# Patient Record
Sex: FEMALE | Race: WHITE | NOT HISPANIC OR LATINO | Employment: PART TIME | URBAN - METROPOLITAN AREA
[De-identification: names, ages, dates, MRNs, and addresses within clinical notes are randomized per-mention and may not be internally consistent; named-entity substitution may affect disease eponyms.]

---

## 2016-03-11 LAB — PHQ9 SCORE: 4

## 2016-05-25 LAB — PHQ9 SCORE: 4

## 2016-06-10 LAB — PHQ9 SCORE: 0

## 2016-07-14 LAB — PHQ9 SCORE: 0

## 2017-03-05 ENCOUNTER — OFFICE VISIT (OUTPATIENT)
Dept: URGENT CARE | Facility: RETAIL CLINIC | Age: 32
End: 2017-03-05
Payer: MEDICARE

## 2017-03-05 VITALS
TEMPERATURE: 98.9 F | SYSTOLIC BLOOD PRESSURE: 129 MMHG | HEART RATE: 98 BPM | DIASTOLIC BLOOD PRESSURE: 86 MMHG | OXYGEN SATURATION: 99 %

## 2017-03-05 DIAGNOSIS — J01.90 ACUTE SINUSITIS WITH SYMPTOMS > 10 DAYS: Primary | ICD-10-CM

## 2017-03-05 PROCEDURE — 99213 OFFICE O/P EST LOW 20 MIN: CPT | Performed by: PHYSICIAN ASSISTANT

## 2017-03-05 NOTE — PATIENT INSTRUCTIONS
Take antibiotic as directed  Apply warm facial compresses/packs for 5-10 minutes three times daily.  Drink plenty of fluids- 6 to 10 glasses of liquids each day. Rest.  Saline drops or nasal sprays as needed.   Steam treatments or humidifier.  Mucinex (guaifenesin) to thin out secretions.  Tylenol or ibuprofen as needed for pain or fever  Follow up at your primary care clinic for increasing pain, high fever, vision changes, worsening symptoms, or no relief from symptoms after 7-10 days.  Please follow up with primary care provider if not improving, worsening or new symptoms or for any adverse reactions to medications.

## 2017-03-05 NOTE — PROGRESS NOTES
Chief Complaint   Patient presents with     Sinus Problem     began as a cold 2 weeks ago; pressure in face, head     Headache     sinus pressure     Cough     productive; 2-3 days        SUBJECTIVE:  Florina Marie is a 31 year old female here with concerns about sinus infection.  She states onset of symptoms were 2 week(s) ago.  Cold sxs prior. She has had maxillary pressure. Course of illness is worsening. Severity moderate  Current and Associated symptoms: nasal congestion, rhinorrhea, cough , facial pain/pressure, headache and post-nasal drainage  Predisposing factors include  recent illness. Recent treatment has included: OTC meds and fliuds    Past Medical History   Diagnosis Date     Attention deficit disorder with hyperactivity(314.01)      Other kyphoscoliosis and scoliosis      Other specified delay in development      Microcephaly     Viral warts, unspecified      plantar warts     Current Outpatient Prescriptions   Medication Sig Dispense Refill     amoxicillin-clavulanate (AUGMENTIN) 875-125 MG per tablet Take 1 tablet by mouth 2 times daily for 10 days 20 tablet 0     desogestrel-ethinyl estradiol (APRI) 0.15-30 MG-MCG per tablet Take 1 tablet by mouth daily Use continuously for 3 months, then use placebo tablets 84 tablet 4     ARIPiprazole (ABILIFY) 10 MG tablet Take 10 mg by mouth daily       clonazePAM (KLONOPIN) 0.5 MG tablet Take 0.5 tablets (0.25 mg) by mouth 2 times daily as needed for anxiety 60 tablet 5          Allergies   Allergen Reactions     No Known Drug Allergies       History   Smoking Status     Never Smoker   Smokeless Tobacco     Never Used     Comment: no smokers in the household       ROS:  Review of systems negative except as stated above.    OBJECTIVE:  /86 (BP Location: Left arm)  Pulse 98  Temp 98.9  F (37.2  C) (Oral)  SpO2 99%  Exam:GENERAL APPEARANCE: healthy, alert and no distress  EYES:  conjunctiva clear  HENT: TM's normal bilaterally, nasal turbinates  erythematous, swollen, thick rhinorrhea yellow, oral mucous membranes moist, no erythema noted, maxillary sinus tenderness. Post nasal drainage noted in the pharynx.  NECK: supple, nontender, no lymphadenopathy  RESP: lungs clear to auscultation - no rales, rhonchi or wheezes  CV: regular rates and rhythm, normal S1 S2, no murmur noted  SKIN: no suspicious lesions or rashes    ASSESSMENT:  Acute sinusitis with symptoms > 10 days [J01.90]    PLAN:  amoxicillin-clavulanate (AUGMENTIN) 875-125 MG per tablet  Take antibiotic as directed  Apply warm facial compresses/packs for 5-10 minutes three times daily.  Drink plenty of fluids- 6 to 10 glasses of liquids each day. Rest.  Saline drops or nasal sprays as needed.   Steam treatments or humidifier.  Mucinex (guaifenesin) to thin out secretions.  Tylenol or ibuprofen as needed for pain or fever  Follow up at your primary care clinic for increasing pain, high fever, vision changes, worsening symptoms, or no relief from symptoms after 7-10 days.  Please follow up with primary care provider if not improving, worsening or new symptoms or for any adverse reactions to medications.     Ekaterina Tristan PA-C  [facility]

## 2017-03-05 NOTE — MR AVS SNAPSHOT
After Visit Summary   3/5/2017    Florina Marie    MRN: [id number]           Patient Information     Date Of Birth          1985        Visit Information        Provider Department      3/5/2017 10:20 AM Ekaterina Tristan PA-C [facility]        Today's Diagnoses     Acute sinusitis with symptoms > 10 days    -  1      Care Instructions    Take antibiotic as directed  Apply warm facial compresses/packs for 5-10 minutes three times daily.  Drink plenty of fluids- 6 to 10 glasses of liquids each day. Rest.  Saline drops or nasal sprays as needed.   Steam treatments or humidifier.  Mucinex (guaifenesin) to thin out secretions.  Tylenol or ibuprofen as needed for pain or fever  Follow up at your primary care clinic for increasing pain, high fever, vision changes, worsening symptoms, or no relief from symptoms after 7-10 days.  Please follow up with primary care provider if not improving, worsening or new symptoms or for any adverse reactions to medications.             Follow-ups after your visit        Who to contact     You can reach your care team any time of the day by calling [phone].  Notification of test results:  If you have an abnormal lab result, we will notify you by phone as soon as possible.         Additional Information About Your Visit        MyChart Information     [employer] gives you secure access to your electronic health record. If you see a primary care provider, you can also send messages to your care team and make appointments. If you have questions, please call your primary care clinic.  If you do not have a primary care provider, please call [phone] and they will assist you.        Care EveryWhere ID     This is your Care EveryWhere ID. This could be used by other organizations to access your [place] medical records  [id number]        Your Vitals Were     Pulse Temperature Pulse Oximetry             98 98.9  F (37.2  C) (Oral) 99%           Blood Pressure from Last 3 Encounters:   03/05/17 129/86   09/09/16 114/73   09/01/16 122/80    Weight from Last 3 Encounters:   09/09/16 237 lb 12 oz (107.8 kg)   09/01/16 237 lb 12 oz (107.8 kg)   08/16/16 239 lb (108.4 kg)              Today, you had the following     No orders found for display         Today's Medication Changes          These changes are accurate as of: 3/5/17 10:50 AM.  If you have any questions, ask your nurse or doctor.               Start taking these medicines.        Dose/Directions    amoxicillin-clavulanate 875-125 MG per tablet   Commonly known as:  AUGMENTIN   Used for:  Acute sinusitis with symptoms > 10 days        Dose:  1 tablet   Take 1 tablet by mouth 2 times daily for 10 days   Quantity:  20 tablet   Refills:  0            Where to get your medicines      These medications were sent to [facility] [place], [facility], [place] - [address]  [address] [address]     Phone:  [phone]     amoxicillin-clavulanate 875-125 MG per tablet                Primary Care Provider Office Phone # Fax #    Alanna Curiel [phone]       [facility] [address]  [place] [address]        Thank you!     Thank you for choosing [facility]  for your care. Our goal is always to provide you with excellent care. Hearing back from our patients is one way we can continue to improve our services. Please take a few minutes to complete the written survey that you may receive in the mail after your visit with us. Thank you!             Your Updated Medication List - Protect others around you: Learn how to safely use, store and throw away your medicines at www.disposemymeds.org.          This list is accurate as of: 3/5/17 10:50 AM.  Always use your most recent med list.                   Brand Name Dispense Instructions for use    ABILIFY 10 MG tablet   Generic drug:  ARIPiprazole      Take 10 mg by mouth  daily       amoxicillin-clavulanate 875-125 MG per tablet    AUGMENTIN    20 tablet    Take 1 tablet by mouth 2 times daily for 10 days       clonazePAM 0.5 MG tablet    klonoPIN    60 tablet    Take 0.5 tablets (0.25 mg) by mouth 2 times daily as needed for anxiety       desogestrel-ethinyl estradiol 0.15-30 MG-MCG per tablet    APRI    84 tablet    Take 1 tablet by mouth daily Use continuously for 3 months, then use placebo tablets

## 2017-03-05 NOTE — NURSING NOTE
"Chief Complaint   Patient presents with     Sinus Problem     began as a cold 2 weeks ago; pressure in face, head     Headache     sinus pressure     Cough     productive; 2-3 days       Initial /86 (BP Location: Left arm)  Pulse 98  Temp 98.9  F (37.2  C) (Oral)  SpO2 99% Estimated body mass index is 39.47 kg/(m^2) as calculated from the following:    Height as of 9/9/16: 5' 5.08\" (1.653 m).    Weight as of 9/9/16: 237 lb 12 oz (107.8 kg).  Medication Reconciliation: complete  "

## 2017-03-07 ENCOUNTER — TELEPHONE (OUTPATIENT)
Dept: FAMILY MEDICINE | Facility: OTHER | Age: 32
End: 2017-03-07

## 2017-03-07 NOTE — TELEPHONE ENCOUNTER
Form filled out to the best of my ability and placed in TC's basket for review/signature.  Karen Rodgers, CMA

## 2017-03-07 NOTE — TELEPHONE ENCOUNTER
Reason for call:  Form  Reason for Call:  Form, our goal is to have forms completed with 72 hours, however, some forms may require a visit or additional information.    Type of letter, form or note:  medical    Who is the form from?: Patient    Where did the form come from: Patient or family brought in       What clinic location was the form placed at?: [facility] - [phone]    Where the form was placed: [facility]    What number is listed as a contact on the form?: NA       Additional comments: pt dropped off form to be completed by MD. Please call pt when done.     Call taken on 3/7/2017 at 10:51 AM by Rhoda Benites

## 2017-03-14 ENCOUNTER — OFFICE VISIT (OUTPATIENT)
Dept: OBGYN | Facility: OTHER | Age: 32
End: 2017-03-14
Payer: MEDICARE

## 2017-03-14 VITALS — DIASTOLIC BLOOD PRESSURE: 76 MMHG | HEART RATE: 72 BPM | SYSTOLIC BLOOD PRESSURE: 122 MMHG

## 2017-03-14 DIAGNOSIS — B37.31 CANDIDIASIS OF VAGINA: Primary | ICD-10-CM

## 2017-03-14 PROCEDURE — 99213 OFFICE O/P EST LOW 20 MIN: CPT | Performed by: ADVANCED PRACTICE MIDWIFE

## 2017-03-14 RX ORDER — FLUCONAZOLE 150 MG/1
150 TABLET ORAL
Qty: 3 TABLET | Refills: 0 | Status: SHIPPED | OUTPATIENT
Start: 2017-03-14 | End: 2017-04-18

## 2017-03-14 NOTE — NURSING NOTE
"Chief Complaint   Patient presents with     Vaginal Problem     check for yeast infection       Initial /76 (BP Location: Left arm, Patient Position: Chair, Cuff Size: Adult Large)  Pulse 72  LMP 03/07/2017 (Approximate) Estimated body mass index is 39.47 kg/(m^2) as calculated from the following:    Height as of 9/9/16: 5' 5.08\" (1.653 m).    Weight as of 9/9/16: 237 lb 12 oz (107.8 kg).  BP completed using cuff size: large    No obstetric history on file.    The following HM Due: NONE      The following patient reported/Care Every where data was sent to:  P ABSTRACT QUALITY INITIATIVES [[id number]]       N/a  Malina Little CMA               "

## 2017-03-14 NOTE — MR AVS SNAPSHOT
After Visit Summary   3/14/2017    Folrina Marie    MRN: [id number]           Patient Information     Date Of Birth          1985        Visit Information        Provider Department      3/14/2017 1:45 PM Lovely Horton APRN [facility]        Today's Diagnoses     Candidiasis of vagina    -  1      Care Instructions    Here is a list of suggestions that may help eliminate or prevent vaginal infections or reduce their recurrence.  Many of these suggestions:   1. boosting your immune system  2. changing the vaginal environment to a more acidic state   3. increasing the good healthy bacteria    Read through them and try the ones that seem to fit you and your life style.    Soak in a warm bath tub, no soap, no bubble bath and no oils.  Add one cup vinegar or lemon juice to bath water once in a while,     Keep a water bottle with a squirt top in your bathroom, fill with warm water and use as a spray after wiping, then pat dry.  You may add 1-3 TBS of white vinegar to help maintain an acidic environment.    Wear cotton underwear; loose pants or skirts, avoid pantyhose and thong underwear.    Try to avoid wearing underware to bed so that your vaginal area can breathe and stay drier.    Keep vaginal area as dry as possible so don't sit for long periods of time in workout clothing or wet bathing suits.     Consider using either male or female condoms or asking your partner not to ejaculate in your vagina.    To boost your immune system consider the followin. Sleep:7-8 hours a night  2. Fluids: get a minimum of 8-10 glasses of water a day  3. Foods: try reducing processed foods in your diet and add whole grains, raw vegetables, fruit, and yogurt that contains active culture or kefir  4. Consider taking a vitamin B complex tablet, sometimes called stress tabs, Vitamin D 1000mg a day, or cranberry tablets.    5.  Consider the stress in your life and try to reduce it through yoga  or meditation.    Decrease daily intake of refined sugars, honey, red meat and alcohol    At each meal drink 1tsp apple cider vinegar and 1 tsp honey in   cup warm water    Consider probiotic tablets to restore normal bacteria back into your system    Boric acid capsules, insert 2 capsules  00  daily into vagina every 3 days if you have chronic problems with yeast or bacterial vaginosis.    If your symptoms do not resolve or if you have questions please call the clinic at [phone] for [place] or [phone] for [place] or send a message through My Chart.           Follow-ups after your visit        Who to contact     If you have questions or need follow up information about today's clinic visit or your schedule please contact [facility] directly at [phone].  Normal or non-critical lab and imaging results will be communicated to you by MyChart, letter or phone within 4 business days after the clinic has received the results. If you do not hear from us within 7 days, please contact the clinic through [employer] or phone. If you have a critical or abnormal lab result, we will notify you by phone as soon as possible.  Submit refill requests through [employer] or call your pharmacy and they will forward the refill request to us. Please allow 3 business days for your refill to be completed.          Additional Information About Your Visit        [email or web address] Information     [employer] gives you secure access to your electronic health record. If you see a primary care provider, you can also send messages to your care team and make appointments. If you have questions, please call your primary care clinic.  If you do not have a primary care provider, please call [phone] and they will assist you.        Care EveryWhere ID     This is your Care EveryWhere ID. This could be used by other organizations to access your [place] medical records  [id number]        Your Vitals Were     Pulse Last Period                 72 03/07/2017 (Approximate)           Blood Pressure from Last 3 Encounters:   03/14/17 122/76   03/05/17 129/86   09/09/16 114/73    Weight from Last 3 Encounters:   09/09/16 237 lb 12 oz (107.8 kg)   09/01/16 237 lb 12 oz (107.8 kg)   08/16/16 239 lb (108.4 kg)              Today, you had the following     No orders found for display         Today's Medication Changes          These changes are accurate as of: 3/14/17  1:57 PM.  If you have any questions, ask your nurse or doctor.               Start taking these medicines.        Dose/Directions    fluconazole 150 MG tablet   Commonly known as:  DIFLUCAN   Used for:  Candidiasis of vagina   Started by:  Lovely Horton APRN CNALYCE        Dose:  150 mg   Take 1 tablet (150 mg) by mouth every 3 days   Quantity:  3 tablet   Refills:  0            Where to get your medicines      These medications were sent to [address]  [address] [facility] [address]     Phone:  [phone]     fluconazole 150 MG tablet                Primary Care Provider Office Phone # Fax #    Alanna CJ MD Veena [phone]       [facility] [address]  [facility] [address]        Thank you!     Thank you for choosing [facility]  for your care. Our goal is always to provide you with excellent care. Hearing back from our patients is one way we can continue to improve our services. Please take a few minutes to complete the written survey that you may receive in the mail after your visit with us. Thank you!             Your Updated Medication List - Protect others around you: Learn how to safely use, store and throw away your medicines at www.disposemymeds.org.          This list is accurate as of: 3/14/17  1:57 PM.  Always use your most recent med list.                   Brand Name Dispense Instructions for use    ABILIFY 10 MG tablet   Generic drug:  ARIPiprazole      Take  10 mg by mouth daily       amoxicillin-clavulanate 875-125 MG per tablet    AUGMENTIN    20 tablet    Take 1 tablet by mouth 2 times daily for 10 days       clonazePAM 0.5 MG tablet    klonoPIN    60 tablet    Take 0.5 tablets (0.25 mg) by mouth 2 times daily as needed for anxiety       desogestrel-ethinyl estradiol 0.15-30 MG-MCG per tablet    APRI    84 tablet    Take 1 tablet by mouth daily Use continuously for 3 months, then use placebo tablets       fluconazole 150 MG tablet    DIFLUCAN    3 tablet    Take 1 tablet (150 mg) by mouth every 3 days

## 2017-03-14 NOTE — PATIENT INSTRUCTIONS
Here is a list of suggestions that may help eliminate or prevent vaginal infections or reduce their recurrence.  Many of these suggestions:   1. boosting your immune system  2. changing the vaginal environment to a more acidic state   3. increasing the good healthy bacteria    Read through them and try the ones that seem to fit you and your life style.    Soak in a warm bath tub, no soap, no bubble bath and no oils.  Add one cup vinegar or lemon juice to bath water once in a while,     Keep a water bottle with a squirt top in your bathroom, fill with warm water and use as a spray after wiping, then pat dry.  You may add 1-3 TBS of white vinegar to help maintain an acidic environment.    Wear cotton underwear; loose pants or skirts, avoid pantyhose and thong underwear.    Try to avoid wearing underware to bed so that your vaginal area can breathe and stay drier.    Keep vaginal area as dry as possible so don't sit for long periods of time in workout clothing or wet bathing suits.     Consider using either male or female condoms or asking your partner not to ejaculate in your vagina.    To boost your immune system consider the followin. Sleep:7-8 hours a night  2. Fluids: get a minimum of 8-10 glasses of water a day  3. Foods: try reducing processed foods in your diet and add whole grains, raw vegetables, fruit, and yogurt that contains active culture or kefir  4. Consider taking a vitamin B complex tablet, sometimes called stress tabs, Vitamin D 1000mg a day, or cranberry tablets.    5.  Consider the stress in your life and try to reduce it through yoga or meditation.    Decrease daily intake of refined sugars, honey, red meat and alcohol    At each meal drink 1tsp apple cider vinegar and 1 tsp honey in   cup warm water    Consider probiotic tablets to restore normal bacteria back into your system    Boric acid capsules, insert 2 capsules  00  daily into vagina every 3 days if you have chronic problems with  yeast or bacterial vaginosis.    If your symptoms do not resolve or if you have questions please call the clinic at [phone] for [place] or [phone] for [place] or send a message through My Chart.

## 2017-03-29 ENCOUNTER — TRANSFERRED RECORDS (OUTPATIENT)
Dept: HEALTH INFORMATION MANAGEMENT | Facility: CLINIC | Age: 32
End: 2017-03-29

## 2017-04-18 ENCOUNTER — OFFICE VISIT (OUTPATIENT)
Dept: OBGYN | Facility: OTHER | Age: 32
End: 2017-04-18
Payer: MEDICARE

## 2017-04-18 VITALS
BODY MASS INDEX: 41.09 KG/M2 | SYSTOLIC BLOOD PRESSURE: 122 MMHG | HEART RATE: 64 BPM | WEIGHT: 247.5 LBS | DIASTOLIC BLOOD PRESSURE: 74 MMHG

## 2017-04-18 DIAGNOSIS — B37.31 CANDIDAL VULVOVAGINITIS: ICD-10-CM

## 2017-04-18 DIAGNOSIS — R30.0 DYSURIA: Primary | ICD-10-CM

## 2017-04-18 LAB
ALBUMIN UR-MCNC: NEGATIVE MG/DL
APPEARANCE UR: CLEAR
BACTERIA #/AREA URNS HPF: ABNORMAL /HPF
BILIRUB UR QL STRIP: NEGATIVE
COLOR UR AUTO: YELLOW
GLUCOSE UR STRIP-MCNC: NEGATIVE MG/DL
HGB UR QL STRIP: ABNORMAL
KETONES UR STRIP-MCNC: NEGATIVE MG/DL
LEUKOCYTE ESTERASE UR QL STRIP: ABNORMAL
NITRATE UR QL: NEGATIVE
NON-SQ EPI CELLS #/AREA URNS LPF: ABNORMAL /LPF
PH UR STRIP: 5.5 PH (ref 5–7)
RBC #/AREA URNS AUTO: ABNORMAL /HPF (ref 0–2)
SP GR UR STRIP: 1.01 (ref 1–1.03)
URN SPEC COLLECT METH UR: ABNORMAL
UROBILINOGEN UR STRIP-ACNC: 0.2 EU/DL (ref 0.2–1)
WBC #/AREA URNS AUTO: ABNORMAL /HPF (ref 0–2)

## 2017-04-18 PROCEDURE — 87088 URINE BACTERIA CULTURE: CPT | Performed by: ADVANCED PRACTICE MIDWIFE

## 2017-04-18 PROCEDURE — 99213 OFFICE O/P EST LOW 20 MIN: CPT | Performed by: ADVANCED PRACTICE MIDWIFE

## 2017-04-18 PROCEDURE — 87086 URINE CULTURE/COLONY COUNT: CPT | Performed by: ADVANCED PRACTICE MIDWIFE

## 2017-04-18 PROCEDURE — 81001 URINALYSIS AUTO W/SCOPE: CPT | Performed by: ADVANCED PRACTICE MIDWIFE

## 2017-04-18 PROCEDURE — 87186 SC STD MICRODIL/AGAR DIL: CPT | Performed by: ADVANCED PRACTICE MIDWIFE

## 2017-04-18 RX ORDER — SULFAMETHOXAZOLE AND TRIMETHOPRIM 400; 80 MG/1; MG/1
1 TABLET ORAL 2 TIMES DAILY
Qty: 6 TABLET | Refills: 0 | Status: SHIPPED | OUTPATIENT
Start: 2017-04-18 | End: 2017-07-26

## 2017-04-18 RX ORDER — PHENAZOPYRIDINE HYDROCHLORIDE 200 MG/1
200 TABLET, FILM COATED ORAL 3 TIMES DAILY PRN
Qty: 6 TABLET | Refills: 0 | Status: SHIPPED | OUTPATIENT
Start: 2017-04-18 | End: 2017-07-26

## 2017-04-18 RX ORDER — FLUCONAZOLE 150 MG/1
150 TABLET ORAL DAILY
Qty: 1 TABLET | Refills: 0 | Status: SHIPPED | OUTPATIENT
Start: 2017-04-18 | End: 2017-07-26

## 2017-04-18 NOTE — MR AVS SNAPSHOT
After Visit Summary   4/18/2017    Florina Marie    MRN: [id number]           Patient Information     Date Of Birth          1985        Visit Information        Provider Department      4/18/2017 1:00 PM Lovely Horton APRN CNM [facility]        Today's Diagnoses     Dysuria    -  1    Candidal vulvovaginitis          Care Instructions    To help reduce the symptoms of your bladder infection please:     Drink 8-10 glasses of water every day  Take all of the medication as it has been prescribed, don't stop before the last dose is gone.  You may use Uristat or Pyridium to soothe your bladder and reduce the burning but be aware that it may stain your clothing.   If you experience fever, chills, back pain please call the clinic ([place] [phone] or [place] [phone])    To prevent future urinary tract infections    AVOID CHEMICAL IRRITTANTS: bath gels, perfumed products,  deoderant pads or tampons, douching    CLOTHING: that increases moisture and bacterial growth:  nylon, lycra, pantihose, pantiliners    AVOID: tight clothing and thongs    ACIDIFY URINE: cranberry tablets  to acidify urine and decrease bacterial growth.     URINATE: before and after intercourse.  If bladder infections are a common problem for you, consider washing your bottom before intercourse as well.     FLUIDS:  Drink 6-8 glasses of water every day.                   Follow-ups after your visit        Follow-up notes from your care team     Return if symptoms worsen or fail to improve.      Who to contact     If you have questions or need follow up information about today's clinic visit or your schedule please contact [facility] directly at [phone].  Normal or non-critical lab and imaging results will be communicated to you by MyChart, letter or phone within 4 business days after the clinic has received the results. If you do not hear from us within 7 days, please contact the  clinic through [employer] or phone. If you have a critical or abnormal lab result, we will notify you by phone as soon as possible.  Submit refill requests through [employer] or call your pharmacy and they will forward the refill request to us. Please allow 3 business days for your refill to be completed.          Additional Information About Your Visit        [employer] Information     [employer] gives you secure access to your electronic health record. If you see a primary care provider, you can also send messages to your care team and make appointments. If you have questions, please call your primary care clinic.  If you do not have a primary care provider, please call [phone] and they will assist you.        Care EveryWhere ID     This is your Care EveryWhere ID. This could be used by other organizations to access your [facility] medical records  [id number]        Your Vitals Were     Pulse Last Period BMI (Body Mass Index)             64 (LMP Unknown) 41.09 kg/m2          Blood Pressure from Last 3 Encounters:   04/18/17 122/74   03/14/17 122/76   03/05/17 129/86    Weight from Last 3 Encounters:   04/18/17 247 lb 8 oz (112.3 kg)   09/09/16 237 lb 12 oz (107.8 kg)   09/01/16 237 lb 12 oz (107.8 kg)              We Performed the Following     *UA reflex to Microscopic and Culture ([facility] (except [place] and [place])     Urine Culture Aerobic Bacterial     Urine Microscopic          Today's Medication Changes          These changes are accurate as of: 4/18/17  3:38 PM.  If you have any questions, ask your nurse or doctor.               Start taking these medicines.        Dose/Directions    fluconazole 150 MG tablet   Commonly known as:  DIFLUCAN   Used for:  Candidal vulvovaginitis   Started by:  Lovely Horton APRN CNM        Dose:  150 mg   Take 1 tablet (150 mg) by mouth daily   Quantity:  1 tablet   Refills:  0       phenazopyridine 200 MG tablet   Commonly known as:  PYRIDIUM   Used  for:  Dysuria   Started by:  Lovely Horton APRN CNM        Dose:  200 mg   Take 1 tablet (200 mg) by mouth 3 times daily as needed for irritation   Quantity:  6 tablet   Refills:  0       sulfamethoxazole-trimethoprim 400-80 MG per tablet   Commonly known as:  BACTRIM/SEPTRA   Used for:  Dysuria   Started by:  Lovely Horton APRN CNM        Dose:  1 tablet   Take 1 tablet by mouth 2 times daily   Quantity:  6 tablet   Refills:  0            Where to get your medicines      These medications were sent to [facility], [facility], [place] - [address]  [address] [address]     Phone:  [phone]     fluconazole 150 MG tablet    phenazopyridine 200 MG tablet    sulfamethoxazole-trimethoprim 400-80 MG per tablet                Primary Care Provider Office Phone # Fax #    Alanna Curiel [phone]       [facility] [address]  [facility] [address]        Thank you!     Thank you for choosing [facility]  for your care. Our goal is always to provide you with excellent care. Hearing back from our patients is one way we can continue to improve our services. Please take a few minutes to complete the written survey that you may receive in the mail after your visit with us. Thank you!             Your Updated Medication List - Protect others around you: Learn how to safely use, store and throw away your medicines at www.disposemymeds.org.          This list is accurate as of: 4/18/17  3:38 PM.  Always use your most recent med list.                   Brand Name Dispense Instructions for use    ABILIFY 10 MG tablet   Generic drug:  ARIPiprazole      Take 10 mg by mouth daily       clonazePAM 0.5 MG tablet    klonoPIN    60 tablet    Take 0.5 tablets (0.25 mg) by mouth 2 times daily as needed for anxiety       desogestrel-ethinyl estradiol 0.15-30 MG-MCG per tablet    APRI    84 tablet    Take 1 tablet by mouth daily Use  continuously for 3 months, then use placebo tablets       fluconazole 150 MG tablet    DIFLUCAN    1 tablet    Take 1 tablet (150 mg) by mouth daily       phenazopyridine 200 MG tablet    PYRIDIUM    6 tablet    Take 1 tablet (200 mg) by mouth 3 times daily as needed for irritation       sulfamethoxazole-trimethoprim 400-80 MG per tablet    BACTRIM/SEPTRA    6 tablet    Take 1 tablet by mouth 2 times daily

## 2017-04-18 NOTE — PROGRESS NOTES
SUBJECTIVE:    Florina Marie is a 31 year old female who presents today with 1 day(s) of dysuria and frequency.   UTI HX: rare.  ADDITIONAL SX: none at this time    Pt denies fever, chills, CVT, abd pain, vaginal discharge/odor. Pt reports that when on abx she gets vaginal yeast infection.    Patient Active Problem List   Diagnosis     Problems with learning     Obesity     Generalized anxiety disorder     Hyperlipidemia with target LDL less than 130     Attention deficit disorder without hyperactivity     Past Medical History:   Diagnosis Date     Attention deficit disorder with hyperactivity(314.01)      Other kyphoscoliosis and scoliosis      Other specified delay in development     Microcephaly     Viral warts, unspecified     plantar warts     Past Surgical History:   Procedure Laterality Date     EXAM UNDER ANESTHESIA PELVIC N/A 9/9/2016    Procedure: EXAM UNDER ANESTHESIA PELVIC;  Surgeon: Rhoda Alcazar MD;  Location:  OR      TOOTH EXTRACTION W/FORCEP      sara     Current Outpatient Prescriptions   Medication Sig Dispense Refill     sulfamethoxazole-trimethoprim (BACTRIM/SEPTRA) 400-80 MG per tablet Take 1 tablet by mouth 2 times daily 6 tablet 0     phenazopyridine (PYRIDIUM) 200 MG tablet Take 1 tablet (200 mg) by mouth 3 times daily as needed for irritation 6 tablet 0     fluconazole (DIFLUCAN) 150 MG tablet Take 1 tablet (150 mg) by mouth daily 1 tablet 0     desogestrel-ethinyl estradiol (APRI) 0.15-30 MG-MCG per tablet Take 1 tablet by mouth daily Use continuously for 3 months, then use placebo tablets 84 tablet 4     ARIPiprazole (ABILIFY) 10 MG tablet Take 10 mg by mouth daily       clonazePAM (KLONOPIN) 0.5 MG tablet Take 0.5 tablets (0.25 mg) by mouth 2 times daily as needed for anxiety 60 tablet 5     Allergies   Allergen Reactions     No Known Drug Allergies          ROS:  CONSTITUTIONAL: Healthy, alert, in no apparent distress.  GI: NEGATIVE  : See HPI  MUSCULOSKELETAL:  NEGATIVE    EXAM  /74 (BP Location: Left arm, Patient Position: Chair, Cuff Size: Adult Large)  Pulse 64  Wt 247 lb 8 oz (112.3 kg)  LMP  (LMP Unknown)  BMI 41.09 kg/m2  Patient appears well, afebrile.   ABD: Soft without supra pubic pain or tenderness  BACK: Neg CVAT.   Urine dip shows   Color Urine (no units)   Date Value   04/18/2017 Yellow     Appearance Urine (no units)   Date Value   04/18/2017 Clear     Glucose Urine (mg/dL)   Date Value   04/18/2017 Negative     Bilirubin Urine (no units)   Date Value   04/18/2017 Negative     Ketones Urine (mg/dL)   Date Value   04/18/2017 Negative     Specific Gravity Urine (no units)   Date Value   04/18/2017 1.015     pH Urine (pH)   Date Value   04/18/2017 5.5     Protein Albumin Urine (mg/dL)   Date Value   04/18/2017 Negative     Urobilinogen Urine (EU/dL)   Date Value   04/18/2017 0.2     Nitrite Urine (no units)   Date Value   04/18/2017 Negative     Leukocyte Esterase Urine (no units)   Date Value   04/18/2017 Small (A)       ASSESSMENT: uncomplicated UTI    PLAN:   (R30.0) Dysuria  (primary encounter diagnosis)  Plan: *UA reflex to Microscopic and Culture ([place]         and [facility] (except [place] and         [place]), Urine Microscopic, Urine Culture         Aerobic Bacterial,         sulfamethoxazole-trimethoprim (BACTRIM/SEPTRA)         400-80 MG per tablet, phenazopyridine         (PYRIDIUM) 200 MG tablet      (B37.3) Candidal vulvovaginitis  Plan: fluconazole (DIFLUCAN) 150 MG tablet        Urine was sent for culture.   Push fluids    Medications per orders in [facility].  May use Uristat or other OTC med for dysuria  Reinforced the importance of completing this course of antibiotics   RTC with continued symptoms or concerns.  Reviewed methods to decrease incidence of UTI    Scribe Disclosure:   IMonae, am serving as a scribe; to document services personally performed by ANGIE Silvestre, BRITTANI  - -based on data collection  and the provider's statements to me.     Provider Disclosure:  I agree with above History, Review of Systems, Physical exam and Plan.  I have reviewed the content of the documentation and have edited it as needed. I have personally performed the services documented here and the documentation accurately represents those services and the decisions I have made.      Electronically signed by:  ANGIE Silvestre CNM

## 2017-04-18 NOTE — PATIENT INSTRUCTIONS
To help reduce the symptoms of your bladder infection please:     Drink 8-10 glasses of water every day  Take all of the medication as it has been prescribed, don't stop before the last dose is gone.  You may use Uristat or Pyridium to soothe your bladder and reduce the burning but be aware that it may stain your clothing.   If you experience fever, chills, back pain please call the clinic ([place] [phone] or [place] [phone])    To prevent future urinary tract infections    AVOID CHEMICAL IRRITTANTS: bath gels, perfumed products,  deoderant pads or tampons, douching    CLOTHING: that increases moisture and bacterial growth:  nylon, lycra, pantihose, pantiliners    AVOID: tight clothing and thongs    ACIDIFY URINE: cranberry tablets  to acidify urine and decrease bacterial growth.     URINATE: before and after intercourse.  If bladder infections are a common problem for you, consider washing your bottom before intercourse as well.     FLUIDS:  Drink 6-8 glasses of water every day.

## 2017-04-18 NOTE — NURSING NOTE
"Chief Complaint   Patient presents with     Dysuria     check for UTI       Initial /74 (BP Location: Left arm, Patient Position: Chair, Cuff Size: Adult Large)  Pulse 64  Wt 247 lb 8 oz (112.3 kg)  LMP  (LMP Unknown)  BMI 41.09 kg/m2 Estimated body mass index is 41.09 kg/(m^2) as calculated from the following:    Height as of 9/9/16: 5' 5.08\" (1.653 m).    Weight as of this encounter: 247 lb 8 oz (112.3 kg).  BP completed using cuff size: large    No obstetric history on file.    The following HM Due: NONE      The following patient reported/Care Every where data was sent to:  P ABSTRACT QUALITY INITIATIVES [[id number]]       N/a  Malina Little CMA               "

## 2017-04-20 LAB
BACTERIA SPEC CULT: ABNORMAL
MICRO REPORT STATUS: ABNORMAL
MICROORGANISM SPEC CULT: ABNORMAL
SPECIMEN SOURCE: ABNORMAL

## 2017-07-26 ENCOUNTER — OFFICE VISIT (OUTPATIENT)
Dept: URGENT CARE | Facility: RETAIL CLINIC | Age: 32
End: 2017-07-26
Payer: MEDICARE

## 2017-07-26 VITALS — SYSTOLIC BLOOD PRESSURE: 134 MMHG | TEMPERATURE: 97.1 F | DIASTOLIC BLOOD PRESSURE: 88 MMHG | HEART RATE: 87 BPM

## 2017-07-26 DIAGNOSIS — H61.22 IMPACTED CERUMEN OF LEFT EAR: Primary | ICD-10-CM

## 2017-07-26 PROCEDURE — 99213 OFFICE O/P EST LOW 20 MIN: CPT | Performed by: PHYSICIAN ASSISTANT

## 2017-07-26 NOTE — PATIENT INSTRUCTIONS
Your left ear were irrigated today.  Discouraged use of Q-tips/chris pins. This may only aggravate the problem.   Do not use ear candles or home irrigation as these techniques may cause injury and have not been proven to be effective.  In the future if wax becomes an issue again, may use 1:1 peroxide/water solution, mineral oil or Debrox.  -Place 5-10 drops in ear  -Let this set for 5 minutes  -Tilt ear to the side to allow solution to drain  -May flush with warm water using a shower head.  May dip a cotton ball in mineral oil and place in ear for 10-15 min to soften wax as well.  Do this for 1-3 days before coming in to have ear flushed as this softens ear wax.  Using these techniques monthly can prevent wax from building up in ear canal.      FOr itchy ears, consider using oils drops (mineral oil, baby oil, olive, canola oil, coconut oil, vegetable oil, etc.   Few drops on cotton ball or eyedropper and place few drops in each ear once a day for few days

## 2017-07-26 NOTE — PROGRESS NOTES
Chief Complaint   Patient presents with     Ear Problem     left ear plugged since last night, no ear pain, no fevers      SUBJECTIVE:  Florina Marie is a 31 year old female who presents with left ear plugged since last night, harder to hear now  No drainage or ear pain  Severity: mild   Timing:still present  Additional symptoms include none  Has had cerumen impaction before    Past Medical History:   Diagnosis Date     Attention deficit disorder with hyperactivity(314.01)      Other kyphoscoliosis and scoliosis      Other specified delay in development     Microcephaly     Viral warts, unspecified     plantar warts     Current Outpatient Prescriptions   Medication Sig Dispense Refill     desogestrel-ethinyl estradiol (APRI) 0.15-30 MG-MCG per tablet Take 1 tablet by mouth daily Use continuously for 3 months, then use placebo tablets 84 tablet 4     ARIPiprazole (ABILIFY) 10 MG tablet Take 10 mg by mouth daily       clonazePAM (KLONOPIN) 0.5 MG tablet Take 0.5 tablets (0.25 mg) by mouth 2 times daily as needed for anxiety 60 tablet 5        Allergies   Allergen Reactions     No Known Drug Allergies         History   Smoking Status     Never Smoker   Smokeless Tobacco     Never Used     Comment: no smokers in the household       ROS:   Review of systems negative except as stated above.    OBJECTIVE:  /88 (BP Location: Left arm)  Pulse 87  Temp 97.1  F (36.2  C) (Temporal)  The right auditory canal very small amt of cerumen, non-obstructing. ear canal otherwise normal.   Right TM is gray, no effusion, and normal landmarks     The left auditory canal shows cerumen impaction, CMA lavaged ear with warm water/hydrogen peroxide, lavaged ear clear of cerumen, remainder of canal clear of erythema, edema, and drainage   After ear lavage, The left TM is intact, no effusions, no erythema, and normal landmarks       ASSESSMENT:  (H61.22) Impacted cerumen of left ear    PLAN:  Your left ear were irrigated  today.  Discouraged use of Q-tips/chris pins. This may only aggravate the problem.   Do not use ear candles or home irrigation as these techniques may cause injury and have not been proven to be effective.  In the future if wax becomes an issue again, may use 1:1 peroxide/water solution, mineral oil or Debrox.  -Place 5-10 drops in ear  -Let this set for 5 minutes  -Tilt ear to the side to allow solution to drain  -May flush with warm water using a shower head.  May dip a cotton ball in mineral oil and place in ear for 10-15 min to soften wax as well.  Do this for 1-3 days before coming in to have ear flushed as this softens ear wax.  Using these techniques monthly can prevent wax from building up in ear canal.  Please follow up with primary care provider if not improving, worsening or new symptoms    Ekaterina Tristan PA-C  [facility] [place] [facility]

## 2017-07-26 NOTE — NURSING NOTE
"Chief Complaint   Patient presents with     Ear Problem     left ear plugged since last night, no ear pain, no fevers       Initial /88 (BP Location: Left arm)  Pulse 87  Temp 97.1  F (36.2  C) (Temporal) Estimated body mass index is 41.09 kg/(m^2) as calculated from the following:    Height as of 9/9/16: 5' 5.08\" (1.653 m).    Weight as of 4/18/17: 247 lb 8 oz (112.3 kg).  Medication Reconciliation: complete    "

## 2017-08-03 LAB — PHQ9 SCORE: 4

## 2017-08-09 ENCOUNTER — TRANSFERRED RECORDS (OUTPATIENT)
Dept: HEALTH INFORMATION MANAGEMENT | Facility: CLINIC | Age: 32
End: 2017-08-09

## 2017-08-09 LAB — PHQ9 SCORE: 2

## 2017-08-24 DIAGNOSIS — F41.1 GENERALIZED ANXIETY DISORDER: ICD-10-CM

## 2017-08-24 DIAGNOSIS — Z13.1 SCREENING FOR DIABETES MELLITUS: ICD-10-CM

## 2017-08-24 DIAGNOSIS — E55.9 VITAMIN D DEFICIENCY: ICD-10-CM

## 2017-08-24 DIAGNOSIS — Z13.220 SCREENING FOR LIPOID DISORDERS: ICD-10-CM

## 2017-08-24 DIAGNOSIS — F33.1 DEPRESSION, MAJOR, RECURRENT, MODERATE (H): Primary | ICD-10-CM

## 2017-08-24 DIAGNOSIS — Z51.81 ENCOUNTER FOR THERAPEUTIC DRUG MONITORING: ICD-10-CM

## 2017-08-24 DIAGNOSIS — Z13.29 SCREENING FOR ENDOCRINE DISORDER: ICD-10-CM

## 2017-08-24 LAB
ALBUMIN SERPL-MCNC: 3.1 G/DL (ref 3.4–5)
ALP SERPL-CCNC: 70 U/L (ref 40–150)
ALT SERPL W P-5'-P-CCNC: 21 U/L (ref 0–50)
ANION GAP SERPL CALCULATED.3IONS-SCNC: 10 MMOL/L (ref 3–14)
AST SERPL W P-5'-P-CCNC: 13 U/L (ref 0–45)
BASOPHILS # BLD AUTO: 0 10E9/L (ref 0–0.2)
BASOPHILS NFR BLD AUTO: 0.2 %
BILIRUB SERPL-MCNC: 0.4 MG/DL (ref 0.2–1.3)
BUN SERPL-MCNC: 11 MG/DL (ref 7–30)
CALCIUM SERPL-MCNC: 8.7 MG/DL (ref 8.5–10.1)
CHLORIDE SERPL-SCNC: 107 MMOL/L (ref 94–109)
CHOLEST SERPL-MCNC: 182 MG/DL
CO2 SERPL-SCNC: 23 MMOL/L (ref 20–32)
CREAT SERPL-MCNC: 1.07 MG/DL (ref 0.52–1.04)
DEPRECATED CALCIDIOL+CALCIFEROL SERPL-MC: 35 UG/L (ref 20–75)
DIFFERENTIAL METHOD BLD: NORMAL
EOSINOPHIL # BLD AUTO: 0.3 10E9/L (ref 0–0.7)
EOSINOPHIL NFR BLD AUTO: 3.1 %
ERYTHROCYTE [DISTWIDTH] IN BLOOD BY AUTOMATED COUNT: 14.1 % (ref 10–15)
GFR SERPL CREATININE-BSD FRML MDRD: 59 ML/MIN/1.7M2
GLUCOSE SERPL-MCNC: 86 MG/DL (ref 70–99)
HCT VFR BLD AUTO: 40 % (ref 35–47)
HDLC SERPL-MCNC: 43 MG/DL
HGB BLD-MCNC: 13 G/DL (ref 11.7–15.7)
LDLC SERPL CALC-MCNC: 107 MG/DL
LYMPHOCYTES # BLD AUTO: 2.9 10E9/L (ref 0.8–5.3)
LYMPHOCYTES NFR BLD AUTO: 27.7 %
MCH RBC QN AUTO: 27.4 PG (ref 26.5–33)
MCHC RBC AUTO-ENTMCNC: 32.5 G/DL (ref 31.5–36.5)
MCV RBC AUTO: 84 FL (ref 78–100)
MONOCYTES # BLD AUTO: 0.6 10E9/L (ref 0–1.3)
MONOCYTES NFR BLD AUTO: 5.7 %
NEUTROPHILS # BLD AUTO: 6.7 10E9/L (ref 1.6–8.3)
NEUTROPHILS NFR BLD AUTO: 63.3 %
NONHDLC SERPL-MCNC: 139 MG/DL
PLATELET # BLD AUTO: 278 10E9/L (ref 150–450)
POTASSIUM SERPL-SCNC: 4.2 MMOL/L (ref 3.4–5.3)
PROT SERPL-MCNC: 7.2 G/DL (ref 6.8–8.8)
RBC # BLD AUTO: 4.74 10E12/L (ref 3.8–5.2)
SODIUM SERPL-SCNC: 140 MMOL/L (ref 133–144)
T4 FREE SERPL-MCNC: 0.84 NG/DL (ref 0.76–1.46)
TRIGL SERPL-MCNC: 160 MG/DL
TSH SERPL DL<=0.005 MIU/L-ACNC: 1.73 MU/L (ref 0.4–4)
WBC # BLD AUTO: 10.6 10E9/L (ref 4–11)

## 2017-08-24 PROCEDURE — 36415 COLL VENOUS BLD VENIPUNCTURE: CPT | Performed by: NURSE PRACTITIONER

## 2017-08-24 PROCEDURE — 84443 ASSAY THYROID STIM HORMONE: CPT | Performed by: NURSE PRACTITIONER

## 2017-08-24 PROCEDURE — 80050 GENERAL HEALTH PANEL: CPT | Performed by: NURSE PRACTITIONER

## 2017-08-24 PROCEDURE — 84439 ASSAY OF FREE THYROXINE: CPT | Performed by: NURSE PRACTITIONER

## 2017-08-24 PROCEDURE — 80053 COMPREHEN METABOLIC PANEL: CPT | Performed by: NURSE PRACTITIONER

## 2017-08-24 PROCEDURE — 80061 LIPID PANEL: CPT | Performed by: NURSE PRACTITIONER

## 2017-08-24 PROCEDURE — 82306 VITAMIN D 25 HYDROXY: CPT | Performed by: NURSE PRACTITIONER

## 2017-08-24 NOTE — PROGRESS NOTES
SUBJECTIVE:   CC: Florina Marie is an 32 year old woman who presents for preventive health visit.     Physical   Annual:     Getting at least 3 servings of Calcium per day::  Yes    Bi-annual eye exam::  Yes    Dental care twice a year::  Yes    Sleep apnea or symptoms of sleep apnea::  None    Diet::  Regular (no restrictions)    Frequency of exercise::  2-3 days/week    Duration of exercise::  30-45 minutes    Taking medications regularly::  Yes    Medication side effects::  None    Additional concerns today::  No      Today's PHQ-2 Score:   PHQ-2 ( 1999 [employer]) 8/29/2017   Q1: Little interest or pleasure in doing things 0   Q2: Feeling down, depressed or hopeless 0   PHQ-2 Score 0   Q1: Little interest or pleasure in doing things Not at all   Q2: Feeling down, depressed or hopeless Not at all   PHQ-2 Score 0       Abuse: Current or Past(Physical, Sexual or Emotional)- No  Do you feel safe in your environment - Yes    Social History   Substance Use Topics     Smoking status: Never Smoker     Smokeless tobacco: Never Used      Comment: no smokers in the household     Alcohol use No     The patient does not drink >3 drinks per day nor >7 drinks per week.    Reviewed orders with patient.  Reviewed health maintenance and updated orders accordingly - Yes    Pertinent mammograms are reviewed under the imaging tab.  History of abnormal Pap smear: NO - age 30-65 PAP every 5 years with negative HPV co-testing recommended    Reviewed and updated as needed this visit by clinical staff  Tobacco  Allergies  Meds  Problems  Med Hx  Surg Hx  Fam Hx  Soc Hx          Reviewed and updated as needed this visit by Provider  Allergies  Meds  Problems            ROS:  C: NEGATIVE for fever, chills, change in weight  I: NEGATIVE for worrisome rashes, moles or lesions  E: NEGATIVE for vision changes or irritation  ENT: NEGATIVE for ear, mouth and throat problems  R: NEGATIVE for significant cough or SOB  B: NEGATIVE for  "masses, tenderness or discharge  CV: NEGATIVE for chest pain, palpitations or peripheral edema  GI: NEGATIVE for nausea, abdominal pain, heartburn, or change in bowel habits  : NEGATIVE for unusual urinary or vaginal symptoms. Periods are regular, every 3 months secondary to her use of continuous oral contraceptive pills.  M: NEGATIVE for significant arthralgias or myalgia  N: NEGATIVE for weakness, dizziness or paresthesias  P: NEGATIVE for changes in mood or affect     OBJECTIVE:   /82 (BP Location: Left arm, Patient Position: Chair, Cuff Size: Adult Large)  Pulse 97  Temp 97.9  F (36.6  C) (Temporal)  Resp 18  Ht 5' 5.75\" (1.67 m)  Wt 257 lb (116.6 kg)  SpO2 96%  BMI 41.8 kg/m2  EXAM:  GENERAL: healthy, alert and no distress  EYES: Eyes grossly normal to inspection, PERRL and conjunctivae and sclerae normal  HENT: ear canals and TM's normal, nose and mouth without ulcers or lesions  NECK: no adenopathy, no asymmetry, masses, or scars and thyroid normal to palpation  RESP: lungs clear to auscultation - no rales, rhonchi or wheezes  CV: regular rate and rhythm, normal S1 S2, no S3 or S4, no murmur, click or rub, no peripheral edema and peripheral pulses strong  ABDOMEN: soft, nontender, no hepatosplenomegaly, no masses and bowel sounds normal  MS: no gross musculoskeletal defects noted, no edema  SKIN: no suspicious lesions or rashes  NEURO: Normal strength and tone, mentation intact and speech normal  PSYCH: mentation appears normal, affect normal/bright    ASSESSMENT/PLAN:   1. Encounter for routine adult health examination without abnormal findings  Follows with Psychiatric NP.    2. Morbid obesity, unspecified obesity type (H)  Discussed increasing exercise, decreasing amount of food.    3. Hyperlipidemia, unspecified  Stable from last year.    4. Irregular menstrual cycle  Refills given.    - desogestrel-ethinyl estradiol (APRI) 0.15-30 MG-MCG per tablet; Take 1 tablet by mouth daily Use " "continuously for 3 months, then use placebo tablets  Dispense: 84 tablet; Refill: 4    COUNSELING:  Reviewed preventive health counseling, as reflected in patient instructions    BP Screening:   Last 3 BP Readings:    BP Readings from Last 3 Encounters:   08/29/17 112/82   07/26/17 134/88   04/18/17 122/74       The following was recommended to the patient:  Re-screen BP within a year and recommended lifestyle modifications     reports that she has never smoked. She has never used smokeless tobacco.    Estimated body mass index is 41.8 kg/(m^2) as calculated from the following:    Height as of this encounter: 5' 5.75\" (1.67 m).    Weight as of this encounter: 257 lb (116.6 kg).   Weight management plan: Discussed healthy diet and exercise guidelines and patient will follow up in 12 months in clinic to re-evaluate.    Counseling Resources:  ATP IV Guidelines  Pooled Cohorts Equation Calculator  Breast Cancer Risk Calculator  FRAX Risk Assessment  ICSI Preventive Guidelines  Dietary Guidelines for Americans, 2010  USDA's MyPlate  ASA Prophylaxis  Lung CA Screening    Alanna Curiel MD  [facility]  Answers for HPI/ROS submitted by the patient on 8/29/2017   PHQ-2 Score: 0  ZEENAT 7 TOTAL SCORE: 0    "

## 2017-08-29 ENCOUNTER — OFFICE VISIT (OUTPATIENT)
Dept: FAMILY MEDICINE | Facility: OTHER | Age: 32
End: 2017-08-29
Payer: MEDICARE

## 2017-08-29 VITALS
DIASTOLIC BLOOD PRESSURE: 82 MMHG | SYSTOLIC BLOOD PRESSURE: 112 MMHG | HEART RATE: 97 BPM | OXYGEN SATURATION: 96 % | WEIGHT: 257 LBS | RESPIRATION RATE: 18 BRPM | HEIGHT: 66 IN | TEMPERATURE: 97.9 F | BODY MASS INDEX: 41.3 KG/M2

## 2017-08-29 DIAGNOSIS — Z00.00 ENCOUNTER FOR ROUTINE ADULT HEALTH EXAMINATION WITHOUT ABNORMAL FINDINGS: Primary | ICD-10-CM

## 2017-08-29 DIAGNOSIS — E66.01 MORBID OBESITY, UNSPECIFIED OBESITY TYPE (H): ICD-10-CM

## 2017-08-29 DIAGNOSIS — E78.5 HYPERLIPIDEMIA, UNSPECIFIED: ICD-10-CM

## 2017-08-29 DIAGNOSIS — N92.6 IRREGULAR MENSTRUAL CYCLE: ICD-10-CM

## 2017-08-29 PROCEDURE — 99395 PREV VISIT EST AGE 18-39: CPT | Performed by: FAMILY MEDICINE

## 2017-08-29 RX ORDER — ARIPIPRAZOLE 15 MG/1
15 TABLET ORAL DAILY
COMMUNITY
Start: 2017-08-07 | End: 2018-03-02

## 2017-08-29 RX ORDER — DESOGESTREL AND ETHINYL ESTRADIOL 0.15-0.03
1 KIT ORAL DAILY
Qty: 84 TABLET | Refills: 4 | Status: SHIPPED | OUTPATIENT
Start: 2017-08-29 | End: 2019-01-08

## 2017-08-29 RX ORDER — ALPRAZOLAM 0.5 MG
TABLET ORAL
Refills: 0 | COMMUNITY
Start: 2017-08-07 | End: 2022-09-06

## 2017-08-29 RX ORDER — ESCITALOPRAM OXALATE 10 MG/1
TABLET ORAL
Refills: 1 | COMMUNITY
Start: 2017-08-24 | End: 2018-08-31

## 2017-08-29 ASSESSMENT — ANXIETY QUESTIONNAIRES
7. FEELING AFRAID AS IF SOMETHING AWFUL MIGHT HAPPEN: NOT AT ALL
GAD7 TOTAL SCORE: 0
5. BEING SO RESTLESS THAT IT IS HARD TO SIT STILL: NOT AT ALL
4. TROUBLE RELAXING: NOT AT ALL
7. FEELING AFRAID AS IF SOMETHING AWFUL MIGHT HAPPEN: NOT AT ALL
6. BECOMING EASILY ANNOYED OR IRRITABLE: NOT AT ALL
3. WORRYING TOO MUCH ABOUT DIFFERENT THINGS: NOT AT ALL
1. FEELING NERVOUS, ANXIOUS, OR ON EDGE: NOT AT ALL
GAD7 TOTAL SCORE: 0
2. NOT BEING ABLE TO STOP OR CONTROL WORRYING: NOT AT ALL
GAD7 TOTAL SCORE: 0

## 2017-08-29 ASSESSMENT — PAIN SCALES - GENERAL: PAINLEVEL: NO PAIN (0)

## 2017-08-29 NOTE — MR AVS SNAPSHOT
After Visit Summary   8/29/2017    Florina Marie    MRN: [id number]           Patient Information     Date Of Birth          1985        Visit Information        Provider Department      8/29/2017 9:00 AM Alanna Curiel MD [facility]        Today's Diagnoses     Encounter for routine adult health examination without abnormal findings    -  1    Morbid obesity, unspecified obesity type (H)        Hyperlipidemia, unspecified        Irregular menstrual cycle          Care Instructions      Preventive Health Recommendations  Female Ages 26 - 39  Yearly exam:   See your health care provider every year in order to    Review health changes.     Discuss preventive care.      Review your medicines if you your doctor has prescribed any.    Until age 30: Get a Pap test every three years (more often if you have had an abnormal result).    After age 30: Talk to your doctor about whether you should have a Pap test every 3 years or have a Pap test with HPV screening every 5 years.   You do not need a Pap test if your uterus was removed (hysterectomy) and you have not had cancer.  You should be tested each year for STDs (sexually transmitted diseases), if you're at risk.   Talk to your provider about how often to have your cholesterol checked.  If you are at risk for diabetes, you should have a diabetes test (fasting glucose).  Shots: Get a flu shot each year. Get a tetanus shot every 10 years.   Nutrition:     Eat at least 5 servings of fruits and vegetables each day.    Eat whole-grain bread, whole-wheat pasta and brown rice instead of white grains and rice.    Talk to your provider about Calcium and Vitamin D.     Lifestyle    Exercise at least 150 minutes a week (30 minutes a day, 5 days of the week). This will help you control your weight and prevent disease.    Limit alcohol to one drink per day.    No smoking.     Wear sunscreen to prevent skin cancer.    See your dentist every  "six months for an exam and cleaning.            Follow-ups after your visit        Who to contact     If you have questions or need follow up information about today's clinic visit or your schedule please contact [facility] directly at [phone].  Normal or non-critical lab and imaging results will be communicated to you by [employer], letter or phone within 4 business days after the clinic has received the results. If you do not hear from us within 7 days, please contact the clinic through [employer] or phone. If you have a critical or abnormal lab result, we will notify you by phone as soon as possible.  Submit refill requests through [employer] or call your pharmacy and they will forward the refill request to us. Please allow 3 business days for your refill to be completed.          Additional Information About Your Visit        [employer] Information     [employer] gives you secure access to your electronic health record. If you see a primary care provider, you can also send messages to your care team and make appointments. If you have questions, please call your primary care clinic.  If you do not have a primary care provider, please call [phone] and they will assist you.        Care EveryWhere ID     This is your Care EveryWhere ID. This could be used by other organizations to access your [facility] medical records  [id number]        Your Vitals Were     Pulse Temperature Respirations Height Pulse Oximetry BMI (Body Mass Index)    97 97.9  F (36.6  C) (Temporal) 18 5' 5.75\" (1.67 m) 96% 41.8 kg/m2       Blood Pressure from Last 3 Encounters:   08/29/17 112/82   07/26/17 134/88   04/18/17 122/74    Weight from Last 3 Encounters:   08/29/17 257 lb (116.6 kg)   04/18/17 247 lb 8 oz (112.3 kg)   09/09/16 237 lb 12 oz (107.8 kg)              Today, you had the following     No orders found for display         Where to get your medicines      These medications were sent to [facility] [place], [place] " - [place], [place] - [address]  [address], [facility] [address]     Phone:  [phone]     desogestrel-ethinyl estradiol 0.15-30 MG-MCG per tablet          Primary Care Provider Office Phone # Fax #    Alanna CJ Curiel [phone]       [address] [facility] [address]  [facility] [address]        Equal Access to Services     ROX SANTIAGO : Hadii luis ku hadasho Soomaali, waaxda luqadaha, qaybta kaalmada adeegyada, waxay ángelin hayjerrican chloe patinokristabryan gonzalez . So [facility] [phone].    ATENCIÓN: Si evette prado, tiene a berkowitz disposición servicios gratuitos de asistencia lingüística. [facility] [phone].    We comply with applicable federal civil rights laws and [place] laws. We do not discriminate on the basis of race, color, national origin, age, disability sex, sexual orientation or gender identity.            Thank you!     Thank you for choosing [facility]  for your care. Our goal is always to provide you with excellent care. Hearing back from our patients is one way we can continue to improve our services. Please take a few minutes to complete the written survey that you may receive in the mail after your visit with us. Thank you!             Your Updated Medication List - Protect others around you: Learn how to safely use, store and throw away your medicines at www.disposemymeds.org.          This list is accurate as of: 8/29/17  9:32 AM.  Always use your most recent med list.                   Brand Name Dispense Instructions for use Diagnosis    ALPRAZolam 0.5 MG tablet    XANAX     TAKE ONE TABLET AS NEEDED FOR SEVERE ANXIETY MAX OF 2 TABLETS PER DAY        ARIPiprazole 15 MG tablet    ABILIFY     Take 15 mg by mouth daily        desogestrel-ethinyl estradiol 0.15-30 MG-MCG per tablet    APRI    84 tablet    Take 1 tablet by mouth daily Use continuously for 3 months, then use placebo tablets    Irregular menstrual cycle       escitalopram 10 MG tablet    LEXAPRO     TAKE 1/2  tablet for 4 days then start 1 TABLET DAILY.

## 2017-08-29 NOTE — NURSING NOTE
"Chief Complaint   Patient presents with     Physical     Panel Management     ht, lipid screening, BOWEN, GAD7       Initial /82 (BP Location: Left arm, Patient Position: Chair, Cuff Size: Adult Large)  Pulse 97  Temp 97.9  F (36.6  C) (Temporal)  Resp 18  Ht 5' 5.75\" (1.67 m)  Wt 257 lb (116.6 kg)  SpO2 96%  BMI 41.8 kg/m2 Estimated body mass index is 41.8 kg/(m^2) as calculated from the following:    Height as of this encounter: 5' 5.75\" (1.67 m).    Weight as of this encounter: 257 lb (116.6 kg).  Medication Reconciliation: complete   Meliza Bruce CMA      "

## 2017-08-30 ASSESSMENT — ANXIETY QUESTIONNAIRES: GAD7 TOTAL SCORE: 0

## 2017-09-29 ENCOUNTER — ALLIED HEALTH/NURSE VISIT (OUTPATIENT)
Dept: FAMILY MEDICINE | Facility: OTHER | Age: 32
End: 2017-09-29
Payer: MEDICARE

## 2017-09-29 DIAGNOSIS — Z11.1 SCREENING EXAMINATION FOR PULMONARY TUBERCULOSIS: Primary | ICD-10-CM

## 2017-09-29 PROCEDURE — 86580 TB INTRADERMAL TEST: CPT

## 2017-09-29 NOTE — MR AVS SNAPSHOT
After Visit Summary   9/29/2017    Florina Marie    MRN: [id number]           Patient Information     Date Of Birth          1985        Visit Information        Provider Department      9/29/2017 10:30 AM NL RN TEAM A, APARNA [facility]        Today's Diagnoses     Screening examination for pulmonary tuberculosis    -  1       Follow-ups after your visit        Your next 10 appointments already scheduled     Oct 02, 2017 10:30 AM CDT   Nurse Only with NL RN TEAM A, APARNA   [facility]    [address]  [facility] [id number] [phone]              Who to contact     If you have questions or need follow up information about today's clinic visit or your schedule please contact [facility] directly at [phone].  Normal or non-critical lab and imaging results will be communicated to you by [employer], letter or phone within 4 business days after the clinic has received the results. If you do not hear from us within 7 days, please contact the clinic through [employer] or phone. If you have a critical or abnormal lab result, we will notify you by phone as soon as possible.  Submit refill requests through [employer] or call your pharmacy and they will forward the refill request to us. Please allow 3 business days for your refill to be completed.          Additional Information About Your Visit        MyChart Information     [employer] gives you secure access to your electronic health record. If you see a primary care provider, you can also send messages to your care team and make appointments. If you have questions, please call your primary care clinic.  If you do not have a primary care provider, please call [phone] and they will assist you.        Care EveryWhere ID     This is your Care EveryWhere ID. This could be used by other organizations to access your [facility] medical records  [id number]         Blood Pressure  from Last 3 Encounters:   08/29/17 112/82   07/26/17 134/88   04/18/17 122/74    Weight from Last 3 Encounters:   08/29/17 257 lb (116.6 kg)   04/18/17 247 lb 8 oz (112.3 kg)   09/09/16 237 lb 12 oz (107.8 kg)              We Performed the Following     INJECTION INTRAMUSCULAR OR SUB-Q     TB INTRADERMAL TEST        Primary Care Provider Office Phone # Fax #    Alanna CORONA MD [facility] [phone]       [address]  [facility] [address]        Equal Access to Services     [facility]: Hadii luis bernardo hadciro Soshira, waaxda luqadaha, qaybta kaalmada blayne, rosalee gonzalez . So [facility] [phone].    ATENCIÓN: Si habla español, tiene a berkowitz disposición servicios gratuitos de asistencia lingüística. [facility] [phone].    We comply with applicable federal civil rights laws and [place] laws. We do not discriminate on the basis of race, color, national origin, age, disability sex, sexual orientation or gender identity.            Thank you!     Thank you for choosing [facility]  for your care. Our goal is always to provide you with excellent care. Hearing back from our patients is one way we can continue to improve our services. Please take a few minutes to complete the written survey that you may receive in the mail after your visit with us. Thank you!             Your Updated Medication List - Protect others around you: Learn how to safely use, store and throw away your medicines at www.disposemymeds.org.          This list is accurate as of: 9/29/17 11:27 AM.  Always use your most recent med list.                   Brand Name Dispense Instructions for use Diagnosis    ALPRAZolam 0.5 MG tablet    XANAX     TAKE ONE TABLET AS NEEDED FOR SEVERE ANXIETY MAX OF 2 TABLETS PER DAY        ARIPiprazole 15 MG tablet    ABILIFY     Take 15 mg by mouth daily        desogestrel-ethinyl estradiol 0.15-30 MG-MCG per tablet    APRI    84 tablet    Take 1 tablet by  mouth daily Use continuously for 3 months, then use placebo tablets    Irregular menstrual cycle       escitalopram 10 MG tablet    LEXAPRO     TAKE 1/2 tablet for 4 days then start 1 TABLET DAILY.

## 2017-09-29 NOTE — NURSING NOTE
Florina is present for a Mantoux administration for Work    The patient is asked the following questions today and these are her answers:    -Have you had a mantoux administered in the past 30 days?    No  -Have you had a previous positive Mantoux.  Yes  -Have you received BCG in the past.  No  -Have you had a live vaccine  (MMR, Varicella, OPV, Yellow Fever) in the last 6 weeks.  No  -Have you had and active  viral or bacterial infection in the past 6 weeks.  No  -Have you received corticosteroids or immunosuppressive agents in the past 6 weeks.  No  -Have you been diagnosed with HIV?  No  -Do you have a maglinancy?  No       Lot number: [id number]  EXP: 04/01/2019  Location of  Mantoux:Left forearm  Time: 10:46 AM    Advised Florina that she needs to return in the next 48-72 hours to get the mantoux test checked on the float nurse schedule, Has an appointment been scheduled for the patient? Yes     Mary Sorenson RN

## 2017-10-02 ENCOUNTER — ALLIED HEALTH/NURSE VISIT (OUTPATIENT)
Dept: FAMILY MEDICINE | Facility: OTHER | Age: 32
End: 2017-10-02
Payer: MEDICARE

## 2017-10-02 DIAGNOSIS — Z11.1 SCREENING EXAMINATION FOR PULMONARY TUBERCULOSIS: Primary | ICD-10-CM

## 2017-10-02 LAB
PPDINDURATION: 0 MM (ref 0–5)
PPDREDNESS: 0 MM

## 2017-10-02 PROCEDURE — 99207 ZZC NO CHARGE NURSE ONLY: CPT

## 2017-10-02 NOTE — PROGRESS NOTES
Patient returns to clinic asking for a letter with mantoux results.   Printed and given to patient.     Jazlyn Torres, RN, BSN

## 2017-10-02 NOTE — LETTER
[address]  [facility] [address]  [phone]          10/2/2017          To Whom it May Concern:     Florina Marie, female, 1985 has had a mantoux on 9/29/17.      Mantoux result is NEGATIVE:  Lab Results   Component Value Date    PPDREDNESS 0 10/02/2017    PPDINDURATIO 0 10/02/2017         Please contact me for questions or concerns.        Sincerely,        Jazlyn Torres, RN, BSN  Alanna Curiel MD

## 2017-10-02 NOTE — PROGRESS NOTES
Mantoux result:  Lab Results   Component Value Date    PPDREDNESS 0 10/02/2017    PPDINDURATIO 0 10/02/2017     Mantoux results: No induration.No swelling.No redness.    Patient declines any form or letter with proof of mantoux results.    Jazlyn Torres, RN, BSN

## 2018-01-06 ENCOUNTER — NURSE TRIAGE (OUTPATIENT)
Dept: NURSING | Facility: CLINIC | Age: 33
End: 2018-01-06

## 2018-01-06 NOTE — TELEPHONE ENCOUNTER
She has questions about getting a letter for a service dog, she already has on service dog and she would like to get another one, not sure the process. Noted that this is a non urgent issue that needs to be discussed with her provider at her [facility] next week when clinic is open. Offered to route message to her provider. She declined and stated she will call on Monday. She was just hoping someone else could give her and answer today. No triage required.    Vida Bartlett, RN, BSN  [facility]

## 2018-01-06 NOTE — TELEPHONE ENCOUNTER
Patient states PCP wrote a letter several years ago for her to get a service dog.  She is looking to get another service dog and requesting if can obtain another letter.  [facility] offered to route a message to PCP but patient will contact [facility] and ask if that is something they can do today.

## 2018-01-06 NOTE — TELEPHONE ENCOUNTER
----- Message from Asher Cruz sent at 1/6/2018  9:25 AM CST -----  Reason for Call:  Other call back    Detailed comments: Patient is calling about a letter that was written for her about having a service dog. She is looking to get another service dog and wondering how many letters she can have or if she can have more than one service dog if there is another letter written to her. Please advise. Thank you.    Phone Number Patient can be reached at: Home number on file [phone] (home)    Best Time: When you can.    Can we leave a detailed message on this number? YES    Call taken on 1/6/2018 at 9:23 AM by Asher Cruz

## 2018-01-08 ENCOUNTER — TELEPHONE (OUTPATIENT)
Dept: FAMILY MEDICINE | Facility: OTHER | Age: 33
End: 2018-01-08

## 2018-01-08 NOTE — TELEPHONE ENCOUNTER
Florina said she is waiting for call back today but has not heard. Please let her know asap about service dog.

## 2018-01-08 NOTE — TELEPHONE ENCOUNTER
Spoke with pt about this need for another animal. Her current dog is not a certified service dog, it is a emotional support animal. She would like to get a second one as her anxiety is increasing. She would need a letter from her provider to give to her housing/ in order to try to get this second dog.     Pt does not want to come in for an appt to address this. She would like to wait and have TC review this. She understands TC will not be in until Friday.

## 2018-01-16 NOTE — TELEPHONE ENCOUNTER
Spoke with patient - informed that she will need to discuss getting another service dog with her psychiatrist. No further questions or concerns at this time.    Aide Hastings, CMA

## 2018-02-23 NOTE — PROGRESS NOTES
SUBJECTIVE:   Florina Marie is a 32 year old female who presents to clinic today for the following health issues:      HPI     Patient would like to discuss options for weight loss. Traditional diet and exercise have been ineffective for her.  She has had a long history of obesity however this intensified after being on Abilify.  Her weight then skyrocketed up.  She has tried diet and exercise to the best of her ability.  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  Her Abilify was weaned off a couple months ago and she has since been weaned on the Topamax and is currently at 200 mg twice daily.  Her mother is with her who would like to discuss other options for her.    Problem list and histories reviewed & adjusted, as indicated.  Additional history: as documented    Patient Active Problem List   Diagnosis     Problems with learning     Morbid obesity (H)     Generalized anxiety disorder     Hyperlipidemia, unspecified     Attention deficit disorder without hyperactivity     Past Surgical History:   Procedure Laterality Date     EXAM UNDER ANESTHESIA PELVIC N/A 9/9/2016    Procedure: EXAM UNDER ANESTHESIA PELVIC;  Surgeon: Rhoda Alcazar MD;  [facility]  [facility]       Social History   Substance Use Topics     Smoking status: Never Smoker     Smokeless tobacco: Never Used      Comment: no smokers in the household     Alcohol use No     Family History   Problem Relation Age of Onset     Lipids Father      diet     Thyroid Disease Mother      unsure if hypo or hyper     DIABETES Paternal Grandfather      adult     HEART DISEASE Paternal Grandfather      bypass     Lipids Maternal Aunt      medication     Lipids Maternal Aunt      diet     Alzheimer Disease Maternal Grandfather      Hypertension No family hx of      Coronary Artery Disease No family hx of      Hyperlipidemia No family hx of      Cancer -  "colorectal No family hx of      Ovarian Cancer No family hx of      Prostate Cancer No family hx of      Depression/Anxiety No family hx of      CEREBROVASCULAR DISEASE No family hx of      Anesthesia Reaction No family hx of      Asthma No family hx of      OSTEOPOROSIS No family hx of      Chemical Addiction No family hx of      Obesity No family hx of          BP Readings from Last 3 Encounters:   03/02/18 132/88   08/29/17 112/82   07/26/17 134/88    Wt Readings from Last 3 Encounters:   03/02/18 286 lb (129.7 kg)   08/29/17 257 lb (116.6 kg)   04/18/17 247 lb 8 oz (112.3 kg)           ROS:  CONSTITUTIONAL: NEGATIVE for fever, chills  ENT/MOUTH: NEGATIVE for ear, mouth and throat problems  RESP: NEGATIVE for significant cough or SOB  CV: NEGATIVE for chest pain, palpitations or peripheral edema    OBJECTIVE:     /88 (BP Location: Right arm, Patient Position: Chair, Cuff Size: Adult Large)  Pulse 78  Temp 98.4  F (36.9  C) (Temporal)  Resp 16  Ht 5' 5.35\" (1.66 m)  Wt 286 lb (129.7 kg)  SpO2 98%  BMI 47.08 kg/m2  Body mass index is 47.08 kg/(m^2).  Gen: no apparent distress  NECK: no adenopathy, no asymmetry, no masses  Chest: clear to auscultation without wheeze, rale or rhonchi  Cor: regular rate and rhythm without murmur  ABDOMEN: obese, soft, nontender, no masses and bowel sounds normal  Ext: warm and dry without edema  Psych: Alert and oriented times 3; coherent speech, normal   rate and volume.  Her affect is neutral     ASSESSMENT/PLAN:     BMI:   Estimated body mass index is 47.08 kg/(m^2) as calculated from the following:    Height as of this encounter: 5' 5.35\" (1.66 m).    Weight as of this encounter: 286 lb (129.7 kg).   Weight management plan: Patient referred to endocrine and/or weight management specialty      1. Morbid obesity (H)  Discussed that Topamax can be beneficial and weight loss.  I do believe that Abilify exacerbated her obesity issue.  At this time the patient and her " mother are very engaged and would like to consult with bariatric surgical specialty.  Will also draw labs to look for contributing metabolic issue.    - TSH with free T4 reflex  - Glucose  - BARIATRIC ADULT REFERRAL    2. Hyperlipidemia, unspecified hyperlipidemia type    - TSH with free T4 reflex  - Glucose    3. Generalized anxiety disorder  Follows closely with psychiatry.  - TSH with free T4 reflex    Alanna Curiel MD  [facility]

## 2018-03-02 ENCOUNTER — OFFICE VISIT (OUTPATIENT)
Dept: FAMILY MEDICINE | Facility: OTHER | Age: 33
End: 2018-03-02
Payer: MEDICARE

## 2018-03-02 VITALS
HEART RATE: 78 BPM | DIASTOLIC BLOOD PRESSURE: 88 MMHG | TEMPERATURE: 98.4 F | HEIGHT: 65 IN | WEIGHT: 286 LBS | SYSTOLIC BLOOD PRESSURE: 132 MMHG | BODY MASS INDEX: 47.65 KG/M2 | RESPIRATION RATE: 16 BRPM | OXYGEN SATURATION: 98 %

## 2018-03-02 DIAGNOSIS — F41.1 GENERALIZED ANXIETY DISORDER: ICD-10-CM

## 2018-03-02 DIAGNOSIS — E66.01 MORBID OBESITY (H): Primary | ICD-10-CM

## 2018-03-02 DIAGNOSIS — E78.5 HYPERLIPIDEMIA, UNSPECIFIED HYPERLIPIDEMIA TYPE: ICD-10-CM

## 2018-03-02 DIAGNOSIS — F33.1 MAJOR DEPRESSIVE DISORDER, RECURRENT EPISODE, MODERATE (H): Primary | ICD-10-CM

## 2018-03-02 DIAGNOSIS — Z79.899 HIGH RISK MEDICATION USE: ICD-10-CM

## 2018-03-02 LAB
GLUCOSE SERPL-MCNC: 92 MG/DL (ref 70–99)
TSH SERPL DL<=0.005 MIU/L-ACNC: 1.37 MU/L (ref 0.4–4)

## 2018-03-02 PROCEDURE — 82947 ASSAY GLUCOSE BLOOD QUANT: CPT | Performed by: FAMILY MEDICINE

## 2018-03-02 PROCEDURE — 99213 OFFICE O/P EST LOW 20 MIN: CPT | Performed by: FAMILY MEDICINE

## 2018-03-02 PROCEDURE — 84443 ASSAY THYROID STIM HORMONE: CPT | Performed by: FAMILY MEDICINE

## 2018-03-02 PROCEDURE — 36415 COLL VENOUS BLD VENIPUNCTURE: CPT | Performed by: FAMILY MEDICINE

## 2018-03-02 RX ORDER — TOPIRAMATE 200 MG/1
TABLET, FILM COATED ORAL
Refills: 5 | COMMUNITY
Start: 2018-01-25 | End: 2018-07-09

## 2018-03-02 NOTE — NURSING NOTE
"Chief Complaint   Patient presents with     Weight Loss     Panel Management     height, flu, jhoana, medicare wellness       Initial /88 (BP Location: Right arm, Patient Position: Chair, Cuff Size: Adult Large)  Pulse 78  Temp 98.4  F (36.9  C) (Temporal)  Resp 16  Ht 5' 5.35\" (1.66 m)  Wt 286 lb (129.7 kg)  SpO2 98%  BMI 47.08 kg/m2 Estimated body mass index is 47.08 kg/(m^2) as calculated from the following:    Height as of this encounter: 5' 5.35\" (1.66 m).    Weight as of this encounter: 286 lb (129.7 kg).  Medication Reconciliation: complete   Meliza Bruce CMA      "

## 2018-03-02 NOTE — MR AVS SNAPSHOT
After Visit Summary   3/2/2018    Florina Marie    MRN: [id number]           Patient Information     Date Of Birth          1985        Visit Information        Provider Department      3/2/2018 2:20 PM Alanna Curiel MD [facility]        Today's Diagnoses     Morbid obesity (H)    -  1    Hyperlipidemia, unspecified hyperlipidemia type        Generalized anxiety disorder           Follow-ups after your visit        Additional Services     BARIATRIC ADULT REFERRAL       Your provider has referred you to: [facility]: [facility]  [place] [phone]  [email or web address]    Please be aware that coverage of these services is subject to the terms and limitations of your health insurance plan.  Call member services at your health plan with any benefit or coverage questions.      Please bring the following with you to your appointment:      (1) List of current medications   (2) This referral request   (3) Any documents/labs given to you for this referral                  Who to contact     If you have questions or need follow up information about today's clinic visit or your schedule please contact [facility] directly at [phone].  Normal or non-critical lab and imaging results will be communicated to you by MyChart, letter or phone within 4 business days after the clinic has received the results. If you do not hear from us within 7 days, please contact the clinic through [employer] or phone. If you have a critical or abnormal lab result, we will notify you by phone as soon as possible.  Submit refill requests through [employer] or call your pharmacy and they will forward the refill request to us. Please allow 3 business days for your refill to be completed.          Additional Information About Your Visit        MyChart Information     [employer] gives  "you secure access to your electronic health record. If you see a primary care provider, you can also send messages to your care team and make appointments. If you have questions, please call your primary care clinic.  If you do not have a primary care provider, please call [phone] and they will assist you.        Care EveryWhere ID     This is your Care EveryWhere ID. This could be used by other organizations to access your [place] medical records  [id number]        Your Vitals Were     Pulse Temperature Respirations Height Pulse Oximetry BMI (Body Mass Index)    78 98.4  F (36.9  C) (Temporal) 16 5' 5.35\" (1.66 m) 98% 47.08 kg/m2       Blood Pressure from Last 3 Encounters:   03/02/18 132/88   08/29/17 112/82   07/26/17 134/88    Weight from Last 3 Encounters:   03/02/18 286 lb (129.7 kg)   08/29/17 257 lb (116.6 kg)   04/18/17 247 lb 8 oz (112.3 kg)              We Performed the Following     BARIATRIC ADULT REFERRAL     Glucose     TSH with free T4 reflex        Primary Care Provider Office Phone # Fax #    Alanna CORONA MD [facility] [phone]       [address]  [facility] [address]        Equal Access to Services     ROX SANTIAGO : Hadii aad ku hadasho Soomaali, waaxda luqadaha, qaybta kaalmada adeegyada, waxay rah gonzalez . So [facility] [phone].    ATENCIÓN: Si habla español, tiene a berkowitz disposición servicios gratuitos de asistencia lingüística. Llame al [phone].    We comply with applicable federal civil rights laws and [place] laws. We do not discriminate on the basis of race, color, national origin, age, disability, sex, sexual orientation, or gender identity.            Thank you!     Thank you for choosing [facility]  for your care. Our goal is always to provide you with excellent care. Hearing back from our patients is one way we can continue to improve our services. Please take a few minutes to complete the written survey that " you may receive in the mail after your visit with us. Thank you!             Your Updated Medication List - Protect others around you: Learn how to safely use, store and throw away your medicines at www.disposemymeds.org.          This list is accurate as of 3/2/18  2:38 PM.  Always use your most recent med list.                   Brand Name Dispense Instructions for use Diagnosis    ALPRAZolam 0.5 MG tablet    XANAX     TAKE ONE TABLET AS NEEDED FOR SEVERE ANXIETY MAX OF 2 TABLETS PER DAY        desogestrel-ethinyl estradiol 0.15-30 MG-MCG per tablet    APRI    84 tablet    Take 1 tablet by mouth daily Use continuously for 3 months, then use placebo tablets    Irregular menstrual cycle       escitalopram 10 MG tablet    LEXAPRO     TAKE 1/2 tablet for 4 days then start 1 TABLET DAILY.        topiramate 200 MG tablet    TOPAMAX     TAKE 1 TABLET TWICE DAILY.

## 2018-03-04 LAB — TOPIRAMATE SERPL-MCNC: 5.9 UG/ML (ref 5–20)

## 2018-03-19 ENCOUNTER — TELEPHONE (OUTPATIENT)
Dept: FAMILY MEDICINE | Facility: OTHER | Age: 33
End: 2018-03-19

## 2018-03-19 NOTE — TELEPHONE ENCOUNTER
You placed a referral for patient to bariatrics  on 3/2/18.  Patient has not scheduled as of yet.      Please review and forward to team if follow up with the patient is needed.     Thank you!  [place]/Clinic Referrals Dyad II

## 2018-04-27 ENCOUNTER — TELEPHONE (OUTPATIENT)
Dept: FAMILY MEDICINE | Facility: OTHER | Age: 33
End: 2018-04-27

## 2018-04-27 NOTE — TELEPHONE ENCOUNTER
"Florina Marie is a 32 year old female who calls with possible allergic reaction.    NURSING ASSESSMENT:  Description:  I spoke with the pt who states she usually sees a psychiatrist at [facility] and she noticed her right eye usually gets red under it, she is taking Lexapro. The area that is red is the bag area under the eye. Usually in the morning when she wakes up but it goes away during the day. No new face creams, soaps or lotions. Sometimes sleeps on that side. No new laundry soap  Onset/duration:  \"It has been going on for a while\"  Precip. factors:  NKDA  Associated symptoms:  No swelling, no SOB, no difficulties breathing  Improves/worsens symptoms:  She has not tried anything.  Pain scale (0-10)   0/10    Allergies:   Allergies   Allergen Reactions     No Known Drug Allergies      RECOMMENDED DISPOSITION:  Home care advice  Will comply with recommendation: Yes  If further questions/concerns or if symptoms do not improve, worsen or new symptoms develop, call your PCP or [facility] as soon as possible.      Guideline used:  Telephone Triage Protocols for Nurses, Fifth Edition, Delma Degroot RN    "

## 2018-05-01 ENCOUNTER — TELEPHONE (OUTPATIENT)
Dept: FAMILY MEDICINE | Facility: OTHER | Age: 33
End: 2018-05-01

## 2018-05-01 NOTE — TELEPHONE ENCOUNTER
Reason for call:  Form  Reason for Call:  Form, our goal is to have forms completed with 72 hours, however, some forms may require a visit or additional information.    Type of letter, form or note:  medical    Who is the form from?: Patient (if other please explain)    Where did the form come from: Patient or family brought in       What clinic location was the form placed at?: [facility] - [phone]    Where the form was placed: [facility]    What number is listed as a contact on the form?: [phone]       Additional comments: pt had came in today and dropped off forms to be completed asap. Pt states that this is for options (  ) and she would like this done so she can start the process. Pt had her last PE 8/2017 and she had her last appt on 3/2018. It looks like Alanna SINGER is out of the clinic this weekend so if another provider can fill out and sign. Please advise.     Call taken on 5/1/2018 at 3:16 PM by Rhoda Benites

## 2018-05-01 NOTE — TELEPHONE ENCOUNTER
Form filled out to the best of my ability and placed in out of office providers basket for review/signature.  Karen Rodgers, CMA

## 2018-05-02 ENCOUNTER — TELEPHONE (OUTPATIENT)
Dept: FAMILY MEDICINE | Facility: OTHER | Age: 33
End: 2018-05-02

## 2018-05-02 ENCOUNTER — OFFICE VISIT (OUTPATIENT)
Dept: FAMILY MEDICINE | Facility: OTHER | Age: 33
End: 2018-05-02
Payer: MEDICARE

## 2018-05-02 VITALS
SYSTOLIC BLOOD PRESSURE: 122 MMHG | TEMPERATURE: 97 F | WEIGHT: 269 LBS | BODY MASS INDEX: 44.28 KG/M2 | DIASTOLIC BLOOD PRESSURE: 74 MMHG | RESPIRATION RATE: 18 BRPM | HEART RATE: 68 BPM

## 2018-05-02 DIAGNOSIS — B37.31 CANDIDIASIS OF VAGINA: ICD-10-CM

## 2018-05-02 DIAGNOSIS — N89.8 VAGINAL ODOR: ICD-10-CM

## 2018-05-02 DIAGNOSIS — N30.00 ACUTE CYSTITIS WITHOUT HEMATURIA: Primary | ICD-10-CM

## 2018-05-02 DIAGNOSIS — E86.0 DEHYDRATION: ICD-10-CM

## 2018-05-02 DIAGNOSIS — N89.8 VAGINAL ITCHING: ICD-10-CM

## 2018-05-02 DIAGNOSIS — F81.9 PROBLEMS WITH LEARNING: ICD-10-CM

## 2018-05-02 LAB
ALBUMIN UR-MCNC: ABNORMAL MG/DL
APPEARANCE UR: CLEAR
BACTERIA #/AREA URNS HPF: ABNORMAL /HPF
BILIRUB UR QL STRIP: NEGATIVE
COLOR UR AUTO: YELLOW
GLUCOSE UR STRIP-MCNC: NEGATIVE MG/DL
HGB UR QL STRIP: ABNORMAL
KETONES UR STRIP-MCNC: ABNORMAL MG/DL
LEUKOCYTE ESTERASE UR QL STRIP: NEGATIVE
NITRATE UR QL: NEGATIVE
NON-SQ EPI CELLS #/AREA URNS LPF: ABNORMAL /LPF
PH UR STRIP: 6 PH (ref 5–7)
RBC #/AREA URNS AUTO: ABNORMAL /HPF
SOURCE: ABNORMAL
SP GR UR STRIP: 1.02 (ref 1–1.03)
SPECIMEN SOURCE: NORMAL
UROBILINOGEN UR STRIP-ACNC: 0.2 EU/DL (ref 0.2–1)
WBC #/AREA URNS AUTO: ABNORMAL /HPF
WET PREP SPEC: NORMAL

## 2018-05-02 PROCEDURE — 87086 URINE CULTURE/COLONY COUNT: CPT | Performed by: NURSE PRACTITIONER

## 2018-05-02 PROCEDURE — 99214 OFFICE O/P EST MOD 30 MIN: CPT | Performed by: NURSE PRACTITIONER

## 2018-05-02 PROCEDURE — 87210 SMEAR WET MOUNT SALINE/INK: CPT | Performed by: NURSE PRACTITIONER

## 2018-05-02 PROCEDURE — 81001 URINALYSIS AUTO W/SCOPE: CPT | Performed by: NURSE PRACTITIONER

## 2018-05-02 RX ORDER — FLUCONAZOLE 150 MG/1
150 TABLET ORAL ONCE
Qty: 1 TABLET | Refills: 0 | Status: SHIPPED | OUTPATIENT
Start: 2018-05-02 | End: 2019-02-05

## 2018-05-02 RX ORDER — CIPROFLOXACIN 250 MG/1
250 TABLET, FILM COATED ORAL 2 TIMES DAILY
Qty: 6 TABLET | Refills: 0 | Status: SHIPPED | OUTPATIENT
Start: 2018-05-02 | End: 2018-07-09

## 2018-05-02 ASSESSMENT — ANXIETY QUESTIONNAIRES
7. FEELING AFRAID AS IF SOMETHING AWFUL MIGHT HAPPEN: NOT AT ALL
3. WORRYING TOO MUCH ABOUT DIFFERENT THINGS: NEARLY EVERY DAY
5. BEING SO RESTLESS THAT IT IS HARD TO SIT STILL: NOT AT ALL
2. NOT BEING ABLE TO STOP OR CONTROL WORRYING: NEARLY EVERY DAY
4. TROUBLE RELAXING: NEARLY EVERY DAY
1. FEELING NERVOUS, ANXIOUS, OR ON EDGE: NEARLY EVERY DAY
7. FEELING AFRAID AS IF SOMETHING AWFUL MIGHT HAPPEN: NOT AT ALL
6. BECOMING EASILY ANNOYED OR IRRITABLE: NEARLY EVERY DAY
GAD7 TOTAL SCORE: 15

## 2018-05-02 ASSESSMENT — PAIN SCALES - GENERAL: PAINLEVEL: NO PAIN (0)

## 2018-05-02 ASSESSMENT — PATIENT HEALTH QUESTIONNAIRE - PHQ9
SUM OF ALL RESPONSES TO PHQ QUESTIONS 1-9: 6
10. IF YOU CHECKED OFF ANY PROBLEMS, HOW DIFFICULT HAVE THESE PROBLEMS MADE IT FOR YOU TO DO YOUR WORK, TAKE CARE OF THINGS AT HOME, OR GET ALONG WITH OTHER PEOPLE: NOT DIFFICULT AT ALL
SUM OF ALL RESPONSES TO PHQ QUESTIONS 1-9: 6

## 2018-05-02 NOTE — TELEPHONE ENCOUNTER
Florina Marie is a 32 year old female who presents with concerns of UTI.    NURSING ASSESSMENT:  Description:  Very stressed from work, on edge, nerved out. Urine is darker yellow. Vaginal itching. Started a new job in February which is causing anxiety, stress and frustration. Having rages, blow ups - getting in peoples faces, has laid hands on her boss. Stopped in the clinic on her way to therapy to see if stress can cause a UTI as she is concerned since her urine is darker in color. Denies pain with urination, cloudy urine, odor, vaginal discharge, fever.   Onset/duration:  A few days   Precip. factors:  Stress, anxiety, vaginal itching and darker than her normal urine   Associated symptoms:  Vaginal itching, dark urine   Improves/worsens symptoms:  Same   Pain scale (0-10)   Itching   LMP/preg/breast feeding:  No LMP recorded.  Last exam/Treatment:  03/02/2018  Allergies:   Allergies   Allergen Reactions     No Known Drug Allergies      NURSING PLAN: Nursing advice to patient to see a provider for vaginal itching concerns and discuss urine concerns    RECOMMENDED DISPOSITION:  See in 24 hours - scheduled   Will comply with recommendation: Yes  If further questions/concerns or if symptoms do not improve, worsen or new symptoms develop, call your PCP or [facility] as soon as possible.    NOTES:  Disposition was determined by the first positive assessment question, therefore all previous assessment questions were negative    Guideline used:  Telephone Triage Protocols for Nurses, Fifth Edition, Delma Potts  Nursing Judgment    Faustina Terry RN, BSN

## 2018-05-02 NOTE — PATIENT INSTRUCTIONS
- Start antibiotic treatment today twice daily for 3 days  - Recommend 1 dose of Diflucan for vaginal itching  - Make sure to increase your water intake to help with hydration.   - Follow up if symptoms worsen or do not improve     ANGIE Kaba CNP

## 2018-05-02 NOTE — PROGRESS NOTES
SUBJECTIVE:   Florina Marie is a 32 year old female who presents to clinic today for the following health issues:      HPI  URINARY TRACT SYMPTOMS  Onset: 05/01    Description:   Painful urination (Dysuria): no   Blood in urine (Hematuria): No but, change in color  Delay in urine (Hesitency): no     Intensity: mild    Progression of Symptoms:  same    Accompanying Signs & Symptoms:  Fever/chills: no   Flank pain no   Nausea and vomiting: no   Any vaginal symptoms: vaginal itching- Patient declined doing a self collect wet prep.  Abdominal/Pelvic Pain: no     History:   History of frequent UTI's: YES  History of kidney stones: no   Sexually Active: no   Possibility of pregnancy: No    Precipitating factors:   Patient states she has been stressed and her anxiety has increased.      Therapies Tried and outcome: none  Problem list and histories reviewed & adjusted, as indicated.  Additional history: as documented        Current Outpatient Prescriptions   Medication Sig Dispense Refill     ALPRAZolam (XANAX) 0.5 MG tablet TAKE ONE TABLET AS NEEDED FOR SEVERE ANXIETY MAX OF 2 TABLETS PER DAY  0     desogestrel-ethinyl estradiol (APRI) 0.15-30 MG-MCG per tablet Take 1 tablet by mouth daily Use continuously for 3 months, then use placebo tablets 84 tablet 4     escitalopram (LEXAPRO) 10 MG tablet TAKE 1/2 tablet for 4 days then start 1 TABLET DAILY.  1     topiramate (TOPAMAX) 200 MG tablet TAKE 1 TABLET TWICE DAILY.  5     BP Readings from Last 3 Encounters:   05/02/18 122/74   03/02/18 132/88   08/29/17 112/82    Wt Readings from Last 3 Encounters:   05/02/18 269 lb (122 kg)   03/02/18 286 lb (129.7 kg)   08/29/17 257 lb (116.6 kg)                    ROS:  CONSTITUTIONAL: NEGATIVE for fever, chills, change in weight  ENT/MOUTH: NEGATIVE for ear, mouth and throat problems  RESP: NEGATIVE for significant cough or SOB  CV: NEGATIVE for chest pain, palpitations or peripheral edema  GI: NEGATIVE for nausea, abdominal pain,  heartburn, or change in bowel habits  :  dysuria and frequency, Negative for chance of pregnancy     OBJECTIVE:     /74 (Cuff Size: Adult Large)  Pulse 68  Temp 97  F (36.1  C) (Temporal)  Resp 18  Wt 269 lb (122 kg)  BMI 44.28 kg/m2  Body mass index is 44.28 kg/(m^2).  GENERAL: healthy, alert and no distress  RESP: lungs clear to auscultation - no rales, rhonchi or wheezes  ABDOMEN: soft, nontender, no hepatosplenomegaly, no masses and bowel sounds normal   (female): normal urethral meatus and foul vaginal odor.   PSYCH: mentation appears normal, affect normal/bright    Diagnostic Test Results:  Results for orders placed or performed in visit on 05/02/18 (from the past 24 hour(s))   *UA reflex to Microscopic and Culture ([facility] (except [place] and [place])   Result Value Ref [facility]    Color Urine Yellow     Appearance Urine Clear     Glucose Urine Negative NEG^Negative mg/dL    Bilirubin Urine Negative NEG^Negative    Ketones Urine Trace (A) NEG^Negative mg/dL    Specific Gravity Urine 1.025 1.003 - 1.035    Blood Urine Small (A) NEG^Negative    pH Urine 6.0 5.0 - 7.0 pH    Protein Albumin Urine Trace (A) NEG^Negative mg/dL    Urobilinogen Urine 0.2 0.2 - 1.0 EU/dL    Nitrite Urine Negative NEG^Negative    Leukocyte Esterase Urine Negative NEG^Negative    Source Unspecified Urine    Urine Microscopic   Result Value Ref [facility]    WBC Urine 0 - 5 OTO5^0 - 5 /HPF    RBC Urine O - 2 OTO2^O - 2 /HPF    Squamous Epithelial /LPF Urine Few FEW^Few /LPF    Bacteria Urine Few (A) NEG^Negative /HPF       ASSESSMENT/PLAN:         ICD-10-CM    1. Acute cystitis without hematuria N30.00 *UA reflex to Microscopic and Culture ([facility] (except [place] and [place])     Wet prep     Urine Microscopic     Urine Culture Aerobic Bacterial     ciprofloxacin (CIPRO) 250 MG tablet   2. Vaginal itching L29.8 *UA reflex to Microscopic and Culture ([facility] (except  [place] and [place])     Wet prep   3. Vaginal odor N89.8 fluconazole (DIFLUCAN) 150 MG tablet   4. Dehydration E86.0    5. Problems with learning F81.9    6. Candidiasis of vagina B37.3 fluconazole (DIFLUCAN) 150 MG tablet     - Start antibiotic treatment today twice daily for 3 days  - Recommend 1 dose of Diflucan for vaginal itching  - Make sure to increase your water intake to help with hydration.   - Follow up if symptoms worsen or do not improve       The patient indicates understanding of these issues and agrees with the plan.    Patient Instructions   - Start antibiotic treatment today twice daily for 3 days  - Recommend 1 dose of Diflucan for vaginal itching  - Make sure to increase your water intake to help with hydration.   - Follow up if symptoms worsen or do not improve     ANGIE Kaba CNP, APRN CNP

## 2018-05-02 NOTE — MR AVS SNAPSHOT
After Visit Summary   5/2/2018    Florina Marie    MRN: [id number]           Patient Information     Date Of Birth          1985        Visit Information        Provider Department      5/2/2018 11:40 AM Billie Victoria APRN CNP [facility]        Today's Diagnoses     Acute cystitis without hematuria    -  1    Vaginal itching        Vaginal odor        Dehydration        Problems with learning        Candidiasis of vagina          Care Instructions    - Start antibiotic treatment today twice daily for 3 days  - Recommend 1 dose of Diflucan for vaginal itching  - Make sure to increase your water intake to help with hydration.   - Follow up if symptoms worsen or do not improve     ANGIE Kaba CNP            Follow-ups after your visit        Follow-up notes from your care team     Return if symptoms worsen or fail to improve.      Who to contact     If you have questions or need follow up information about today's clinic visit or your schedule please contact [facility] directly at [phone].  Normal or non-critical lab and imaging results will be communicated to you by [employer], letter or phone within 4 business days after the clinic has received the results. If you do not hear from us within 7 days, please contact the clinic through [employer] or phone. If you have a critical or abnormal lab result, we will notify you by phone as soon as possible.  Submit refill requests through [employer] or call your pharmacy and they will forward the refill request to us. Please allow 3 business days for your refill to be completed.          Additional Information About Your Visit        MyChart Information     [employer] gives you secure access to your electronic health record. If you see a primary care provider, you can also send messages to your care team and make appointments. If you have questions, please call your primary care clinic.  If you do not have a primary  care provider, please call [phone] and they will assist you.        Care EveryWhere ID     This is your Care EveryWhere ID. This could be used by other organizations to access your [place] medical records  [id number]        Your Vitals Were     Pulse Temperature Respirations BMI (Body Mass Index)          68 97  F (36.1  C) (Temporal) 18 44.28 kg/m2         Blood Pressure from Last 3 Encounters:   05/02/18 122/74   03/02/18 132/88   08/29/17 112/82    Weight from Last 3 Encounters:   05/02/18 269 lb (122 kg)   03/02/18 286 lb (129.7 kg)   08/29/17 257 lb (116.6 kg)              We Performed the Following     *UA reflex to Microscopic and Culture ([facility] (except [place] and [place])     Urine Culture Aerobic Bacterial     Urine Microscopic     Wet prep          Today's Medication Changes          These changes are accurate as of 5/2/18 12:28 PM.  If you have any questions, ask your nurse or doctor.               Start taking these medicines.        Dose/Directions    ciprofloxacin 250 MG tablet   Commonly known as:  CIPRO   Used for:  Acute cystitis without hematuria   Started by:  Billie Victoria APRN CNP        Dose:  250 mg   Take 1 tablet (250 mg) by mouth 2 times daily   Quantity:  6 tablet   Refills:  0       fluconazole 150 MG tablet   Commonly known as:  DIFLUCAN   Used for:  Vaginal odor, Candidiasis of vagina   Started by:  Billie Victoria APRN CNP        Dose:  150 mg   Take 1 tablet (150 mg) by mouth once for 1 dose   Quantity:  1 tablet   Refills:  0            Where to get your medicines      These medications were sent to [place], [place] - [place], [place] - [address]  [address] [address]     Phone:  [phone]     ciprofloxacin 250 MG tablet    fluconazole 150 MG tablet                Primary Care Provider Office Phone # Fax #    Alanna Curiel [phone]       [address]  [facility]  [address]        Equal Access to Services     [facility] Children's HospitalLETICIA : Hadii luis bernardo ritikamoe Al, waaxda luqadaha, qaybta kaalmada blayne, rosalee mathis. So [facility] [phone].    ATENCIÓN: Si habla español, tiene a berkowitz disposición servicios gratuitos de asistencia lingüística. Brain al [phone].    We comply with applicable federal civil rights laws and [place] laws. We do not discriminate on the basis of race, color, national origin, age, disability, sex, sexual orientation, or gender identity.            Thank you!     Thank you for choosing [facility]  for your care. Our goal is always to provide you with excellent care. Hearing back from our patients is one way we can continue to improve our services. Please take a few minutes to complete the written survey that you may receive in the mail after your visit with us. Thank you!             Your Updated Medication List - Protect others around you: Learn how to safely use, store and throw away your medicines at www.disposemymeds.org.          This list is accurate as of 5/2/18 12:28 PM.  Always use your most recent med list.                   Brand Name Dispense Instructions for use Diagnosis    ALPRAZolam 0.5 MG tablet    XANAX     TAKE ONE TABLET AS NEEDED FOR SEVERE ANXIETY MAX OF 2 TABLETS PER DAY        ciprofloxacin 250 MG tablet    CIPRO    6 tablet    Take 1 tablet (250 mg) by mouth 2 times daily    Acute cystitis without hematuria       desogestrel-ethinyl estradiol 0.15-30 MG-MCG per tablet    APRI    84 tablet    Take 1 tablet by mouth daily Use continuously for 3 months, then use placebo tablets    Irregular menstrual cycle       escitalopram 10 MG tablet    LEXAPRO     TAKE 1/2 tablet for 4 days then start 1 TABLET DAILY.        fluconazole 150 MG tablet    DIFLUCAN    1 tablet    Take 1 tablet (150 mg) by mouth once for 1 dose    Vaginal odor, Candidiasis of vagina       topiramate 200 MG tablet    TOPAMAX      TAKE 1 TABLET TWICE DAILY.

## 2018-05-02 NOTE — NURSING NOTE
"Chief Complaint   Patient presents with     UTI       Initial /74 (Cuff Size: Adult Large)  Pulse 68  Temp 97  F (36.1  C) (Temporal)  Resp 18  Wt 269 lb (122 kg)  BMI 44.28 kg/m2 Estimated body mass index is 44.28 kg/(m^2) as calculated from the following:    Height as of 3/2/18: 5' 5.35\" (1.66 m).    Weight as of this encounter: 269 lb (122 kg).  Medication Reconciliation: complete  "

## 2018-05-02 NOTE — TELEPHONE ENCOUNTER
Reason for call:  Patient reporting a symptom    Symptom or request: symptoms    Duration (how long have symptoms been present): 2 days     Have you been treated for this before? No    Additional comments: patient is calling asking to speak to a nurse she thinks she might have a UTI and she wants to know if you can get a UTI from stress?    Phone Number patient can be reached at:  Home number on file [phone] (home) or Cell number on file:    Telephone Information:   Mobile [phone]       Best Time:  anytime    Can we leave a detailed message on this number:  YES    Call taken on 5/2/2018 at 8:12 AM by Jennifer Randolph

## 2018-05-03 LAB
BACTERIA SPEC CULT: NORMAL
Lab: NORMAL
SPECIMEN SOURCE: NORMAL

## 2018-05-03 ASSESSMENT — PATIENT HEALTH QUESTIONNAIRE - PHQ9: SUM OF ALL RESPONSES TO PHQ QUESTIONS 1-9: 6

## 2018-05-03 ASSESSMENT — ANXIETY QUESTIONNAIRES: GAD7 TOTAL SCORE: 15

## 2018-07-08 ENCOUNTER — NURSE TRIAGE (OUTPATIENT)
Dept: NURSING | Facility: CLINIC | Age: 33
End: 2018-07-08

## 2018-07-09 ENCOUNTER — TELEPHONE (OUTPATIENT)
Dept: FAMILY MEDICINE | Facility: OTHER | Age: 33
End: 2018-07-09

## 2018-07-09 ENCOUNTER — OFFICE VISIT (OUTPATIENT)
Dept: FAMILY MEDICINE | Facility: OTHER | Age: 33
End: 2018-07-09
Payer: MEDICARE

## 2018-07-09 VITALS
WEIGHT: 279 LBS | HEART RATE: 88 BPM | HEIGHT: 65 IN | OXYGEN SATURATION: 98 % | SYSTOLIC BLOOD PRESSURE: 126 MMHG | DIASTOLIC BLOOD PRESSURE: 82 MMHG | TEMPERATURE: 98.3 F | BODY MASS INDEX: 46.48 KG/M2 | RESPIRATION RATE: 16 BRPM

## 2018-07-09 DIAGNOSIS — E78.5 HYPERLIPIDEMIA, UNSPECIFIED HYPERLIPIDEMIA TYPE: Primary | ICD-10-CM

## 2018-07-09 DIAGNOSIS — T14.8XXA BRUISE: Primary | ICD-10-CM

## 2018-07-09 LAB
ALBUMIN SERPL-MCNC: 3.8 G/DL (ref 3.4–5)
ALP SERPL-CCNC: 71 U/L (ref 40–150)
ALT SERPL W P-5'-P-CCNC: 23 U/L (ref 0–50)
ANION GAP SERPL CALCULATED.3IONS-SCNC: 9 MMOL/L (ref 3–14)
APTT PPP: 42 SEC (ref 22–37)
AST SERPL W P-5'-P-CCNC: 13 U/L (ref 0–45)
BILIRUB SERPL-MCNC: 0.5 MG/DL (ref 0.2–1.3)
BUN SERPL-MCNC: 11 MG/DL (ref 7–30)
CALCIUM SERPL-MCNC: 8.8 MG/DL (ref 8.5–10.1)
CHLORIDE SERPL-SCNC: 104 MMOL/L (ref 94–109)
CO2 SERPL-SCNC: 25 MMOL/L (ref 20–32)
CREAT SERPL-MCNC: 1.01 MG/DL (ref 0.52–1.04)
ERYTHROCYTE [DISTWIDTH] IN BLOOD BY AUTOMATED COUNT: 15.5 % (ref 10–15)
GFR SERPL CREATININE-BSD FRML MDRD: 63 ML/MIN/1.7M2
GLUCOSE SERPL-MCNC: 88 MG/DL (ref 70–99)
HCT VFR BLD AUTO: 42.6 % (ref 35–47)
HGB BLD-MCNC: 13.2 G/DL (ref 11.7–15.7)
INR PPP: 1.1 (ref 0.86–1.14)
MCH RBC QN AUTO: 25.7 PG (ref 26.5–33)
MCHC RBC AUTO-ENTMCNC: 31 G/DL (ref 31.5–36.5)
MCV RBC AUTO: 83 FL (ref 78–100)
PLATELET # BLD AUTO: 299 10E9/L (ref 150–450)
POTASSIUM SERPL-SCNC: 3.9 MMOL/L (ref 3.4–5.3)
PROT SERPL-MCNC: 7.9 G/DL (ref 6.8–8.8)
RBC # BLD AUTO: 5.13 10E12/L (ref 3.8–5.2)
SODIUM SERPL-SCNC: 138 MMOL/L (ref 133–144)
WBC # BLD AUTO: 12.9 10E9/L (ref 4–11)

## 2018-07-09 PROCEDURE — 85610 PROTHROMBIN TIME: CPT | Performed by: FAMILY MEDICINE

## 2018-07-09 PROCEDURE — 85027 COMPLETE CBC AUTOMATED: CPT | Performed by: FAMILY MEDICINE

## 2018-07-09 PROCEDURE — 80053 COMPREHEN METABOLIC PANEL: CPT | Performed by: FAMILY MEDICINE

## 2018-07-09 PROCEDURE — 99213 OFFICE O/P EST LOW 20 MIN: CPT | Performed by: FAMILY MEDICINE

## 2018-07-09 PROCEDURE — 36415 COLL VENOUS BLD VENIPUNCTURE: CPT | Performed by: FAMILY MEDICINE

## 2018-07-09 PROCEDURE — 85730 THROMBOPLASTIN TIME PARTIAL: CPT | Performed by: FAMILY MEDICINE

## 2018-07-09 RX ORDER — DULOXETIN HYDROCHLORIDE 30 MG/1
30 CAPSULE, DELAYED RELEASE ORAL DAILY
Refills: 1 | COMMUNITY
Start: 2018-06-29 | End: 2018-08-09

## 2018-07-09 NOTE — TELEPHONE ENCOUNTER
Reason for Call:  Other call back    Detailed comments: Patient has set up her PE for September 4th, she would like to come in the morning to complete.     Phone Number Patient can be reached at: Cell number on file:    Telephone Information:   Mobile [phone]       Best Time: any    Can we leave a detailed message on this number? YES    Call taken on 7/9/2018 at 3:51 PM by Danielle Dsouza

## 2018-07-09 NOTE — PROGRESS NOTES
SUBJECTIVE:   Florina Marie is a 32 year old female who presents to clinic today for the following health issues:      HPI      No known injury.  Pt has four bruises on her body (leg, arms).       Disposition:  See a provider within 3 days, preferably tomorrow due to number of bruises.  Pt stated her understanding and had no further questions.      Reason for Disposition    [1] Not caused by an injury AND [2] < 5 unexplained bruises    Additional Information    Negative: Shock suspected (e.g., cold/pale/clammy skin, too weak to stand, low BP, rapid pulse)    Negative: Sounds like a life-threatening emergency to the triager    Negative: Bruises with fever    Negative: Tiny bruises (spots or dots) of unknown cause    Negative: Bruise(s) of forehead or head    Negative: Bruise(s) of face or jaw    Negative: Followed an injury, and triager doesn't know which injury guideline to use first    Negative: Post-operative bruising    Negative: Dizziness or lightheadedness    Negative: [1] Bruise on head/face, chest, or abdomen AND [2]  taking Coumadin (warfarin) or other strong blood thinner, or known bleeding disorder (e.g., thrombocytopenia)    Negative: Unexplained bleeding from another site (e.g., gums, nose, urine) as well    Negative: Patient sounds very sick or weak to the triager    Negative: [1] Not caused by an injury AND [2] 5 or more bruises now    Negative: [1] Raised bruise AND [2] size > 2 inches (5 cm) AND [3] expanding    Negative: [1] SEVERE pain AND [2] not improved 2 hours after pain medicine/ice packs    Negative: Suspicious history for the injury    Negative: Taking Coumadin (warfarin) or other strong blood thinner, or known bleeding disorder (e.g., thrombocytopenia)    Protocols used: BRUISES-ADULT-    Problem list and histories reviewed & adjusted, as indicated.  Additional history: as documented        Patient Active Problem List   Diagnosis     Problems with learning     Morbid obesity (H)      Generalized anxiety disorder     Hyperlipidemia, unspecified     Attention deficit disorder without hyperactivity     Past Surgical History:   Procedure Laterality Date     EXAM UNDER ANESTHESIA PELVIC N/A 9/9/2016    Procedure: EXAM UNDER ANESTHESIA PELVIC;  Surgeon: Rhoda Alcazar MD;  Location: [facility]     HC TOOTH EXTRACTION W/FORCEP      wisdom       Social History   Substance Use Topics     Smoking status: Never Smoker     Smokeless tobacco: Never Used      Comment: no smokers in the household     Alcohol use No     Family History   Problem Relation Age of Onset     Lipids Father      diet     Thyroid Disease Mother      unsure if hypo or hyper     Diabetes Paternal Grandfather      adult     HEART DISEASE Paternal Grandfather      bypass     Lipids Maternal Aunt      medication     Lipids Maternal Aunt      diet     Alzheimer Disease Maternal Grandfather      Hypertension No family hx of      Coronary Artery Disease No family hx of      Hyperlipidemia No family hx of      Cancer - colorectal No family hx of      Ovarian Cancer No family hx of      Prostate Cancer No family hx of      Depression/Anxiety No family hx of      Cerebrovascular Disease No family hx of      Anesthesia Reaction No family hx of      Asthma No family hx of      Osteoperosis No family hx of      Chemical Addiction No family hx of      Obesity No family hx of          Current Outpatient Prescriptions   Medication Sig Dispense Refill     ALPRAZolam (XANAX) 0.5 MG tablet TAKE ONE TABLET AS NEEDED FOR SEVERE ANXIETY MAX OF 2 TABLETS PER DAY  0     desogestrel-ethinyl estradiol (APRI) 0.15-30 MG-MCG per tablet Take 1 tablet by mouth daily Use continuously for 3 months, then use placebo tablets 84 tablet 4     escitalopram (LEXAPRO) 10 MG tablet TAKE 1/2 tablet for 4 days then start 1 TABLET DAILY.  1     DULoxetine (CYMBALTA) 30 MG EC capsule Take 30 mg by mouth daily  1     phentermine 30 MG capsule Take 1 capsule (30 mg) by mouth every  "morning 30 capsule 0     Allergies   Allergen Reactions     No Known Drug Allergies      BP Readings from Last 3 Encounters:   07/18/18 124/78   07/09/18 126/82   05/02/18 122/74    Wt Readings from Last 3 Encounters:   07/18/18 282 lb 4.8 oz (128.1 kg)   07/09/18 279 lb (126.6 kg)   05/02/18 269 lb (122 kg)                  Labs reviewed in EPIC    ROS:  Constitutional, HEENT, cardiovascular, pulmonary, gi and gu systems are negative, except as otherwise noted.    OBJECTIVE:     /82 (BP Location: Right arm, Patient Position: Chair, Cuff Size: Adult Large)  Pulse 88  Temp 98.3  F (36.8  C) (Temporal)  Resp 16  Ht 5' 5.35\" (1.66 m)  Wt 279 lb (126.6 kg)  SpO2 98%  BMI 45.93 kg/m2  Body mass index is 45.93 kg/(m^2).   Physical Exam   Constitutional: She is oriented to person, place, and time. She appears well-developed and well-nourished.   HENT:   Head: Normocephalic and atraumatic.   Right Ear: External ear normal.   Left Ear: External ear normal.   Eyes: EOM are normal.   Cardiovascular: Normal rate, regular rhythm and normal heart sounds.    Pulmonary/Chest: Effort normal and breath sounds normal.   Musculoskeletal: Normal range of motion.   Neurological: She is alert and oriented to person, place, and time.   Psychiatric: She has a normal mood and affect.         Diagnostic Test Results:  none     ASSESSMENT/PLAN:     Problem List Items Addressed This Visit     None      Visit Diagnoses     Bruise    -  Primary    Relevant Orders    CBC with platelets (Completed)    INR (Completed)    Partial thromboplastin time (Completed)    Comprehensive metabolic panel (BMP + Alb, Alk Phos, ALT, AST, Total. Bili, TP) (Completed)         Increased bruising  Labs as ordered to evaluate bleeding disorders  Follow results and discuss management accordingly    Emperatriz Stover MD  [facility]"

## 2018-07-09 NOTE — TELEPHONE ENCOUNTER
Pt calling back to report that she read online that if she is bruising and taking Cymbalta that she should call her doctor.  Cymbalta started 6/29/18.      8:50PM: Smart Web used to page on-call Dr. Valorei Juarez to [phone].   9:08PM: Smart Web used to send out a second page.    9:18PM: Writer called Dr. Juarez's cell phone.  She stated pt does not need to be seen tonight, should continue the medication and see her provider tomorrow.    9:20PM: Pt aware and had no further questions.     Lachelle Ott RN/FNA

## 2018-07-09 NOTE — MR AVS SNAPSHOT
"              After Visit Summary   7/9/2018    Florina Marie    MRN: [id number]           Patient Information     Date Of Birth          1985        Visit Information        Provider Department      7/9/2018 3:00 PM Emperatriz Stover MD [facility]        Today's Diagnoses     Bruise    -  1       Follow-ups after your visit        Who to contact     If you have questions or need follow up information about today's clinic visit or your schedule please contact [facility] directly at [phone].  Normal or non-critical lab and imaging results will be communicated to you by [employer], letter or phone within 4 business days after the clinic has received the results. If you do not hear from us within 7 days, please contact the clinic through [employer] S[employer] or phone. If you have a critical or abnormal lab result, we will notify you by phone as soon as possible.  Submit refill requests through [employer] or call your pharmacy and they will forward the refill request to us. Please allow 3 business days for your refill to be completed.          Additional Information About Your Visit        MyChart Information     PerfectPost gives you secure access to your electronic health record. If you see a primary care provider, you can also send messages to your care team and make appointments. If you have questions, please call your primary care clinic.  If you do not have a primary care provider, please call [phone] and they will assist you.        Care EveryWhere ID     This is your Care EveryWhere ID. This could be used by other organizations to access your [place] medical records  [id number]        Your Vitals Were     Pulse Temperature Respirations Height Pulse Oximetry BMI (Body Mass Index)    88 98.3  F (36.8  C) (Temporal) 16 5' 5.35\" (1.66 m) 98% 45.93 kg/m2       Blood Pressure from Last 3 Encounters:   07/09/18 126/82   05/02/18 122/74   03/02/18 132/88    Weight from Last 3 Encounters: "   07/09/18 279 lb (126.6 kg)   05/02/18 269 lb (122 kg)   03/02/18 286 lb (129.7 kg)              We Performed the Following     CBC with platelets     INR     Partial thromboplastin time          Today's Medication Changes          These changes are accurate as of 7/9/18  3:36 PM.  If you have any questions, ask your nurse or doctor.               Stop taking these medicines if you haven't already. Please contact your care team if you have questions.     topiramate 200 MG tablet   Commonly known as:  TOPAMAX   Stopped by:  Emperatriz Stover MD                    Primary Care Provider Office Phone # Fax #    Alanna CORONA MD [facility] [phone]       [address]  [facility] [address]        Equal Access to Services     [facility]: Sosa chris Soshira, waaxda luqadaha, qaybta kaalmada adeegyaradha, rosalee gonzalez . So [facility] [phone].    ATENCIÓN: Si habla español, tiene a berkowitz disposición servicios gratuitos de asistencia lingüística. Llame al [phone].    We comply with applicable federal civil rights laws and [place] laws. We do not discriminate on the basis of race, color, national origin, age, disability, sex, sexual orientation, or gender identity.            Thank you!     Thank you for choosing [facility]  for your care. Our goal is always to provide you with excellent care. Hearing back from our patients is one way we can continue to improve our services. Please take a few minutes to complete the written survey that you may receive in the mail after your visit with us. Thank you!             Your Updated Medication List - Protect others around you: Learn how to safely use, store and throw away your medicines at www.disposemymeds.org.          This list is accurate as of 7/9/18  3:36 PM.  Always use your most recent med list.                   Brand Name Dispense Instructions for use Diagnosis    ALPRAZolam 0.5 MG tablet    XANAX      TAKE ONE TABLET AS NEEDED FOR SEVERE ANXIETY MAX OF 2 TABLETS PER DAY        desogestrel-ethinyl estradiol 0.15-30 MG-MCG per tablet    APRI    84 tablet    Take 1 tablet by mouth daily Use continuously for 3 months, then use placebo tablets    Irregular menstrual cycle       DULoxetine 30 MG EC capsule    CYMBALTA     Take 30 mg by mouth daily        escitalopram 10 MG tablet    LEXAPRO     TAKE 1/2 tablet for 4 days then start 1 TABLET DAILY.

## 2018-07-09 NOTE — TELEPHONE ENCOUNTER
No known injury.  Pt has four bruises on her body (leg, arms).      Disposition:  See a provider within 3 days, preferably tomorrow due to number of bruises.  Pt stated her understanding and had no further questions.     Reason for Disposition    [1] Not caused by an injury AND [2] < 5 unexplained bruises    Additional Information    Negative: Shock suspected (e.g., cold/pale/clammy skin, too weak to stand, low BP, rapid pulse)    Negative: Sounds like a life-threatening emergency to the triager    Negative: Bruises with fever    Negative: Tiny bruises (spots or dots) of unknown cause    Negative: Bruise(s) of forehead or head    Negative: Bruise(s) of face or jaw    Negative: Followed an injury, and triager doesn't know which injury guideline to use first    Negative: Post-operative bruising    Negative: Dizziness or lightheadedness    Negative: [1] Bruise on head/face, chest, or abdomen AND [2]  taking Coumadin (warfarin) or other strong blood thinner, or known bleeding disorder (e.g., thrombocytopenia)    Negative: Unexplained bleeding from another site (e.g., gums, nose, urine) as well    Negative: Patient sounds very sick or weak to the triager    Negative: [1] Not caused by an injury AND [2] 5 or more bruises now    Negative: [1] Raised bruise AND [2] size > 2 inches (5 cm) AND [3] expanding    Negative: [1] SEVERE pain AND [2] not improved 2 hours after pain medicine/ice packs    Negative: Suspicious history for the injury    Negative: Taking Coumadin (warfarin) or other strong blood thinner, or known bleeding disorder (e.g., thrombocytopenia)    Protocols used: BRUISES-ADULT-

## 2018-07-10 ENCOUNTER — TELEPHONE (OUTPATIENT)
Dept: LAB | Facility: OTHER | Age: 33
End: 2018-07-10

## 2018-07-10 ENCOUNTER — TELEPHONE (OUTPATIENT)
Dept: FAMILY MEDICINE | Facility: OTHER | Age: 33
End: 2018-07-10

## 2018-07-10 DIAGNOSIS — R23.3 ABNORMAL BRUISING: ICD-10-CM

## 2018-07-10 DIAGNOSIS — R79.1 ELEVATED PARTIAL THROMBOPLASTIN TIME (PTT): Primary | ICD-10-CM

## 2018-07-10 DIAGNOSIS — R79.1 ELEVATED PARTIAL THROMBOPLASTIN TIME (PTT): ICD-10-CM

## 2018-07-10 LAB
ALBUMIN UR-MCNC: NEGATIVE MG/DL
APPEARANCE UR: CLEAR
BASOPHILS # BLD AUTO: 0.1 10E9/L (ref 0–0.2)
BASOPHILS NFR BLD AUTO: 0.5 %
BILIRUB UR QL STRIP: NEGATIVE
COLOR UR AUTO: YELLOW
CRP SERPL-MCNC: 11 MG/L (ref 0–8)
DIFFERENTIAL METHOD BLD: ABNORMAL
EOSINOPHIL # BLD AUTO: 0.2 10E9/L (ref 0–0.7)
EOSINOPHIL NFR BLD AUTO: 1.5 %
ERYTHROCYTE [DISTWIDTH] IN BLOOD BY AUTOMATED COUNT: 15.7 % (ref 10–15)
ERYTHROCYTE [SEDIMENTATION RATE] IN BLOOD BY WESTERGREN METHOD: 13 MM/H (ref 0–20)
GLUCOSE UR STRIP-MCNC: NEGATIVE MG/DL
HCT VFR BLD AUTO: 41.6 % (ref 35–47)
HGB BLD-MCNC: 13.2 G/DL (ref 11.7–15.7)
HGB UR QL STRIP: NEGATIVE
KETONES UR STRIP-MCNC: 15 MG/DL
LEUKOCYTE ESTERASE UR QL STRIP: NEGATIVE
LYMPHOCYTES # BLD AUTO: 2.6 10E9/L (ref 0.8–5.3)
LYMPHOCYTES NFR BLD AUTO: 20.5 %
MCH RBC QN AUTO: 26.5 PG (ref 26.5–33)
MCHC RBC AUTO-ENTMCNC: 31.7 G/DL (ref 31.5–36.5)
MCV RBC AUTO: 83 FL (ref 78–100)
MONOCYTES # BLD AUTO: 0.8 10E9/L (ref 0–1.3)
MONOCYTES NFR BLD AUTO: 5.9 %
NEUTROPHILS # BLD AUTO: 9 10E9/L (ref 1.6–8.3)
NEUTROPHILS NFR BLD AUTO: 71.6 %
NITRATE UR QL: NEGATIVE
PH UR STRIP: 5 PH (ref 5–7)
PLATELET # BLD AUTO: 296 10E9/L (ref 150–450)
RBC # BLD AUTO: 4.99 10E12/L (ref 3.8–5.2)
RETICS # AUTO: 74.6 10E9/L (ref 25–95)
RETICS/RBC NFR AUTO: 1.5 % (ref 0.5–2)
SOURCE: ABNORMAL
SP GR UR STRIP: >1.03 (ref 1–1.03)
TSH SERPL DL<=0.005 MIU/L-ACNC: 1.18 MU/L (ref 0.4–4)
UROBILINOGEN UR STRIP-ACNC: 0.2 EU/DL (ref 0.2–1)
WBC # BLD AUTO: 12.6 10E9/L (ref 4–11)

## 2018-07-10 PROCEDURE — 85025 COMPLETE CBC W/AUTO DIFF WBC: CPT | Performed by: FAMILY MEDICINE

## 2018-07-10 PROCEDURE — 85652 RBC SED RATE AUTOMATED: CPT | Performed by: FAMILY MEDICINE

## 2018-07-10 PROCEDURE — 81003 URINALYSIS AUTO W/O SCOPE: CPT | Performed by: FAMILY MEDICINE

## 2018-07-10 PROCEDURE — 85060 BLOOD SMEAR INTERPRETATION: CPT | Performed by: FAMILY MEDICINE

## 2018-07-10 PROCEDURE — 36415 COLL VENOUS BLD VENIPUNCTURE: CPT | Performed by: FAMILY MEDICINE

## 2018-07-10 PROCEDURE — 86140 C-REACTIVE PROTEIN: CPT | Performed by: FAMILY MEDICINE

## 2018-07-10 PROCEDURE — 86039 ANTINUCLEAR ANTIBODIES (ANA): CPT | Performed by: FAMILY MEDICINE

## 2018-07-10 PROCEDURE — 84443 ASSAY THYROID STIM HORMONE: CPT | Performed by: FAMILY MEDICINE

## 2018-07-10 PROCEDURE — 84165 PROTEIN E-PHORESIS SERUM: CPT | Performed by: FAMILY MEDICINE

## 2018-07-10 PROCEDURE — 85045 AUTOMATED RETICULOCYTE COUNT: CPT | Performed by: FAMILY MEDICINE

## 2018-07-10 PROCEDURE — 00000402 ZZHCL STATISTIC TOTAL PROTEIN: Performed by: FAMILY MEDICINE

## 2018-07-10 PROCEDURE — 86038 ANTINUCLEAR ANTIBODIES: CPT | Performed by: FAMILY MEDICINE

## 2018-07-10 PROCEDURE — 40000847 ZZHCL STATISTIC MORPHOLOGY W/INTERP HISTOLOGY TC 85060: Performed by: FAMILY MEDICINE

## 2018-07-10 NOTE — TELEPHONE ENCOUNTER
Not a problem, we will have her come back a different time fasting.  Thank you for the quick response. Have a great day!    Mara

## 2018-07-10 NOTE — TELEPHONE ENCOUNTER
Florina had her blood drawn today for a lab only appointment, a lipid panel is ordered as future, the patient was not fasting for her labs today so the lipid panel was not released. If you would like the lipid panel done on the blood (non-fasting) drawn today, we are able to add it on.    Thank you,  Mara Lockhart MLT  [facility]

## 2018-07-10 NOTE — TELEPHONE ENCOUNTER
I called pt to discuss elevated WBC and mildly elevated PTT. Concern for acquired VWD based on results. Will get further testing to r/o blood dyscrasias, Myeloproliferative disease, thyroid condition. Will consider further management based on results. Pt will come in this afternoon to get the labs done

## 2018-07-11 ENCOUNTER — TELEPHONE (OUTPATIENT)
Dept: FAMILY MEDICINE | Facility: OTHER | Age: 33
End: 2018-07-11

## 2018-07-11 DIAGNOSIS — T14.8XXA BRUISING: ICD-10-CM

## 2018-07-11 DIAGNOSIS — R79.1 ABNORMAL COAGULATION PROFILE: Primary | ICD-10-CM

## 2018-07-11 LAB
ALBUMIN SERPL ELPH-MCNC: 4 G/DL (ref 3.7–5.1)
ALPHA1 GLOB SERPL ELPH-MCNC: 0.4 G/DL (ref 0.2–0.4)
ALPHA2 GLOB SERPL ELPH-MCNC: 0.8 G/DL (ref 0.5–0.9)
ANA PAT SER IF-IMP: ABNORMAL
ANA SER QL IF: ABNORMAL
ANA TITR SER IF: ABNORMAL {TITER}
B-GLOBULIN SERPL ELPH-MCNC: 1 G/DL (ref 0.6–1)
COPATH REPORT: NORMAL
GAMMA GLOB SERPL ELPH-MCNC: 1.3 G/DL (ref 0.7–1.6)
M PROTEIN SERPL ELPH-MCNC: 0 G/DL
PROT PATTERN SERPL ELPH-IMP: NORMAL

## 2018-07-11 NOTE — TELEPHONE ENCOUNTER
I called and spoke with patient regarding her lab results and re-iterated what Dr. Stover said yesterday. Her WBCs are still slightly elevated, PTT is prolonged slightly and CRP is mildly elevated as well. I discussed RK's concerned for VWD and what this is. I told her RK will be notifying her within the next few days of the rest of her lab results.    Savage Steward PA-C

## 2018-07-11 NOTE — TELEPHONE ENCOUNTER
Reason for Call:  Request for results:    Name of test or procedure: labs     Date of test of procedure: 7/9/18    Location of the test or procedure: [place]    OK to leave the result message on voice mail or with a family member? YES    Phone number Patient can be reached at:  Home number on file [phone] (home)    Additional comments: pt would like someone to call her and go over her results from labs done on 7/9, please call and advise.    Call taken on 7/11/2018 at 9:42 AM by Anisa Khoury

## 2018-07-11 NOTE — TELEPHONE ENCOUNTER
"Spoke with patient - she is requesting that a provider can call her in RK/TC absence to go over the phone call she had with RK yesterday. She states it was \"hard to understand what RK was saying.\" Also requesting that a provider looks at her labs from yesterday, specifically urine because \"urine is really dark\" and she doesn't want to wait for RK or TC to return.    Please review/advise. Patient requesting a call from a provider and no one else.  Kellee Moore, [facility]    RK phone message from 07/10: \"I called pt to discuss elevated WBC and mildly elevated PTT. Concern for acquired VWD based on results. Will get further testing to r/o blood dyscrasias, Myeloproliferative disease, thyroid condition. Will consider further management based on results. Pt will come in this afternoon to get the labs done \"    "

## 2018-07-12 NOTE — TELEPHONE ENCOUNTER
Pt callingt o request a provider other than dr orlando call her to discuss lab results as she has a hard time understanding RK.

## 2018-07-12 NOTE — TELEPHONE ENCOUNTER
Contacted pt and let her know of RK's message.  Pt states she will call back if she wishes to go forward with the referral.  Karen Rodgers, CMA

## 2018-07-12 NOTE — TELEPHONE ENCOUNTER
Please inform pt that all the lab test to look for other causes of her bruising have come back normal. I cannot explain the reason for her bruising based on the current results. She can see a specialist if symptoms persist. I placed the referral . Please schedule appt if she wishes to proceed that route

## 2018-07-18 ENCOUNTER — OFFICE VISIT (OUTPATIENT)
Dept: FAMILY MEDICINE | Facility: CLINIC | Age: 33
End: 2018-07-18
Payer: MEDICARE

## 2018-07-18 VITALS
HEIGHT: 65 IN | DIASTOLIC BLOOD PRESSURE: 78 MMHG | BODY MASS INDEX: 47.03 KG/M2 | OXYGEN SATURATION: 99 % | SYSTOLIC BLOOD PRESSURE: 124 MMHG | TEMPERATURE: 98.5 F | HEART RATE: 69 BPM | WEIGHT: 282.3 LBS

## 2018-07-18 DIAGNOSIS — Z79.899 HIGH RISK MEDICATION USE: ICD-10-CM

## 2018-07-18 DIAGNOSIS — E66.01 MORBID OBESITY (H): Primary | ICD-10-CM

## 2018-07-18 PROCEDURE — 93000 ELECTROCARDIOGRAM COMPLETE: CPT | Performed by: INTERNAL MEDICINE

## 2018-07-18 PROCEDURE — 99214 OFFICE O/P EST MOD 30 MIN: CPT | Performed by: INTERNAL MEDICINE

## 2018-07-18 RX ORDER — PHENTERMINE HYDROCHLORIDE 30 MG/1
30 CAPSULE ORAL EVERY MORNING
Qty: 30 CAPSULE | Refills: 0 | Status: SHIPPED | OUTPATIENT
Start: 2018-07-18 | End: 2018-09-04

## 2018-07-18 ASSESSMENT — LIFESTYLE VARIABLES: SUBSTANCE_ABUSE: 0

## 2018-07-18 ASSESSMENT — ENCOUNTER SYMPTOMS
NERVOUS/ANXIOUS: 0
DEPRESSION: 0
SHORTNESS OF BREATH: 0
PALPITATIONS: 0
NAUSEA: 0
VOMITING: 0
INSOMNIA: 0
TREMORS: 0
DIARRHEA: 0
HEADACHES: 0
CONSTIPATION: 0
ABDOMINAL PAIN: 0

## 2018-07-18 NOTE — MR AVS SNAPSHOT
After Visit Summary   7/18/2018    Florina Marie    MRN: [id number]           Patient Information     Date Of Birth          1985        Visit Information        Provider Department      7/18/2018 10:30 AM Luis Fernando Sewell MD [facility]        Today's Diagnoses     Morbid obesity (H)    -  1    High risk medication use          Care Instructions    Strictly count/monitor your calories with every meal/snack every day.    Maintain the following meal plan:    Entree - 250 calories per serving  Drink (high protein) - 150 calories per serving  Fruit or Vegetable - 100 calories per serving    Breakfast - Fruit or Vegetable  Snack - Drink   Lunch - Drink + Entree   Snack - Fruit or Vegetable  Dinner - Entree + Entree   Snack - Drink     Monitor your weight once a week and set a weight-loss goal of at least 1-2 pounds per week.    Call doctor if you develop any side effects from the medication.    Follow up in 1 month.            Follow-ups after your visit        Your next 10 appointments already scheduled     Sep 04, 2018  2:20 PM CDT   [facility]YS[facility] with Alanna Curiel MD   [facility]    [address] [place] [id number] [phone]              Who to contact     If you have questions or need follow up information about today's clinic visit or your schedule please contact [facility] directly at [phone].  Normal or non-critical lab and imaging results will be communicated to you by MyChart, letter or phone within 4 business days after the clinic has received the results. If you do not hear from us within 7 days, please contact the clinic through MyChart or phone. If you have a critical or abnormal lab result, we will notify you by phone as soon as possible.  Submit refill requests through [employer] or call your pharmacy and they will forward the refill request to us. Please allow 3 business  "days for your refill to be completed.          Additional Information About Your Visit        MyChart Information     [employer] gives you secure access to your electronic health record. If you see a primary care provider, you can also send messages to your care team and make appointments. If you have questions, please call your primary care clinic.  If you do not have a primary care provider, please call [phone] and they will assist you.        Care EveryWhere ID     This is your Care EveryWhere ID. This could be used by other organizations to access your [place] medical records  [id number]        Your Vitals Were     Pulse Temperature Height Pulse Oximetry BMI (Body Mass Index)       69 98.5  F (36.9  C) (Oral) 5' 5.35\" (1.66 m) 99% 46.48 kg/m2        Blood Pressure from Last 3 Encounters:   07/18/18 124/78   07/09/18 126/82   05/02/18 122/74    Weight from Last 3 Encounters:   07/18/18 282 lb 4.8 oz (128.1 kg)   07/09/18 279 lb (126.6 kg)   05/02/18 269 lb (122 kg)              We Performed the Following     EKG 12-lead complete w/read - Clinics          Today's Medication Changes          These changes are accurate as of 7/18/18 10:58 AM.  If you have any questions, ask your nurse or doctor.               Start taking these medicines.        Dose/Directions    phentermine 30 MG capsule   Used for:  Morbid obesity (H)   Started by:  Luis Fernando Sewell MD        Dose:  30 mg   Take 1 capsule (30 mg) by mouth every morning   Quantity:  30 capsule   Refills:  0            Where to get your medicines      Some of these will need a paper prescription and others can be bought over the counter.  Ask your nurse if you have questions.     Bring a paper prescription for each of these medications     phentermine 30 MG capsule                Primary Care Provider Office Phone # Fax #    Alanna Curiel [phone]       [address]  [facility] [address]        Equal Access " to Services     [facility] [facility] : Sosa Melendez, mat ferris, qarosalee andrade. So [facility] [phone].    ATENCIÓN: Si antoinettela johan, tiene a berkowitz disposición servicios gratuitos de asistencia lingüística. Llame al [phone].    We comply with applicable federal civil rights laws and [place] laws. We do not discriminate on the basis of race, color, national origin, age, disability, sex, sexual orientation, or gender identity.            Thank you!     Thank you for choosing [facility]  for your care. Our goal is always to provide you with excellent care. Hearing back from our patients is one way we can continue to improve our services. Please take a few minutes to complete the written survey that you may receive in the mail after your visit with us. Thank you!             Your Updated Medication List - Protect others around you: Learn how to safely use, store and throw away your medicines at www.disposemymeds.org.          This list is accurate as of 7/18/18 10:58 AM.  Always use your most recent med list.                   Brand Name Dispense Instructions for use Diagnosis    ALPRAZolam 0.5 MG tablet    XANAX     TAKE ONE TABLET AS NEEDED FOR SEVERE ANXIETY MAX OF 2 TABLETS PER DAY        desogestrel-ethinyl estradiol 0.15-30 MG-MCG per tablet    APRI    84 tablet    Take 1 tablet by mouth daily Use continuously for 3 months, then use placebo tablets    Irregular menstrual cycle       DULoxetine 30 MG EC capsule    CYMBALTA     Take 30 mg by mouth daily        escitalopram 10 MG tablet    LEXAPRO     TAKE 1/2 tablet for 4 days then start 1 TABLET DAILY.        phentermine 30 MG capsule     30 capsule    Take 1 capsule (30 mg) by mouth every morning    Morbid obesity (H)

## 2018-07-18 NOTE — PROGRESS NOTES
"HPI      SUBJECTIVE:   Florina Marie is a 32 year old female who presents to clinic today for the following health issues:      Weight management      Interested in gastric sleeve, referred to Wilfredo Ferreira, was reportedly told that she is not a good surgical candidate at this time      When asked about patient's prior experiences with any weight loss program, she said that she has tried Weight Watchers for about 3 months and did not lose much weight. Uncertain if she has been consistent with the program. Her parents will be supervising her diet more closely.    **patient denies history of cardiovascular disease (arrhythmias, heart failure, coronary artery disease, stroke, uncontrolled hypertension); hyperthyroidism; glaucoma; severe anxiety or agitated states; history of drug abuse      Past Medical History:   Diagnosis Date     Attention deficit disorder with hyperactivity(314.01)      Other kyphoscoliosis and scoliosis      Other specified delay in development     Microcephaly     Viral warts, unspecified     plantar warts       Review of Systems   Constitutional: Negative for malaise/fatigue.   Respiratory: Negative for shortness of breath.    Cardiovascular: Negative for chest pain and palpitations.   Gastrointestinal: Negative for abdominal pain, constipation, diarrhea, nausea and vomiting.   Neurological: Negative for tremors and headaches.   Psychiatric/Behavioral: Negative for depression and substance abuse. The patient is not nervous/anxious and does not have insomnia.        /78 (BP Location: Right arm, Patient Position: Sitting, Cuff Size: Adult Regular)  Pulse 69  Temp 98.5  F (36.9  C) (Oral)  Ht 5' 5.35\" (1.66 m)  Wt 282 lb 4.8 oz (128.1 kg)  SpO2 99%  BMI 46.48 kg/m2      Physical Exam   Constitutional: She is oriented to person, place, and time. No distress.   Neck: No thyromegaly present.   Cardiovascular: Normal rate, regular rhythm and normal heart sounds.    Pulmonary/Chest: " Effort normal and breath sounds normal. No respiratory distress.   Abdominal: Soft. There is no tenderness.   Neurological: She is alert and oriented to person, place, and time. Coordination normal. GCS score is 15.   No tremors   Psychiatric: Mood and affect normal.   Vitals reviewed.        ICD-10-CM    1. Morbid obesity (H) E66.01 EKG 12-lead complete w/read - Clinics     phentermine 30 MG capsule   2. High risk medication use Z79.899 EKG 12-lead complete w/read - Clinics     *25 minutes was spent with the patient, more than half of which was spent on counseling on weight management      Patient Instructions   Strictly count/monitor your calories with every meal/snack every day.    Maintain the following meal plan:    Entree - 250 calories per serving  Drink (high protein) - 150 calories per serving  Fruit or Vegetable - 100 calories per serving    Breakfast - Fruit or Vegetable  Snack - Drink   Lunch - Drink + Entree   Snack - Fruit or Vegetable  Dinner - Entree + Entree   Snack - Drink     Monitor your weight once a week and set a weight-loss goal of at least 1-2 pounds per week.    Call doctor if you develop any side effects from the medication.    Follow up in 1 month.

## 2018-07-18 NOTE — PATIENT INSTRUCTIONS
Strictly count/monitor your calories with every meal/snack every day.    Maintain the following meal plan:    Entree - 250 calories per serving  Drink (high protein) - 150 calories per serving  Fruit or Vegetable - 100 calories per serving    Breakfast - Fruit or Vegetable  Snack - Drink   Lunch - Drink + Entree   Snack - Fruit or Vegetable  Dinner - Entree + Entree   Snack - Drink     Monitor your weight once a week and set a weight-loss goal of at least 1-2 pounds per week.    Call doctor if you develop any side effects from the medication.    Follow up in 1 month.

## 2018-07-25 ENCOUNTER — TELEPHONE (OUTPATIENT)
Dept: FAMILY MEDICINE | Facility: OTHER | Age: 33
End: 2018-07-25

## 2018-07-25 NOTE — TELEPHONE ENCOUNTER
You placed a referral for patient to [facility]: [facility] [place] [phone] on 7/12/18.  Patient has not scheduled as of yet.    Please review and forward to team if follow up with the patient is needed.     Thank you!  [place]/Clinic Referrals [facility]

## 2018-07-27 ENCOUNTER — MYC MEDICAL ADVICE (OUTPATIENT)
Dept: FAMILY MEDICINE | Facility: OTHER | Age: 33
End: 2018-07-27

## 2018-08-02 ENCOUNTER — TELEPHONE (OUTPATIENT)
Dept: FAMILY MEDICINE | Facility: OTHER | Age: 33
End: 2018-08-02

## 2018-08-02 NOTE — TELEPHONE ENCOUNTER
Reason for Call:  Same Day Appointment, Requested Provider:  Alanna Curiel MD     PCP: Alanna Curiel    Reason for visit: work in for fatigue - TC only     Duration of symptoms: ongoing    Have you been treated for this in the past? No    Additional comments: wants to see you Friday     Can we leave a detailed message on this number? YES    Phone number patient can be reached at: Home number on file [phone] (home)    Best Time: any    Call taken on 8/2/2018 at 10:14 AM by Xochilt Finch

## 2018-08-02 NOTE — PROGRESS NOTES
"  SUBJECTIVE:   Florina Marie is a 33 year old female who presents to clinic today for the following health issues:    HPI  Concern - Fatigue  Onset: 2-3 weeks    Description:   Therapist has noticed that patient has been very tired during sessions and feels that there is \"something medical going on\". Patient states she will go to work and then come home and want to get back in bed right away. She also states she has been more depressed. She recently got a new boss and states that change is very difficult for her, she also feels that it is her kip that her previous boss was fired.     Intensity: moderate    Progression of Symptoms:  same    Accompanying Signs & Symptoms:  depression    Previous history of similar problem:   unknown    Precipitating factors:   Worsened by: unsure    Alleviating factors:  Improved by: unsure    Therapies Tried and outcome: none       Would also like to discuss her recent lab work. She also heard from friend about \"doing a test to see what medications would be good for you\"    She follows with psychiatry.  She states she was on topiramate which helped out with weight loss but made her more irritable and she did not do well at work as she became confrontational.  This improved when her topiramate was stopped.  She is concerned about taking any other additional medication as she does not want to cause problems at work.  She states she follows up with psychiatry next week.    She did not start the phentermine and she is worried about whether it will cause further problems at work.      Answers for HPI/ROS submitted by the patient on 8/3/2018   ZEENAT 7 TOTAL SCORE: 1  If you checked off any problems, how difficult have these problems made it for you to do your work, take care of things at home, or get along with other people?: Somewhat difficult  PHQ9 TOTAL SCORE: 4    Problem list and histories reviewed & adjusted, as indicated.  Additional history: as documented    Patient Active " Problem List   Diagnosis     Problems with learning     Morbid obesity (H)     Generalized anxiety disorder     Hyperlipidemia, unspecified     Attention deficit disorder without hyperactivity     Past Surgical History:   Procedure Laterality Date     EXAM UNDER ANESTHESIA PELVIC N/A 9/9/2016    Procedure: EXAM UNDER ANESTHESIA PELVIC;  Surgeon: Rhoda Alcazar MD;  Location:  OR      TOOTH EXTRACTION W/FORCEP      wisdom       Social History   Substance Use Topics     Smoking status: Never Smoker     Smokeless tobacco: Never Used      Comment: no smokers in the household     Alcohol use No     Family History   Problem Relation Age of Onset     Lipids Father      diet     Thyroid Disease Mother      unsure if hypo or hyper     Diabetes Paternal Grandfather      adult     HEART DISEASE Paternal Grandfather      bypass     Lipids Maternal Aunt      medication     Lipids Maternal Aunt      diet     Alzheimer Disease Maternal Grandfather      Hypertension No family hx of      Coronary Artery Disease No family hx of      Hyperlipidemia No family hx of      Cancer - colorectal No family hx of      Ovarian Cancer No family hx of      Prostate Cancer No family hx of      Depression/Anxiety No family hx of      Cerebrovascular Disease No family hx of      Anesthesia Reaction No family hx of      Asthma No family hx of      Osteoperosis No family hx of      Chemical Addiction No family hx of      Obesity No family hx of            ROS:  CONSTITUTIONAL: NEGATIVE for fever, chills, has had increase in weight  ENT/MOUTH: NEGATIVE for ear, mouth and throat problems  RESP: NEGATIVE for significant cough or shortness of breath  CV: NEGATIVE for chest pain, palpitations or peripheral edema  Skin: New bruise on left lower abdomen    OBJECTIVE:     /84 (BP Location: Left arm, Patient Position: Chair, Cuff Size: Adult Large)  Pulse 76  Temp 99.1  F (37.3  C) (Temporal)  Resp 16  Wt 286 lb (129.7 kg)  SpO2 97%  BMI  47.08 kg/m2  Body mass index is 47.08 kg/(m^2).  Gen: no apparent distress  Chest/CV: S1 and S2 normal, no murmurs, clicks, gallops or rubs. Regular rate and rhythm. Chest is clear; no wheezes or rales. No edema or JVD.  Skin:  2 cm fading ecchymoses on left lower abdomen, no other bruises seen  Psych: Mood is neutral.    ASSESSMENT/PLAN:     1. Bruising  She saw a colleague of mine for bruising and had further testing done.  There was some concern for possible von Willebrand's disease and referral to clotting disorder clinic has been made.  She has an appointment next week.  Patient does have some learning disabilities and developmental delay.  Attempted to explain that she needs further testing about her bruising.  She did have me call her mother and talk to her mother on the phone.  Her mother is aware of these appointments and the reasons for further testing.    2. Morbid obesity (H)  Encouraged her to try the phentermine.  This could help with appetite suppression as well as possibly increasing her energy level.  We discussed that if she does not like how it makes her feel this medication could always be stopped.  She will follow-up with the weight specialist in a couple of weeks.      3. Generalized anxiety disorder  She continues to follow with psychiatry who is managing her other medications.  She tells me she is taking the Lexapro not Cymbalta.  Encouraged her to discuss this further with psychiatry.    Alanna Curiel MD  [facility]

## 2018-08-02 NOTE — TELEPHONE ENCOUNTER
Patient calls with many questions about referral and lab tests. Does not seem to understand explanations given by providers.   Is advised to discuss at appointment tomorrow with Dr Joiner. She is agreeable with this plan.

## 2018-08-03 ENCOUNTER — OFFICE VISIT (OUTPATIENT)
Dept: FAMILY MEDICINE | Facility: OTHER | Age: 33
End: 2018-08-03
Payer: MEDICARE

## 2018-08-03 VITALS
BODY MASS INDEX: 47.08 KG/M2 | WEIGHT: 286 LBS | SYSTOLIC BLOOD PRESSURE: 132 MMHG | TEMPERATURE: 99.1 F | RESPIRATION RATE: 16 BRPM | DIASTOLIC BLOOD PRESSURE: 84 MMHG | OXYGEN SATURATION: 97 % | HEART RATE: 76 BPM

## 2018-08-03 DIAGNOSIS — F41.1 GENERALIZED ANXIETY DISORDER: ICD-10-CM

## 2018-08-03 DIAGNOSIS — T14.8XXA BRUISING: Primary | ICD-10-CM

## 2018-08-03 DIAGNOSIS — E66.01 MORBID OBESITY (H): ICD-10-CM

## 2018-08-03 PROCEDURE — 99213 OFFICE O/P EST LOW 20 MIN: CPT | Performed by: FAMILY MEDICINE

## 2018-08-03 ASSESSMENT — ANXIETY QUESTIONNAIRES
7. FEELING AFRAID AS IF SOMETHING AWFUL MIGHT HAPPEN: NOT AT ALL
7. FEELING AFRAID AS IF SOMETHING AWFUL MIGHT HAPPEN: NOT AT ALL
1. FEELING NERVOUS, ANXIOUS, OR ON EDGE: SEVERAL DAYS
GAD7 TOTAL SCORE: 1
6. BECOMING EASILY ANNOYED OR IRRITABLE: NOT AT ALL
5. BEING SO RESTLESS THAT IT IS HARD TO SIT STILL: NOT AT ALL
GAD7 TOTAL SCORE: 1
2. NOT BEING ABLE TO STOP OR CONTROL WORRYING: NOT AT ALL
4. TROUBLE RELAXING: NOT AT ALL
GAD7 TOTAL SCORE: 1
3. WORRYING TOO MUCH ABOUT DIFFERENT THINGS: NOT AT ALL

## 2018-08-03 ASSESSMENT — PATIENT HEALTH QUESTIONNAIRE - PHQ9
SUM OF ALL RESPONSES TO PHQ QUESTIONS 1-9: 4
10. IF YOU CHECKED OFF ANY PROBLEMS, HOW DIFFICULT HAVE THESE PROBLEMS MADE IT FOR YOU TO DO YOUR WORK, TAKE CARE OF THINGS AT HOME, OR GET ALONG WITH OTHER PEOPLE: SOMEWHAT DIFFICULT
SUM OF ALL RESPONSES TO PHQ QUESTIONS 1-9: 4

## 2018-08-03 NOTE — MR AVS SNAPSHOT
After Visit Summary   8/3/2018    Florina Marie    MRN: [id number]           Patient Information     Date Of Birth          1985        Visit Information        Provider Department      8/3/2018 1:20 PM Alanna Curiel MD [facility]        Today's Diagnoses     Bruising    -  1    Morbid obesity (H)          Care Instructions    I think it is reasonable to try the phentermine to see if it helps keep your appetite controlled and give you more energy.  If you don't like how it makes your feel, you can stop it.    You have some mild abnormalities in your blood that may be making it easier to bruise.  That is why you are seeing the specialist next week.            Follow-ups after your visit        Your next 10 appointments already scheduled     Aug 09, 2018 10:00 AM CDT   NEW BLEEDING DISORDER with Neo Lopez PA-C   [facility])    [facility] [address]  [facility] [address]   [phone]            Aug 23, 2018 10:00 AM CDT   [facility]    [address] [place] [id number] [phone]           Bring a current list of meds and any records pertaining to this visit. For Physicals, please bring immunization records and any forms needing to be filled out. Please arrive 10 minutes early to complete paperwork.            Sep 04, 2018  2:20 PM CDT   PHYSICAL with [facility]iel MD   [facility]    [address] [place] [address] [address]  [facility] [id number] [phone]              Who to contact     If you have questions or need follow up information about today's clinic visit or your schedule please contact [facility] directly at [phone].  Normal or non-critical lab and imaging results will be communicated to you by  MyChart, letter or phone within 4 business days after the clinic has received the results. If you do not hear from us within 7 days, please contact the clinic through [employer] or phone. If you have a critical or abnormal lab result, we will notify you by phone as soon as possible.  Submit refill requests through [employer] or call your pharmacy and they will forward the refill request to us. Please allow 3 business days for your refill to be completed.          Additional Information About Your Visit        [employer] Information     [employer] gives you secure access to your electronic health record. If you see a primary care provider, you can also send messages to your care team and make appointments. If you have questions, please call your primary care clinic.  If you do not have a primary care provider, please call [phone] and they will assist you.        Care EveryWhere ID     This is your Care EveryWhere ID. This could be used by other organizations to access your [facility] medical records  [id number]        Your Vitals Were     Pulse Temperature Respirations Pulse Oximetry BMI (Body Mass Index)       76 99.1  F (37.3  C) (Temporal) 16 97% 47.08 kg/m2        Blood Pressure from Last 3 Encounters:   08/03/18 132/84   07/18/18 124/78   07/09/18 126/82    Weight from Last 3 Encounters:   08/03/18 286 lb (129.7 kg)   07/18/18 282 lb 4.8 oz (128.1 kg)   07/09/18 279 lb (126.6 kg)              Today, you had the following     No orders found for display       Primary Care Provider Office Phone # Fax #    Alanna CORONA MD [facility] [phone]       [address] [facility] [address]  [facility] [address]        Equal Access to Services     IRINA [facility] Care SystemLETICIA : Hadii luis Melendez, waaxda luqadaha, qaybta kaalrosalee patten. So [facility] [phone].    ATENCIÓN: Si habla español, tiene a berkowitz disposición servicios gratuitos de asistencia lingüística. Llame al [phone].    We  comply with applicable federal civil rights laws and [place] laws. We do not discriminate on the basis of race, color, national origin, age, disability, sex, sexual orientation, or gender identity.            Thank you!     Thank you for choosing [facility]  for your care. Our goal is always to provide you with excellent care. Hearing back from our patients is one way we can continue to improve our services. Please take a few minutes to complete the written survey that you may receive in the mail after your visit with us. Thank you!             Your Updated Medication List - Protect others around you: Learn how to safely use, store and throw away your medicines at www.disposemymeds.org.          This list is accurate as of 8/3/18  1:54 PM.  Always use your most recent med list.                   Brand Name Dispense Instructions for use Diagnosis    ALPRAZolam 0.5 MG tablet    XANAX     TAKE ONE TABLET AS NEEDED FOR SEVERE ANXIETY MAX OF 2 TABLETS PER DAY        desogestrel-ethinyl estradiol 0.15-30 MG-MCG per tablet    APRI    84 tablet    Take 1 tablet by mouth daily Use continuously for 3 months, then use placebo tablets    Irregular menstrual cycle       DULoxetine 30 MG EC capsule    CYMBALTA     Take 30 mg by mouth daily        escitalopram 10 MG tablet    LEXAPRO     TAKE 1/2 tablet for 4 days then start 1 TABLET DAILY.        phentermine 30 MG capsule     30 capsule    Take 1 capsule (30 mg) by mouth every morning    Morbid obesity (H)

## 2018-08-03 NOTE — PATIENT INSTRUCTIONS
I think it is reasonable to try the phentermine to see if it helps keep your appetite controlled and give you more energy.  If you don't like how it makes your feel, you can stop it.    You have some mild abnormalities in your blood that may be making it easier to bruise.  That is why you are seeing the specialist next week.

## 2018-08-04 ASSESSMENT — PATIENT HEALTH QUESTIONNAIRE - PHQ9: SUM OF ALL RESPONSES TO PHQ QUESTIONS 1-9: 4

## 2018-08-04 ASSESSMENT — ANXIETY QUESTIONNAIRES: GAD7 TOTAL SCORE: 1

## 2018-08-09 ENCOUNTER — OFFICE VISIT (OUTPATIENT)
Dept: HEMATOLOGY | Facility: CLINIC | Age: 33
End: 2018-08-09
Attending: FAMILY MEDICINE
Payer: MEDICARE

## 2018-08-09 VITALS
HEART RATE: 70 BPM | TEMPERATURE: 98.7 F | SYSTOLIC BLOOD PRESSURE: 130 MMHG | WEIGHT: 288 LBS | OXYGEN SATURATION: 99 % | DIASTOLIC BLOOD PRESSURE: 85 MMHG | HEIGHT: 65 IN | BODY MASS INDEX: 47.98 KG/M2

## 2018-08-09 DIAGNOSIS — R79.1 PROLONGED PTT: ICD-10-CM

## 2018-08-09 LAB — APTT PPP: 31 SEC (ref 22–37)

## 2018-08-09 PROCEDURE — 85613 RUSSELL VIPER VENOM DILUTED: CPT | Performed by: PHYSICIAN ASSISTANT

## 2018-08-09 PROCEDURE — 85250 CLOT FACTOR IX PTC/CHRSTMAS: CPT | Performed by: PHYSICIAN ASSISTANT

## 2018-08-09 PROCEDURE — 85240 CLOT FACTOR VIII AHG 1 STAGE: CPT | Performed by: PHYSICIAN ASSISTANT

## 2018-08-09 PROCEDURE — 00000401 ZZHCL STATISTIC THROMBIN TIME NC: Performed by: PHYSICIAN ASSISTANT

## 2018-08-09 PROCEDURE — 85245 CLOT FACTOR VIII VW RISTOCTN: CPT | Performed by: PHYSICIAN ASSISTANT

## 2018-08-09 PROCEDURE — 85730 THROMBOPLASTIN TIME PARTIAL: CPT | Performed by: PHYSICIAN ASSISTANT

## 2018-08-09 PROCEDURE — 99215 OFFICE O/P EST HI 40 MIN: CPT | Performed by: PHYSICIAN ASSISTANT

## 2018-08-09 PROCEDURE — 00000167 ZZHCL STATISTIC INR NC: Performed by: PHYSICIAN ASSISTANT

## 2018-08-09 PROCEDURE — 40000809 ZZH STATISTIC NO DOCUMENTATION TO SUPPORT CHARGE

## 2018-08-09 PROCEDURE — 85246 CLOT FACTOR VIII VW ANTIGEN: CPT | Performed by: PHYSICIAN ASSISTANT

## 2018-08-09 ASSESSMENT — PAIN SCALES - GENERAL: PAINLEVEL: NO PAIN (0)

## 2018-08-09 NOTE — PROGRESS NOTES
[facility]  [address] [place] [address]  Phone: [phone], Fax: [phone]      Patient: Florina Marie  MRN: [id number]  : 1985  MARYBEL: 2018    Reason:  Prolonged PTT (42sec)    HPI:  This is a 33 year old female being referred by Dr. Alanna Curiel for abnormal coagulation testing and recent bruising.    Florina is here with her mom today.  She has learning disabilities and developmental delay and mother is her guardian.  They report that Florina has never had significant bleeding issues. No easy bruising as a child, no nosebleeds.  She had wisdom teeth extracted as a young adult without bleeding and had breast reduction surgery in  without bleeding or bruising outside of normal.  She has regular menstrual cycles which last 4-5 days with the first two days being somewhat heavy.  She is unable to quantify this but does states that sometimes she uses two pads.  She denies gushing or passing clots.  She has never been anemic per mother and had her hgb checked in early July.    Mom reports that maternal uncle had RA and was treated with Humira and went on to develop hematologic cancer.  Paternal aunt has chronic ITP.  Neither mother or father have bleeding issues.  Mom has 5 sisters who have no reported bleeding issues but multiple sisters have had issues with thyroid and mother had thyroid removed due to a cancerous nodule.     ROS:  Denies any bleeding issues. Denies any current ecchymosis - she mainly noted these while she was on Cymbalta for a 1-2 week peroid. She does have scoliosis which was not fixed and thus has uneven gait and has frequent falls and back pain.    Works with a psychiatrist for anxiety and depression issues.     Past Medical History:  Past Medical History:   Diagnosis Date     Attention deficit disorder with hyperactivity(314.01)      Other kyphoscoliosis and scoliosis      Other specified delay in development      Microcephaly     Viral warts, unspecified     plantar warts       Past Surgical History:  Past Surgical History:   Procedure Laterality Date     EXAM UNDER ANESTHESIA PELVIC N/A 9/9/2016    Procedure: EXAM UNDER ANESTHESIA PELVIC;  Surgeon: Rhoda Alcazar MD;  Location:  OR      TOOTH EXTRACTION W/FORCEP      wisdom       Medications:  Current Outpatient Prescriptions   Medication Sig Dispense Refill     ALPRAZolam (XANAX) 0.5 MG tablet TAKE ONE TABLET AS NEEDED FOR SEVERE ANXIETY MAX OF 2 TABLETS PER DAY  0     desogestrel-ethinyl estradiol (APRI) 0.15-30 MG-MCG per tablet Take 1 tablet by mouth daily Use continuously for 3 months, then use placebo tablets 84 tablet 4     escitalopram (LEXAPRO) 10 MG tablet TAKE 1/2 tablet for 4 days then start 1 TABLET DAILY.  1     phentermine 30 MG capsule Take 1 capsule (30 mg) by mouth every morning 30 capsule 0        Allergies:  Allergies   Allergen Reactions     No Known Drug Allergies        Social History:  Here with mother who is guardian.      Family History:  See HPI.  No known family hx of bleeding disorder.    Objectives:  Pleasant 33 year old female in no acute distress.  Vitals: B/P: 130/85, T: 98.7, P: 70, R: Data Unavailable, Wt: 288 lbs 0 oz  Exam:   No bruises noted on upper or lower extremities.  No petechiae noted.  She does not appear hypermobile on exam.    Labs:  Recent CBC normal (hgb: 13.2, plt count 296)  Had TSH which was also in the normal range at 1.18  INR: 1.10 and PTT: 42 sec (normal: 22-37 sec)  Repeat PTT, lupus antibody, factor IX and VW panel (on estrogen) being done today.    Assessment:  34 yo female who was evaluated for bruising and found to have prolonged PTT.  No personal hx of bleeding issues despite surgical challenges thus unlikely to have moderate/severe congenital bleeding disorder. Anticipate repeat PTT will be normal or prolongation is from a lupus antibody.   Many SSRIs (Lexapro) are known to cause platelet inhibition  which can result in easy bruising without significant bleeding.     Patient concerned about ongoing fatigue as well.  Recent hgb normal and MCV normal, which do not support iron def.  Encouraged her and mom to continue to discuss with primary care provider.  Mom very concerned about family hx of thyroid issues.     Plan:  Repeat lab testing today to evaluate prolonged PTT.  Will call mom with results per Florina's request.  If abnormalities found discussed having them come back to review in person.      Total Time Spent:  40 minutes, all 40 minutes was spent on face-to-face consultation of the patient and coordination of care in regard to prolonged PTT and reported bruising.   Patient seen with Aure Morfin, nurse clinician.    Sabina Mario MPH, PA-C  Physician Assistant  [facility]  [phone]-main line     All results in the normal range.  Called mom with results per request.  No need for follow-up.   Ref. Range 8/9/2018 11:00   PTT Latest Ref Range: 22 - 37 sec 31   Factor 8 Assay Latest Ref Range: 55 - 200 % 151   von Willebrand Antigen Latest Ref Range: 50 - 200 % 135   von Willebrand Factor Activity Latest Ref Range: 50 - 180 % 110   Factor 9 Assay Latest Ref Range: 65 - 150 % 156 (H)   Lupus Result Latest Ref Range: NEG^Negative  Negative

## 2018-08-09 NOTE — MR AVS SNAPSHOT
After Visit Summary   8/9/2018    Florina Marie    MRN: [id number]           Patient Information     Date Of Birth          1985        Visit Information        Provider Department      8/9/2018 10:00 AM Sabina Mario PA [facility]        Today's Diagnoses     Prolonged PTT           Follow-ups after your visit        Your next 10 appointments already scheduled     Aug 23, 2018 10:00 AM CDT   Office Visit with Luis Fernando Sewell MD   [facility] ([facility])    [address] [phone]           Bring a current list of meds and any records pertaining to this visit. For Physicals, please bring immunization records and any forms needing to be filled out. Please arrive 10 minutes early to complete paperwork.            Sep 04, 2018  2:20 PM CDT   PHYSICAL with [facility]iel [facility]   [facility]    [address] [place] [id number] [phone]              Who to contact     If you have questions or need follow up information about today's clinic visit or your schedule please contact [facility] directly at [phone].  Normal or non-critical lab and imaging results will be communicated to you by [employer], letter or phone within 4 business days after the clinic has received the results. If you do not hear from us within 7 days, please contact the clinic through [employer] or phone. If you have a critical or abnormal lab result, we will notify you by phone as soon as possible.  Submit refill requests through [employer] or call your pharmacy and they will forward the refill request to us. Please allow 3 business days for your refill to be completed.          Additional Information About Your Visit        [employer] Information     [employer] gives you secure access to your electronic health record. If you see a  "primary care provider, you can also send messages to your care team and make appointments. If you have questions, please call your primary care clinic.  If you do not have a primary care provider, please call [phone] and they will assist you.        Care EveryWhere ID     This is your Care EveryWhere ID. This could be used by other organizations to access your [facility] medical records  [id number]        Your Vitals Were     Pulse Temperature Height Pulse Oximetry BMI (Body Mass Index)       70 98.7  F (37.1  C) (Oral) 1.651 m (5' 5\") 99% 47.93 kg/m2        Blood Pressure from Last 3 Encounters:   08/09/18 130/85   08/03/18 132/84   07/18/18 124/78    Weight from Last 3 Encounters:   08/09/18 130.6 kg (288 lb)   08/03/18 129.7 kg (286 lb)   07/18/18 128.1 kg (282 lb 4.8 oz)              We Performed the Following     Ristocetin cofactor     VWF Activity with reflex to Ristocetin Cofactor Activity        Primary Care Provider Office Phone # Fax #    Alanna CORONA MD Veena [phone]       [address]  [facility] [address]        Equal Access to Services     ROX SANTIAGO : Hadii luis yosto Soshira, waaxda luqadaha, qaybta kaalmada adeegyada, rosalee gonzalez . So [facility] [phone].    ATENCIÓN: Si habla español, tiene a berkowitz disposición servicios gratuitos de asistencia lingüística. Llame al [phone].    We comply with applicable federal civil rights laws and [place] laws. We do not discriminate on the basis of race, color, national origin, age, disability, sex, sexual orientation, or gender identity.            Thank you!     Thank you for choosing [facility]  for your care. Our goal is always to provide you with excellent care. Hearing back from our patients is one way we can continue to improve our services. Please take a few minutes to complete the written survey that you may receive in the mail after your visit with " us. Thank you!             Your Updated Medication List - Protect others around you: Learn how to safely use, store and throw away your medicines at www.disposemymeds.org.          This list is accurate as of 8/9/18 11:29 AM.  Always use your most recent med list.                   Brand Name Dispense Instructions for use Diagnosis    ALPRAZolam 0.5 MG tablet    XANAX     TAKE ONE TABLET AS NEEDED FOR SEVERE ANXIETY MAX OF 2 TABLETS PER DAY        desogestrel-ethinyl estradiol 0.15-30 MG-MCG per tablet    APRI    84 tablet    Take 1 tablet by mouth daily Use continuously for 3 months, then use placebo tablets    Irregular menstrual cycle       escitalopram 10 MG tablet    LEXAPRO     TAKE 1/2 tablet for 4 days then start 1 TABLET DAILY.        phentermine 30 MG capsule     30 capsule    Take 1 capsule (30 mg) by mouth every morning    Morbid obesity (H)

## 2018-08-09 NOTE — NURSING NOTE
Florina Marie is here bleeding work up visit.  She is  being seen for elevated PTT.     Welcomed and introduced her and her Mom to our center and provided them with a contact card with our contact phone numbers.    I did review which provider they were going to see today and the timing of seeing that provider.    Medications and allergies were reviewed with only minimal updates.    I thanked them for coming in and encouraged them to call with any concerns or questions. We will plan to call them next week with lab results.    Aure Morfin RN - Nurse Clinician - [facility] - [phone]

## 2018-08-10 LAB — FACT IX ACT/NOR PPP: 156 % (ref 65–150)

## 2018-08-13 LAB — LA PPP-IMP: NEGATIVE

## 2018-08-14 LAB
FACT VIII ACT/NOR PPP: 151 % (ref 55–200)
VWF CBA/VWF AG PPP IA-RTO: 135 % (ref 50–200)
VWF:AC ACT/NOR PPP IA: 110 % (ref 50–180)

## 2018-08-15 LAB
VON WILLEBRAND INTERPRETATION: NORMAL
VWF:RCO ACT/NOR PPP PL AGG: NORMAL %

## 2018-08-20 NOTE — PATIENT INSTRUCTIONS
You have lost 5 pounds since august 9th.  Let's try the phentermine again every day and come back in 1 month and let's see how you do on another month.    Preventive Health Recommendations    Female Ages 65 +    Yearly exam:     See your health care provider every year in order to  o Review health changes.   o Discuss preventive care.    o Review your medicines if your doctor has prescribed any.      You no longer need a yearly Pap test unless you've had an abnormal Pap test in the past 10 years. If you have vaginal symptoms, such as bleeding or discharge, be sure to talk with your provider about a Pap test.      Every 1 to 2 years, have a mammogram.  If you are over 69, talk with your health care provider about whether or not you want to continue having screening mammograms.      Every 10 years, have a colonoscopy. Or, have a yearly FIT test (stool test). These exams will check for colon cancer.       Have a cholesterol test every 5 years, or more often if your doctor advises it.       Have a diabetes test (fasting glucose) every three years. If you are at risk for diabetes, you should have this test more often.       At age 65, have a bone density scan (DEXA) to check for osteoporosis (brittle bone disease).    Shots:    Get a flu shot each year.    Get a tetanus shot every 10 years.    Talk to your doctor about your pneumonia vaccines. There are now two you should receive - Pneumovax (PPSV 23) and Prevnar (PCV 13).    Talk to your pharmacist about the shingles vaccine.    Talk to your doctor about the hepatitis B vaccine.    Nutrition:     Eat at least 5 servings of fruits and vegetables each day.      Eat whole-grain bread, whole-wheat pasta and brown rice instead of white grains and rice.      Get adequate Calcium and Vitamin D.     Lifestyle    Exercise at least 150 minutes a week (30 minutes a day, 5 days a week). This will help you control your weight and prevent disease.      Limit alcohol to one drink per  day.      No smoking.       Wear sunscreen to prevent skin cancer.       See your dentist twice a year for an exam and cleaning.      See your eye doctor every 1 to 2 years to screen for conditions such as glaucoma, macular degeneration and cataracts.

## 2018-08-20 NOTE — PROGRESS NOTES
"   SUBJECTIVE:   CC: Florina Marie is an 33 year old woman who presents for preventive health visit.     HPI  {Outside tests to abstract? :703757}    {additional problems to add (Optional):820504}    Today's PHQ-2 Score:   PHQ-2 ( 1999 Pfizer) 8/29/2017   Q1: Little interest or pleasure in doing things 0   Q2: Feeling down, depressed or hopeless 0   PHQ-2 Score 0   Q1: Little interest or pleasure in doing things Not at all   Q2: Feeling down, depressed or hopeless Not at all   PHQ-2 Score 0       Abuse: Current or Past(Physical, Sexual or Emotional)- {YES/NO/NA:840847}  Do you feel safe in your environment - {YES/NO/NA:399511}    Social History   Substance Use Topics     Smoking status: Never Smoker     Smokeless tobacco: Never Used      Comment: no smokers in the household     Alcohol use No     No flowsheet data found.{add AUDIT responses (Optional) (A score of 7 for adult men is an indication of hazardous drinking; a score of 8 or more is an indication of an alcohol use disorder.  A score of 7 or more for adult women is an indication of hazardous drinking or an alchohol use disorder):952419}    Reviewed orders with patient.  Reviewed health maintenance and updated orders accordingly - {Yes/No:314731::\"Yes\"}  {Chronicprobdata (Optional):075402}    {Mammo Decision Support (Optional):712962}    Pertinent mammograms are reviewed under the imaging tab.  History of abnormal Pap smear: {PAP HX:371577}  PAP / HPV Latest Ref Rng & Units 9/9/2016 9/8/2016 7/24/2013   PAP - - NIL NIL   HPV 16 DNA NEG Negative - -   HPV 18 DNA NEG Negative - -   OTHER HR HPV NEG Negative - -     Reviewed and updated as needed this visit by clinical staff         Reviewed and updated as needed this visit by Provider        {HISTORY OPTIONS (Optional):288451}    Review of Systems  {FEMALE ROS (Optional):418700}     OBJECTIVE:   There were no vitals taken for this visit.  Physical Exam  {Exam Choices (Optional):111347}    {Diagnostic Test " "Results (Optional):195334::\"Diagnostic Test Results:\",\"none \"}    ASSESSMENT/PLAN:   {Diag Picklist:206050}    COUNSELING:  {FEMALE COUNSELING MESSAGES:677067::\"Reviewed preventive health counseling, as reflected in patient instructions\"}    BP Readings from Last 1 Encounters:   08/09/18 130/85     Estimated body mass index is 47.93 kg/(m^2) as calculated from the following:    Height as of 8/9/18: 5' 5\" (1.651 m).    Weight as of 8/9/18: 288 lb (130.6 kg).    {BP Counseling- Complete if BP >= 120/80  (Optional):744364}  {Weight Management Plan (ACO) Complete if BMI is abnormal-  Ages 18-64  BMI >24.9.  Age 65+ with BMI <23 or >30 (Optional):123292}     reports that she has never smoked. She has never used smokeless tobacco.  {Tobacco Cessation -- Complete if patient is a smoker (Optional):855815}    Counseling Resources:  ATP IV Guidelines  Pooled Cohorts Equation Calculator  Breast Cancer Risk Calculator  FRAX Risk Assessment  ICSI Preventive Guidelines  Dietary Guidelines for Americans, 2010  [employer]'s MyPlate  ASA Prophylaxis  Lung CA Screening    Alanna Curiel MD  [facility]"

## 2018-08-20 NOTE — PROGRESS NOTES
SUBJECTIVE:   CC: Florina Marie is an 33 year old woman who presents for preventive health visit.     Physical   Annual:     Getting at least 3 servings of Calcium per day:  NO    Bi-annual eye exam:  NO    Dental care twice a year:  Yes    Sleep apnea or symptoms of sleep apnea:  None    Diet:  Regular (no restrictions)    Frequency of exercise:  None    Taking medications regularly:  Yes    Medication side effects:  None    Additional concerns today:  No    Today's PHQ-2 Score:   PHQ-2 ( 1999 Pfizer) 9/4/2018   Q1: Little interest or pleasure in doing things 0   Q2: Feeling down, depressed or hopeless 0   PHQ-2 Score 0   Q1: Little interest or pleasure in doing things Not at all   Q2: Feeling down, depressed or hopeless Not at all   PHQ-2 Score 0   Answers for HPI/ROS submitted by the patient on 9/4/2018   PHQ-2 Score: 0  If you checked off any problems, how difficult have these problems made it for you to do your work, take care of things at home, or get along with other people?: Not difficult at all  PHQ9 TOTAL SCORE: 6  ZEENAT 7 TOTAL SCORE: 6    Abuse: Current or Past(Physical, Sexual or Emotional)- No  Do you feel safe in your environment - Yes    Social History   Substance Use Topics     Smoking status: Never Smoker     Smokeless tobacco: Never Used      Comment: no smokers in the household     Alcohol use Yes     Alcohol Use 9/4/2018   If you drink alcohol do you typically have greater than 3 drinks per day OR greater than 7 drinks per week? Not Applicable   No flowsheet data found.    Reviewed orders with patient.  Reviewed health maintenance and updated orders accordingly - Yes    Mammogram not appropriate for this patient based on age.    Pertinent mammograms are reviewed under the imaging tab.  History of abnormal Pap smear: NO - age 30-65 PAP every 5 years with negative HPV co-testing recommended  PAP / HPV Latest Ref Rng & Units 9/9/2016 9/8/2016 7/24/2013   PAP - - NIL NIL   HPV 16 DNA NEG Negative  "- -   HPV 18 DNA NEG Negative - -   OTHER HR HPV NEG Negative - -     Reviewed and updated as needed this visit by clinical staff  Tobacco  Allergies  Med Hx  Surg Hx  Fam Hx  Soc Hx        Reviewed and updated as needed this visit by Provider            Review of Systems   Constitutional: Negative for chills and fever.   HENT: Negative for congestion, ear pain, hearing loss and sore throat.    Eyes: Negative for pain and visual disturbance.   Respiratory: Positive for cough. Negative for shortness of breath.    Cardiovascular: Negative for chest pain, palpitations and peripheral edema.   Gastrointestinal: Negative for abdominal pain, constipation, diarrhea, heartburn, hematochezia and nausea.   Breasts:  Negative for tenderness, breast mass and discharge.   Genitourinary: Negative for dysuria, frequency, genital sores, hematuria, pelvic pain, urgency, vaginal bleeding and vaginal discharge.   Musculoskeletal: Negative for arthralgias, joint swelling and myalgias.   Skin: Negative for rash.   Neurological: Negative for dizziness, weakness, headaches and paresthesias.   Psychiatric/Behavioral: Negative for mood changes. The patient is nervous/anxious.           OBJECTIVE:   /68 (BP Location: Left arm, Patient Position: Chair, Cuff Size: Adult Large)  Pulse 89  Temp 98.8  F (37.1  C) (Temporal)  Resp 16  Ht 5' 5.5\" (1.664 m)  Wt 283 lb (128.4 kg)  SpO2 100%  BMI 46.38 kg/m2  Physical Exam   Constitutional: She is oriented to person, place, and time. She appears well-developed and well-nourished. No distress.   HENT:   Right Ear: Tympanic membrane and external ear normal.   Left Ear: Tympanic membrane and external ear normal.   Nose: Nose normal.   Mouth/Throat: Oropharynx is clear and moist. No oropharyngeal exudate.   Eyes: Conjunctivae are normal. Pupils are equal, round, and reactive to light. Right eye exhibits no discharge. Left eye exhibits no discharge.   Neck: Neck supple. No tracheal " deviation present. No thyromegaly present.   Cardiovascular: Normal rate, regular rhythm, S1 normal, S2 normal, normal heart sounds and normal pulses.  Exam reveals no S3, no S4 and no friction rub.    No murmur heard.  Pulmonary/Chest: Effort normal and breath sounds normal. No respiratory distress. She has no wheezes. She has no rales.   Abdominal: Soft. Bowel sounds are normal. She exhibits no mass. There is no hepatosplenomegaly. There is no tenderness.   Musculoskeletal: Normal range of motion. She exhibits no edema.   Lymphadenopathy:     She has no cervical adenopathy.   Neurological: She is alert and oriented to person, place, and time. She has normal strength and normal reflexes. She exhibits normal muscle tone.   Skin: Skin is warm and dry. No rash noted.   Psychiatric: She has a normal mood and affect. Judgment and thought content normal. Cognition and memory are normal.         ASSESSMENT/PLAN:   1. Encounter for routine adult health examination without abnormal findings      2. Hyperlipidemia, unspecified hyperlipidemia type  Plans to return tomorrow for fasting labs    - Lipid panel reflex to direct LDL Fasting; Future    3. Morbid obesity (H)  Feel she tolerated the phentermine okay.  She did lose 5 pounds over the last month.  She is not sure how effective the phentermine was.  I suggested that we give it another month and have her return in follow-up in a month.  We also discussed getting a calendar so she can write down all of her activities and see if she can write down her exercise.  She plans to bike for exercise.  She also likes to dance.    - phentermine 30 MG capsule; Take 1 capsule (30 mg) by mouth every morning  Dispense: 30 capsule; Refill: 0    4. Screening for HIV (human immunodeficiency virus)  We will obtained tomorrow with her blood work    - HIV Screening; Future    5. Viral upper respiratory tract infection  She states that she had some head pain and nasal congestion over the  "weekend.  She feels this is improving.  Do not recommend further intervention at this time.      COUNSELING:  Reviewed preventive health counseling, as reflected in patient instructions    BP Readings from Last 1 Encounters:   09/04/18 114/68     Estimated body mass index is 46.38 kg/(m^2) as calculated from the following:    Height as of this encounter: 5' 5.5\" (1.664 m).    Weight as of this encounter: 283 lb (128.4 kg).      Weight management plan: Will try phentermine     reports that she has never smoked. She has never used smokeless tobacco.      Counseling Resources:  ATP IV Guidelines  Pooled Cohorts Equation Calculator  Breast Cancer Risk Calculator  FRAX Risk Assessment  ICSI Preventive Guidelines  Dietary Guidelines for Americans, 2010  USDA's MyPlate  ASA Prophylaxis  Lung CA Screening    Alanna Curiel MD  [facility]  "

## 2018-08-30 ENCOUNTER — TELEPHONE (OUTPATIENT)
Dept: FAMILY MEDICINE | Facility: OTHER | Age: 33
End: 2018-08-30

## 2018-08-30 NOTE — TELEPHONE ENCOUNTER
Reason for Call:  Form, our goal is to have forms completed with 72 hours, however, some forms may require a visit or additional information.    Type of letter, form or note:  medical    Who is the form from?: Patient    Where did the form come from: form was faxed in    What clinic location was the form placed at?: [facility] - [phone]    Where the form was placed: 's Box-forms box    What number is listed as a contact on the form?: [phone] f       Additional comments: form to be completed     Call taken on 8/30/2018 at 10:08 AM by Danielle Dsouza

## 2018-08-31 ENCOUNTER — NURSE TRIAGE (OUTPATIENT)
Dept: NURSING | Facility: CLINIC | Age: 33
End: 2018-08-31

## 2018-08-31 ENCOUNTER — TELEPHONE (OUTPATIENT)
Dept: FAMILY MEDICINE | Facility: OTHER | Age: 33
End: 2018-08-31

## 2018-08-31 ENCOUNTER — OFFICE VISIT (OUTPATIENT)
Dept: URGENT CARE | Facility: RETAIL CLINIC | Age: 33
End: 2018-08-31
Payer: MEDICARE

## 2018-08-31 VITALS — HEART RATE: 80 BPM | TEMPERATURE: 98.8 F | DIASTOLIC BLOOD PRESSURE: 88 MMHG | SYSTOLIC BLOOD PRESSURE: 138 MMHG

## 2018-08-31 DIAGNOSIS — J02.9 ACUTE PHARYNGITIS, UNSPECIFIED ETIOLOGY: Primary | ICD-10-CM

## 2018-08-31 LAB — S PYO AG THROAT QL IA.RAPID: NORMAL

## 2018-08-31 PROCEDURE — 87081 CULTURE SCREEN ONLY: CPT | Performed by: PHYSICIAN ASSISTANT

## 2018-08-31 PROCEDURE — 87880 STREP A ASSAY W/OPTIC: CPT | Mod: QW | Performed by: PHYSICIAN ASSISTANT

## 2018-08-31 PROCEDURE — 99213 OFFICE O/P EST LOW 20 MIN: CPT | Performed by: PHYSICIAN ASSISTANT

## 2018-08-31 RX ORDER — ESCITALOPRAM OXALATE 20 MG/1
40 TABLET ORAL DAILY
Refills: 5 | COMMUNITY
Start: 2018-08-08

## 2018-08-31 NOTE — PROGRESS NOTES
Chief Complaint   Patient presents with     Throat Pain     began today; using cough drops     Nasal Congestion     stuffy     Sweats     having hot flashes off and on since last night     SUBJECTIVE:  Florina Marie is a 33 year old female presenting with a chief complaint of a sore throat.  Onset of symptoms was when she woke up about 9 hours ago.  Course of illness: sudden onset.  Severity: moderate  Current and Associated symptoms: hot flashes starting last night  Treatment measures tried include: Tylenol/Ibuprofen.  Predisposing factors include: None.    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  5     phentermine 30 MG capsule Take 1 capsule (30 mg) by mouth every morning 30 capsule 0     [DISCONTINUED] escitalopram (LEXAPRO) 10 MG tablet TAKE 1/2 tablet for 4 days then start 1 TABLET DAILY.  1     Social History   Substance Use Topics     Smoking status: Never Smoker     Smokeless tobacco: Never Used      Comment: no smokers in the household     Alcohol use No     Allergies   Allergen Reactions     No Known Drug Allergies      REVIEW OF SYSTEMS  General: POSITIVE for hot flashes. NEGATIVE for chills, headaches.  Skin: Negative for rash.  ENT: POSITIVE for sore throat. NEGATIVE for nasal congestion, sinus pain/pressure, ear pain and post nasal drip.  Resp: NEGATIVE for cough, wheezing and  "SOB.    OBJECTIVE:   /88 (BP Location: Left arm)  Pulse 80  Temp 98.8  F (37.1  C) (Oral)  GENERAL APPEARANCE: healthy, alert and in no distress  HEENT: Eyes PEERL, conjunctiva clear. Bilateral ear canals and TMs normal. Nose normal. Pharynx erythematous without tonsillar hypertrophy without exudate noted.  NECK: supple, non-tender to palpation, no adenopathy noted  RESP: lungs clear to auscultation - no rales, rhonchi or wheezes  CV: regular rates and rhythm, normal S1 S2, no murmur noted  SKIN: no suspicious lesions or rashes    Rapid Strep test is negative; await throat culture results.    ASSESSMENT:    ICD-10-CM    1. Acute pharyngitis, unspecified etiology J02.9 RAPID STREP SCREEN     BETA STREP GROUP A R/O CULTURE     PLAN:   Patient Instructions   Rapid strep test today is negative.   Your throat culture is pending. [facility] will call if positive results to start antibiotics at that time; No call if the culture is negative.  Drink plenty of fluids and rest.  May use salt water gargles- about 8 oz warm water with about 1 teaspoon salt  Sucrets and Cepacol spray are over the counter medications that numb the throat.  Over the counter pain relievers such as Tylenol or ibuprofen may be used as needed.   Honey lemon tea helps to soothe the throat. \"Throat Coat\" tea is soothing as well.  Please follow up with primary care provider if not improving, worsening or new symptoms.    Hydrocortisone cream when feet get red, dry and itchy.    Follow up with primary care provider with any problems, questions or concerns or if symptoms worsen or fail to improve. Patient agreed to plan and verbalized understanding.    Wendy Becerril PA-C  [facility]  "

## 2018-08-31 NOTE — MR AVS SNAPSHOT
"              After Visit Summary   8/31/2018    Florina Marie    MRN: [id number]           Patient Information     Date Of Birth          1985        Visit Information        Provider Department      8/31/2018 4:00 PM Shannon Becerril PA-C [facility]        Today's Diagnoses     Acute pharyngitis, unspecified etiology    -  1      Care Instructions    Rapid strep test today is negative.   Your throat culture is pending. [facility] will call if positive results to start antibiotics at that time; No call if the culture is negative.  Drink plenty of fluids and rest.  May use salt water gargles- about 8 oz warm water with about 1 teaspoon salt  Sucrets and Cepacol spray are over the counter medications that numb the throat.  Over the counter pain relievers such as Tylenol or ibuprofen may be used as needed.   Honey lemon tea helps to soothe the throat. \"Throat Coat\" tea is soothing as well.  Please follow up with primary care provider if not improving, worsening or new symptoms.    Hydrocortisone cream when feet get red, dry and itchy.          Follow-ups after your visit        Your next 10 appointments already scheduled     Sep 04, 2018  2:20 PM CDT   PHYSICAL with Alanna Curiel MD   [facility]    [address] [place] [address] [phone]              Who to contact     You can reach your care team any time of the day by calling [phone].  Notification of test results:  If you have an abnormal lab result, we will notify you by phone as soon as possible.         Additional Information About Your Visit        MyChart Information     [employer] gives you secure access to your electronic health record. If you see a primary care provider, you can also send messages to your care team and make appointments. If you have questions, please call your primary care clinic.  If you do not have a primary care provider, please " call [phone] and they will assist you.        Care EveryWhere ID     This is your Care EveryWhere ID. This could be used by other organizations to access your Center medical records  [id number]        Your Vitals Were     Pulse Temperature                80 98.8  F (37.1  C) (Oral)           Blood Pressure from Last 3 Encounters:   08/31/18 138/88   08/09/18 130/85   08/03/18 132/84    Weight from Last 3 Encounters:   08/09/18 288 lb (130.6 kg)   08/03/18 286 lb (129.7 kg)   07/18/18 282 lb 4.8 oz (128.1 kg)              We Performed the Following     BETA STREP GROUP A R/O CULTURE     RAPID STREP SCREEN        Primary Care Provider Office Phone # Fax #    Alanna CORONA MD Veena [phone]       [address] [facility] [address]  [facility] [address]        Equal Access to Services     [facility] : Hadii aad ku hadasho Soomaali, waaxda luqadaha, qaybta kaalmada adeegyada, waxay ángelin hayjerrican chloe gonzalez . So [facility] [phone].    ATENCIÓN: Si habla español, tiene a berkowitz disposición servicios gratuitos de asistencia lingüística. Brain al [phone].    We comply with applicable federal civil rights laws and [place] laws. We do not discriminate on the basis of race, color, national origin, age, disability, sex, sexual orientation, or gender identity.            Thank you!     Thank you for choosing [facility]  for your care. Our goal is always to provide you with excellent care. Hearing back from our patients is one way we can continue to improve our services. Please take a few minutes to complete the written survey that you may receive in the mail after your visit with us. Thank you!             Your Updated Medication List - Protect others around you: Learn how to safely use, store and throw away your medicines at www.disposemymeds.org.          This list is accurate as of 8/31/18  4:27 PM.  Always use your most recent med list.                   Brand Name Dispense  Instructions for use Diagnosis    ALPRAZolam 0.5 MG tablet    XANAX     TAKE ONE TABLET AS NEEDED FOR SEVERE ANXIETY MAX OF 2 TABLETS PER DAY        desogestrel-ethinyl estradiol 0.15-30 MG-MCG per tablet    APRI    84 tablet    Take 1 tablet by mouth daily Use continuously for 3 months, then use placebo tablets    Irregular menstrual cycle       escitalopram 20 MG tablet    LEXAPRO     TAKE 1 TABLET BY MOUTH DAILY.        phentermine 30 MG capsule     30 capsule    Take 1 capsule (30 mg) by mouth every morning    Morbid obesity (H)

## 2018-08-31 NOTE — PATIENT INSTRUCTIONS
"Rapid strep test today is negative.   Your throat culture is pending. [facility] will call if positive results to start antibiotics at that time; No call if the culture is negative.  Drink plenty of fluids and rest.  May use salt water gargles- about 8 oz warm water with about 1 teaspoon salt  Sucrets and Cepacol spray are over the counter medications that numb the throat.  Over the counter pain relievers such as Tylenol or ibuprofen may be used as needed.   Honey lemon tea helps to soothe the throat. \"Throat Coat\" tea is soothing as well.  Please follow up with primary care provider if not improving, worsening or new symptoms.    Hydrocortisone cream when feet get red, dry and itchy.  "

## 2018-09-01 ENCOUNTER — TELEPHONE (OUTPATIENT)
Dept: URGENT CARE | Facility: RETAIL CLINIC | Age: 33
End: 2018-09-01

## 2018-09-01 ENCOUNTER — NURSE TRIAGE (OUTPATIENT)
Dept: NURSING | Facility: CLINIC | Age: 33
End: 2018-09-01

## 2018-09-01 NOTE — TELEPHONE ENCOUNTER
"Patient waiting for culture results. Also c/o \"hot flashes.\" Denies other symptoms.  Has appointment on 8-4-18.   Tamiko Banuelos RN  [facility]      Reason for Disposition    Nursing judgment    Protocols used: NO GUIDELINE OR REFERENCE AVAILABLE-ADULT-AH      "

## 2018-09-01 NOTE — TELEPHONE ENCOUNTER
Florina was seen at [facility] yesterday for a sore throat and at that time did not have any nasal congestion. Today she present to [facility] to be seen because she had a stuffy nose. She was told that because she was just seen yesterday, we could not see her again today. She called central scheduling, upset by this. I talked with her on the phone.     She has nasal congestion today. No fever, no facial pain or pressure, no cough, no chest congestion. She feels anxious when her nose is stuffy because she feels like she can't breathe and she gets worried.    I advised that she use some nasal saline and or Flonase to treat her nasal congestion. Take a deep breath through her mouth when she starts to feel like she can't breathe. I reassured her that common things happen commonly and this is very likely a common cold. If symptoms worsen or she becomes concerned, follow up in urgent care, at her clinic. She is agreeable to this plan.    Wendy Becerril PA-C  [facility]

## 2018-09-01 NOTE — TELEPHONE ENCOUNTER
"    [facility]NA Triage Call  Presenting Problem:From [phone] Pt called.    Constant Facial pain is 10/10 = today   , with cold and sore throat 5/10 with sneeze , coughing and swallowing , and denies fever .  Currently : 1&0 is ok, and resting a lot and up to bathroom today. Guideline Used: facial pain -adult and tehn sinus pain or congestion - adult   Patient Recommendations/Teaching: see provider in 4 hours and Pt agrees to go to [facility] care and push fluids , see care advice .  .Bree Tavares RN [facility].         Reason for Disposition    [1] SEVERE pain AND [2] not improved 2 hours after pain medicine    Additional Information    Negative: Shock suspected (e.g., cold/pale/clammy skin, too weak to stand, low BP, rapid pulse)    Negative: [1] Similar pain previously AND [2] it was from \"heart attack\"    Negative: [1] Similar pain previously AND [2] it was from \"angina\" AND [3] not relieved by nitroglycerin    Negative: Sounds like a life-threatening emergency to the triager    Negative: Chest pain    Sinus pain and congestion    Negative: Severe difficulty breathing (e.g., struggling for each breath, speaks in single words)    Negative: Sounds like a life-threatening emergency to the triager    Negative: [1] Sinus infection AND [2] taking an antibiotic AND [3] symptoms continue    Negative: [1] Difficulty breathing AND [2] not from stuffy nose (e.g., not relieved by cleaning out the nose)    Negative: [1] SEVERE headache AND [2] fever    Negative: [1] Redness or swelling on the cheek, forehead or around the eye AND [2] fever    Negative: Fever > 104 F (40 C)    Negative: Patient sounds very sick or weak to the triager    Protocols used: SINUS PAIN OR CONGESTION-ADULT-AH, FACE PAIN-ADULT-AH      "

## 2018-09-02 LAB
BACTERIA SPEC CULT: NORMAL
SPECIMEN SOURCE: NORMAL

## 2018-09-04 ENCOUNTER — TELEPHONE (OUTPATIENT)
Dept: LAB | Facility: OTHER | Age: 33
End: 2018-09-04

## 2018-09-04 ENCOUNTER — OFFICE VISIT (OUTPATIENT)
Dept: FAMILY MEDICINE | Facility: OTHER | Age: 33
End: 2018-09-04
Payer: MEDICARE

## 2018-09-04 VITALS
WEIGHT: 283 LBS | BODY MASS INDEX: 45.48 KG/M2 | HEART RATE: 89 BPM | OXYGEN SATURATION: 100 % | DIASTOLIC BLOOD PRESSURE: 68 MMHG | SYSTOLIC BLOOD PRESSURE: 114 MMHG | RESPIRATION RATE: 16 BRPM | TEMPERATURE: 98.8 F | HEIGHT: 66 IN

## 2018-09-04 DIAGNOSIS — Z00.00 ENCOUNTER FOR ROUTINE ADULT HEALTH EXAMINATION WITHOUT ABNORMAL FINDINGS: Primary | ICD-10-CM

## 2018-09-04 DIAGNOSIS — E78.5 HYPERLIPIDEMIA, UNSPECIFIED HYPERLIPIDEMIA TYPE: ICD-10-CM

## 2018-09-04 DIAGNOSIS — Z11.4 SCREENING FOR HIV (HUMAN IMMUNODEFICIENCY VIRUS): ICD-10-CM

## 2018-09-04 DIAGNOSIS — E66.01 MORBID OBESITY (H): ICD-10-CM

## 2018-09-04 DIAGNOSIS — J06.9 VIRAL UPPER RESPIRATORY TRACT INFECTION: ICD-10-CM

## 2018-09-04 PROCEDURE — 99395 PREV VISIT EST AGE 18-39: CPT | Performed by: FAMILY MEDICINE

## 2018-09-04 RX ORDER — PHENTERMINE HYDROCHLORIDE 30 MG/1
30 CAPSULE ORAL EVERY MORNING
Qty: 30 CAPSULE | Refills: 0 | Status: SHIPPED | OUTPATIENT
Start: 2018-09-04 | End: 2018-10-23

## 2018-09-04 ASSESSMENT — ENCOUNTER SYMPTOMS
FREQUENCY: 0
DIZZINESS: 0
JOINT SWELLING: 0
FEVER: 0
DYSURIA: 0
DIARRHEA: 0
HEADACHES: 0
EYE PAIN: 0
HEARTBURN: 0
WEAKNESS: 0
NERVOUS/ANXIOUS: 1
ABDOMINAL PAIN: 0
CHILLS: 0
COUGH: 1
HEMATURIA: 0
SORE THROAT: 0
ARTHRALGIAS: 0
SHORTNESS OF BREATH: 0
CONSTIPATION: 0
PARESTHESIAS: 0
HEMATOCHEZIA: 0
PALPITATIONS: 0
NAUSEA: 0
MYALGIAS: 0
BREAST MASS: 0

## 2018-09-04 ASSESSMENT — ANXIETY QUESTIONNAIRES
2. NOT BEING ABLE TO STOP OR CONTROL WORRYING: MORE THAN HALF THE DAYS
6. BECOMING EASILY ANNOYED OR IRRITABLE: MORE THAN HALF THE DAYS
7. FEELING AFRAID AS IF SOMETHING AWFUL MIGHT HAPPEN: NOT AT ALL
5. BEING SO RESTLESS THAT IT IS HARD TO SIT STILL: NOT AT ALL
7. FEELING AFRAID AS IF SOMETHING AWFUL MIGHT HAPPEN: NOT AT ALL
GAD7 TOTAL SCORE: 6
4. TROUBLE RELAXING: NOT AT ALL
GAD7 TOTAL SCORE: 6
1. FEELING NERVOUS, ANXIOUS, OR ON EDGE: MORE THAN HALF THE DAYS
GAD7 TOTAL SCORE: 6
3. WORRYING TOO MUCH ABOUT DIFFERENT THINGS: NOT AT ALL

## 2018-09-04 ASSESSMENT — PATIENT HEALTH QUESTIONNAIRE - PHQ9
SUM OF ALL RESPONSES TO PHQ QUESTIONS 1-9: 6
SUM OF ALL RESPONSES TO PHQ QUESTIONS 1-9: 6
10. IF YOU CHECKED OFF ANY PROBLEMS, HOW DIFFICULT HAVE THESE PROBLEMS MADE IT FOR YOU TO DO YOUR WORK, TAKE CARE OF THINGS AT HOME, OR GET ALONG WITH OTHER PEOPLE: NOT DIFFICULT AT ALL

## 2018-09-04 NOTE — LETTER
My Depression Action Plan  Name: Florina Marie   Date of Birth 1985  Date: 8/20/2018    My doctor: Alanna Curiel   My clinic: [address]  [facility] [address] [phone]          GREEN    ZONE   Good Control    What it looks like:     Things are going generally well. You have normal up s and down s. You may even feel depressed from time to time, but bad moods usually last less than a day.   What you need to do:  1. Continue to care for yourself (see self care plan)  2. Check your depression survival kit and update it as needed  3. Follow your physician s recommendations including any medication.  4. Do not stop taking medication unless you consult with your physician first.           YELLOW         ZONE Getting Worse    What it looks like:     Depression is starting to interfere with your life.     It may be hard to get out of bed; you may be starting to isolate yourself from others.    Symptoms of depression are starting to last most all day and this has happened for several days.     You may have suicidal thoughts but they are not constant.   What you need to do:     1. Call your care team, your response to treatment will improve if you keep your care team informed of your progress. Yellow periods are signs an adjustment may need to be made.     2. Continue your self-care, even if you have to fake it!    3. Talk to someone in your support network    4. Open up your depression survival kit           RED    ZONE Medical Alert - Get Help    What it looks like:     Depression is seriously interfering with your life.     You may experience these or other symptoms: You can t get out of bed most days, can t work or engage in other necessary activities, you have trouble taking care of basic hygiene, or basic responsibilities, thoughts of suicide or death that will not go away, self-injurious behavior.     What you need to do:  1. Call your care team  and request a same-day appointment. If they are not available (weekends or after hours) call your local crisis line, emergency room or 911.            Depression Self Care Plan / Survival Kit    Self-Care for Depression  Here s the deal. Your body and mind are really not as separate as most people think.  What you do and think affects how you feel and how you feel influences what you do and think. This means if you do things that people who feel good do, it will help you feel better.  Sometimes this is all it takes.  There is also a place for medication and therapy depending on how severe your depression is, so be sure to consult with your medical provider and/ or Behavioral Health Consultant if your symptoms are worsening or not improving.     In order to better manage my stress, I will:    Exercise  Get some form of exercise, every day. This will help reduce pain and release endorphins, the  feel good  chemicals in your brain. This is almost as good as taking antidepressants!  This is not the same as joining a gym and then never going! (they count on that by the way ) It can be as simple as just going for a walk or doing some gardening, anything that will get you moving.      Hygiene   Maintain good hygiene (Get out of bed in the morning, Make your bed, Brush your teeth, Take a shower, and Get dressed like you were going to work, even if you are unemployed).  If your clothes don't fit try to get ones that do.    Diet  I will strive to eat foods that are good for me, drink plenty of water, and avoid excessive sugar, caffeine, alcohol, and other mood-altering substances.  Some foods that are helpful in depression are: complex carbohydrates, B vitamins, flaxseed, fish or fish oil, fresh fruits and vegetables.    Psychotherapy  I agree to participate in Individual Therapy (if recommended).    Medication  If prescribed medications, I agree to take them.  Missing doses can result in serious side effects.  I understand  that drinking alcohol, or other illicit drug use, may cause potential side effects.  I will not stop my medication abruptly without first discussing it with my provider.    Staying Connected With Others  I will stay in touch with my friends, family members, and my primary care provider/team.    Use your imagination  Be creative.  We all have a creative side; it doesn t matter if it s oil painting, sand castles, or mud pies! This will also kick up the endorphins.    Witness Beauty  (AKA stop and smell the roses) Take a look outside, even in mid-winter. Notice colors, textures. Watch the squirrels and birds.     Service to others  Be of service to others.  There is always someone else in need.  By helping others we can  get out of ourselves  and remember the really important things.  This also provides opportunities for practicing all the other parts of the program.    Humor  Laugh and be silly!  Adjust your TV habits for less news and crime-drama and more comedy.    Control your stress  Try breathing deep, massage therapy, biofeedback, and meditation. Find time to relax each day.     My support system    Clinic Contact:  Phone number:    Contact 1:  Phone number:    Contact 2:  Phone number:    [employer]/:  Phone number:    Therapist:  Phone number:    Local crisis center:    Phone number:    Other community support:  Phone number:

## 2018-09-04 NOTE — TELEPHONE ENCOUNTER
"Florina has a lab appointment tomorrow 9/5/18 and at this time there are \"pending\" orders, can they be signed so lab has access to them?  "

## 2018-09-04 NOTE — MR AVS SNAPSHOT
After Visit Summary   9/4/2018    Florina Marie    MRN: [id number]           Patient Information     Date Of Birth          1985        Visit Information        Provider Department      9/4/2018 2:20 PM Alanna Curiel MD [facility]        Today's Diagnoses     Encounter for routine adult health examination without abnormal findings    -  1    Hyperlipidemia, unspecified hyperlipidemia type        Morbid obesity (H)        Screening for HIV (human immunodeficiency virus)        Viral upper respiratory tract infection          Care Instructions    You have lost 5 pounds since august 9th.  Let's try the phentermine again every day and come back in 1 month and let's see how you do on another month.    Preventive Health Recommendations    Female Ages 65 +    Yearly exam:     See your health care provider every year in order to  o Review health changes.   o Discuss preventive care.    o Review your medicines if your doctor has prescribed any.      You no longer need a yearly Pap test unless you've had an abnormal Pap test in the past 10 years. If you have vaginal symptoms, such as bleeding or discharge, be sure to talk with your provider about a Pap test.      Every 1 to 2 years, have a mammogram.  If you are over 69, talk with your health care provider about whether or not you want to continue having screening mammograms.      Every 10 years, have a colonoscopy. Or, have a yearly FIT test (stool test). These exams will check for colon cancer.       Have a cholesterol test every 5 years, or more often if your doctor advises it.       Have a diabetes test (fasting glucose) every three years. If you are at risk for diabetes, you should have this test more often.       At age 65, have a bone density scan (DEXA) to check for osteoporosis (brittle bone disease).    Shots:    Get a flu shot each year.    Get a tetanus shot every 10 years.    Talk to your doctor about your pneumonia  vaccines. There are now two you should receive - Pneumovax (PPSV 23) and Prevnar (PCV 13).    Talk to your pharmacist about the shingles vaccine.    Talk to your doctor about the hepatitis B vaccine.    Nutrition:     Eat at least 5 servings of fruits and vegetables each day.      Eat whole-grain bread, whole-wheat pasta and brown rice instead of white grains and rice.      Get adequate Calcium and Vitamin D.     Lifestyle    Exercise at least 150 minutes a week (30 minutes a day, 5 days a week). This will help you control your weight and prevent disease.      Limit alcohol to one drink per day.      No smoking.       Wear sunscreen to prevent skin cancer.       See your dentist twice a year for an exam and cleaning.      See your eye doctor every 1 to 2 years to screen for conditions such as glaucoma, macular degeneration and cataracts.          Follow-ups after your visit        Follow-up notes from your care team     Return in about 4 weeks (around 10/2/2018) for weight.      Your next 10 appointments already scheduled     Sep 05, 2018  8:00 AM CDT   [facility]    [address] [facility] [address]   [phone]           Please do not eat 10-12 hours before your appointment if you are coming in fasting for labs on lipids, cholesterol, or glucose (sugar). This does not apply to pregnant women. Water, hot tea and black coffee (with nothing added) are okay. Do not drink other fluids, diet soda or chew gum.            Oct 02, 2018  2:20 PM CDT   [facility]    [address] [address]   [phone]           Bring a current list of meds and any records pertaining to this visit. For Physicals, please bring immunization records and any forms needing to be filled out. Please arrive 10 minutes early to complete paperwork.             "  Future tests that were ordered for you today     Open Future Orders        Priority Expected Expires Ordered    HIV Screening Routine  9/4/2019 9/4/2018    Lipid panel reflex to direct LDL Fasting Routine  9/4/2019 9/4/2018            Who to contact     If you have questions or need follow up information about today's clinic visit or your schedule please contact [facility] [place] directly at [phone].  Normal or non-critical lab and imaging results will be communicated to you by MedGRChart, letter or phone within 4 business days after the clinic has received the results. If you do not hear from us within 7 days, please contact the clinic through [employer] or phone. If you have a critical or abnormal lab result, we will notify you by phone as soon as possible.  Submit refill requests through [employer] or call your pharmacy and they will forward the refill request to us. Please allow 3 business days for your refill to be completed.          Additional Information About Your Visit        [employer] Information     [employer] gives you secure access to your electronic health record. If you see a primary care provider, you can also send messages to your care team and make appointments. If you have questions, please call your primary care clinic.  If you do not have a primary care provider, please call [phone] and they will assist you.        Care EveryWhere ID     This is your Care EveryWhere ID. This could be used by other organizations to access your [place] medical records  [id number]        Your Vitals Were     Pulse Temperature Respirations Height Pulse Oximetry BMI (Body Mass Index)    89 98.8  F (37.1  C) (Temporal) 16 5' 5.5\" (1.664 m) 100% 46.38 kg/m2       Blood Pressure from Last 3 Encounters:   09/04/18 114/68   08/31/18 138/88   08/09/18 130/85    Weight from Last 3 Encounters:   09/04/18 283 lb (128.4 kg)   08/09/18 288 lb (130.6 kg)   08/03/18 286 lb (129.7 kg)                 Where to get your " medicines      Some of these will need a paper prescription and others can be bought over the counter.  Ask your nurse if you have questions.     Bring a paper prescription for each of these medications     phentermine 30 MG capsule          Primary Care Provider Office Phone # Fax #    Alanna Curiel [phone]       [address] [facility] [address]  [facility] [address]        Equal Access to Services     [facility] PAMELA : Hadii aad ku hadasho Soomaali, waaxda luqadaha, qaybta kaalmada adeegyada, waxay idiin hayaan adeeg kharash lamargarita . So [facility] [phone].    ATENCIÓN: Si habla español, tiene a berkowitz disposición servicios gratuitos de asistencia lingüística. Llame al [phone].    We comply with applicable federal civil rights laws and [place] laws. We do not discriminate on the basis of race, color, national origin, age, disability, sex, sexual orientation, or gender identity.            Thank you!     Thank you for choosing [facility]  for your care. Our goal is always to provide you with excellent care. Hearing back from our patients is one way we can continue to improve our services. Please take a few minutes to complete the written survey that you may receive in the mail after your visit with us. Thank you!             Your Updated Medication List - Protect others around you: Learn how to safely use, store and throw away your medicines at www.disposemymeds.org.          This list is accurate as of 9/4/18  3:01 PM.  Always use your most recent med list.                   Brand Name Dispense Instructions for use Diagnosis    ALPRAZolam 0.5 MG tablet    XANAX     TAKE ONE TABLET AS NEEDED FOR SEVERE ANXIETY MAX OF 2 TABLETS PER DAY        desogestrel-ethinyl estradiol 0.15-30 MG-MCG per tablet    APRI    84 tablet    Take 1 tablet by mouth daily Use continuously for 3 months, then use placebo tablets    Irregular menstrual cycle       escitalopram 20 MG tablet    LEXAPRO     TAKE  1 TABLET BY MOUTH DAILY.        phentermine 30 MG capsule     30 capsule    Take 1 capsule (30 mg) by mouth every morning    Morbid obesity (H)

## 2018-09-05 DIAGNOSIS — E78.5 HYPERLIPIDEMIA, UNSPECIFIED HYPERLIPIDEMIA TYPE: ICD-10-CM

## 2018-09-05 DIAGNOSIS — Z11.4 SCREENING FOR HIV (HUMAN IMMUNODEFICIENCY VIRUS): ICD-10-CM

## 2018-09-05 LAB
CHOLEST SERPL-MCNC: 182 MG/DL
HDLC SERPL-MCNC: 36 MG/DL
LDLC SERPL CALC-MCNC: 105 MG/DL
NONHDLC SERPL-MCNC: 146 MG/DL
TRIGL SERPL-MCNC: 206 MG/DL

## 2018-09-05 PROCEDURE — 36415 COLL VENOUS BLD VENIPUNCTURE: CPT | Performed by: FAMILY MEDICINE

## 2018-09-05 PROCEDURE — 80061 LIPID PANEL: CPT | Performed by: FAMILY MEDICINE

## 2018-09-05 PROCEDURE — 87389 HIV-1 AG W/HIV-1&-2 AB AG IA: CPT | Performed by: FAMILY MEDICINE

## 2018-09-06 ENCOUNTER — TELEPHONE (OUTPATIENT)
Dept: FAMILY MEDICINE | Facility: OTHER | Age: 33
End: 2018-09-06

## 2018-09-06 LAB — HIV 1+2 AB+HIV1 P24 AG SERPL QL IA: NONREACTIVE

## 2018-09-06 ASSESSMENT — ANXIETY QUESTIONNAIRES: GAD7 TOTAL SCORE: 6

## 2018-09-06 ASSESSMENT — PATIENT HEALTH QUESTIONNAIRE - PHQ9: SUM OF ALL RESPONSES TO PHQ QUESTIONS 1-9: 6

## 2018-09-06 NOTE — TELEPHONE ENCOUNTER
Prior Authorization Retail Medication Request    Medication/Dose: phentermine  ICD code (if different than what is on RX):    Previously Tried and Failed:    Rationale:      Insurance Name:    Insurance ID:        Pharmacy Information (if different than what is on RX)  Name:    Phone:

## 2018-09-07 NOTE — TELEPHONE ENCOUNTER
Central Prior Authorization Team   Phone: [phone]      PA Initiation    Medication: phentermine-Initiated  Insurance Company: [employer] - Phone [phone] Fax [phone]  Pharmacy Filling the [place] [facility] [place] [address]  Filling Pharmacy Phone: [phone]  Filling Pharmacy Fax:    Start Date: 9/7/2018    Filled out fax form, manually faxed to [facility] [phone]

## 2018-09-07 NOTE — TELEPHONE ENCOUNTER
PRIOR AUTHORIZATION DENIED    Medication: phentermine-DENIED    Denial Date: 9/7/2018    Denial Rational: Patient's Medicare Part D drug plan cannot cover drugs used for loss of appetite, weight loss, or weight gain.        Appeal Information:

## 2018-10-18 ENCOUNTER — TELEPHONE (OUTPATIENT)
Dept: FAMILY MEDICINE | Facility: OTHER | Age: 33
End: 2018-10-18

## 2018-10-18 NOTE — TELEPHONE ENCOUNTER
Assuming patient will need to wait for her upcoming appointment (10/23/18) for refill. Will route to covering providers to review   Meliza Bruce CMA

## 2018-10-18 NOTE — PROGRESS NOTES
SUBJECTIVE:   Florina Marie is a 33 year old female who presents to clinic today for the following health issues:      HPI  Medication Followup of Phentermine     Taking Medication as prescribed: yes    Side Effects:  None    Medication Helping Symptoms:  yes     Problem list and histories reviewed & adjusted, as indicated.  Additional history: as documented    Feels less hungry, not eating out as much.  Was biking, but now that it is cold she isn't exercising.  She thinks she will start going to the [facility] and using the treadmill.  Denies being irritable or angry.  Feels her mood is good.  Denies chest pain or shortness of breath.     Patient Active Problem List   Diagnosis     Problems with learning     Morbid obesity (H)     Generalized anxiety disorder     Hyperlipidemia, unspecified     Attention deficit disorder without hyperactivity     Prolonged PTT     Past Surgical History:   Procedure Laterality Date     EXAM UNDER ANESTHESIA PELVIC N/A 9/9/2016    Procedure: EXAM UNDER ANESTHESIA PELVIC;  Surgeon: Rhoda Alcazar MD;  Location:  OR      TOOTH EXTRACTION W/FORCEP      wisdom       Social History   Substance Use Topics     Smoking status: Never Smoker     Smokeless tobacco: Never Used      Comment: no smokers in the household     Alcohol use Yes     Family History   Problem Relation Age of Onset     Lipids Father      diet     Thyroid Disease Mother      unsure if hypo or hyper     Diabetes Paternal Grandfather      adult     HEART DISEASE Paternal Grandfather      bypass     Lipids Maternal Aunt      medication     Lipids Maternal Aunt      diet     Alzheimer Disease Maternal Grandfather      Hypertension No family hx of      Coronary Artery Disease No family hx of      Hyperlipidemia No family hx of      Cancer - colorectal No family hx of      Ovarian Cancer No family hx of      Prostate Cancer No family hx of      Depression/Anxiety No family hx of      Cerebrovascular Disease No family hx of       Anesthesia Reaction No family hx of      Asthma No family hx of      Osteoporosis No family hx of      Chemical Addiction No family hx of      Obesity No family hx of            OBJECTIVE:     /84 (BP Location: Left arm, Patient Position: Chair, Cuff Size: Adult Large)  Pulse 65  Temp 98  F (36.7  C) (Temporal)  Resp 16  Wt 270 lb (122.5 kg)  SpO2 98%  BMI 44.25 kg/m2  Body mass index is 44.25 kg/(m^2).  Gen: no apparent distress  Chest: clear to auscultation without wheeze, rale or rhonchi  Cor: regular rate and rhythm without murmur  Ext: warm and dry without edema  Psych: Alert and oriented times 3; coherent speech, normal   rate and volume.  Her affect is neutral     ASSESSMENT/PLAN:     1. Morbid obesity (H)  She is down 13#.  Congratulated her on her success.  Discussed adding exercise to her program.  Follow up in 1 month.    - phentermine 30 MG capsule; Take 1 capsule (30 mg) by mouth every morning  Dispense: 30 capsule; Refill: 0    Alanna Curiel MD  [facility]

## 2018-10-18 NOTE — TELEPHONE ENCOUNTER
Reason for Call:  Medication or medication refill:    Do you use a [facility]?  Name of the pharmacy and phone number for the current request:  [facility] - [phone]    Name of the medication requested: phentermine 30 MG capsule    Other request: patient is out of the medciation    Can we leave a detailed message on this number? YES    Phone number patient can be reached at: Home number on file [phone] (home)    Best Time: any    Call taken on 10/18/2018 at 8:00 AM by Karen Ahuja

## 2018-10-23 ENCOUNTER — OFFICE VISIT (OUTPATIENT)
Dept: FAMILY MEDICINE | Facility: OTHER | Age: 33
End: 2018-10-23
Payer: MEDICARE

## 2018-10-23 VITALS
WEIGHT: 270 LBS | BODY MASS INDEX: 44.25 KG/M2 | TEMPERATURE: 98 F | SYSTOLIC BLOOD PRESSURE: 130 MMHG | HEART RATE: 65 BPM | DIASTOLIC BLOOD PRESSURE: 84 MMHG | OXYGEN SATURATION: 98 % | RESPIRATION RATE: 16 BRPM

## 2018-10-23 DIAGNOSIS — E66.01 MORBID OBESITY (H): Primary | ICD-10-CM

## 2018-10-23 PROCEDURE — 99213 OFFICE O/P EST LOW 20 MIN: CPT | Performed by: FAMILY MEDICINE

## 2018-10-23 RX ORDER — PHENTERMINE HYDROCHLORIDE 30 MG/1
30 CAPSULE ORAL EVERY MORNING
Qty: 30 CAPSULE | Refills: 0 | Status: SHIPPED | OUTPATIENT
Start: 2018-10-23 | End: 2018-11-20

## 2018-10-23 NOTE — MR AVS SNAPSHOT
After Visit Summary   10/23/2018    Florina Marie    MRN: [id number]           Patient Information     Date Of Birth          1985        Visit Information        Provider Department      10/23/2018 7:40 AM Alanna Curiel MD [facility]        Today's Diagnoses     Morbid obesity (H)    -  1    Need for prophylactic vaccination and inoculation against influenza           Follow-ups after your visit        Follow-up notes from your care team     Return in about 4 weeks (around 11/20/2018) for BP Recheck.      Your next 10 appointments already scheduled     Nov 20, 2018  9:00 AM CST   Office Visit with Alanna Curiel MD   [facility]    [address] [place] [id number] [phone]           Bring a current list of meds and any records pertaining to this visit. For Physicals, please bring immunization records and any forms needing to be filled out. Please arrive 10 minutes early to complete paperwork.              Who to contact     If you have questions or need follow up information about today's clinic visit or your schedule please contact [facility] directly at [phone].  Normal or non-critical lab and imaging results will be communicated to you by MyChart, letter or phone within 4 business days after the clinic has received the results. If you do not hear from us within 7 days, please contact the clinic through [email or web address] or phone. If you have a critical or abnormal lab result, we will notify you by phone as soon as possible.  Submit refill requests through [facility] or call your pharmacy and they will forward the refill request to us. Please allow 3 business days for your refill to be completed.          Additional Information About Your Visit        MyChart Information     [facility] gives you secure access to your electronic health record. If you see a primary care provider, you can also  send messages to your care team and make appointments. If you have questions, please call your primary care clinic.  If you do not have a primary care provider, please call [phone] and they will assist you.        Care EveryWhere ID     This is your Care EveryWhere ID. This could be used by other organizations to access your [place] medical records  [id number]        Your Vitals Were     Pulse Temperature Respirations Pulse Oximetry BMI (Body Mass Index)       65 98  F (36.7  C) (Temporal) 16 98% 44.25 kg/m2        Blood Pressure from Last 3 Encounters:   10/23/18 130/84   09/04/18 114/68   08/31/18 138/88    Weight from Last 3 Encounters:   10/23/18 270 lb (122.5 kg)   09/04/18 283 lb (128.4 kg)   08/09/18 288 lb (130.6 kg)              Today, you had the following     No orders found for display         Where to get your medicines      Some of these will need a paper prescription and others can be bought over the counter.  Ask your nurse if you have questions.     Bring a paper prescription for each of these medications     phentermine 30 MG capsule          Primary Care Provider Office Phone # Fax #    Alanna Curiel [phone]       [address] [address]        Equal Access to Services     ROX SANTIAGO : Sosa yosto Soshira, waaxda luqadaha, qaybta kaalmada adeegyada, rosalee mathis. So [facility] [phone].    ATENCIÓN: Si habla español, tiene a berkowitz disposición servicios gratuitos de asistencia lingüística. Llame al [phone].    We comply with applicable federal civil rights laws and [place] laws. We do not discriminate on the basis of race, color, national origin, age, disability, sex, sexual orientation, or gender identity.            Thank you!     Thank you for choosing [facility]  for your care. Our goal is always to provide you with excellent care. Hearing back from our patients is one way we can  continue to improve our services. Please take a few minutes to complete the written survey that you may receive in the mail after your visit with us. Thank you!             Your Updated Medication List - Protect others around you: Learn how to safely use, store and throw away your medicines at www.disposemymeds.org.          This list is accurate as of 10/23/18  8:13 AM.  Always use your most recent med list.                   Brand Name Dispense Instructions for use Diagnosis    ALPRAZolam 0.5 MG tablet    XANAX     TAKE ONE TABLET AS NEEDED FOR SEVERE ANXIETY MAX OF 2 TABLETS PER DAY        desogestrel-ethinyl estradiol 0.15-30 MG-MCG per tablet    APRI    84 tablet    Take 1 tablet by mouth daily Use continuously for 3 months, then use placebo tablets    Irregular menstrual cycle       escitalopram 20 MG tablet    LEXAPRO     TAKE 1 TABLET BY MOUTH DAILY.        phentermine 30 MG capsule     30 capsule    Take 1 capsule (30 mg) by mouth every morning    Morbid obesity (H)

## 2018-11-04 ENCOUNTER — NURSE TRIAGE (OUTPATIENT)
Dept: NURSING | Facility: CLINIC | Age: 33
End: 2018-11-04

## 2018-11-05 NOTE — TELEPHONE ENCOUNTER
"Patient calling about a pain in vaginal area. description is vague. Denies discharge, fever, or illness. Has mild occasional itching. Was treated with antibiotic about 2 weeks ago and took diflucan she had from previous prescription for possible yeast infection a couple of days ago. Reviewed symptoms to be seen for sooner.  Reason for Disposition    All other vaginal symptoms  (Exception: feels like prior yeast infection, minor abrasion, mild rash < 24 hour duration, mild itching)    Additional Information    Negative: Sounds like a life-threatening emergency to the triager    Negative: Followed a genital area injury    Negative: Foreign body in vagina (e.g., tampon)    Negative: Vaginal bleeding is main symptom    Negative: Vaginal discharge is main symptom    Negative: Pain or burning with urination is main symptom    Negative: Menstrual cramps is main symptom    Negative: Abdominal pain is main symptom    Negative: Pubic lice suspected    Negative: Itching or rash of external female genital area (vulva)    Negative: Patient sounds very sick or weak to the triager    Negative: [1] SEVERE pain AND [2] not improved 2 hours after pain medicine    Negative: [1] Genital area looks infected (e.g., draining sore, spreading redness) AND [2] fever    Negative: [1] Something is hanging out of the vagina AND [2] cannot easily be pushed back inside    Negative: MODERATE-SEVERE itching (i.e., interferes with school, work, or sleep)    Negative: Genital area looks infected (e.g., draining sore, spreading redness)    Negative: Rash with painful tiny water blisters    Negative: [1] Rash (e.g., redness, tiny bumps, sore) of genital area AND [2] present > 24 hours    Negative: Tender lump (swelling or \"ball\") at vaginal opening    Negative: [1] Symptoms of a yeast infection (i.e., itchy, white discharge, not bad smelling) AND    [2] not improved > 3 days following CARE ADVICE    Negative: [1] Vaginal itching AND     [2] not " improved > 3 days following CARE ADVICE    Negative: Patient is worried about sexually transmitted disease (STD)    Negative: Feels like something inside is falling out of vagina (e.g., pressure, heaviness, fullness)    Negative: [1] Vaginal dryness or itching AND [2] nearing menopause or after menopause    Negative: Pain with sexual intercourse (dyspareunia)  (Exception: feels like prior yeast infection, minor abrasion, minor rash < 24 hour duration, mild itching)    Negative: Pain in genital area is a chronic symptom (recurrent or ongoing AND present > 4 weeks)    Protocols used: VAGINAL SYMPTOMS-ADULT-

## 2018-11-13 ENCOUNTER — TELEPHONE (OUTPATIENT)
Dept: FAMILY MEDICINE | Facility: OTHER | Age: 33
End: 2018-11-13

## 2018-11-13 NOTE — TELEPHONE ENCOUNTER
May need office visit to discuss. Last physical 9/2018. Will route to TC to review/advise  Meliza Bruce CMA

## 2018-11-13 NOTE — TELEPHONE ENCOUNTER
Reason for Call:  Other call back    Detailed comments: pt calling, she is on BC and whenever she is close to getting her period, she has uncontrolled crying meltdowns. This happens every month. Wondering if TC has some ideas as to what to do, or if she thinks she needs to start a new BC. Please advise.     Phone Number Patient can be reached at: [phone]    Best Time: any     Can we leave a detailed message on this number? YES    Call taken on 11/13/2018 at 11:53 AM by Liv Roblero

## 2018-11-15 NOTE — PROGRESS NOTES
SUBJECTIVE:   Florina Marie is a 33 year old female who presents to clinic today for the following health issues:      Hypertension     Hypertension:     Outpatient blood pressures:  Are not being checked    Dietary sodium intake::  No added salt diet    Medication Followup of Phentermine    Taking Medication as prescribed: yes    Side Effects:  None    Medication Helping Symptoms:  yes     Would also like to discuss her birth control, her mother is with her today and states that Florina's mood and behaviors escalate when she has her menses.  Florina is following with Psychiatry. Florina and her mother wonder about different contraceptive options.    Florina also complains of right wrist pain with turning her wrist at work.  [facility] and given wrist splint, but she doesn't feel it is helping.  Denies swelling, redness, injury or numbness/tingling.      Problem list and histories reviewed & adjusted, as indicated.  Additional history: as documented    Patient Active Problem List   Diagnosis     Problems with learning     Morbid obesity (H)     Generalized anxiety disorder     Hyperlipidemia, unspecified     Attention deficit disorder without hyperactivity     Prolonged PTT     Past Surgical History:   Procedure Laterality Date     EXAM UNDER ANESTHESIA PELVIC N/A 9/9/2016    Procedure: EXAM UNDER ANESTHESIA PELVIC;  Surgeon: Rhoda Alcazar MD;  Location[facility]  [facility]       Social History   Substance Use Topics     Smoking status: Never Smoker     Smokeless tobacco: Never Used      Comment: no smokers in the household     Alcohol use Yes     Family History   Problem Relation Age of Onset     Lipids Father      diet     Thyroid Disease Mother      unsure if hypo or hyper     Diabetes Paternal Grandfather      adult     HEART DISEASE Paternal Grandfather      bypass     Lipids Maternal Aunt      medication     Lipids Maternal Aunt      diet     Alzheimer Disease  "Maternal Grandfather      Hypertension No family hx of      Coronary Artery Disease No family hx of      Hyperlipidemia No family hx of      Cancer - colorectal No family hx of      Ovarian Cancer No family hx of      Prostate Cancer No family hx of      Depression/Anxiety No family hx of      Cerebrovascular Disease No family hx of      Anesthesia Reaction No family hx of      Asthma No family hx of      Osteoporosis No family hx of      Chemical Addiction No family hx of      Obesity No family hx of            ROS:  CONSTITUTIONAL: NEGATIVE for fever, chills  RESP: NEGATIVE for significant cough or shortness of breath   CV: NEGATIVE for chest pain, palpitations or peripheral edema    OBJECTIVE:     /80 (BP Location: Left arm, Patient Position: Chair, Cuff Size: Adult Large)  Pulse 68  Temp 97.7  F (36.5  C) (Temporal)  Resp 14  Ht 5' 5.5\" (1.664 m)  Wt 263 lb (119.3 kg)  SpO2 98%  BMI 43.1 kg/m2  Body mass index is 43.1 kg/(m^2).  Gen: no apparent distress  Chest/CV: S1 and S2 normal, no murmurs, clicks, gallops or rubs. Regular rate and rhythm. Chest is clear; no wheezes or rales. No edema.  Right wrist:  No erythema or swelling, no tenderness on exam, normal strength and range of motion.  Negative Finklestein's.      ASSESSMENT/PLAN:     1. Morbid obesity (H)  Will continue phentermine.  Asked her to start some regular exercise, she plans to go to the [facility].  Talked about healthy snacks.  Will follow up in 1 month.     - phentermine (ADIPEX-P) 30 MG capsule; Take 1 capsule (30 mg) by mouth every morning  Dispense: 30 capsule; Refill: 0    2. Encounter for other contraceptive management  Discussed DepoProver, Nexaplonon and IUD.  Patient is most interested in Nexaplanon, will refer to Gynecology for consult.    - OB/GYN REFERRAL    3. Need for Td vaccine    - TD (ADULT, 7+) PRESERVE FREE    4. Right wrist pain  Normal exam, suspect strain, will refer to Hand Therapy.    - [facility] HAND, AND " CHIROPRACTIC REFERRAL; Future    5. Need for prophylactic vaccination and inoculation against influenza    - FLU VACCINE, SPLIT VIRUS, IM (QUADRIVALENT) [16995]- >3 YRS  - Vaccine Administration, Initial [43830]    Alanna Curiel MD  [facility]    Injectable Influenza Immunization Documentation    1.  Is the person to be vaccinated sick today?   No    2. Does the person to be vaccinated have an allergy to a component   of the vaccine?   No  Egg Allergy Algorithm Link    3. Has the person to be vaccinated ever had a serious reaction   to influenza vaccine in the past?   No    4. Has the person to be vaccinated ever had Guillain-Barré syndrome?   No    Form completed by Meliza Bruce CMA     Screening Questionnaire for Adult Immunization    Are you sick today?   No   Do you have allergies to medications, food, a vaccine component or latex?   No   Have you ever had a serious reaction after receiving a vaccination?   No   Do you have a long-term health problem with heart disease, lung disease, asthma, kidney disease, metabolic disease (e.g. diabetes), anemia, or other blood disorder?   No   Do you have cancer, leukemia, HIV/AIDS, or any other immune system problem?   No   In the past 3 months, have you taken medications that affect  your immune system, such as prednisone, other steroids, or anticancer drugs; drugs for the treatment of rheumatoid arthritis, Crohn s disease, or psoriasis; or have you had radiation treatments?   No   Have you had a seizure, or a brain or other nervous system problem?   No   During the past year, have you received a transfusion of blood or blood     products, or been given immune (gamma) globulin or antiviral drug?   No   For women: Are you pregnant or is there a chance you could become        pregnant during the next month?   No   Have you received any vaccinations in the past 4 weeks?   No     Immunization questionnaire answers were all negative.        Per orders of  Dr. Curiel, injection of TD, flu given by Meliza Bruce. Patient instructed to remain in clinic for 15 minutes afterwards, and to report any adverse reaction to me immediately.       Screening performed by Meliza Bruce on 11/20/2018 at 9:43 AM.

## 2018-11-20 ENCOUNTER — OFFICE VISIT (OUTPATIENT)
Dept: FAMILY MEDICINE | Facility: OTHER | Age: 33
End: 2018-11-20
Payer: MEDICARE

## 2018-11-20 VITALS
RESPIRATION RATE: 14 BRPM | TEMPERATURE: 97.7 F | WEIGHT: 263 LBS | DIASTOLIC BLOOD PRESSURE: 80 MMHG | OXYGEN SATURATION: 98 % | HEIGHT: 66 IN | HEART RATE: 68 BPM | SYSTOLIC BLOOD PRESSURE: 114 MMHG | BODY MASS INDEX: 42.27 KG/M2

## 2018-11-20 DIAGNOSIS — Z30.8 ENCOUNTER FOR OTHER CONTRACEPTIVE MANAGEMENT: ICD-10-CM

## 2018-11-20 DIAGNOSIS — M25.531 RIGHT WRIST PAIN: ICD-10-CM

## 2018-11-20 DIAGNOSIS — E66.01 MORBID OBESITY (H): Primary | ICD-10-CM

## 2018-11-20 DIAGNOSIS — Z23 NEED FOR TD VACCINE: ICD-10-CM

## 2018-11-20 DIAGNOSIS — Z23 NEED FOR PROPHYLACTIC VACCINATION AND INOCULATION AGAINST INFLUENZA: ICD-10-CM

## 2018-11-20 PROCEDURE — 90686 IIV4 VACC NO PRSV 0.5 ML IM: CPT | Performed by: FAMILY MEDICINE

## 2018-11-20 PROCEDURE — G0008 ADMIN INFLUENZA VIRUS VAC: HCPCS | Mod: 59 | Performed by: FAMILY MEDICINE

## 2018-11-20 PROCEDURE — 90714 TD VACC NO PRESV 7 YRS+ IM: CPT | Performed by: FAMILY MEDICINE

## 2018-11-20 PROCEDURE — 90471 IMMUNIZATION ADMIN: CPT | Performed by: FAMILY MEDICINE

## 2018-11-20 PROCEDURE — 99214 OFFICE O/P EST MOD 30 MIN: CPT | Mod: 25 | Performed by: FAMILY MEDICINE

## 2018-11-20 RX ORDER — PHENTERMINE HYDROCHLORIDE 30 MG/1
30 CAPSULE ORAL EVERY MORNING
Qty: 30 CAPSULE | Refills: 0 | Status: SHIPPED | OUTPATIENT
Start: 2018-11-20 | End: 2018-12-14

## 2018-11-20 NOTE — MR AVS SNAPSHOT
After Visit Summary   11/20/2018    Florina Marie    MRN: [id number]           Patient Information     Date Of Birth          1985        Visit Information        Provider Department      11/20/2018 9:00 AM Alanna Curiel MD [facility]        Today's Diagnoses     Morbid obesity (H)    -  1    Encounter for other contraceptive management        Need for Td vaccine        Right wrist pain        Need for prophylactic vaccination and inoculation against influenza          Care Instructions    Consider Nexplanon/Implanon          Follow-ups after your visit        Additional Services     [facility] PT, HAND, AND CHIROPRACTIC REFERRAL       Physical Therapy, Hand Therapy and Chiropractic Care are available through:  *[facility]  *Hand Therapy (Occupational Therapy or Physical Therapy)  *[facility]    Call one number to schedule at any of the above locations: [phone].    Physical therapy, Hand therapy and/or Chiropractic care has been recommended by your physician as an excellent treatment option to reduce pain and help people return to normal activities, including sports.  Therapy and/or chiropractic care services are a great complement or alternative to expensive and invasive surgery, injections, or long-term use of prescription medications. The primary goal is to identify the underlying problem and provide you the tools to manage your condition on your own.     Please be aware that coverage of these services is subject to the terms and limitations of your health insurance plan.  Call member services at your health plan with any benefit or coverage questions.      Please bring the following to your appointment:  *Your personal calendar for scheduling future appointments  *Comfortable clothing            OB/GYN REFERRAL       Your provider has referred you to:  FM[facility]: [facility] [phone]    [email or web address]    Please be aware that coverage of these services is subject to the terms and limitations of your health insurance plan.  Call member services at your health plan with any benefit or coverage questions.      Please bring the following with you to your appointment:    (1) Any X-Rays, CTs or MRIs which have been performed.  Contact the facility where they were done to arrange for  prior to your scheduled appointment.   (2) List of current medications   (3) This referral request   (4) Any documents/labs given to you for this referral                  Follow-up notes from your care team     Return in about 4 weeks (around 12/18/2018) for weight follow up.      Your next 10 appointments already scheduled     Nov 27, 2018  9:00 AM CST   [facility] ANGIE Fu CNM   [facility]    [address] [facility] [address]   [phone]           Bring a current list of meds and any records pertaining to this visit. For Physicals, please bring immunization records and any forms needing to be filled out. Please arrive 10 minutes early to complete paperwork.            Dec 18, 2018  9:20 AM CST   Office Visit with Alanna Curiel MD   [facility]    [address] [address]   [phone]           Bring a current list of meds and any records pertaining to this visit. For Physicals, please bring immunization records and any forms needing to be filled out. Please arrive 10 minutes early to complete paperwork.              Future tests that were ordered for you today     Open Future Orders        Priority Expected Expires Ordered    [facility] PT, HAND, AND CHIROPRACTIC REFERRAL Routine  11/20/2019 11/20/2018            Who to contact     If you have questions or need follow up information about today's clinic visit or your schedule please contact  "[facility] [place] directly at [phone].  Normal or non-critical lab and imaging results will be communicated to you by MyChart, letter or phone within 4 business days after the clinic has received the results. If you do not hear from us within 7 days, please contact the clinic through CloudSway[employer] or phone. If you have a critical or abnormal lab result, we will notify you by phone as soon as possible.  Submit refill requests through [employer] or call your pharmacy and they will forward the refill request to us. Please allow 3 business days for your refill to be completed.          Additional Information About Your Visit        CloudSwayharTweetflow Information     [employer] gives you secure access to your electronic health record. If you see a primary care provider, you can also send messages to your care team and make appointments. If you have questions, please call your primary care clinic.  If you do not have a primary care provider, please call [phone] and they will assist you.        Care EveryWhere ID     This is your Care EveryWhere ID. This could be used by other organizations to access your [place] medical records  [id number]        Your Vitals Were     Pulse Temperature Respirations Height Pulse Oximetry BMI (Body Mass Index)    68 97.7  F (36.5  C) (Temporal) 14 5' 5.5\" (1.664 m) 98% 43.1 kg/m2       Blood Pressure from Last 3 Encounters:   11/20/18 114/80   10/23/18 130/84   09/04/18 114/68    Weight from Last 3 Encounters:   11/20/18 263 lb (119.3 kg)   10/23/18 270 lb (122.5 kg)   09/04/18 283 lb (128.4 kg)              We Performed the Following     FLU VACCINE, SPLIT VIRUS, IM (QUADRIVALENT) [54006]- >3 YRS     OB/GYN REFERRAL     TD (ADULT, 7+) PRESERVE FREE     Vaccine Administration, Initial [88996]          Where to get your medicines      Some of these will need a paper prescription and others can be bought over the counter.  Ask your nurse if you have questions.     Bring a paper prescription for " each of these medications     phentermine 30 MG capsule          Primary Care Provider Office Phone # Fax #    Alanna CORONA MD [facility] [phone]       [address] [place] [address] [address]  [facility] [address]        Equal Access to Services     ROX SANTIAGO : Hadii aad ku hadsarahmoe Soomaali, waaxda luqadaha, qaybta kaalmada adeegyada, rosalee monreal angelicaalonzo patinokristabryan mathis. So [facility] [phone].    ATENCIÓN: Si habla español, tiene a berkowitz disposición servicios gratuitos de asistencia lingüística. Llame al [phone].    We comply with applicable federal civil rights laws and [place] laws. We do not discriminate on the basis of race, color, national origin, age, disability, sex, sexual orientation, or gender identity.            Thank you!     Thank you for choosing [facility]  for your care. Our goal is always to provide you with excellent care. Hearing back from our patients is one way we can continue to improve our services. Please take a few minutes to complete the written survey that you may receive in the mail after your visit with us. Thank you!             Your Updated Medication List - Protect others around you: Learn how to safely use, store and throw away your medicines at www.disposemymeds.org.          This list is accurate as of 11/20/18  9:44 AM.  Always use your most recent med list.                   Brand Name Dispense Instructions for use Diagnosis    ALPRAZolam 0.5 MG tablet    XANAX     TAKE ONE TABLET AS NEEDED FOR SEVERE ANXIETY MAX OF 2 TABLETS PER DAY        desogestrel-ethinyl estradiol 0.15-30 MG-MCG per tablet    APRI    84 tablet    Take 1 tablet by mouth daily Use continuously for 3 months, then use placebo tablets    Irregular menstrual cycle       escitalopram 20 MG tablet    LEXAPRO     TAKE 1 TABLET BY MOUTH DAILY.        phentermine 30 MG capsule    ADIPEX-P    30 capsule    Take 1 capsule (30 mg) by mouth every morning    Morbid obesity (H)

## 2018-11-27 ENCOUNTER — OFFICE VISIT (OUTPATIENT)
Dept: OBGYN | Facility: OTHER | Age: 33
End: 2018-11-27
Payer: MEDICARE

## 2018-11-27 VITALS
DIASTOLIC BLOOD PRESSURE: 68 MMHG | WEIGHT: 266.75 LBS | BODY MASS INDEX: 43.71 KG/M2 | HEART RATE: 68 BPM | SYSTOLIC BLOOD PRESSURE: 122 MMHG

## 2018-11-27 DIAGNOSIS — Z30.09 CONTRACEPTIVE EDUCATION: ICD-10-CM

## 2018-11-27 DIAGNOSIS — Z97.5 NEXPLANON IN PLACE: ICD-10-CM

## 2018-11-27 DIAGNOSIS — Z30.017 NEXPLANON INSERTION: Primary | ICD-10-CM

## 2018-11-27 PROCEDURE — 11981 INSERTION DRUG DLVR IMPLANT: CPT | Performed by: ADVANCED PRACTICE MIDWIFE

## 2018-11-27 PROCEDURE — 99212 OFFICE O/P EST SF 10 MIN: CPT | Mod: 25 | Performed by: ADVANCED PRACTICE MIDWIFE

## 2018-11-27 NOTE — NURSING NOTE
"Chief Complaint   Patient presents with     Contraception     birth control consult       Initial /68 (BP Location: Right arm, Patient Position: Sitting, Cuff Size: Adult Large)  Pulse 68  Wt 266 lb 12 oz (121 kg)  LMP 11/22/2018 (Exact Date)  BMI 43.71 kg/m2 Estimated body mass index is 43.71 kg/(m^2) as calculated from the following:    Height as of 11/20/18: 5' 5.5\" (1.664 m).    Weight as of this encounter: 266 lb 12 oz (121 kg).  Medication Reconciliation: complete    Malina Little CMA    "

## 2018-11-27 NOTE — PATIENT INSTRUCTIONS
Leave the pressure dressing in place for 4-6 hours.  If you feel the dressing is too tight loosen it or remove it completely.  Leave the Band-Aid in place for 24 hours.  Change it if wet.   Leave the steri strip in place until it falls off by itself.  Call the clinic with fever, chills or bleeding from the site.   Use a back up method of birth control such as condoms or abstain from intercourse for 7 days.   Call the clinic for bleeding that is heavier than a normal period for you or if you experience severe pelvic pain.      What Nexplanon Users May Expect    For appropiate patients, Nexplanon is well tolerated and has a low early-removal rate.    Insertion site complications, such as prolonged pain or infection, are rare. Removal is occasionally difficult, and rarely requires a surgical procedure in the operating room.    Menstrual changes are common with Nexplanon. Bleeding may become more or less frequent or heavy, or absent. The bleeding pattern after the first three months is predictive of future bleeding, but the pattern may change at any time. Average bleeding is 18 days over 3 months. Over 50% of women experience rare over absent bleeding over the two year period, while 25% experience frequent or prolonged bleeding.    In clinical studies, users gained 3.7 pounds over two years. It is unknown what portion of this weight gain is related to Nexplanon    Women with a history of depressed mood may have worsening on Nexplanon, and may need to have the device removed.    Return to baseline ovulation patterns is seen 7-14 days after removal of Nexplanon.    Rarely, headaches and acne have also let to device removal.    Nexplanon may be less effective in women weight more than 130% of their ideal body weight.    Nexplanon does not protect against HIV or STDs.    Please call [facility] at [phone] if you have questions or concerns.    For more complete  information:  [email or web address]

## 2018-11-27 NOTE — MR AVS SNAPSHOT
After Visit Summary   11/27/2018    Florina Marie    MRN: [id number]           Patient Information     Date Of Birth          1985        Visit Information        Provider Department      11/27/2018 9:00 AM Lovely Horton APRN [facility]        Today's Diagnoses     Nexplanon insertion    -  1    Contraceptive education        Nexplanon in place          Care Instructions    Leave the pressure dressing in place for 4-6 hours.  If you feel the dressing is too tight loosen it or remove it completely.  Leave the Band-Aid in place for 24 hours.  Change it if wet.   Leave the steri strip in place until it falls off by itself.  Call the clinic with fever, chills or bleeding from the site.   Use a back up method of birth control such as condoms or abstain from intercourse for 7 days.   Call the clinic for bleeding that is heavier than a normal period for you or if you experience severe pelvic pain.      What Nexplanon Users May Expect    For appropiate patients, Nexplanon is well tolerated and has a low early-removal rate.    Insertion site complications, such as prolonged pain or infection, are rare. Removal is occasionally difficult, and rarely requires a surgical procedure in the operating room.    Menstrual changes are common with Nexplanon. Bleeding may become more or less frequent or heavy, or absent. The bleeding pattern after the first three months is predictive of future bleeding, but the pattern may change at any time. Average bleeding is 18 days over 3 months. Over 50% of women experience rare over absent bleeding over the two year period, while 25% experience frequent or prolonged bleeding.    In clinical studies, users gained 3.7 pounds over two years. It is unknown what portion of this weight gain is related to Nexplanon    Women with a history of depressed mood may have worsening on Nexplanon, and may need to have the device removed.    Return to baseline  ovulation patterns is seen 7-14 days after removal of Nexplanon.    Rarely, headaches and acne have also let to device removal.    Nexplanon may be less effective in women weight more than 130% of their ideal body weight.    Nexplanon does not protect against HIV or STDs.    Please call [facility] at [phone] if you have questions or concerns.    For more complete information:  [email or web address]              Follow-ups after your visit        Follow-up notes from your care team     Return if symptoms worsen or fail to improve.      Your next 10 appointments already scheduled     Dec 12, 2018  1:30 PM CST   [facility] with Rama Hayes OT   [facility]    [address]  [facility] [id number] [phone]            Dec 18, 2018  9:20 AM CST   Office Visit with Alanna Curiel MD   [facility]    [address]  [facility] [id number] [phone]           Bring a current list of meds and any records pertaining to this visit. For Physicals, please bring immunization records and any forms needing to be filled out. Please arrive 10 minutes early to complete paperwork.              Who to contact     If you have questions or need follow up information about today's clinic visit or your schedule please contact [facility] directly at [phone].  Normal or non-critical lab and imaging results will be communicated to you by MyChart, letter or phone within 4 business days after the clinic has received the results. If you do not hear from us within 7 days, please contact the clinic through MyChart or phone. If you have a critical or abnormal lab result, we will notify you by phone as soon as possible.  Submit refill requests through [employer] or call your pharmacy and they will forward the refill request to us. Please allow 3  business days for your refill to be completed.          Additional Information About Your Visit        [email or web address] Information     [employer]) gives you secure access to your electronic health record. If you see a primary care provider, you can also send messages to your care team and make appointments. If you have questions, please call your primary care clinic.  If you do not have a primary care provider, please call [phone] and they will assist you.        Care EveryWhere ID     This is your Care EveryWhere ID. This could be used by other organizations to access your [place] medical records  [id number]        Your Vitals Were     Pulse Last Period BMI (Body Mass Index)             68 11/22/2018 (Exact Date) 43.71 kg/m2          Blood Pressure from Last 3 Encounters:   11/27/18 122/68   11/20/18 114/80   10/23/18 130/84    Weight from Last 3 Encounters:   11/27/18 266 lb 12 oz (121 kg)   11/20/18 263 lb (119.3 kg)   10/23/18 270 lb (122.5 kg)              We Performed the Following     ETONOGESTREL IMPLANT SYSTEM     INSERTION NON-BIODEGRADABLE DRUG DELIVERY IMPLANT          Today's Medication Changes          These changes are accurate as of 11/27/18 10:23 AM.  If you have any questions, ask your nurse or doctor.               Start taking these medicines.        Dose/Directions    etonogestrel 68 MG Impl   Commonly known as:  IMPLANON/NEXPLANON   Used for:  Nexplanon insertion, Nexplanon in place   Started by:  Lovely Horton APRN CNM        Dose:  1 each   1 each (68 mg) by Subdermal route continuous   Refills:  0            Where to get your medicines      Some of these will need a paper prescription and others can be bought over the counter.  Ask your nurse if you have questions.     You don't need a prescription for these medications     etonogestrel 68 MG Impl                Primary Care Provider Office Phone # Fax #    Alanna Curiel [phone]       [address] [place] [address] [address]  [place]  [facility] [address]        Equal Access to Services     [facility] HospitalLETICIA : Hadii luis bernardo ritikamoe Soreianali, waaxda luqadaha, qaybta kaalmada blayne, rosalee mathis. So [facility] [phone].    ATENCIÓN: Si habla español, tiene a berkowitz disposición servicios gratuitos de asistencia lingüística. Brain al [phone].    We comply with applicable federal civil rights laws and [place] laws. We do not discriminate on the basis of race, color, national origin, age, disability, sex, sexual orientation, or gender identity.            Thank you!     Thank you for choosing [facility]  for your care. Our goal is always to provide you with excellent care. Hearing back from our patients is one way we can continue to improve our services. Please take a few minutes to complete the written survey that you may receive in the mail after your visit with us. Thank you!             Your Updated Medication List - Protect others around you: Learn how to safely use, store and throw away your medicines at www.disposemymeds.org.          This list is accurate as of 11/27/18 10:23 AM.  Always use your most recent med list.                   Brand Name Dispense Instructions for use Diagnosis    ALPRAZolam 0.5 MG tablet    XANAX     TAKE ONE TABLET AS NEEDED FOR SEVERE ANXIETY MAX OF 2 TABLETS PER DAY        desogestrel-ethinyl estradiol 0.15-30 MG-MCG per tablet    APRI    84 tablet    Take 1 tablet by mouth daily Use continuously for 3 months, then use placebo tablets    Irregular menstrual cycle       escitalopram 20 MG tablet    LEXAPRO     TAKE 1 TABLET BY MOUTH DAILY.        etonogestrel 68 MG Impl    IMPLANON/NEXPLANON     1 each (68 mg) by Subdermal route continuous    Nexplanon insertion, Nexplanon in place       phentermine 30 MG capsule    ADIPEX-P    30 capsule    Take 1 capsule (30 mg) by mouth every morning    Morbid obesity (H)

## 2018-11-27 NOTE — PROGRESS NOTES
Florina Marie is a 33 year old who presents to the clinic for discussion of cycle control methods.   She has used the following methods in the past: VENESSA but is having mood swings and is hoping that nexplanon would eliminate them  Today she is interested in discussing Mirena IUD, Depo Provera, Nexplanon, Nuva Ring and Patch  Histories reviewed and updated  Past Medical History:   Diagnosis Date     Attention deficit disorder with hyperactivity(314.01)      Other kyphoscoliosis and scoliosis      Other specified delay in development     Microcephaly     Viral warts, unspecified     plantar warts     Past Surgical History:   Procedure Laterality Date     EXAM UNDER ANESTHESIA PELVIC N/A 9/9/2016    Procedure: EXAM UNDER ANESTHESIA PELVIC;  Surgeon: Rhoda Alcazar MD;  Location:  OR      TOOTH EXTRACTION W/FORCEP      wisshaq     Social History     Social History     Marital status: Single     Spouse name: N/A     Number of children: 0     Years of education: N/A     Occupational History     student      [employer]     Social History Main Topics     Smoking status: Never Smoker     Smokeless tobacco: Never Used      Comment: no smokers in the household     Alcohol use Yes     Drug use: No     Sexual activity: No     Other Topics Concern     Caffeine Concern Yes     1 can QD     Exercise Yes     4 days a week 1/2 hour     Bike Helmet No     Seat Belt Yes     Parent/Sibling W/ Cabg, Mi Or Angioplasty Before 65f 55m? No     Social History Narrative     Family History   Problem Relation Age of Onset     Lipids Father      diet     Thyroid Disease Mother      unsure if hypo or hyper     Diabetes Paternal Grandfather      adult     HEART DISEASE Paternal Grandfather      bypass     Lipids Maternal Aunt      medication     Lipids Maternal Aunt      diet     Alzheimer Disease Maternal Grandfather      Hypertension No family hx of      Coronary Artery Disease No family hx of      Hyperlipidemia No family hx  of      Cancer - colorectal No family hx of      Ovarian Cancer No family hx of      Prostate Cancer No family hx of      Depression/Anxiety No family hx of      Cerebrovascular Disease No family hx of      Anesthesia Reaction No family hx of      Asthma No family hx of      Osteoporosis No family hx of      Chemical Addiction No family hx of      Obesity No family hx of        Menstrual History    Menses every 28 days.  Length of menses: 5 days  Menstrual description: normal    Denies the following contraindications to OCP use:  Migraine and migraine with aura  Smoking  Liver disease  Personal and family history of blood clot or stroke under the age of 50.  History of heart disease  History of breast cancer  History of lupus  History of hypertension        EXAM:  /68 (BP Location: Right arm, Patient Position: Sitting, Cuff Size: Adult Large)  Pulse 68  Wt 266 lb 12 oz (121 kg)  LMP 11/22/2018 (Exact Date)  BMI 43.71 kg/m2          ASSESSMENT/PLAN:  There are no contraindications to the use of Nexplanon.  We discussed the use of continuous COCs or monophasic pills vs nexplanon or mirena.  Reports living with her boyfriend but is not sexually active.   Also talked with her mom on the phone regarding cycle control and nexplanon and her mom verbally indicated she wants her to proceed with the nexplanon insertion.     (Z30.017) Nexplanon insertion  (primary encounter diagnosis)  Comment:   Plan:     (Z30.09) Contraceptive education  Comment:   Plan:     (Z97.5) Nexplanon in place  Comment:   Plan:     NEXPLANON INSERTION PROCEDURE    Florina Marie is a 33 year old No obstetric history on file. who presents for Nexplanon insertion. Patient's last menstrual period was 11/22/2018 (exact date).  The patient is currently using oral contraceptive  for contraception.     Tests:   None  Discussed risks of bleeding and infection with placement and the insertion procedure. Also discussed the possibility of irregular  bleeding for 3-6 months and then often cessation of menses but possibility of continued abnormal bleeding. Small risk of migration of the Nexplanon or difficulty removing the Nexplanon. We also discussed possible side effects of weight gain, skin or hair changes, alterations in mood and increase in headaches.  Lasts for 3 years at which time she could have this one removed and another replaced. All questions answered and consent form signed.   Preprocedure medications: 1% plain lidocaine, 1-2 ml  Nexplanon Lot # [id number]  [id number]    Exp date:01/2021   NDC 0914-6454-26    All equipment required was ready and available.  Patients allergies were confirmed.  The patient was placed in the supine position with her  (non-dominant) arm flexed at the elbow, externally rotated, and placed with her wrist parallel to her ear.  The insertion site was identified 6-8 cm above the elbow crease at the inner aspect overlying the bicepital groove.  The insertion site was marked with a sterile marker. The direction of insertion was also indicated with a minal 6-8 cm proximal in the bicepital groove.  The insertion area was cleaned with betadine swabs and anesthetized with 3 cc of 1% lidocaine without epinephrine.  The Nexplanon was removed from its blister.  The needle shield was removed.  Counter-traction was applied to the skin at the marked needle insertion site.  The tip of the needle was inserted at the site, beveled side up, at a slight angle.  The applicator was then lowered to a horizontal position.  The needle was inserted to its full length, keeping the needle parallel to the surface of the skin and the skin tented.   The cannula was retracted against the obturator.  The 4 cm elma was palpated under the skin.  The patient also palpated the elma.  A pressure bandage was applied with sterile gauze. The patient was instructed to remove the bangage in several hours and replace with a band-aid.    The user card was filled out and  given to the patient to keep.    PLAN:   The patient was asked to contact the clinic for any fever/chills/severe pelvic or abdominal pain or heavy bleeding.     FOLLOW-UP:  She was asked to follow up for any problems.

## 2018-11-28 ENCOUNTER — NURSE TRIAGE (OUTPATIENT)
Dept: FAMILY MEDICINE | Facility: OTHER | Age: 33
End: 2018-11-28

## 2018-11-28 NOTE — TELEPHONE ENCOUNTER
"\"I had the Nexplanon implanted yesterday and it's really pinching\".  Paged on call provider for [facility] to speak to me at [facility].  Dr. Madera is on call, page sent @ 6:13 pm via smart web.    Dr. Zepeda advised that Florina should be seen tomorrow.   If she develops any sxs of infections, severe pain, numbness/tingling or weakness of arm, be seen in ER.    Notified Florina of instructions, she appears to understand directives and agrees with plan.  Warm transfer to scheduling to make an appointment.     Reason for Disposition    Caller has URGENT question and triager unable to answer question    Additional Information    Negative: [1] Abdominal pain AND [2] pregnant < 20 weeks    Negative: [1] Vaginal bleeding AND [2] pregnant < 20 weeks    Negative: Vaginal discharge is main symptom    Negative: [1] SEVERE abdominal pain (e.g., excruciating) AND [2] present > 1 hour    Negative: SEVERE vaginal bleeding (i.e., soaking 2 pads or tampons per hour and present 2 or more hours)    Negative: Patient sounds very sick or weak to the triager    Negative: MODERATE vaginal bleeding (i.e., soaking 1 pad or tampon per hour and present > 6 hours)    Negative: [1] Constant abdominal pain AND [2] present > 2 hours    Protocols used: CONTRACEPTION - IMPLANT SYMPTOMS AND QUESTIONS-ADULT-    Marly Landis RN  [facility]      "

## 2018-12-12 ENCOUNTER — NURSE TRIAGE (OUTPATIENT)
Dept: NURSING | Facility: CLINIC | Age: 33
End: 2018-12-12

## 2018-12-12 ENCOUNTER — TELEPHONE (OUTPATIENT)
Dept: FAMILY MEDICINE | Facility: OTHER | Age: 33
End: 2018-12-12

## 2018-12-12 ENCOUNTER — THERAPY VISIT (OUTPATIENT)
Dept: OCCUPATIONAL THERAPY | Facility: CLINIC | Age: 33
End: 2018-12-12
Attending: FAMILY MEDICINE
Payer: MEDICARE

## 2018-12-12 DIAGNOSIS — M25.531 RIGHT WRIST PAIN: ICD-10-CM

## 2018-12-12 PROCEDURE — G8984 CARRY CURRENT STATUS: HCPCS | Mod: GO | Performed by: OCCUPATIONAL THERAPIST

## 2018-12-12 PROCEDURE — G8985 CARRY GOAL STATUS: HCPCS | Mod: GO | Performed by: OCCUPATIONAL THERAPIST

## 2018-12-12 PROCEDURE — 97165 OT EVAL LOW COMPLEX 30 MIN: CPT | Mod: GO | Performed by: OCCUPATIONAL THERAPIST

## 2018-12-12 PROCEDURE — 97140 MANUAL THERAPY 1/> REGIONS: CPT | Mod: GO | Performed by: OCCUPATIONAL THERAPIST

## 2018-12-12 PROCEDURE — 97035 APP MDLTY 1+ULTRASOUND EA 15: CPT | Mod: GO | Performed by: OCCUPATIONAL THERAPIST

## 2018-12-12 NOTE — PROGRESS NOTES
Hand Therapy Initial Evaluation    Current Date:  2018  Referring Physician: Dr. Curiel    Subjective:  Florina Marie is a 33 year old right hand dominant female.    Diagnosis:Right wrist pain  DOI:  18 (Date of order)    Patient reports symptoms of pain and numbness of the right wrist which occurred due to an unknown etiology. Since onset symptoms are unchanged  Special tests:  none.  Previous treatment: brace with no benefit.    General health as reported by patient is good.  Pertinent medical history includes:Depression  Medical allergies:none.  Surgical history: none.  Medication history: Anti-depressants.    Occupational Profile Information:  Current occupation is [employer]  Currently working in normal job without restrictions  Job Tasks: Lifting, Carrying, Pushing, Pulling, Repetitive Tasks  Barriers include:none  Prior functional level:  no limitations  Mobility: No difficulty  Transportation: drives  Leisure activities/hobbies: [employer] basketball, [employer]    Upper Extremity Functional Index Score:  SCORE:   Column Totals: /80: 61   (A lower score indicates greater disability.)    Objective:  Pain:    VAS(0-10) 18   At Rest:  5/10   With Use:  8/10   At Worst  8/10     Report of Pain:  Location: right ulnar wrist   Description: Ache,   Frequency:  Intermittent,   Duration: Worse during the day  Exacerbated by: Lifting, gripping, twisting, pulling, activity  Relieved by:  rest,   Progression: Unchanged    ROM:    Wrist 18   AROM(PROM) Right Left   Extension 70 70   Flexion 73 75   RD 20 20   UD 50 55   Supination 70 73   Pronation 90 90     Strength:   Pain free (Measured in pounds)   18   Trials Right Left   1  2  3  Av  55  60  57 53  49  60  54     Lat Pinch 18   Trials Right Left   1  2  3  Av 12     3 Pt.  Pinch 18   Trials Right Left   1  28 POC  3  Avg: 10 10     Pain Report:  - none    + mild    ++  moderate    +++ severe   Wrist 12/12/18    Strength Right    Extension 5/5    Flexion 5/5 +    RD 5/5    UD 5/5    Supination 5/5    Pronation 5/5      Edema:  [x]         None   []         Mild    []         Moderate      []         Severe                    Color/Temperature:     [x]        Normal  []        Abnormal       Palpation:  [x]         Normal        []       Tender       []      Mild         []       Moderate []       Severe        Sensation: []                   WNL throughout all nerve distributions; per patient report    [x]                   Decreased  []        Median    []        Ulnar    []        Radial nerve distribution  Patient reports sporadic numbness in her hands when she sits in her recliner    Assessment:  Patient presents with symptoms consistent with diagnosis of wrist pain,  with conservative intervention.     Patient's limitations or Problem List includes:  Pain, Sensory disturbance and Tightness in musculature of the right wrist which interferes with the patient's ability to perform Work Tasks and Household Chores as compared to previous level of function.    Rehab Potential:  Excellent - Return to full activity, no limitations    Patient will benefit from skilled Occupational Therapy to increase flexibility, overall strength and stability of wrist and decrease pain to return to previous activity level and resume normal daily tasks and to reach their rehab potential.    Barriers to Learning:  No barrier    Communication Issues:  Patient appears to be able to clearly communicate and understand verbal and written communication and follow directions correctly.    Chart Review: Brief history including review of medical and/or therapy records relating to the presenting problem and Simple history review with patient    Identified Performance Deficits: home establishment and management, meal preparation and cleanup and work    Assessment of Occupational Performance:  3-5 Performance  Deficits    Clinical Decision Making (Complexity): Low complexity    Treatment Explanation:  The following has been discussed with the patient:  RX ordered/plan of care  Anticipated outcomes  Possible risks and side effects    Frequency:  1 X week, once daily  Duration:  for 8 weeks  Treatment Plan:  Modalities:  US  Therapeutic Exercise:  AROM, PROM, Isotonics, Isometrics and Stabilization  Neuromuscular re-education:  Nerve Gliding and Kinesiotaping  Manual Techniques:  Joint mobilization and Myofascial release  Discharge Plan:  Achieve all LTG.  Independent in home treatment program.  Reach maximal therapeutic benefit.      Home Exercise Program:  Compliance with wrist support with activities  Trial of K-tape  Massage to flexors and extensors    Next Visit:  US if indicated  MFR to flexors and extensors  Joint mobilization  Strengthening if pain reduced

## 2018-12-12 NOTE — TELEPHONE ENCOUNTER
Florina Marie is a 33 year old female who calls with headache.    NURSING ASSESSMENT:  Description:  Having right eye pain, red around the eye and eye ball is swollen. Has a headache. Has not tried anything for it. Denies fever, stiff neck, visual changes.   Onset/duration:  2-3 days   Precip. factors:  Unknown   Associated symptoms:  Concern of right eye ball swelling, redness around eye, and pain with headache   Improves/worsens symptoms:  NEW   Pain scale (0-10)   8-9/10, constant pain, worse when touching the eye, throbbing sensation   LMP/preg/breast feeding:  Patient's last menstrual period was 11/22/2018 (exact date).  Last exam/Treatment:  11/27/2018  Allergies:   Allergies   Allergen Reactions     No Known Drug Allergies      NURSING PLAN: Huddle with provider, plan includes to call eye provider or go to ED    RECOMMENDED DISPOSITION:  To ED/UC for evaluation, another person to drive  Will comply with recommendation: Yes  If further questions/concerns or if symptoms do not improve, worsen or new symptoms develop, call your PCP or [facility] as soon as possible.    NOTES:  Disposition was determined by the first positive assessment question, therefore all previous assessment questions were negative    Guideline used:  Telephone Triage Protocols for Nurses, Fifth Edition, Delma Potts  Eye problems  Nursing Judgment   Huddled with SEBASTIEN Terry, RN, BSN

## 2018-12-12 NOTE — LETTER
DEPARTMENT OF HEALTH AND HUMAN SERVICES  CENTERS FOR MEDICARE & MEDICAID SERVICES    PLAN/UPDATED PLAN OF PROGRESS FOR OUTPATIENT REHABILITATION    PATIENTS NAME:  Florina Marie     : 1985    PROVIDER NUMBER:    [id number]    [id number]    [facility]OVIDER NAME: [facility]    MEDICAL RECORD NUMBER: [id number]     START OF CARE DATE:  SOC Date: 18   TYPE:  OT    PRIMARY/TREATMENT DIAGNOSIS: (Pertinent Medical Diagnosis)  Right wrist pain    VISITS FROM START OF CARE:  Rxs Used: 1     Hand Therapy Initial Evaluation  Current Date:  2018  Referring Physician: Dr. Curiel    Subjective:  Florina Marie is a 33 year old right hand dominant female.    Diagnosis:Right wrist pain  DOI:  18 (Date of order)    Patient reports symptoms of pain and numbness of the right wrist which occurred due to an unknown etiology. Since onset symptoms are unchanged  Special tests:  none.  Previous treatment: brace with no benefit.    General health as reported by patient is good.  Pertinent medical history includes:Depression  Medical allergies:none.  Surgical history: none.  Medication history: Anti-depressants.    Occupational Profile Information:  Current occupation is [employer]  Currently working in normal job without restrictions  Job Tasks: Lifting, Carrying, Pushing, Pulling, Repetitive Tasks  Barriers include:none  Prior functional level:  no limitations  Mobility: No difficulty  Transportation: drives  Leisure activities/hobbies: [employer] basketball, bowling    Upper Extremity Functional Index Score:  SCORE:   Column Totals: /80: 61   (A lower score indicates greater disability.)    Objective:  Pain:    VAS(0-10) 18   At Rest:  5/10   With Use:  8/10   At Worst  8/10     Report of Pain:  Location: right ulnar wrist   Description: Ache,   Frequency:  Intermittent,   Duration: Worse during the day  Exacerbated by: Lifting, gripping, twisting, pulling,  activity  Relieved by:  rest,   Progression: Unchanged    ROM:    Wrist 18   AROM(PROM) Right Left   Extension 70 70   Flexion 73 75   RD 20 20   UD 50 55   Supination 70 73   Pronation 90 90     Strength:   Pain free (Measured in pounds)   18   Trials Right Left   1  2  3  Av  55  60  57 53  49  60  54     Lat Pinch 18   Trials Right Left   1  2  3  Av 12     3 Pt.  Pinch 18   Trials Right Left   1  28 POC  3  Avg: 10 10     Pain Report:  - none    + mild    ++ moderate    +++ severe   Wrist 18    Strength Right    Extension 5/5    Flexion 5/5 +    RD 5/5    UD 5/5    Supination 5/5    Pronation 5/5      Edema:  [x]         None   []         Mild    []         Moderate      []         Severe                  Color/Temperature:     [x]        Normal  []        Abnormal       Palpation:  [x]         Normal        []       Tender       []      Mild         []       Moderate []       Severe        Sensation: []                   WNL throughout all nerve distributions; per patient report    [x]                   Decreased  []        Median    []        Ulnar    []        Radial nerve distribution  Patient reports sporadic numbness in her hands when she sits in her recliner    Assessment:  Patient presents with symptoms consistent with diagnosis of wrist pain,  with conservative intervention.     Patient's limitations or Problem List includes:  Pain, Sensory disturbance and Tightness in musculature of the right wrist which interferes with the patient's ability to perform Work Tasks and Household Chores as compared to previous level of function.    Rehab Potential:  Excellent - Return to full activity, no limitations    Patient will benefit from skilled Occupational Therapy to increase flexibility, overall strength and stability of wrist and decrease pain to return to previous activity level and resume normal daily tasks and to reach their rehab potential.    Barriers  "to Learning:  No barrier    Communication Issues:  Patient appears to be able to clearly communicate and understand verbal and written communication and follow directions correctly.    Chart Review: Brief history including review of medical and/or therapy records relating to the presenting problem and Simple history review with patient    Identified Performance Deficits: home establishment and management, meal preparation and cleanup and work    Assessment of Occupational Performance:  3-5 Performance Deficits    Clinical Decision Making (Complexity): Low complexity    Treatment Explanation:  The following has been discussed with the patient:  RX ordered/plan of care  Anticipated outcomes  Possible risks and side effects    Frequency:  1 X week, once daily  Duration:  for 8 weeks  Treatment Plan:  Modalities:  US  Therapeutic Exercise:  AROM, PROM, Isotonics, Isometrics and Stabilization  Neuromuscular re-education:  Nerve Gliding and Kinesiotaping  Manual Techniques:  Joint mobilization and Myofascial release  Discharge Plan:  Achieve all LTG.  Independent in home treatment program.  Reach maximal therapeutic benefit.      Home Exercise Program:  Compliance with wrist support with activities  Trial of K-tape  Massage to flexors and extensors    Next Visit:  US if indicated  MFR to flexors and extensors  Joint mobilization  Strengthening if pain reduced    Caregiver Signature/Credentials _____________________________ Date ________         Rama Hayes OTR/L, CHT  I have reviewed and certified the need for these services and plan of treatment while under my care.        PHYSICIAN'S SIGNATURE:   _________________________________________  Date___________   Alanna Curiel MD    Certification period:  Beginning of Cert date period: 12/12/18 to  End of Cert period date: 03/11/19     Functional Level Progress Report: Please see attached \"Goal Flow sheet for Functional level.\"    ____X____ Continue Services or      "  ________ DC Services                Service dates: From  SOC Date: 12/12/18 date to present

## 2018-12-12 NOTE — TELEPHONE ENCOUNTER
Reason for Call:  Same Day Appointment, Requested Provider:  any provider     PCP: Alanna Curiel    Reason for visit:  Headache, red and swollen right eye.  No vision changes-declined triage but wants to be worked in.    Duration of symptoms: 2-3 days    Have you been treated for this in the past? No    Additional comments:  Patient would like to be seen today in [place] after 2:30.  Please advise.      Can we leave a detailed message on this number? YES    Phone number patient can be reached at: Cell number on file:    Telephone Information:   Mobile [phone]       Best Time: any    Call taken on 12/12/2018 at 11:37 AM by Josefa Haji

## 2018-12-13 ENCOUNTER — NURSE TRIAGE (OUTPATIENT)
Dept: NURSING | Facility: CLINIC | Age: 33
End: 2018-12-13

## 2018-12-13 NOTE — TELEPHONE ENCOUNTER
Eye pain.  Saw eye MD today who put her on antibiotics, but now has sudden bad headache.    She is wondering if it has something to do do with her arm implant birth control.  Will go to ER per protocol.  Tamiko Banuelos RN  [facility]      Reason for Disposition    [1] SEVERE headache AND [2] sudden-onset (i.e., reaching maximum intensity within seconds)    Additional Information    Negative: Unable to walk, or can only walk with assistance (e.g., requires support)    Negative: Stiff neck (can't touch chin to chest)    Negative: Severe pain in one eye    Negative: [1] Other family members (or roommates) with headaches AND [2] possibility of carbon monoxide exposure    Protocols used: HEADACHE-ADULT-AH

## 2018-12-13 NOTE — TELEPHONE ENCOUNTER
Additional Information    Negative: Severe difficulty breathing (e.g., struggling for each breath, speaks in single words)    Negative: Sounds like a life-threatening emergency to the triager    Negative: [1] Recent hospitalization for pneumonia AND [2] taking an antibiotic    Negative: [1] Animal bite infection AND [2] taking an antibiotic    Negative: [1] Ear  infection (Otitis Media) AND [2] taking an antibiotic    Negative: [1] Ear  infection (Swimmer's Ear)) AND [2] taking an antibiotic    Negative: [1] Sinus infection AND [2] taking an antibiotic    Negative: [1] Strep throat AND [2] taking an antibiotic    Negative: [1] Urinary tract  infection (e.g., cystitis, pyelonephritis, urethritis, epididymitis) AND [2] male AND [3] taking an antibiotic    Negative: [1] Urinary tract  infection (e.g., cystitis, pyelonephritis, urethritis) AND [2] female AND [3] taking an antibiotic    Negative: [1] Wound infection AND [2] taking an antibiotic    Negative: MODERATE difficulty breathing (e.g., speaks in phrases, SOB even at rest, pulse 100-120)    Negative: Fever > 103 F (39.4 C)    Negative: Patient sounds very sick or weak to the triager    Negative: [1] Taking antibiotics > 24 hours AND [2] symptoms WORSE    Negative: [1] Recent hospitalization AND [2] symptoms WORSE    Negative: [1] Diabetes mellitus or weak immune system (e.g., HIV positive, cancer chemo, splenectomy, organ transplant, chronic steroids) AND [2] new onset of fever > 100.5 F (38.1 C)    Negative: Caller has URGENT question and triager unable to answer question    Negative: [1] Taking antibiotic AND [2] new onset of fever    Negative: [1] Taking antibiotic > 48 hours (2 days) AND [2] fever still present (SAME)    Negative: [1] Taking antibiotic > 72 hours (3 days) AND [2] symptoms (other than fever) not improved    Negative: Caller has NON-URGENT question and triager unable to answer question    Negative: [1] Finished taking antibiotics AND [2]  symptoms are BETTER but [3] not completely gone    Negative: [1] Taking antibiotic AND [2] symptoms are BETTER AND [3] no fever (all triage questions negative)    Negative: [1] Taking antibiotics < 48 hours AND [2] fever still present (SAME) (all triage questions negative)    Negative: [1] Taking antibiotic < 72 hours (3 days) AND [2] symptoms are SAME (not improved) (all triage questions negative)    Caller has question and triager able to answer question    Protocols used: INFECTION ON ANTIBIOTIC FOLLOW-UP CALL-ADULT-    Caller states her eye is watery and thinks it may be viral infection. Caller states she was seen by an eye doctor on 12/12/19 and was prescribed amoxicillin by mouth to take for the eye infection. Caller states she has not picked up her prescription yet. Triager advised caller to start antibiotic asap, give it up to 72 hours for symptoms to improve, if not call back if symptoms worsen. Caller wants to know if she should go into work with the eye infection. Triager advised to give 24 hours. Caller verbalized and understands directives.

## 2018-12-13 NOTE — TELEPHONE ENCOUNTER
Patient states she has right eyelid swelling that is red and painful with or without touch to the area. She did see an eye doctor today who said she may have an infected gland and started her on antibiotics. She is concerned tonight as the pain has gotten bad and she has a bad headache and is now really nauseous too. She did get a nexplanon implant a couple weeks ago and doesn't think that would be a side effect but wondered, read through the [employer] reference for the Nexplanon with her.     Protocol and care advice reviewed.  She is unsure what she will do, as the recommendations for her symptoms in the protocol are to be seen within 4 hrs, but having been seen already today she is unsure if she will go in again to be seen or not, advised if the eye is that much worse she should look at getting it looked at again.   Caller states understanding of the recommended disposition.   Advised to call back if further questions or concerns.    Reason for Disposition    [1] SEVERE eyelid swelling on one side AND [2] red and painful (or tender to touch)    Additional Information    Negative: Unresponsive, passed out or very weak    Negative: Difficulty breathing or wheezing    Negative: [1] Difficulty swallowing or slurred speech AND [2] sudden onset    Negative: Sounds like a life-threatening emergency to the triager    Negative: [1] SEVERE eyelid swelling (i.e., shut or almost) AND [2] fever    Negative: [1] Eyelid (outer) is very red AND [2] fever    Negative: Patient sounds very sick or weak to the triager    Negative: [1] Pregnant > 20 weeks AND [2] sudden weight gain (i.e., more than 3 lbs or 1.4 kg in one week)    Negative: [1] SEVERE eyelid swelling (i.e., shut or almost) AND [2] involves both eyes      (Exception: itchy eyes, which  are probably an allergic reaction)    Protocols used: EYE - SWELLING-ADULT-

## 2018-12-14 ENCOUNTER — MYC MEDICAL ADVICE (OUTPATIENT)
Dept: FAMILY MEDICINE | Facility: OTHER | Age: 33
End: 2018-12-14

## 2018-12-14 ENCOUNTER — TELEPHONE (OUTPATIENT)
Dept: FAMILY MEDICINE | Facility: OTHER | Age: 33
End: 2018-12-14

## 2018-12-14 ENCOUNTER — OFFICE VISIT (OUTPATIENT)
Dept: FAMILY MEDICINE | Facility: OTHER | Age: 33
End: 2018-12-14
Payer: MEDICARE

## 2018-12-14 VITALS
WEIGHT: 262 LBS | TEMPERATURE: 98.9 F | SYSTOLIC BLOOD PRESSURE: 120 MMHG | RESPIRATION RATE: 16 BRPM | OXYGEN SATURATION: 97 % | BODY MASS INDEX: 42.94 KG/M2 | HEART RATE: 88 BPM | DIASTOLIC BLOOD PRESSURE: 80 MMHG

## 2018-12-14 DIAGNOSIS — H01.001 BLEPHARITIS OF RIGHT UPPER EYELID, UNSPECIFIED TYPE: Primary | ICD-10-CM

## 2018-12-14 DIAGNOSIS — E66.01 MORBID OBESITY (H): ICD-10-CM

## 2018-12-14 PROCEDURE — 99214 OFFICE O/P EST MOD 30 MIN: CPT | Performed by: FAMILY MEDICINE

## 2018-12-14 RX ORDER — NEOMYCIN/POLYMYXIN B/HYDROCORT 3.5-10K-1
1-2 SUSPENSION, DROPS(FINAL DOSAGE FORM)(ML) OPHTHALMIC (EYE) 4 TIMES DAILY
Qty: 4 ML | Refills: 0 | Status: SHIPPED | OUTPATIENT
Start: 2018-12-14 | End: 2019-02-05

## 2018-12-14 RX ORDER — PHENTERMINE HYDROCHLORIDE 30 MG/1
30 CAPSULE ORAL EVERY MORNING
Qty: 30 CAPSULE | Refills: 0 | Status: SHIPPED | OUTPATIENT
Start: 2018-12-14 | End: 2019-01-08

## 2018-12-14 RX ORDER — NEOMYCIN POLYMYXIN B SULFATES AND DEXAMETHASONE 3.5; 10000; 1 MG/ML; [USP'U]/ML; MG/ML
1-2 SUSPENSION/ DROPS OPHTHALMIC 4 TIMES DAILY
Qty: 5 ML | Refills: 0 | Status: SHIPPED | OUTPATIENT
Start: 2018-12-14 | End: 2019-02-05

## 2018-12-14 RX ORDER — NEOMYCIN SULFATE, POLYMYXIN B SULFATE, HYDROCORTISONE 3.5; 10000; 1 MG/ML; [USP'U]/ML; MG/ML
3 SOLUTION/ DROPS AURICULAR (OTIC) 4 TIMES DAILY
Qty: 5 ML | Refills: 0 | Status: SHIPPED | OUTPATIENT
Start: 2018-12-14 | End: 2018-12-14

## 2018-12-14 NOTE — TELEPHONE ENCOUNTER
Reason for Call:  Other call back    Detailed comments: Tiffany from [facility] called clinic. Patient is at pharmacy now. Tiffany stated they do not have Cortisporin in stock. They would like to know if they could use a suspension, or switch to a different medication. Please call Tiffany back at [facility]. Patient is waiting there.     Phone Number : [phone]    Best Time: any    Can we leave a detailed message on this number? YES    Call taken on 12/14/2018 at 11:23 AM by Kit Martinez

## 2018-12-14 NOTE — TELEPHONE ENCOUNTER
Spoke to pharmacy, they need Neomycin-Polymyxim-Dexamethasone sent if able as this is what is in stock  Meliza Bruce, CMA

## 2018-12-14 NOTE — PROGRESS NOTES
SUBJECTIVE:   Florina Marie is a 33 year old female who presents to clinic today for the following health issues:      HPI  Eye(s) Problem - there are several triage notes regarding this   Onset: 3 days     Description:   Location: right  Pain: YES  Redness: YES    Accompanying Signs & Symptoms:  Discharge/mattering: YES  Swelling: YES  Visual changes: no  Fever: no  Nasal Congestion: no  Bothered by bright lights: YES    History:   Trauma: no   Foreign body exposure: no     Precipitating factors:   Wearing contacts: no    Alleviating factors:  Improved by: none    Therapies Tried and outcome: was prescribed Amoxicillin and purchased OTC eye drops    Reviewed nurse triage messages.  Patient saw her eye doctor 2 days ago, was given amoxicillin, didn't pick it up until yesterday, so has been on it for a little less than 24 hours.  The headache has resolved.  The upper eyelid swelling and crusting have stayed the same.  She also notes that she has red papules on her right arm      Problem list and histories reviewed & adjusted, as indicated.  Additional history: as documented    Medication Followup of Phentermine    Taking Medication as prescribed: yes    Side Effects:  None    Medication Helping Symptoms:  yes       Patient Active Problem List   Diagnosis     Problems with learning     Morbid obesity (H)     Generalized anxiety disorder     Hyperlipidemia, unspecified     Attention deficit disorder without hyperactivity     Prolonged PTT     Past Surgical History:   Procedure Laterality Date     EXAM UNDER ANESTHESIA PELVIC N/A 9/9/2016    Procedure: EXAM UNDER ANESTHESIA PELVIC;  Surgeon: Rhoda Alcazar MD;  Location:  OR      TOOTH EXTRACTION W/FORCEP      wisdom       Social History     Tobacco Use     Smoking status: Never Smoker     Smokeless tobacco: Never Used     Tobacco comment: no smokers in the household   Substance Use Topics     Alcohol use: Yes     Family History   Problem Relation Age of  Onset     Lipids Father         diet     Thyroid Disease Mother         unsure if hypo or hyper     Diabetes Paternal Grandfather         adult     Heart Disease Paternal Grandfather         bypass     Lipids Maternal Aunt         medication     Lipids Maternal Aunt         diet     Alzheimer Disease Maternal Grandfather      Hypertension No family hx of      Coronary Artery Disease No family hx of      Hyperlipidemia No family hx of      Cancer - colorectal No family hx of      Ovarian Cancer No family hx of      Prostate Cancer No family hx of      Depression/Anxiety No family hx of      Cerebrovascular Disease No family hx of      Anesthesia Reaction No family hx of      Asthma No family hx of      Osteoporosis No family hx of      Chemical Addiction No family hx of      Obesity No family hx of            ROS:  CONSTITUTIONAL: NEGATIVE for fever, chills, change in weight  ENT/MOUTH: NEGATIVE for ear, mouth and throat problems  RESP: NEGATIVE for significant cough or shortness of breath   CV: NEGATIVE for chest pain, palpitations or peripheral edema    OBJECTIVE:     /80 (BP Location: Right arm, Patient Position: Chair, Cuff Size: Adult Large)   Pulse 88   Temp 98.9  F (37.2  C) (Temporal)   Resp 16   Wt 118.8 kg (262 lb)   LMP 11/22/2018 (Exact Date)   SpO2 97%   BMI 42.94 kg/m    Body mass index is 42.94 kg/m .  Gen: no apparent distress  Right eye:  Upper eyelid red and swollen, eyelashes with yellow crusting.  Lower lid normal.  No conjunctival injection.  Chest/Cv: S1 and S2 normal, no murmurs, clicks, gallops or rubs. Regular rate and rhythm. Chest is clear; no wheezes or rales. No edema.  Skin:  Few acne papules on chin.  Has red papules right antecubital, wrist and finger    ASSESSMENT/PLAN:     1. Blepharitis of right upper eyelid, unspecified type  Has been on oral antibiotics for not quite 24 hours.  Encouraged her to continue these--they could also help out with acne and skin lesions.   Will also Rx cortisporin eye drops to help with her tearing and crusting.    - neomycin-polymyxin-hydrocortisone (CORTISPORIN) 3.5-50477-8 otic solution; Place 3 drops in ear(s) 4 times daily for 7 days  Dispense: 5 mL; Refill: 0    2. Morbid obesity (H)  She is down 4# from a couple weeks ago, will continue phentermine and recheck next month.    - phentermine (ADIPEX-P) 30 MG capsule; Take 1 capsule (30 mg) by mouth every morning  Dispense: 30 capsule; Refill: 0    Alanna Curiel MD  [facility]

## 2018-12-14 NOTE — TELEPHONE ENCOUNTER
Patient states that eye drops were supposed to be sent over but ear drops were sent over. Please send in asap as she is waiting and leaving town.

## 2018-12-26 ENCOUNTER — NURSE TRIAGE (OUTPATIENT)
Dept: NURSING | Facility: CLINIC | Age: 33
End: 2018-12-26

## 2018-12-27 ENCOUNTER — THERAPY VISIT (OUTPATIENT)
Dept: OCCUPATIONAL THERAPY | Facility: CLINIC | Age: 33
End: 2018-12-27
Payer: MEDICARE

## 2018-12-27 ENCOUNTER — TELEPHONE (OUTPATIENT)
Dept: FAMILY MEDICINE | Facility: OTHER | Age: 33
End: 2018-12-27

## 2018-12-27 DIAGNOSIS — M25.531 RIGHT WRIST PAIN: Primary | ICD-10-CM

## 2018-12-27 PROCEDURE — 97530 THERAPEUTIC ACTIVITIES: CPT | Mod: GO | Performed by: OCCUPATIONAL THERAPIST

## 2018-12-27 PROCEDURE — 97140 MANUAL THERAPY 1/> REGIONS: CPT | Mod: GO | Performed by: OCCUPATIONAL THERAPIST

## 2018-12-27 NOTE — TELEPHONE ENCOUNTER
Additional Information    Negative: Sounds like a life-threatening emergency to the triager    Negative: Abdominal cramps unrelated to menstrual period    Negative: [1] SEVERE pain AND [2] pain clearly increases with coughing    Negative: Patient sounds very sick or weak to the triager    Negative: [1] SEVERE vaginal bleeding (i.e., soaking 2 pads or tampons per hour and present 2 or more hours) AND [2] worse than ever before    Negative: [1] MODERATE vaginal bleeding (i.e., soaking 1 pad or tampon per hour and present > 6 hours) AND [2] worse than ever before    Negative: Fever > 100.5 F (38.1 C)    Negative: Could be pregnant (e.g., missed last menstrual period)    Negative: [1] SEVERE pain (e.g., excruciating cramps) AND [2] worse than ever before AND [3] not improved with ibuprofen or naproxen    Negative: [1] Pain present > 3 days AND [2] normally menstrual cramps last 1-3 days    Negative: Pain only on one side    Negative: Unusual vaginal discharge (e.g., odorous, yellow, green, or foamy-white)    Negative: [1] MODERATE pain (e.g., cramps interfere with normal activities) AND [2] not relieved by ibuprofen or naproxen used per Care Advice    Negative: [1] Pain present > 3 days AND [2] occurs with every menstrual period    Negative: Pain during sexual intercourse    Negative: Symptoms began at age over 21    Negative: Vomiting or diarrhea are also present    Normal menstrual cramps (all triage questions negative)    Protocols used: ABDOMINAL PAIN - MENSTRUAL CRAMPS-ADULT-AH    Patient called stating that she is menstruating and developed a level '6' type of left sided lower abdominal pain that comes and goes in a crampy way.  She will try a heating pad and otc pain reliever.

## 2018-12-27 NOTE — PROGRESS NOTES
SOAP Note - Hand Therapy - Objective Information    Current Date:  2018  Referring Physician: Dr. Veena MEAD Cynthia is a 33 year old right hand dominant female.    Diagnosis:Right wrist pain  DOI:  18 (Date of order)    S:  Subjective changes as noted by patient: The wrist is all right. I can't  heavy things without pain.  Still wearing my wrist support  Functional changes noted by patient: Pain with twisting.      O:  Pain:    VAS(0-10) 18   At Rest:  5/10 0/10   With Use:  810 5/10   At Worst  810 5/10     Report of Pain:  Location: right ulnar wrist   Description: Ache,   Frequency:  Intermittent  Duration: Worse during the day  Exacerbated by: Lifting, gripping, twisting, pulling, activity  Relieved by:  rest,   Progression: gradually improving    ROM:    Wrist 18   AROM(PROM) Right Left   Extension 70 70   Flexion 73 75   RD 20 20   UD 50 55   Supination 70 73   Pronation 90 90     Strength:   Pain free (Measured in pounds)   18   Trials Right Left   1  2  3  Av  55  60  57 53  49  60  54     Lat Pinch 18   Trials Right Left   1  2  3  Av 12     3 Pt.  Pinch 18   Trials Right Left   1  2  3  Avg: 10 10     Pain Report:  - none    + mild    ++ moderate    +++ severe   Wrist 18    Strength Right    Extension 5/5    Flexion 5/5 +    RD 5/5    UD 5/5    Supination 5/5    Pronation 5/5           Palpation:  [x]         Normal        []       Tender       []      Mild         []       Moderate []       Severe        Sensation: []                   WNL throughout all nerve distributions; per patient report    [x]                   Decreased  []        Median    []        Ulnar    []        Radial nerve distribution  Patient reports sporadic numbness in her hands when she sits in her recliner    Please refer to the daily flowsheet for treatment provided today.     Home Exercise Program:  Compliance with wrist support  with activities  Massage to flexors and extensors    Next Visit:  US if indicated  MFR to flexors and extensors  Joint mobilization  Strengthening if pain reduced

## 2018-12-27 NOTE — TELEPHONE ENCOUNTER
Our goal is to have forms completed with 72 hours, however some forms may require a visit or additional information.    Who is the form from?: [facility] (if other please explain)  Where the form came from: form was faxed in  What clinic location was the form placed at?: [place]  Where the form was placed: 's Box  What number is listed as a contact on the form?: [phone]    Phone call message- patient request for a letter, form or note:    Date needed: as soon as possible  Please fax to [phone]  Has the patient signed a consent form for release of information? NO    Additional comments:     Call taken on 12/27/2018 at 12:18 PM by Mary Rodríguez    Type of letter, form or note: medical

## 2019-01-02 ENCOUNTER — THERAPY VISIT (OUTPATIENT)
Dept: OCCUPATIONAL THERAPY | Facility: CLINIC | Age: 34
End: 2019-01-02
Payer: MEDICARE

## 2019-01-02 DIAGNOSIS — M25.531 RIGHT WRIST PAIN: ICD-10-CM

## 2019-01-02 PROCEDURE — 97110 THERAPEUTIC EXERCISES: CPT | Mod: GO | Performed by: OCCUPATIONAL THERAPIST

## 2019-01-02 PROCEDURE — 97140 MANUAL THERAPY 1/> REGIONS: CPT | Mod: GO | Performed by: OCCUPATIONAL THERAPIST

## 2019-01-02 NOTE — PROGRESS NOTES
SOAP Note - Hand Therapy - Objective Information    Current Date:  2019  Referring Physician: Dr. Veena MEAD Cynthia is a 33 year old right hand dominant female.    Diagnosis:Right wrist pain  DOI:  18 (Date of order)    S:  Subjective changes as noted by patient: No change. 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  Functional changes noted by patient: Pain with twisting.      O:  Pain:    VAS(0-10) 18   At Rest:  10 010 0/10   With Use:  8/10 510 5/10   At Worst  8/10 5/10 510     Report of Pain:  Location: right ulnar wrist   Description: Ache,   Frequency:  Intermittent  Duration: Worse during the day  Exacerbated by: Lifting, gripping, twisting, pulling, activity  Relieved by:  rest,   Progression: gradually improving    ROM:    Wrist 18   AROM(PROM) Right Left   Extension 70 70   Flexion 73 75   RD 20 20   UD 50 55   Supination 70 73   Pronation 90 90     Strength:   Pain free (Measured in pounds)   18   Trials Right Left   1  2  3  Av  55  60  57 53  49  60  54     Lat Pinch 18   Trials Right Left   1  2  3  Av 12     3 Pt.  Pinch 18   Trials Right Left   1  2  3  Avg: 10 10     Pain Report:  - none    + mild    ++ moderate    +++ severe   Wrist 18   Strength Right Right   Extension 5/5    Flexion 5/5 +    RD 5/5    UD 5/5    Supination 5/5 +   Pronation 5/5         Sensation: []                   WNL throughout all nerve distributions; per patient report    [x]                   Decreased  []        Median    []        Ulnar    []        Radial nerve distribution  Patient reports sporadic numbness in her hands when she sits in her recliner    Please refer to the daily flowsheet for treatment provided today.     Home Exercise Program:  Compliance with wrist support with activities  Massage to flexors and extensors    Next Visit:  US if  indicated  MFR to flexors and extensors  Joint mobilization  Strengthening if pain reduced

## 2019-01-03 NOTE — PROGRESS NOTES
"  SUBJECTIVE:   Florina Marie is a 33 year old female who presents to clinic today for the following health issues:    Patient is here today for a weight check and follow up on phentermine    HPI  Florina Marie is a 33 year old female who presents in follow up of phentermine use.  She denies chest pain, shortness of breath, headache, insomnia or double vision.  She feels it is helping curb her appetite.  She is not exercising, but plans to go back to the [facility] and do more regular exercise such as dance class or treadmill.  She feels her diet is improved.  She is happy with her progress.    Past Medical History:   Diagnosis Date     Attention deficit disorder with hyperactivity(314.01)      Other kyphoscoliosis and scoliosis      Other specified delay in development     Microcephaly     Viral warts, unspecified     plantar warts       Current Outpatient Medications   Medication Sig Dispense Refill     ALPRAZolam (XANAX) 0.5 MG tablet TAKE ONE TABLET AS NEEDED FOR SEVERE ANXIETY MAX OF 2 TABLETS PER DAY  0     escitalopram (LEXAPRO) 20 MG tablet TAKE 1 TABLET BY MOUTH DAILY.  5     etonogestrel (IMPLANON/NEXPLANON) 68 MG IMPL 1 each (68 mg) by Subdermal route continuous  0     phentermine (ADIPEX-P) 30 MG capsule Take 1 capsule (30 mg) by mouth every morning 30 capsule 0         OBJECTIVE:  /72   Pulse 74   Temp 98.9  F (37.2  C) (Temporal)   Resp 16   Ht 1.675 m (5' 5.95\")   Wt 118.1 kg (260 lb 6.4 oz)   LMP 12/17/2018 (Approximate)   SpO2 97%   BMI 42.10 kg/m      Body mass index is 42.1 kg/m .     She has lost 2 pounds since her last visit.    Gen: no apparent distress   Chest: clear to auscultation  Cor: regular rate and rhythm without murmur or gallop  Skin: no rash  Abd: soft, nontender, no mass    ASSESSMENT:    Obesity    PLAN:  Continue phentermine for another month.  Her blood pressure is stable.  Encouraged routine exercise and monitoring of her eating habits.  She feels better having " monthly check in visits.    She also complains of mild and intermittent left ankle pain after kneeling.  Exam reveals no erythema, edema or tenderness.  She has full range of motion and normal strength.  Reassured patient that no obvious abnormality found and most likely secondary to the position of her foot while kneeling.    Electronically signed by Alanna Curiel MD on 1/8/2019

## 2019-01-08 ENCOUNTER — OFFICE VISIT (OUTPATIENT)
Dept: FAMILY MEDICINE | Facility: OTHER | Age: 34
End: 2019-01-08
Payer: MEDICARE

## 2019-01-08 VITALS
HEIGHT: 66 IN | HEART RATE: 74 BPM | SYSTOLIC BLOOD PRESSURE: 104 MMHG | WEIGHT: 260.4 LBS | BODY MASS INDEX: 41.85 KG/M2 | RESPIRATION RATE: 16 BRPM | OXYGEN SATURATION: 97 % | DIASTOLIC BLOOD PRESSURE: 72 MMHG | TEMPERATURE: 98.9 F

## 2019-01-08 DIAGNOSIS — M25.572 PAIN IN JOINT INVOLVING ANKLE AND FOOT, LEFT: ICD-10-CM

## 2019-01-08 DIAGNOSIS — E66.01 MORBID OBESITY (H): Primary | ICD-10-CM

## 2019-01-08 PROBLEM — M25.531 RIGHT WRIST PAIN: Status: RESOLVED | Noted: 2019-01-02 | Resolved: 2019-01-08

## 2019-01-08 PROCEDURE — 99213 OFFICE O/P EST LOW 20 MIN: CPT | Performed by: FAMILY MEDICINE

## 2019-01-08 RX ORDER — PHENTERMINE HYDROCHLORIDE 30 MG/1
30 CAPSULE ORAL EVERY MORNING
Qty: 30 CAPSULE | Refills: 0 | Status: SHIPPED | OUTPATIENT
Start: 2019-01-08 | End: 2019-02-05

## 2019-01-08 ASSESSMENT — ANXIETY QUESTIONNAIRES
2. NOT BEING ABLE TO STOP OR CONTROL WORRYING: NOT AT ALL
1. FEELING NERVOUS, ANXIOUS, OR ON EDGE: MORE THAN HALF THE DAYS
6. BECOMING EASILY ANNOYED OR IRRITABLE: NOT AT ALL
IF YOU CHECKED OFF ANY PROBLEMS ON THIS QUESTIONNAIRE, HOW DIFFICULT HAVE THESE PROBLEMS MADE IT FOR YOU TO DO YOUR WORK, TAKE CARE OF THINGS AT HOME, OR GET ALONG WITH OTHER PEOPLE: SOMEWHAT DIFFICULT
7. FEELING AFRAID AS IF SOMETHING AWFUL MIGHT HAPPEN: NOT AT ALL
3. WORRYING TOO MUCH ABOUT DIFFERENT THINGS: SEVERAL DAYS
5. BEING SO RESTLESS THAT IT IS HARD TO SIT STILL: NOT AT ALL
GAD7 TOTAL SCORE: 3

## 2019-01-08 ASSESSMENT — MIFFLIN-ST. JEOR: SCORE: 1902.05

## 2019-01-08 ASSESSMENT — PATIENT HEALTH QUESTIONNAIRE - PHQ9
5. POOR APPETITE OR OVEREATING: NOT AT ALL
SUM OF ALL RESPONSES TO PHQ QUESTIONS 1-9: 0

## 2019-01-09 ASSESSMENT — ANXIETY QUESTIONNAIRES: GAD7 TOTAL SCORE: 3

## 2019-01-10 ENCOUNTER — NURSE TRIAGE (OUTPATIENT)
Dept: NURSING | Facility: CLINIC | Age: 34
End: 2019-01-10

## 2019-01-10 NOTE — TELEPHONE ENCOUNTER
She gets numbness in her hands and fingers that comes and goes and is present now. It happens randomly throughout the day.  I connected the patient with scheduling, for an appointment.  Kate Gale RN-[facility]      Reason for Disposition    [1] Numbness (i.e., loss of sensation) of the face, arm / hand, or leg / foot on one side of the body AND [2] gradual onset (e.g., days to weeks) AND [3] present now    Additional Information    Negative: [1] SEVERE weakness (i.e., unable to walk or barely able to walk, requires support) AND [2] new onset or worsening    Negative: [1] Weakness (i.e., paralysis, loss of muscle strength) of the face, arm / hand, or leg / foot on one side of the body AND [2] sudden onset AND [3] present now    Negative: [1] Numbness (i.e., loss of sensation) of the face, arm / hand, or leg / foot on one side of the body AND [2] sudden onset AND [3] present now    Negative: [1] Loss of speech or garbled speech AND [2] sudden onset AND [3] present now    Negative: Difficult to awaken or acting confused  (e.g., disoriented, slurred speech)    Negative: Sounds like a life-threatening emergency to the triager    Negative: Confusion, disorientation, or hallucinations is main symptom    Negative: Neck pain is main symptom (and having weakness, numbness, or tingling in arm / hand because of neck pain)    Negative: Back pain is main symptom (and having weakness, numbness, or tingling in leg because of back pain)    Negative: Hand pain is main symptom (and having mild weakness, numbness, or tingling in hand related to hand pain)    Negative: Dizziness is main symptom    Negative: Vision loss or change is main symptom    Negative: Followed a head injury within last 3 days    Negative: Followed a neck injury within last 3 days    Negative: [1] Tingling in both hands and/or feet AND [2] breathing faster than normal AND [3] feels similar to prior panic attack or hyperventilation episode     Negative: Weakness in both sides of the body or weakness all over    Negative: Headache  (and neurologic deficit)    Negative: [1] Back pain AND [2] numbness (loss of sensation) in groin or rectal area    Negative: [1] Unable to urinate (or only a few drops) > 4 hours AND [2] bladder feels very full (e.g., palpable bladder or strong urge to urinate)    Negative: [1] Loss of control of bowel or bladder (i.e., incontinence) AND [2] new onset    Negative: [1] Weakness (i.e., paralysis, loss of muscle strength) of the face, arm / hand, or leg / foot on one side of the body AND [2] sudden onset AND [3] brief (now gone)    Negative: [1] Numbness (i.e., loss of sensation) of the face, arm / hand, or leg / foot on one side of the body AND [2] sudden onset AND [3] brief (now gone)    Negative: [1] Loss of speech or garbled speech AND [2] sudden onset AND [3] brief (now gone)    Negative: Bell's palsy suspected (i.e., weakness on only one side of the face, developing over hours to days, no other symptoms)    Negative: Patient sounds very sick or weak to the triager    Negative: Neck pain  (and neurologic deficit)    Negative: Back pain  (and neurologic deficit)    Negative: [1] Weakness of the face, arm / hand, or leg / foot on one side of the body AND [2] gradual onset (e.g., days to weeks) AND [3] present now    Protocols used: NEUROLOGIC DEFICIT-ADULT-

## 2019-01-11 ENCOUNTER — MYC MEDICAL ADVICE (OUTPATIENT)
Dept: FAMILY MEDICINE | Facility: OTHER | Age: 34
End: 2019-01-11

## 2019-01-11 NOTE — TELEPHONE ENCOUNTER
"Patient calling back asking if she really needs to be seen.  When asked if she made an appointment to see her PCP, she said, \"No, I didn't make an appointment because I really didn't want to.\"  Now she is wondering if this is really something she should be concerned about or not.  This nurse recommended she call clinic in the morning to make appointment to see her PCP as she was advised.  Tamiko Banuelos RN  [facility]    "

## 2019-01-14 NOTE — TELEPHONE ENCOUNTER
Last Read in [employer]  1/11/2019  5:10 PM by Florina Marie    No appt scheduled. Sent [facility] message letting pt know there are some openings in TC schedule tomorrow.    Vida Degroot, RN, BSN

## 2019-01-16 ENCOUNTER — THERAPY VISIT (OUTPATIENT)
Dept: OCCUPATIONAL THERAPY | Facility: CLINIC | Age: 34
End: 2019-01-16
Payer: MEDICARE

## 2019-01-16 DIAGNOSIS — M25.531 RIGHT WRIST PAIN: Primary | ICD-10-CM

## 2019-01-16 PROCEDURE — 97140 MANUAL THERAPY 1/> REGIONS: CPT | Mod: GO | Performed by: OCCUPATIONAL THERAPIST

## 2019-01-16 PROCEDURE — 97110 THERAPEUTIC EXERCISES: CPT | Mod: GO | Performed by: OCCUPATIONAL THERAPIST

## 2019-01-16 NOTE — PROGRESS NOTES
Progress Note - Hand Therapy     Current Date:  2019  Reporting Period: 18 to 19  Referring Physician: Dr. Veena Heaton ALYCE Marie is a 33 year old right hand dominant female.    Diagnosis:Right wrist pain  DOI:  18 (Date of order)    S:  Subjective changes as noted by patient: the wrist has been better. I am still wearing the brace. The only pain I have is when I twist the wrist.  (ie. Cleaning toilets)  Functional changes noted by patient: Pain with twisting.    Upper Extremity Functional Index Score:  SCORE:   Column Totals: /80: 78   (A lower score indicates greater disability.)      O:  Pain:    VAS(0-10) 18   At Rest:  5/10 0/10 0/10 0/10   With Use:  8/10 5/10 5/10 0/10   At Worst  8/10 5/10 5/10 0/10     Report of Pain:  Location: right ulnar wrist   Description: Ache,   Frequency:  Intermittent  Duration: Worse during the day  Exacerbated by: Lifting, gripping, twisting, pulling, activity  Relieved by:  rest,   Progression: gradually improving    ROM:    Wrist 18   AROM(PROM) Right Left   Extension 70 70   Flexion 73 75   RD 20 20   UD 50 55   Supination 70 73   Pronation 90 90     Strength:   Pain free (Measured in pounds)   18   Trials Right Left Right   1  2  3  Av  55  60  57 53  49  60  54 50  55  55  53     Lat Pinch 18   Trials Right Left   1  2  3  Av 12     3 Pt.  Pinch 18   Trials Right Left   1  2  3  Avg: 10 10     Pain Report:  - none    + mild    ++ moderate    +++ severe   Wrist 18   Strength Right Right Right   Extension 5/5  -   Flexion 5/5 +  -   RD 5/5  -   UD 5/5  -   Supination 5/5 + -   Pronation 5/5  -        Sensation: []                   WNL throughout all nerve distributions; per patient report    [x]                   Decreased  []        Median    []        Ulnar    []        Radial nerve distribution  Patient reports sporadic numbness in her  hands during the day    Please refer to the daily flowsheet for treatment provided today.     Assessment:  Response to therapy has been improvement to:    Pain:  frequency is less, intensity of pain is decreased, duration of pain is decreased and less tender over affected area    Overall Assessment:  Patient's symptoms are resolving.  Patient is ready to progress to more complex exercises.  Patient is becoming more independent in home exercise program  Patient would benefit from continued therapy to achieve rehab potential  STG/LTG:  STGoals have been reviewed and progress or achievement has occurred;  see goal sheet for details and updates.    I have re-evaluated this patient and find that the nature, scope, duration and intensity of the therapy is appropriate for the medical condition of the patient.  Plan:  Frequency/Duration:  Recommend continuing with the current treatment plan. 1 X week, once daily  for 4 weeks    Recommendations for Continued Therapy  Additions to Treatment Plan -  Therapeutic Exercise:  Isotonics, Isometrics and Stabilization      Home Exercise Program:  Compliance with wrist support with activities  Massage to flexors and extensors  Isometric ECU and Pronator Quadratus and Teres    Next Visit:  US if indicated  MFR to flexors and extensors  Joint mobilization  Strengthening

## 2019-01-23 ENCOUNTER — OFFICE VISIT (OUTPATIENT)
Dept: FAMILY MEDICINE | Facility: OTHER | Age: 34
End: 2019-01-23
Payer: MEDICARE

## 2019-01-23 ENCOUNTER — THERAPY VISIT (OUTPATIENT)
Dept: OCCUPATIONAL THERAPY | Facility: CLINIC | Age: 34
End: 2019-01-23
Payer: MEDICARE

## 2019-01-23 VITALS
OXYGEN SATURATION: 98 % | SYSTOLIC BLOOD PRESSURE: 118 MMHG | HEIGHT: 66 IN | TEMPERATURE: 99.3 F | RESPIRATION RATE: 16 BRPM | DIASTOLIC BLOOD PRESSURE: 78 MMHG | HEART RATE: 86 BPM | BODY MASS INDEX: 40.82 KG/M2 | WEIGHT: 254 LBS

## 2019-01-23 DIAGNOSIS — M25.531 RIGHT WRIST PAIN: Primary | ICD-10-CM

## 2019-01-23 DIAGNOSIS — R30.0 DYSURIA: Primary | ICD-10-CM

## 2019-01-23 DIAGNOSIS — G56.03 BILATERAL CARPAL TUNNEL SYNDROME: ICD-10-CM

## 2019-01-23 LAB
ALBUMIN UR-MCNC: NEGATIVE MG/DL
APPEARANCE UR: CLEAR
BILIRUB UR QL STRIP: NEGATIVE
COLOR UR AUTO: YELLOW
GLUCOSE UR STRIP-MCNC: NEGATIVE MG/DL
HGB UR QL STRIP: ABNORMAL
KETONES UR STRIP-MCNC: NEGATIVE MG/DL
LEUKOCYTE ESTERASE UR QL STRIP: NEGATIVE
NITRATE UR QL: NEGATIVE
PH UR STRIP: 5.5 PH (ref 5–7)
RBC #/AREA URNS AUTO: NORMAL /HPF
SOURCE: ABNORMAL
SP GR UR STRIP: 1.02 (ref 1–1.03)
UROBILINOGEN UR STRIP-ACNC: 0.2 EU/DL (ref 0.2–1)
WBC #/AREA URNS AUTO: NORMAL /HPF

## 2019-01-23 PROCEDURE — 81001 URINALYSIS AUTO W/SCOPE: CPT | Performed by: PHYSICIAN ASSISTANT

## 2019-01-23 PROCEDURE — 97110 THERAPEUTIC EXERCISES: CPT | Mod: GO | Performed by: OCCUPATIONAL THERAPIST

## 2019-01-23 PROCEDURE — 99214 OFFICE O/P EST MOD 30 MIN: CPT | Performed by: PHYSICIAN ASSISTANT

## 2019-01-23 PROCEDURE — 87086 URINE CULTURE/COLONY COUNT: CPT | Performed by: PHYSICIAN ASSISTANT

## 2019-01-23 PROCEDURE — 97140 MANUAL THERAPY 1/> REGIONS: CPT | Mod: GO | Performed by: OCCUPATIONAL THERAPIST

## 2019-01-23 SDOH — HEALTH STABILITY: MENTAL HEALTH: HOW OFTEN DO YOU HAVE A DRINK CONTAINING ALCOHOL?: NEVER

## 2019-01-23 ASSESSMENT — MIFFLIN-ST. JEOR: SCORE: 1865.95

## 2019-01-23 ASSESSMENT — PAIN SCALES - GENERAL: PAINLEVEL: NO PAIN (0)

## 2019-01-23 NOTE — PROGRESS NOTES
Discharge Note - Hand Therapy     Current Date:  2019  Initial Evaluation Date:  18  Reporting period is from 19 to 2019  Number of Visits:  5    Florina Marie is a 33 year old right hand dominant female.    Diagnosis:Right wrist pain  DOI:  18 (Date of order)    S:  Subjective changes as noted by patient: The wrist is doing good. No changes. I don't think therapy is helping much. Wearing the brace less often.  Functional changes noted by patient: Pain with twisting.    Upper Extremity Functional Index Score:  SCORE:   Column Totals: /80: 78   (A lower score indicates greater disability.)      O:  Pain:    VAS(0-10) 18   At Rest:  5/10 0/10 0/10 0/10   With Use:  8/10 5/10 5/10 0/10   At Worst  8/10 5/10 5/10 0/10     Report of Pain:  Location: right ulnar wrist   Description: Ache,   Frequency:  Intermittent  Duration: Worse during the day  Exacerbated by: Lifting, gripping, twisting, pulling, activity  Relieved by:  rest,   Progression: gradually improving    ROM:    Wrist 18   AROM(PROM) Right Left   Extension 70 70   Flexion 73 75   RD 20 20   UD 50 55   Supination 70 73   Pronation 90 90     Strength:   Pain free (Measured in pounds)   18   Trials Right Left Right   1  2  3  Av  55  60  57 53  49  60  54 50  55  55  53     Lat Pinch 18   Trials Right Left   1  2  3  Av 12     3 Pt.  Pinch 18   Trials Right Left   1  2  3  Avg: 10 10     Pain Report:  - none    + mild    ++ moderate    +++ severe   Wrist 18   Strength Right Right Right   Extension 5/5  -   Flexion 5/5 +  -   RD 5/5  -   UD 5/5  -   Supination 5/5 + -   Pronation 5/5  -        Sensation: []                   WNL throughout all nerve distributions; per patient report    [x]                   Decreased  []        Median    []        Ulnar    []        Radial nerve distribution  Patient reports sporadic numbness in  her hands during the day    Please refer to the daily flowsheet for treatment provided today.     Assessment:  Overall Assessment:  Patient's progress has slowed.  Patient is ready to be discharged from therapy and continue their home treatment program.  STG/LTG:  See goal sheet for details and updates.    Plan:  Frequency/Duration:  Hold chart open for one month, if no contact is received from patient, discharge will occur at that time with this note serving as the discharge summary.    Recommendations for Home Program -   Home Exercise Program:  Compliance with wrist support with activities  Massage to flexors and extensors  Isometric ECU and Pronator Quadratus and Teres

## 2019-01-23 NOTE — PROGRESS NOTES
SUBJECTIVE:   Florina Marie is a 33 year old female who presents to clinic today for the following health issues:      HPI  URINARY TRACT SYMPTOMS  Onset: 2 days    Description:   Painful urination (Dysuria): no   Blood in urine (Hematuria): no   Delay in urine (Hesitancy): no     Intensity: mild    Progression of Symptoms:  worsening    Accompanying Signs & Symptoms:  Fever/chills: no   Flank pain no   Nausea and vomiting: no   Any vaginal symptoms: none  Abdominal/Pelvic Pain: no     History:   History of frequent UTI's: YES  History of kidney stones: no   Sexually Active: no   Possibility of pregnancy: No    Patient reports over the last 2 days she has had urinary frequency. Very rarely will have some dysuria. She denies hesitancy, lower abdominal or back pain, fevers. She has had UTI's in the past. She reports taking baths often. She denies any concerns for STD's as not sexually active.     Problem list and histories reviewed & adjusted, as indicated.  Additional history: as documented    Musculoskeletal problem/pain wrist pain/ numbness      Duration: 2 weeks    Description  Location: both wrists    Intensity:      Accompanying signs and symptoms: numbness    History  Previous similar problem: no   Previous evaluation:  none    Precipitating or alleviating factors:  Trauma or overuse: YES  Aggravating factors include: none    Therapies tried and outcome: nothing    Patient also reports numbness/tingling affecting both hands. This has been present for many months but worse over the last 2 weeks. 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 She has been wearing a right wrist splint while at work and has been seeing OT without improvement in symptoms.     Patient Active Problem List   Diagnosis     Problems with learning     Morbid obesity (H)     Generalized anxiety disorder     Hyperlipidemia, unspecified     Attention deficit disorder without hyperactivity     Prolonged PTT     Right wrist pain      Past Surgical History:   Procedure Laterality Date     EXAM UNDER ANESTHESIA PELVIC N/A 9/9/2016    Procedure: EXAM UNDER ANESTHESIA PELVIC;  Surgeon: Rhoda Alcazar MD;  Location: [facility]     HC TOOTH EXTRACTION W/FORCEP      wisdom       Social History     Tobacco Use     Smoking status: Never Smoker     Smokeless tobacco: Never Used     Tobacco comment: no smokers in the household   Substance Use Topics     Alcohol use: No     Frequency: Never     Family History   Problem Relation Age of Onset     Lipids Father         diet     Thyroid Disease Mother         unsure if hypo or hyper     Diabetes Paternal Grandfather         adult     Heart Disease Paternal Grandfather         bypass     Lipids Maternal Aunt         medication     Lipids Maternal Aunt         diet     Alzheimer Disease Maternal Grandfather      Hypertension No family hx of      Coronary Artery Disease No family hx of      Hyperlipidemia No family hx of      Cancer - colorectal No family hx of      Ovarian Cancer No family hx of      Prostate Cancer No family hx of      Depression/Anxiety No family hx of      Cerebrovascular Disease No family hx of      Anesthesia Reaction No family hx of      Asthma No family hx of      Osteoporosis No family hx of      Chemical Addiction No family hx of      Obesity No family hx of          Current Outpatient Medications   Medication Sig Dispense Refill     ALPRAZolam (XANAX) 0.5 MG tablet TAKE ONE TABLET AS NEEDED FOR SEVERE ANXIETY MAX OF 2 TABLETS PER DAY  0     escitalopram (LEXAPRO) 20 MG tablet TAKE 1 TABLET BY MOUTH DAILY.  5     etonogestrel (IMPLANON/NEXPLANON) 68 MG IMPL 1 each (68 mg) by Subdermal route continuous  0     phentermine (ADIPEX-P) 30 MG capsule Take 1 capsule (30 mg) by mouth every morning 30 capsule 0     Allergies   Allergen Reactions     No Known Drug Allergies      BP Readings from Last 3 Encounters:   01/23/19 118/78   01/08/19 104/72   12/14/18 120/80    Wt Readings from Last 3  "Encounters:   01/23/19 115.2 kg (254 lb)   01/08/19 118.1 kg (260 lb 6.4 oz)   12/14/18 118.8 kg (262 lb)         ROS:  Constitutional, HEENT, cardiovascular, pulmonary, gi and gu systems are negative, except as otherwise noted.    OBJECTIVE:     /78   Pulse 86   Temp 99.3  F (37.4  C) (Temporal)   Resp 16   Ht 1.664 m (5' 5.5\")   Wt 115.2 kg (254 lb)   LMP  (LMP Unknown)   SpO2 98%   PF 97 L/min   BMI 41.62 kg/m    Body mass index is 41.62 kg/m .  GENERAL: healthy, alert and no distress  NECK: no adenopathy, no asymmetry, masses, or scars and thyroid normal to palpation  RESP: lungs clear to auscultation - no rales, rhonchi or wheezes  CV: regular rate and rhythm, normal S1 S2, no S3 or S4, no murmur, click or rub, no peripheral edema and peripheral pulses strong  ABDOMEN: soft, nontender, no hepatosplenomegaly, no masses and bowel sounds normal  MS: no gross musculoskeletal defects noted, no edema  SKIN: no suspicious lesions or rashes  NEURO: Normal strength and tone, mentation intact and speech normal  BACK: no CVA tenderness, no paralumbar tenderness  PSYCH: mentation appears normal, affect normal/bright    Diagnostic Test Results:  Results for orders placed or performed in visit on 01/23/19 (from the past 24 hour(s))   *UA reflex to Microscopic and Culture ([place] and [facility] (except [place] and [place])   Result Value Ref Range    Color Urine Yellow     Appearance Urine Clear     Glucose Urine Negative NEG^Negative mg/dL    Bilirubin Urine Negative NEG^Negative    Ketones Urine Negative NEG^Negative mg/dL    Specific Gravity Urine 1.025 1.003 - 1.035    Blood Urine Small (A) NEG^Negative    pH Urine 5.5 5.0 - 7.0 pH    Protein Albumin Urine Negative NEG^Negative mg/dL    Urobilinogen Urine 0.2 0.2 - 1.0 EU/dL    Nitrite Urine Negative NEG^Negative    Leukocyte Esterase Urine Negative NEG^Negative    Source Unspecified Urine    Urine Microscopic   Result Value Ref Range    WBC Urine " 0 - 5 OTO5^0 - 5 /HPF    RBC Urine O - 2 OTO2^O - 2 /HPF       ASSESSMENT/PLAN:     1. Dysuria  Urine appears clear of infection at this time. Will await culture. Encouraged pushing fluids and avoiding baths for a few days as this certainly could contribute to symptoms. Patient will follow-up in clinic if new symptoms develop or current symptoms fail to improve.  - *UA reflex to Microscopic and Culture ([place] and [facility] (except [place] and [place])  - Urine Microscopic  - Urine Culture Aerobic Bacterial    2. Bilateral carpal tunnel syndrome  Patient continues to have worsening bilateral wrist numbness consistent with carpal tunnel syndrome. She has been wearing a brace and participating in OT without improvement. Will refer patient to ortho for further evaluation and management.   - ORTHO  REFERRAL    The patient indicates understanding of these issues and agrees with the plan.    Ekaterina Nichols PA-C  [facility]

## 2019-01-24 ENCOUNTER — ANCILLARY PROCEDURE (OUTPATIENT)
Dept: GENERAL RADIOLOGY | Facility: OTHER | Age: 34
End: 2019-01-24
Payer: MEDICARE

## 2019-01-24 ENCOUNTER — OFFICE VISIT (OUTPATIENT)
Dept: ORTHOPEDICS | Facility: OTHER | Age: 34
End: 2019-01-24
Payer: MEDICARE

## 2019-01-24 DIAGNOSIS — R20.0 BILATERAL HAND NUMBNESS: ICD-10-CM

## 2019-01-24 DIAGNOSIS — M25.531 BILATERAL WRIST PAIN: Primary | ICD-10-CM

## 2019-01-24 DIAGNOSIS — M25.532 BILATERAL WRIST PAIN: ICD-10-CM

## 2019-01-24 DIAGNOSIS — M25.532 BILATERAL WRIST PAIN: Primary | ICD-10-CM

## 2019-01-24 DIAGNOSIS — M25.531 BILATERAL WRIST PAIN: ICD-10-CM

## 2019-01-24 LAB
BACTERIA SPEC CULT: NO GROWTH
Lab: NORMAL
SPECIMEN SOURCE: NORMAL

## 2019-01-24 PROCEDURE — 73110 X-RAY EXAM OF WRIST: CPT | Mod: LT

## 2019-01-24 PROCEDURE — 99203 OFFICE O/P NEW LOW 30 MIN: CPT | Performed by: ORTHOPAEDIC SURGERY

## 2019-01-24 ASSESSMENT — MIFFLIN-ST. JEOR: SCORE: 1865.95

## 2019-01-24 ASSESSMENT — PAIN SCALES - GENERAL: PAINLEVEL: NO PAIN (0)

## 2019-01-24 NOTE — PROGRESS NOTES
ORTHOPEDIC CONSULT      Chief Complaint: Florina Marie is a 33 year old female who is being seen for Chief Complaint   Patient presents with     Musculoskeletal Problem     bilateral wrist pain     Consult     ref: Ekaterina Nichols       History of Present Illness:   Florina Marie is a 33 year old female who is seen in consultation at the request of Ekaterina Nichols for evaluation of bilateral hand numbness.  Complains of at least 2 months worth of her entire hand going numb.  Is coming and going but getting more constant.  She is attempted some occupational therapy as well as bracing at night with no improvement.  It bothers her at work.  She reports all of her digits go numb.  No pain.          Patient's past medical, surgical, social and family histories reviewed.     Past Medical History:   Diagnosis Date     Attention deficit disorder with hyperactivity(314.01)      Other kyphoscoliosis and scoliosis      Other specified delay in development     Microcephaly     Viral warts, unspecified     plantar warts       Past Surgical History:   Procedure Laterality Date     EXAM UNDER ANESTHESIA PELVIC N/A 9/9/2016    Procedure: EXAM UNDER ANESTHESIA PELVIC;  Surgeon: Rhoda Alcazar MD;  Location:  OR      TOOTH EXTRACTION W/FORCEP      wisdom       Medications:    Current Outpatient Medications on File Prior to Visit:  escitalopram (LEXAPRO) 20 MG tablet TAKE 1 TABLET BY MOUTH DAILY.   etonogestrel (IMPLANON/NEXPLANON) 68 MG IMPL 1 each (68 mg) by Subdermal route continuous   phentermine (ADIPEX-P) 30 MG capsule Take 1 capsule (30 mg) by mouth every morning   ALPRAZolam (XANAX) 0.5 MG tablet TAKE ONE TABLET AS NEEDED FOR SEVERE ANXIETY MAX OF 2 TABLETS PER DAY     No current facility-administered medications on file prior to visit.     Allergies   Allergen Reactions     No Known Drug Allergies        Social History     Occupational History     Occupation: student     Comment: [employer]   Tobacco Use      "Smoking status: Never Smoker     Smokeless tobacco: Never Used     Tobacco comment: no smokers in the household   Substance and Sexual Activity     Alcohol use: No     Frequency: Never     Drug use: No     Sexual activity: No     Birth control/protection: Implant       Family History   Problem Relation Age of Onset     Lipids Father         diet     Thyroid Disease Mother         unsure if hypo or hyper     Diabetes Paternal Grandfather         adult     Heart Disease Paternal Grandfather         bypass     Lipids Maternal Aunt         medication     Lipids Maternal Aunt         diet     Alzheimer Disease Maternal Grandfather      Hypertension No family hx of      Coronary Artery Disease No family hx of      Hyperlipidemia No family hx of      Cancer - colorectal No family hx of      Ovarian Cancer No family hx of      Prostate Cancer No family hx of      Depression/Anxiety No family hx of      Cerebrovascular Disease No family hx of      Anesthesia Reaction No family hx of      Asthma No family hx of      Osteoporosis No family hx of      Chemical Addiction No family hx of      Obesity No family hx of        REVIEW OF SYSTEMS  10 point review systems performed otherwise negative as noted as per history of present illness.    Physical Exam:  Vitals: BP (P) 120/80   Ht (P) 1.664 m (5' 5.5\")   Wt (P) 115.2 kg (254 lb)   LMP  (LMP Unknown)   BMI (P) 41.62 kg/m    BMI= Body mass index is 41.62 kg/m  (pended).  Constitutional: healthy, alert and no acute distress   Psychiatric: mentation appears normal and affect normal/bright  NEURO: no focal deficits  RESP: Normal with easy respirations and no use of accessory muscles to breathe, no audible wheezing or retractions  CV: bilateral upper extemity:  no edema         Regular rate and rhythm by palpation  SKIN: No erythema, rashes, excoriation, or breakdown. No evidence of infection.   JOINT/EXTREMITIES:bilateral: Have full active range of motion.  There is no edema or " atrophy.  There is no weakness throughout.  Sensation is intact to light touch to all the digits.  Negative Tinel's.  Negative carpal compression.  Negative Phalen's.  Hand is well perfused with good capillary refill.     GAIT: not tested     Diagnostic Modalities:  bilateral wrist X-ray: No fracture, dislocation and or lesions. Normal alignment.  Independent visualization of the images was performed.      Impression: bilateral hand numbness    Plan:  All of the above pertinent physical exam and imaging modalities findings was reviewed with Florina.    I reviewed her exam findings.  She reports all of her digits are numb.  It is coming and going.  Carpal tunnel provocative findings are negative today.  Given her 2 months worth of progressive neurologic symptoms affecting the entire hand I recommend EMG to rule out carpal tunnel versus ulnar neuropathy versus radiculopathy.        Return to clinic to discuss test results, or sooner as needed for changes.  Re-x-ray on return: No    Akshat Wilburn D.O.

## 2019-01-24 NOTE — LETTER
1/24/2019         RE: Florina Marie  [id number]  [address]  [place] [address]        Dear Colleague,    Thank you for referring your patient, Florina Marie, to the [facility]. Please see a copy of my visit note below.    ORTHOPEDIC CONSULT      Chief Complaint: Florina Marie is a 33 year old female who is being seen for Chief Complaint   Patient presents with     Musculoskeletal Problem     bilateral wrist pain     Consult     ref: Ekaterina Nichols       History of Present Illness:   Florina Marie is a 33 year old female who is seen in consultation at the request of Ekaterina Nichols for evaluation of bilateral hand numbness.  Complains of at least 2 months worth of her entire hand going numb.  Is coming and going but getting more constant.  She is attempted some occupational therapy as well as bracing at night with no improvement.  It bothers her at work.  She reports all of her digits go numb.  No pain.          Patient's past medical, surgical, social and family histories reviewed.     Past Medical History:   Diagnosis Date     Attention deficit disorder with hyperactivity(314.01)      Other kyphoscoliosis and scoliosis      Other specified delay in development     Microcephaly     Viral warts, unspecified     plantar warts       Past Surgical History:   Procedure Laterality Date     EXAM UNDER ANESTHESIA PELVIC N/A 9/9/2016    Procedure: EXAM UNDER ANESTHESIA PELVIC;  Surgeon: Rhoda Alcazar MD;  Location:  OR      TOOTH EXTRACTION W/FORCEP      wisdom       Medications:    Current Outpatient Medications on File Prior to Visit:  escitalopram (LEXAPRO) 20 MG tablet TAKE 1 TABLET BY MOUTH DAILY.   etonogestrel (IMPLANON/NEXPLANON) 68 MG IMPL 1 each (68 mg) by Subdermal route continuous   phentermine (ADIPEX-P) 30 MG capsule Take 1 capsule (30 mg) by mouth every morning   ALPRAZolam (XANAX) 0.5 MG tablet TAKE ONE TABLET AS NEEDED FOR SEVERE ANXIETY MAX OF 2 TABLETS PER DAY  "    No current facility-administered medications on file prior to visit.     Allergies   Allergen Reactions     No Known Drug Allergies        Social History     Occupational History     Occupation: student     Comment: [employer]   Tobacco Use     Smoking status: Never Smoker     Smokeless tobacco: Never Used     Tobacco comment: no smokers in the household   Substance and Sexual Activity     Alcohol use: No     Frequency: Never     Drug use: No     Sexual activity: No     Birth control/protection: Implant       Family History   Problem Relation Age of Onset     Lipids Father         diet     Thyroid Disease Mother         unsure if hypo or hyper     Diabetes Paternal Grandfather         adult     Heart Disease Paternal Grandfather         bypass     Lipids Maternal Aunt         medication     Lipids Maternal Aunt         diet     Alzheimer Disease Maternal Grandfather      Hypertension No family hx of      Coronary Artery Disease No family hx of      Hyperlipidemia No family hx of      Cancer - colorectal No family hx of      Ovarian Cancer No family hx of      Prostate Cancer No family hx of      Depression/Anxiety No family hx of      Cerebrovascular Disease No family hx of      Anesthesia Reaction No family hx of      Asthma No family hx of      Osteoporosis No family hx of      Chemical Addiction No family hx of      Obesity No family hx of        REVIEW OF SYSTEMS  10 point review systems performed otherwise negative as noted as per history of present illness.    Physical Exam:  Vitals: BP (P) 120/80   Ht (P) 1.664 m (5' 5.5\")   Wt (P) 115.2 kg (254 lb)   LMP  (LMP Unknown)   BMI (P) 41.62 kg/m     BMI= Body mass index is 41.62 kg/m  (pended).  Constitutional: healthy, alert and no acute distress   Psychiatric: mentation appears normal and affect normal/bright  NEURO: no focal deficits  RESP: Normal with easy respirations and no use of accessory muscles to breathe, no audible wheezing or " retractions  CV: bilateral upper extemity:  no edema         Regular rate and rhythm by palpation  SKIN: No erythema, rashes, excoriation, or breakdown. No evidence of infection.   JOINT/EXTREMITIES:bilateral: Have full active range of motion.  There is no edema or atrophy.  There is no weakness throughout.  Sensation is intact to light touch to all the digits.  Negative Tinel's.  Negative carpal compression.  Negative Phalen's.  Hand is well perfused with good capillary refill.     GAIT: not tested     Diagnostic Modalities:  bilateral wrist X-ray: No fracture, dislocation and or lesions. Normal alignment.  Independent visualization of the images was performed.      Impression: bilateral hand numbness    Plan:  All of the above pertinent physical exam and imaging modalities findings was reviewed with Florina.    I reviewed her exam findings.  She reports all of her digits are numb.  It is coming and going.  Carpal tunnel provocative findings are negative today.  Given her 2 months worth of progressive neurologic symptoms affecting the entire hand I recommend EMG to rule out carpal tunnel versus ulnar neuropathy versus radiculopathy.        Return to clinic to discuss test results, or sooner as needed for changes.  Re-x-ray on return: No    Akshat Wilburn D.O.    Again, thank you for allowing me to participate in the care of your patient.        Sincerely,        Anjum Wilburn, DO

## 2019-01-24 NOTE — NURSING NOTE
Referral with patient demographics faxed to [facility], [facility] of Neurology...Darby Montes CMA  ([facility])

## 2019-01-28 NOTE — PROGRESS NOTES
SUBJECTIVE:   Florina Marie is a 33 year old female who presents to clinic today for the following health issues:      History of Present Illness     Diet:  Regular (no restrictions)  Frequency of exercise:  None  Taking medications regularly:  Yes  Medication side effects:  None  Additional concerns today:  No    Medication Followup of Phentermine    Taking Medication as prescribed: yes    Side Effects:  None    Medication Helping Symptoms:  yes   Answers for HPI/ROS submitted by the patient on 2/5/2019   Chronic problems general questions HPI Form  If you checked off any problems, how difficult have these problems made it for you to do your work, take care of things at home, or get along with other people?: Not difficult at all  PHQ9 TOTAL SCORE: 0  ZEENAT 7 TOTAL SCORE: 10    Problem list and histories reviewed & adjusted, as indicated.  Additional history: as documented    Patient Active Problem List   Diagnosis     Problems with learning     Morbid obesity (H)     Generalized anxiety disorder     Hyperlipidemia, unspecified     Attention deficit disorder without hyperactivity     Prolonged PTT     Right wrist pain     Past Surgical History:   Procedure Laterality Date     EXAM UNDER ANESTHESIA PELVIC N/A 9/9/2016    Procedure: EXAM UNDER ANESTHESIA PELVIC;  Surgeon: Rhoda Alcazar MD;  Location:  OR      TOOTH EXTRACTION W/FORCEP      wisdom       Social History     Tobacco Use     Smoking status: Never Smoker     Smokeless tobacco: Never Used     Tobacco comment: no smokers in the household   Substance Use Topics     Alcohol use: No     Frequency: Never     Family History   Problem Relation Age of Onset     Lipids Father         diet     Thyroid Disease Mother         unsure if hypo or hyper     Diabetes Paternal Grandfather         adult     Heart Disease Paternal Grandfather         bypass     Lipids Maternal Aunt         medication     Lipids Maternal Aunt         diet     Alzheimer Disease  Maternal Grandfather      Hypertension No family hx of      Coronary Artery Disease No family hx of      Hyperlipidemia No family hx of      Cancer - colorectal No family hx of      Ovarian Cancer No family hx of      Prostate Cancer No family hx of      Depression/Anxiety No family hx of      Cerebrovascular Disease No family hx of      Anesthesia Reaction No family hx of      Asthma No family hx of      Osteoporosis No family hx of      Chemical Addiction No family hx of      Obesity No family hx of            ROS:  CONSTITUTIONAL: NEGATIVE for fever, chills, change in weight  ENT/MOUTH: NEGATIVE for ear, mouth and throat problems  RESP: NEGATIVE for significant cough or shortness of breath   CV: NEGATIVE for chest pain, palpitations or peripheral edema    OBJECTIVE:     /70 (BP Location: Left arm, Patient Position: Chair, Cuff Size: Adult Large)   Pulse 83   Temp 98.4  F (36.9  C) (Temporal)   Resp 16   Wt 116 kg (255 lb 12.8 oz)   LMP  (LMP Unknown)   SpO2 98%   BMI (P) 41.92 kg/m    Body mass index is 41.92 kg/m  (pended).  Gen: no apparent distress  Chest: clear to auscultation without wheeze, rale or rhonchi  Cor: regular rate and rhythm without murmur  Ext: warm and dry without edema      ASSESSMENT/PLAN:     1. Morbid obesity (H)  Has lost 5# since our last visit.  She goes to [employer] once a week and does a dance class, she is going to try to increase going to the gym to 2-3 times a week.  She feels monthly visits are helpful in keeping her on track, will follow up in another month.    - phentermine (ADIPEX-P) 30 MG capsule; Take 1 capsule (30 mg) by mouth every morning  Dispense: 30 capsule; Refill: 0    Alanna Curiel MD  [facility]

## 2019-02-05 ENCOUNTER — OFFICE VISIT (OUTPATIENT)
Dept: FAMILY MEDICINE | Facility: OTHER | Age: 34
End: 2019-02-05
Payer: MEDICARE

## 2019-02-05 VITALS
SYSTOLIC BLOOD PRESSURE: 120 MMHG | DIASTOLIC BLOOD PRESSURE: 70 MMHG | RESPIRATION RATE: 16 BRPM | BODY MASS INDEX: 41.92 KG/M2 | OXYGEN SATURATION: 98 % | TEMPERATURE: 98.4 F | HEART RATE: 83 BPM | WEIGHT: 255.8 LBS

## 2019-02-05 DIAGNOSIS — E66.01 MORBID OBESITY (H): ICD-10-CM

## 2019-02-05 PROCEDURE — 99213 OFFICE O/P EST LOW 20 MIN: CPT | Performed by: FAMILY MEDICINE

## 2019-02-05 RX ORDER — PHENTERMINE HYDROCHLORIDE 30 MG/1
30 CAPSULE ORAL EVERY MORNING
Qty: 30 CAPSULE | Refills: 0 | Status: SHIPPED | OUTPATIENT
Start: 2019-02-05 | End: 2019-03-05

## 2019-02-05 ASSESSMENT — PATIENT HEALTH QUESTIONNAIRE - PHQ9
SUM OF ALL RESPONSES TO PHQ QUESTIONS 1-9: 0
SUM OF ALL RESPONSES TO PHQ QUESTIONS 1-9: 0
10. IF YOU CHECKED OFF ANY PROBLEMS, HOW DIFFICULT HAVE THESE PROBLEMS MADE IT FOR YOU TO DO YOUR WORK, TAKE CARE OF THINGS AT HOME, OR GET ALONG WITH OTHER PEOPLE: NOT DIFFICULT AT ALL

## 2019-02-05 ASSESSMENT — ANXIETY QUESTIONNAIRES
1. FEELING NERVOUS, ANXIOUS, OR ON EDGE: MORE THAN HALF THE DAYS
4. TROUBLE RELAXING: MORE THAN HALF THE DAYS
6. BECOMING EASILY ANNOYED OR IRRITABLE: NOT AT ALL
GAD7 TOTAL SCORE: 10
GAD7 TOTAL SCORE: 10
3. WORRYING TOO MUCH ABOUT DIFFERENT THINGS: MORE THAN HALF THE DAYS
5. BEING SO RESTLESS THAT IT IS HARD TO SIT STILL: NOT AT ALL
7. FEELING AFRAID AS IF SOMETHING AWFUL MIGHT HAPPEN: MORE THAN HALF THE DAYS
GAD7 TOTAL SCORE: 10
2. NOT BEING ABLE TO STOP OR CONTROL WORRYING: MORE THAN HALF THE DAYS
7. FEELING AFRAID AS IF SOMETHING AWFUL MIGHT HAPPEN: MORE THAN HALF THE DAYS

## 2019-02-06 ASSESSMENT — ANXIETY QUESTIONNAIRES: GAD7 TOTAL SCORE: 10

## 2019-02-06 ASSESSMENT — PATIENT HEALTH QUESTIONNAIRE - PHQ9: SUM OF ALL RESPONSES TO PHQ QUESTIONS 1-9: 0

## 2019-02-14 ENCOUNTER — TELEPHONE (OUTPATIENT)
Dept: FAMILY MEDICINE | Facility: OTHER | Age: 34
End: 2019-02-14

## 2019-02-14 NOTE — TELEPHONE ENCOUNTER
Reason for Call:  Other call back    Detailed comments: pt calling, wondering about a referral to a foot specialist? She is having feet issues, they hurt constantly to the point where she doesn't even want to walk on them. She's been a foot dr in [place] before and was given shoe inserts but she doesn't think they are helping. Wondering what she should do, requests a call back.    Phone Number Patient can be reached at: Cell number on file:    Telephone Information:   Mobile [phone]       Best Time: any    Can we leave a detailed message on this number? YES    Call taken on 2/14/2019 at 10:11 AM by Liv Roblero

## 2019-02-25 ENCOUNTER — TELEPHONE (OUTPATIENT)
Dept: FAMILY MEDICINE | Facility: OTHER | Age: 34
End: 2019-02-25

## 2019-02-25 NOTE — TELEPHONE ENCOUNTER
Reason for Call:  Other prescription    Detailed comments: pt states on medication phentermine (ADIPEX-P) 30 MG capsule. Pt wondering if this can cause constipation , pt heard it could. Pt states is having some constipation and wondering what she can do to help it. Please advise     Phone Number Patient can be reached at: Cell number on file:    Telephone Information:   Mobile [phone]       Best Time: aNY    Can we leave a detailed message on this number? YES    Call taken on 2/25/2019 at 7:26 AM by Alla Keating

## 2019-02-27 NOTE — PROGRESS NOTES
SUBJECTIVE:   Florina Marie is a 33 year old female who presents to clinic today for the following health issues:      History of Present Illness     Diet:  Regular (no restrictions)  Frequency of exercise:  None  Taking medications regularly:  Yes  Medication side effects:  None  Additional concerns today:  No    If you checked off any problems, how difficult have these problems made it for you to do your work, take care of things at home, or get along with other people?: Not difficult at all  PHQ9 TOTAL SCORE: 0  ZEENAT 7 TOTAL SCORE: 4  Problem list and histories review    Problem list and histories reviewed & adjusted, as indicated.  Additional history: as documented    Patient Active Problem List   Diagnosis     Problems with learning     Morbid obesity (H)     Generalized anxiety disorder     Hyperlipidemia, unspecified     Attention deficit disorder without hyperactivity     Prolonged PTT     Right wrist pain     Past Surgical History:   Procedure Laterality Date     EXAM UNDER ANESTHESIA PELVIC N/A 9/9/2016    Procedure: EXAM UNDER ANESTHESIA PELVIC;  Surgeon: Rhoda Alcazar MD;  Location:  OR      TOOTH EXTRACTION W/FORCEP      wisdom       Social History     Tobacco Use     Smoking status: Never Smoker     Smokeless tobacco: Never Used     Tobacco comment: no smokers in the household   Substance Use Topics     Alcohol use: No     Frequency: Never     Family History   Problem Relation Age of Onset     Lipids Father         diet     Thyroid Disease Mother         unsure if hypo or hyper     Diabetes Paternal Grandfather         adult     Heart Disease Paternal Grandfather         bypass     Lipids Maternal Aunt         medication     Lipids Maternal Aunt         diet     Alzheimer Disease Maternal Grandfather      Hypertension No family hx of      Coronary Artery Disease No family hx of      Hyperlipidemia No family hx of      Cancer - colorectal No family hx of      Ovarian Cancer No family hx of       Prostate Cancer No family hx of      Depression/Anxiety No family hx of      Cerebrovascular Disease No family hx of      Anesthesia Reaction No family hx of      Asthma No family hx of      Osteoporosis No family hx of      Chemical Addiction No family hx of      Obesity No family hx of            ROS:  CONSTITUTIONAL: NEGATIVE for fever, chills  ENT/MOUTH: NEGATIVE for ear, mouth and throat problems  RESP: NEGATIVE for significant cough or shortness of breath   CV: NEGATIVE for chest pain, palpitations or peripheral edema  MSK:  States she has left foot pain after work despite using orthotics, this makes her not want to go to the gym  Neuro:  States her EMG reveals that she has carpal tunnel syndrome, she has follow up appointment with Orthopedics in a couple weeks    OBJECTIVE:     /76 (BP Location: Left arm, Patient Position: Chair, Cuff Size: Adult Large)   Pulse 74   Temp 97.8  F (36.6  C) (Temporal)   Resp 16   Wt 114 kg (251 lb 6.4 oz)   SpO2 98%   BMI (P) 41.20 kg/m    Body mass index is 41.2 kg/m  (pended).  Gen: no apparent distress  Chest: clear to auscultation without wheeze, rale or rhonchi  Cor: regular rate and rhythm without murmur  Ext: warm and dry without edema.  Left foot without erythema or edema, does have mild tenderness over the metatarsal heads.      ASSESSMENT/PLAN:     1. Morbid obesity (H)  Has lost 4# since our last visit.  Encouraged her to try using the pool at the gym since her feet hurt.  She feels monthly visits are helpful in keeping her on track, will follow up in another month.    - phentermine (ADIPEX-P) 30 MG capsule; Take 1 capsule (30 mg) by mouth every morning  Dispense: 30 capsule; Refill: 0    2. Left foot pain  She is using her orthotics.  Will refer to Podiatry.    - PODIATRY/FOOT & ANKLE SURGERY REFERRAL     Alanna Curiel MD  [facility]

## 2019-02-28 ENCOUNTER — TRANSFERRED RECORDS (OUTPATIENT)
Dept: HEALTH INFORMATION MANAGEMENT | Facility: CLINIC | Age: 34
End: 2019-02-28

## 2019-03-05 ENCOUNTER — OFFICE VISIT (OUTPATIENT)
Dept: FAMILY MEDICINE | Facility: OTHER | Age: 34
End: 2019-03-05
Payer: MEDICARE

## 2019-03-05 VITALS
DIASTOLIC BLOOD PRESSURE: 76 MMHG | RESPIRATION RATE: 16 BRPM | OXYGEN SATURATION: 98 % | HEART RATE: 74 BPM | SYSTOLIC BLOOD PRESSURE: 124 MMHG | TEMPERATURE: 97.8 F | WEIGHT: 251.4 LBS | BODY MASS INDEX: 41.2 KG/M2

## 2019-03-05 DIAGNOSIS — E66.01 MORBID OBESITY (H): ICD-10-CM

## 2019-03-05 DIAGNOSIS — M79.672 LEFT FOOT PAIN: Primary | ICD-10-CM

## 2019-03-05 PROCEDURE — 99213 OFFICE O/P EST LOW 20 MIN: CPT | Performed by: FAMILY MEDICINE

## 2019-03-05 RX ORDER — PHENTERMINE HYDROCHLORIDE 30 MG/1
30 CAPSULE ORAL EVERY MORNING
Qty: 30 CAPSULE | Refills: 0 | Status: SHIPPED | OUTPATIENT
Start: 2019-03-05 | End: 2019-04-09

## 2019-03-05 ASSESSMENT — ANXIETY QUESTIONNAIRES
GAD7 TOTAL SCORE: 4
7. FEELING AFRAID AS IF SOMETHING AWFUL MIGHT HAPPEN: NOT AT ALL
GAD7 TOTAL SCORE: 4
1. FEELING NERVOUS, ANXIOUS, OR ON EDGE: SEVERAL DAYS
6. BECOMING EASILY ANNOYED OR IRRITABLE: NOT AT ALL
7. FEELING AFRAID AS IF SOMETHING AWFUL MIGHT HAPPEN: NOT AT ALL
GAD7 TOTAL SCORE: 4
4. TROUBLE RELAXING: SEVERAL DAYS
5. BEING SO RESTLESS THAT IT IS HARD TO SIT STILL: NOT AT ALL
3. WORRYING TOO MUCH ABOUT DIFFERENT THINGS: SEVERAL DAYS
2. NOT BEING ABLE TO STOP OR CONTROL WORRYING: SEVERAL DAYS

## 2019-03-06 PROBLEM — M25.531 RIGHT WRIST PAIN: Status: RESOLVED | Noted: 2019-01-02 | Resolved: 2019-03-06

## 2019-03-06 ASSESSMENT — ANXIETY QUESTIONNAIRES: GAD7 TOTAL SCORE: 4

## 2019-03-06 ASSESSMENT — PATIENT HEALTH QUESTIONNAIRE - PHQ9: SUM OF ALL RESPONSES TO PHQ QUESTIONS 1-9: 0

## 2019-03-07 ENCOUNTER — NURSE TRIAGE (OUTPATIENT)
Dept: NURSING | Facility: CLINIC | Age: 34
End: 2019-03-07

## 2019-03-07 ENCOUNTER — TELEPHONE (OUTPATIENT)
Dept: ORTHOPEDICS | Facility: OTHER | Age: 34
End: 2019-03-07

## 2019-03-07 NOTE — TELEPHONE ENCOUNTER
Spoke with patient. She states that she has numbness and pain in her bilateral hands/wrists. The right is worse than the left. She states she was given braces to wear but feels like they don't do anything and sometimes make it feel worse so she does not use them. She is using advil as well.  She understands that she may have to wait until her appointment next week to have this addressed by Dr. Wilburn.  She would still like this sent to him in case there is anything else that can be done before her appointment.     Leni TOLLIVER RN. . .  3/7/2019, 2:40 PM

## 2019-03-07 NOTE — TELEPHONE ENCOUNTER
Reason for call:  Patient reporting a symptom    Symptom or request: shooting pain up rt hand    Duration (how long have symptoms been present): ongoing    Have you been treated for this before? Yes    Additional comments: pt has had EMG and cannot wait for appt 3/14 to see what she should do about shooting pain up rt hand and arm.  She is not sure what to take to help this.  Please advise    Phone Number patient can be reached at:  Cell number on file:    Telephone Information:   Mobile [phone]       Best Time:  any    Can we leave a detailed message on this number:  YES    Call taken on 3/7/2019 at 8:32 AM by Mary Duncan

## 2019-03-08 NOTE — TELEPHONE ENCOUNTER
32 y/o female calls about increased RIGHT hand and arm pain.  She reports she had an EMG done last week completed for hand, been having numbness in hand. EMG showed nerve pinching, possible right hand carpule tunnel. She was given a brace for her hand/wrist which she has on now and a follow up appointment with specialist on Thursday.  However, tonight she is having severe pain in right hand and up to shoulder. Denies new injury, no new deficit of numbness or weakness, but despited NSAID's, unable to control pain >2 hours no relief, advised be seen in the next 3-4 hour tonight, se declines ER will be seen tomorrow in UC or clinic, she will call back for appointment on her own.     Jazlyn Fitzpatrick RN - [facility] Nurse Advisor  03/07/2019       11:40PM    Reason for Disposition    [1] SEVERE pain AND [2] not improved 2 hours after pain medicine/ice packs    Additional Information    Negative: Serious injury with multiple fractures    Negative: [1] Major bleeding (e.g., actively dripping or spurting) AND [2] can't be stopped    Negative: Amputation    Negative: Sounds like a life-threatening emergency to the triager    Negative: Bullet wound, stabbed by knife, or other serious penetrating wound    Negative: Looks like a broken bone (e.g., knuckle is gone or depressed)    Negative: Looks like a dislocated joint (e.g., crooked or deformed)    Negative: Cut over knuckle (MCP joint)    Negative: Skin is split open or gaping  (or length > 1/2 inch or 12 mm)    Negative: [1] Bleeding AND [2] won't stop after 10 minutes of direct pressure (using correct technique)    Negative: [1] Dirt in the wound AND [2] not removed with 15 minutes of scrubbing    Negative: [1] Numbness (loss of sensation) of finger(s) AND [2] present now    Negative: High pressure injection injury (e.g., from grease gun or paint gun, usually work-related)    Negative: Sounds like a serious injury to the triager    Protocols used: HAND AND WRIST  INJURY-ADULT-AH

## 2019-03-12 NOTE — TELEPHONE ENCOUNTER
Informed patient she agrees and will keep appt for Thursday,.     Leni TOLLIVER RN. . .  3/12/2019, 3:22 PM

## 2019-03-13 ENCOUNTER — TELEPHONE (OUTPATIENT)
Dept: FAMILY MEDICINE | Facility: OTHER | Age: 34
End: 2019-03-13

## 2019-03-13 NOTE — TELEPHONE ENCOUNTER
Reason for Call:  Other call back    Detailed comments: pt would like her new lab dog to be a support animal for her depression. She stated her landlord will be faxing the paperwork over today 3/13 and Alanna just needs to sign it. Please call and advise.     Phone Number Patient can be reached at: Cell number on file:    Telephone Information:   Mobile [phone]       Best Time: any    Can we leave a detailed message on this number? YES    Call taken on 3/13/2019 at 9:55 AM by Anisa Khoury

## 2019-03-14 ENCOUNTER — OFFICE VISIT (OUTPATIENT)
Dept: ORTHOPEDICS | Facility: OTHER | Age: 34
End: 2019-03-14
Payer: MEDICARE

## 2019-03-14 VITALS
BODY MASS INDEX: 40.34 KG/M2 | DIASTOLIC BLOOD PRESSURE: 60 MMHG | HEIGHT: 66 IN | SYSTOLIC BLOOD PRESSURE: 124 MMHG | WEIGHT: 251 LBS

## 2019-03-14 DIAGNOSIS — G56.01 RIGHT CARPAL TUNNEL SYNDROME: Primary | ICD-10-CM

## 2019-03-14 PROCEDURE — 99213 OFFICE O/P EST LOW 20 MIN: CPT | Performed by: ORTHOPAEDIC SURGERY

## 2019-03-14 ASSESSMENT — MIFFLIN-ST. JEOR: SCORE: 1852.34

## 2019-03-14 NOTE — LETTER
"    3/14/2019         RE: Florina Marie  [address]  [address]  [facility] [address]        Dear Colleague,    Thank you for referring your patient, Florina Marie, to the [facility]. Please see a copy of my visit note below.    Office Visit-Follow up    Chief Complaint: Florina Marie is a 33 year old female who is being seen for   Chief Complaint   Patient presents with     RECHECK     EMG results       History of Present Illness:  Today's visit  Returns to clinic. Mostly returns for the right hand. States hand is now constantly numb on the right, hand and all five fingers. Still intermittent on the left.  Right much worse than left. 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 Has pain into the wrist and hand, and occasional radiates up towards the elbow. Denies any shooting pains or numbness from the neck radiating down.  Has been trying night braces without benefit. Has had previous hand therapy without improvement.    1/24/19 visit   Florina Marie is a 33 year old female who is seen in consultation at the request of Ekaterina Nichols for evaluation of bilateral hand numbness.  Complains of at least 2 months worth of her entire hand going numb.  Is coming and going but getting more constant.  She is attempted some occupational therapy as well as bracing at night with no improvement.  It bothers her at work.  She reports all of her digits go numb.  No pain.            REVIEW OF SYSTEMS  General: negative for, night sweats, dizziness, fatigue  Resp: No shortness of breath and no cough  CV: negative for chest pain, syncope or near-syncope  GI: negative for nausea, vomiting and diarrhea  : negative for dysuria and hematuria  Musculoskeletal: as above  Neurologic: negative for syncope   Hematologic: negative for bleeding disorder    Physical Exam:  Vitals: /60   Ht 1.664 m (5' 5.5\")   Wt 113.9 kg (251 lb)  "  BMI 41.13 kg/m     BMI= Body mass index is 41.13 kg/m .  Constitutional: healthy, alert and no acute distress   Psychiatric: mentation appears normal and affect normal/bright  NEURO: no focal deficits  RESP: Normal with easy respirations and no use of accessory muscles to breathe, no audible wheezing or retractions  CV: RUE:  no edema         Regular rate and rhythm by palpation  SKIN: No erythema, rashes, excoriation, or breakdown. No evidence of infection.   JOINT/EXTREMITIES:right hand: no atrophy or deformity. No swelling. Decreased sensation to thumb, index, middle, ring finger.  Sensation intact to little finger.  Numbness no worse with median compression, phalen's, and tinel's.  Negative tinel's at elbow. Full elbow motion.  Full motion to right wrist and fingers. No weakness with  strength. Radial pulse 2+. Fingers well perfused.       Diagnostic Modalities:  None today.  EMG report reviewed of BUE: moderate right carpal tunnel syndrome, no other abnormalities. No abnormalities on left.   Independent visualization of the images was performed.      Impression:right carpal tunnel syndrome    Plan:  All of the above pertinent physical exam and imaging modalities findings was reviewed with Florina.  The numbness and tingling is now constant and consistent with EMG report.  Has failed conservative therapy. Recommended carpal tunnel release.  She is agreeable to this. Dicussed sedation vs local only, would like under sedation.       The patient has attempted conservative treatments that include activity modifications, bracing , time, rest, hand therapy yet still continues to have significant issues. These issues include progressive numbness that is now constant numbness, difficulty with work due to the numbness. Worsening symptoms Secondary to these reasons I have offered surgery in the form of RIGHT carpal tunnel release.    The risks, benefits, alternatives, complication, limitations and expectations were  reviewed at length. Complications such as infection, bleeding, nerve damage, incomplete release, pillar pain, repeat surgery, skin problems, scar sensitivity, aspiration, heart attack, stroke, and death were discussed. Also reviewed were expectations and the goal of surgery. With surgery the goal would be to prevent further damage to the nerve, the surgery may not be able to reverse any current nerve damage. Postoperatively there will be limitation in use for approximately 3-4 weeks or as clinically indicated. All questions were answered. The patient agrees to proceed with surgery.     The patient's past medical and surgical history was reviewed; The patient will need a preoperative medical evaluation and clearance. Anticipate performing the surgery under local anesthetic with IV sedation.      Return to clinic 10 days post-operatively. , or sooner as needed for changes.  Re-x-ray on return: No    Scribed by:  ANGIE Wiggins, CNP  4:16 PM  3/14/2019    I attest I have seen and evaluated the patient.  I agree with above impression and plan.  Akshat Wilburn D.O.          Again, thank you for allowing me to participate in the care of your patient.        Sincerely,        Anjum Wilburn, DO

## 2019-03-14 NOTE — PROGRESS NOTES
Answers for HPI/ROS submitted by the patient on 3/19/2019   ZEENAT 7 TOTAL SCORE: 3    [facility]  [address], [place], operated by [facility] [address] [phone]  Dept: [phone]    PRE-OP EVALUATION:  Today's date: 3/19/2019    Florina Marie (: 1985) presents for pre-operative evaluation assessment as requested by Dr. Ruiz.  She requires evaluation and anesthesia risk assessment prior to undergoing surgery/procedure for treatment of right wrist carpal tunnel .    Primary Physician: Alanna Curiel  Type of Anesthesia Anticipated: to be determined    Patient has a Health Care Directive or Living Will:  NO    Preop Questions 3/19/2019   Who is doing your surgery? DR RUIZ   What are you having done? hand carpal tunnel surgery   Date of Surgery/Procedure: 19   [facility]   1.  Do you have a history of Heart attack, stroke, stent, coronary bypass surgery, or other heart surgery? No   2.  Do you ever have any pain or discomfort in your chest? No   3.  Do you have a history of  Heart Failure? No   4.   Are you troubled by shortness of breath when:  walking on a level surface, or up a slight hill, or at night? No   5.  Do you currently have a cold, bronchitis or other respiratory infection? No   6.  Do you have a cough, shortness of breath, or wheezing? No   7.  Do you sometimes get pains in the calves of your legs when you walk? YES - no signs of DVT   8. Do you or anyone in your family have previous history of blood clots? No   9.  Do you or does anyone in your family have a serious bleeding problem such as prolonged bleeding following surgeries or cuts? No   10. Have you ever had problems with anemia or been told to take iron pills? No   11. Have you had any abnormal blood loss such as black, tarry or bloody stools, or abnormal vaginal bleeding? No   12. Have you ever had a blood transfusion? No   13. Have you or any of  your relatives ever had problems with anesthesia? No   14. Do you have sleep apnea, excessive snoring or daytime drowsiness? No   15. Do you have any prosthetic heart valves? No   16. Do you have prosthetic joints? No   17. Is there any chance that you may be pregnant? No       HPI:     HPI related to upcoming procedure: Patient with ongoing CTS affecting the right wrist. Has been seen by orthopedics and has tried splinting without relief. Will now undergo release surgery.       See problem list for active medical problems.  Problems all longstanding and stable, except as noted/documented.  See ROS for pertinent symptoms related to these conditions.                                                                                                                                                          .    MEDICAL HISTORY:     Patient Active Problem List    Diagnosis Date Noted     Right carpal tunnel syndrome 03/14/2019     Priority: Medium     Prolonged PTT 08/09/2018     Priority: Medium     Attention deficit disorder without hyperactivity 12/01/2011     Priority: Medium     Hyperlipidemia, unspecified 01/08/2010     Priority: Medium     Generalized anxiety disorder 10/24/2007     Priority: Medium     Morbid obesity (H) 01/01/2004     Priority: Medium     Problems with learning 12/31/2003     Priority: Medium      Past Medical History:   Diagnosis Date     Attention deficit disorder with hyperactivity(314.01)      Other kyphoscoliosis and scoliosis      Other specified delay in development     Microcephaly     Viral warts, unspecified     plantar warts     Past Surgical History:   Procedure Laterality Date     EXAM UNDER ANESTHESIA PELVIC N/A 9/9/2016    Procedure: EXAM UNDER ANESTHESIA PELVIC;  Surgeon: Rhoda Alcazar MD;  Location:  OR      TOOTH EXTRACTION W/FORCEP      sara     Current Outpatient Medications   Medication Sig Dispense Refill     ALPRAZolam (XANAX) 0.5 MG tablet TAKE ONE TABLET AS  "NEEDED FOR SEVERE ANXIETY MAX OF 2 TABLETS PER DAY  0     diclofenac (VOLTAREN) 75 MG EC tablet Take 1 tablet (75 mg) by mouth 2 times daily 60 tablet 1     escitalopram (LEXAPRO) 20 MG tablet TAKE 1 TABLET BY MOUTH DAILY.  5     etonogestrel (IMPLANON/NEXPLANON) 68 MG IMPL 1 each (68 mg) by Subdermal route continuous  0     phentermine (ADIPEX-P) 30 MG capsule Take 1 capsule (30 mg) by mouth every morning 30 capsule 0     OTC products: None, except as noted above    Allergies   Allergen Reactions     No Known Drug Allergies       Latex Allergy: NO    Social History     Tobacco Use     Smoking status: Never Smoker     Smokeless tobacco: Never Used     Tobacco comment: no smokers in the household   Substance Use Topics     Alcohol use: No     Frequency: Never     History   Drug Use No       REVIEW OF SYSTEMS:   CONSTITUTIONAL: NEGATIVE for fever, chills, change in weight  INTEGUMENTARY/SKIN: NEGATIVE for worrisome rashes, moles or lesions  EYES: NEGATIVE for vision changes or irritation  ENT/MOUTH: NEGATIVE for ear, mouth and throat problems  RESP: NEGATIVE for significant cough or SOB  BREAST: NEGATIVE for masses, tenderness or discharge  CV: NEGATIVE for chest pain, palpitations or peripheral edema  GI: NEGATIVE for nausea, abdominal pain, heartburn, or change in bowel habits  : NEGATIVE for frequency, dysuria, or hematuria  MUSCULOSKELETAL: NEGATIVE for significant arthralgias or myalgia  NEURO: NEGATIVE for weakness, dizziness or paresthesias  ENDOCRINE: NEGATIVE for temperature intolerance, skin/hair changes  HEME: NEGATIVE for bleeding problems  PSYCHIATRIC: NEGATIVE for changes in mood or affect    EXAM:   /78   Pulse 82   Temp 98.7  F (37.1  C) (Temporal)   Resp 16   Ht 1.702 m (5' 7\")   Wt 115.7 kg (255 lb)   LMP  (LMP Unknown)   SpO2 98%   BMI 39.94 kg/m      GENERAL APPEARANCE: healthy, alert and no distress     EYES: EOMI, PERRL     HENT: ear canals and TM's normal and nose and mouth " without ulcers or lesions     NECK: no adenopathy, no asymmetry, masses, or scars and thyroid normal to palpation     RESP: lungs clear to auscultation - no rales, rhonchi or wheezes     CV: regular rates and rhythm, normal S1 S2, no S3 or S4 and no murmur, click or rub     ABDOMEN:  soft, nontender, no HSM or masses and bowel sounds normal     MS: extremities normal- no gross deformities noted, no evidence of inflammation in joints, FROM in all extremities.     SKIN: no suspicious lesions or rashes     NEURO: Normal strength and tone, sensory exam grossly normal, mentation intact and speech normal     PSYCH: mentation appears normal. and affect normal/bright     LYMPHATICS: No cervical adenopathy    DIAGNOSTICS:     Labs Drawn and in Process:   Unresulted Labs Ordered in the Past 30 Days of this Admission     Date and Time Order Name Status Description    3/19/2019 0900 CBC WITH PLATELETS DIFFERENTIAL In process           Recent Labs   Lab Test 07/10/18  1334 07/09/18  1540 08/24/17  0823  05/04/12  1024   HGB 13.2 13.2 13.0   < >  --     299 278   < >  --    INR  --  1.10  --   --   --    NA  --  138 140   < >  --    POTASSIUM  --  3.9 4.2   < >  --    CR  --  1.01 1.07*   < >  --    A1C  --   --   --   --  5.6    < > = values in this interval not displayed.        IMPRESSION:   Reason for surgery/procedure: right carpal tunnel syndrome    The proposed surgical procedure is considered LOW risk.    REVISED CARDIAC RISK INDEX  The patient has the following serious cardiovascular risks for perioperative complications such as (MI, PE, VFib and 3  AV Block):  No serious cardiac risks  INTERPRETATION: 1 risks: Class II (low risk - 0.9% complication rate)    The patient has the following additional risks for perioperative complications:  Morbid obesity      ICD-10-CM    1. Preop general physical exam Z01.818 CBC with platelets and differential   2. Right carpal tunnel syndrome G56.01        RECOMMENDATIONS:      --Consult hospital rounder / IM to assist post-op medical management    --Patient is to take all scheduled medications on the day of surgery EXCEPT for modifications listed below.   Hold Phentermine   Hold Diclofenac until after surgery   Take Lexapro with a small sip of water    APPROVAL GIVEN to proceed with proposed procedure, without further diagnostic evaluation       Signed Electronically by: Ekaterina Nichols PA-C    Copy of this evaluation report is provided to requesting physician.    [facility] Preop Guidelines    Revised Cardiac Risk Index

## 2019-03-14 NOTE — PROGRESS NOTES
"Office Visit-Follow up    Chief Complaint: Florina Marie is a 33 year old female who is being seen for   Chief Complaint   Patient presents with     RECHECK     EMG results       History of Present Illness:  Today's visit  Returns to clinic. Mostly returns for the right hand. States hand is now constantly numb on the right, hand and all five fingers. Still intermittent on the left.  Right much worse than left. 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 Has pain into the wrist and hand, and occasional radiates up towards the elbow. Denies any shooting pains or numbness from the neck radiating down.  Has been trying night braces without benefit. Has had previous hand therapy without improvement.    1/24/19 visit   Florina Marie is a 33 year old female who is seen in consultation at the request of Ekaterina Nichols for evaluation of bilateral hand numbness.  Complains of at least 2 months worth of her entire hand going numb.  Is coming and going but getting more constant.  She is attempted some occupational therapy as well as bracing at night with no improvement.  It bothers her at work.  She reports all of her digits go numb.  No pain.            REVIEW OF SYSTEMS  General: negative for, night sweats, dizziness, fatigue  Resp: No shortness of breath and no cough  CV: negative for chest pain, syncope or near-syncope  GI: negative for nausea, vomiting and diarrhea  : negative for dysuria and hematuria  Musculoskeletal: as above  Neurologic: negative for syncope   Hematologic: negative for bleeding disorder    Physical Exam:  Vitals: /60   Ht 1.664 m (5' 5.5\")   Wt 113.9 kg (251 lb)   BMI 41.13 kg/m    BMI= Body mass index is 41.13 kg/m .  Constitutional: healthy, alert and no acute distress   Psychiatric: mentation appears normal and affect normal/bright  NEURO: no focal deficits  RESP: Normal with easy respirations and no use of " accessory muscles to breathe, no audible wheezing or retractions  CV: RUE:  no edema         Regular rate and rhythm by palpation  SKIN: No erythema, rashes, excoriation, or breakdown. No evidence of infection.   JOINT/EXTREMITIES:right hand: no atrophy or deformity. No swelling. Decreased sensation to thumb, index, middle, ring finger.  Sensation intact to little finger.  Numbness no worse with median compression, phalen's, and tinel's.  Negative tinel's at elbow. Full elbow motion.  Full motion to right wrist and fingers. No weakness with  strength. Radial pulse 2+. Fingers well perfused.       Diagnostic Modalities:  None today.  EMG report reviewed of BUE: moderate right carpal tunnel syndrome, no other abnormalities. No abnormalities on left.   Independent visualization of the images was performed.      Impression:right carpal tunnel syndrome    Plan:  All of the above pertinent physical exam and imaging modalities findings was reviewed with Florina.  The numbness and tingling is now constant and consistent with EMG report.  Has failed conservative therapy. Recommended carpal tunnel release.  She is agreeable to this. Dicussed sedation vs local only, would like under sedation.       The patient has attempted conservative treatments that include activity modifications, bracing , time, rest, hand therapy yet still continues to have significant issues. These issues include progressive numbness that is now constant numbness, difficulty with work due to the numbness. Worsening symptoms Secondary to these reasons I have offered surgery in the form of RIGHT carpal tunnel release.    The risks, benefits, alternatives, complication, limitations and expectations were reviewed at length. Complications such as infection, bleeding, nerve damage, incomplete release, pillar pain, repeat surgery, skin problems, scar sensitivity, aspiration, heart attack, stroke, and death were discussed. Also reviewed were expectations and  the goal of surgery. With surgery the goal would be to prevent further damage to the nerve, the surgery may not be able to reverse any current nerve damage. Postoperatively there will be limitation in use for approximately 3-4 weeks or as clinically indicated. All questions were answered. The patient agrees to proceed with surgery.     The patient's past medical and surgical history was reviewed; The patient will need a preoperative medical evaluation and clearance. Anticipate performing the surgery under local anesthetic with IV sedation.      Return to clinic 10 days post-operatively. , or sooner as needed for changes.  Re-x-ray on return: No    Scribed by:  Abdias Torres APRN, CNP  4:16 PM  3/14/2019    I attest I have seen and evaluated the patient.  I agree with above impression and plan.  Akshat Wilburn D.O.

## 2019-03-14 NOTE — PATIENT INSTRUCTIONS
Do not take Diclofenac (Voltaren) until after surgery  Skip Phentermine on the morning of surgery  OK to take your Lexapro with a small sip of water on the morning of surgery    Call me if you do not hear from the surgery team by Thursday    Before Your Surgery      Call your surgeon if there is any change in your health. This includes signs of a cold or flu (such as a sore throat, runny nose, cough, rash or fever).    Do not smoke, drink alcohol or take over the counter medicine (unless your surgeon or primary care doctor tells you to) for the 24 hours before and after surgery.    If you take prescribed drugs: Follow your doctor s orders about which medicines to take and which to stop until after surgery.    Eating and drinking prior to surgery: follow the instructions from your surgeon    Take a shower or bath the night before surgery. Use the soap your surgeon gave you to gently clean your skin. If you do not have soap from your surgeon, use your regular soap. Do not shave or scrub the surgery site.  Wear clean pajamas and have clean sheets on your bed.

## 2019-03-15 ENCOUNTER — TELEPHONE (OUTPATIENT)
Dept: ORTHOPEDICS | Facility: OTHER | Age: 34
End: 2019-03-15

## 2019-03-15 NOTE — TELEPHONE ENCOUNTER
Type of surgery: RIGHT RELEASE CARPAL TUNNEL  Location of surgery: [facility]  Date and time of surgery: 03/22/19  Surgeon: Dr Wilburn  Pre-Op Appt Date: 03/19/19 w/Ekaterina Diana  Post-Op Appt Date: 04/03/19 w/Abdias Torres   Packet sent out: Yes  Pre-cert/Authorization completed:  Yes  Date: 03/15/19

## 2019-03-19 ENCOUNTER — OFFICE VISIT (OUTPATIENT)
Dept: PODIATRY | Facility: OTHER | Age: 34
End: 2019-03-19
Payer: MEDICARE

## 2019-03-19 ENCOUNTER — ANCILLARY PROCEDURE (OUTPATIENT)
Dept: GENERAL RADIOLOGY | Facility: OTHER | Age: 34
End: 2019-03-19
Attending: PODIATRIST
Payer: MEDICARE

## 2019-03-19 ENCOUNTER — OFFICE VISIT (OUTPATIENT)
Dept: FAMILY MEDICINE | Facility: OTHER | Age: 34
End: 2019-03-19
Payer: MEDICARE

## 2019-03-19 VITALS
DIASTOLIC BLOOD PRESSURE: 78 MMHG | SYSTOLIC BLOOD PRESSURE: 118 MMHG | BODY MASS INDEX: 40.34 KG/M2 | WEIGHT: 251 LBS | HEIGHT: 66 IN

## 2019-03-19 VITALS
WEIGHT: 255 LBS | DIASTOLIC BLOOD PRESSURE: 78 MMHG | SYSTOLIC BLOOD PRESSURE: 118 MMHG | OXYGEN SATURATION: 98 % | BODY MASS INDEX: 40.02 KG/M2 | HEIGHT: 67 IN | RESPIRATION RATE: 16 BRPM | HEART RATE: 82 BPM | TEMPERATURE: 98.7 F

## 2019-03-19 DIAGNOSIS — M25.80 SESAMOIDITIS: ICD-10-CM

## 2019-03-19 DIAGNOSIS — M79.672 LEFT FOOT PAIN: ICD-10-CM

## 2019-03-19 DIAGNOSIS — M20.5X2 HALLUX LIMITUS OF LEFT FOOT: Primary | ICD-10-CM

## 2019-03-19 DIAGNOSIS — G56.01 RIGHT CARPAL TUNNEL SYNDROME: ICD-10-CM

## 2019-03-19 DIAGNOSIS — Z01.818 PREOP GENERAL PHYSICAL EXAM: Primary | ICD-10-CM

## 2019-03-19 LAB
BASOPHILS # BLD AUTO: 0 10E9/L (ref 0–0.2)
BASOPHILS NFR BLD AUTO: 0.2 %
DIFFERENTIAL METHOD BLD: NORMAL
EOSINOPHIL # BLD AUTO: 0.2 10E9/L (ref 0–0.7)
EOSINOPHIL NFR BLD AUTO: 2.1 %
ERYTHROCYTE [DISTWIDTH] IN BLOOD BY AUTOMATED COUNT: 14.8 % (ref 10–15)
HCT VFR BLD AUTO: 40.9 % (ref 35–47)
HGB BLD-MCNC: 13.2 G/DL (ref 11.7–15.7)
LYMPHOCYTES # BLD AUTO: 2.4 10E9/L (ref 0.8–5.3)
LYMPHOCYTES NFR BLD AUTO: 23.8 %
MCH RBC QN AUTO: 26.9 PG (ref 26.5–33)
MCHC RBC AUTO-ENTMCNC: 32.3 G/DL (ref 31.5–36.5)
MCV RBC AUTO: 83 FL (ref 78–100)
MONOCYTES # BLD AUTO: 0.6 10E9/L (ref 0–1.3)
MONOCYTES NFR BLD AUTO: 5.9 %
NEUTROPHILS # BLD AUTO: 6.9 10E9/L (ref 1.6–8.3)
NEUTROPHILS NFR BLD AUTO: 68 %
PLATELET # BLD AUTO: 278 10E9/L (ref 150–450)
RBC # BLD AUTO: 4.91 10E12/L (ref 3.8–5.2)
WBC # BLD AUTO: 10.2 10E9/L (ref 4–11)

## 2019-03-19 PROCEDURE — 73630 X-RAY EXAM OF FOOT: CPT | Mod: LT

## 2019-03-19 PROCEDURE — 85025 COMPLETE CBC W/AUTO DIFF WBC: CPT | Performed by: PHYSICIAN ASSISTANT

## 2019-03-19 PROCEDURE — 36415 COLL VENOUS BLD VENIPUNCTURE: CPT | Performed by: PHYSICIAN ASSISTANT

## 2019-03-19 PROCEDURE — 99203 OFFICE O/P NEW LOW 30 MIN: CPT | Performed by: PODIATRIST

## 2019-03-19 PROCEDURE — 99214 OFFICE O/P EST MOD 30 MIN: CPT | Performed by: PHYSICIAN ASSISTANT

## 2019-03-19 RX ORDER — DICLOFENAC SODIUM 75 MG/1
75 TABLET, DELAYED RELEASE ORAL 2 TIMES DAILY
Qty: 60 TABLET | Refills: 1 | Status: SHIPPED | OUTPATIENT
Start: 2019-03-19 | End: 2019-06-04

## 2019-03-19 ASSESSMENT — ANXIETY QUESTIONNAIRES
GAD7 TOTAL SCORE: 3
5. BEING SO RESTLESS THAT IT IS HARD TO SIT STILL: NOT AT ALL
3. WORRYING TOO MUCH ABOUT DIFFERENT THINGS: SEVERAL DAYS
GAD7 TOTAL SCORE: 3
7. FEELING AFRAID AS IF SOMETHING AWFUL MIGHT HAPPEN: NOT AT ALL
4. TROUBLE RELAXING: NOT AT ALL
6. BECOMING EASILY ANNOYED OR IRRITABLE: NOT AT ALL
GAD7 TOTAL SCORE: 3
1. FEELING NERVOUS, ANXIOUS, OR ON EDGE: SEVERAL DAYS
2. NOT BEING ABLE TO STOP OR CONTROL WORRYING: SEVERAL DAYS

## 2019-03-19 ASSESSMENT — MIFFLIN-ST. JEOR
SCORE: 1894.3
SCORE: 1852.34

## 2019-03-19 ASSESSMENT — PAIN SCALES - GENERAL
PAINLEVEL: MODERATE PAIN (5)
PAINLEVEL: MODERATE PAIN (5)

## 2019-03-19 NOTE — PROGRESS NOTES
HPI:  Florina Marie is a 33 year old female who is seen in consultation at the request of Alanna Curiel MD.    Pt presents for eval of:   (Onset, Location, L/R, Character, Treatments, Injury if yes)    XR Left foot today, 3/19/2019     Onset early Jan 2019, plantar ball of Left foot pain. No injury. Presents today with supportive athletic shoes and orthotics.  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    Weight management plan: Patient was referred to their PCP to discuss a diet and exercise plan.     Patient to follow up with Primary Care provider regarding elevated blood pressure.    ROS:  10 point ROS neg other than the symptoms noted above in the HPI.    Patient Active Problem List   Diagnosis     Problems with learning     Morbid obesity (H)     Generalized anxiety disorder     Hyperlipidemia, unspecified     Attention deficit disorder without hyperactivity     Prolonged PTT     Right carpal tunnel syndrome       PAST MEDICAL HISTORY:   Past Medical History:   Diagnosis Date     Attention deficit disorder with hyperactivity(314.01)      Other kyphoscoliosis and scoliosis      Other specified delay in development     Microcephaly     Viral warts, unspecified     plantar warts        PAST SURGICAL HISTORY:   Past Surgical History:   Procedure Laterality Date     EXAM UNDER ANESTHESIA PELVIC N/A 9/9/2016    Procedure: EXAM UNDER ANESTHESIA PELVIC;  Surgeon: Rhoda Alcazar MD;  Location:  OR      TOOTH EXTRACTION W/FORCEP      wisdom        MEDICATIONS:   Current Outpatient Medications:      ALPRAZolam (XANAX) 0.5 MG tablet, TAKE ONE TABLET AS NEEDED FOR SEVERE ANXIETY MAX OF 2 TABLETS PER DAY, Disp: , Rfl: 0     diclofenac (VOLTAREN) 75 MG EC tablet, Take 1 tablet (75 mg) by mouth 2 times daily, Disp: 60 tablet, Rfl: 1     escitalopram (LEXAPRO) 20 MG tablet, TAKE 1 TABLET BY MOUTH DAILY., Disp: , Rfl: 5     etonogestrel  (IMPLANON/NEXPLANON) 68 MG IMPL, 1 each (68 mg) by Subdermal route continuous, Disp: , Rfl: 0     phentermine (ADIPEX-P) 30 MG capsule, Take 1 capsule (30 mg) by mouth every morning, Disp: 30 capsule, Rfl: 0     ALLERGIES:    Allergies   Allergen Reactions     No Known Drug Allergies         SOCIAL HISTORY:   Social History     Socioeconomic History     Marital status: Single     Spouse name: Not on file     Number of children: 0     Years of education: Not on file     Highest education level: Not on file   Occupational History     Occupation: student     Comment: [employer]   Social Needs     Financial resource strain: Not on file     Food insecurity:     Worry: Not on file     Inability: Not on file     Transportation needs:     Medical: Not on file     Non-medical: Not on file   Tobacco Use     Smoking status: Never Smoker     Smokeless tobacco: Never Used     Tobacco comment: no smokers in the household   Substance and Sexual Activity     Alcohol use: No     Frequency: Never     Drug use: No     Sexual activity: No     Birth control/protection: Implant   Lifestyle     Physical activity:     Days per week: Not on file     Minutes per session: Not on file     Stress: Not on file   Relationships     Social connections:     Talks on phone: Not on file     Gets together: Not on file     Attends Yazdanism service: Not on file     Active member of club or organization: Not on file     Attends meetings of clubs or organizations: Not on file     Relationship status: Not on file     Intimate partner violence:     Fear of current or ex partner: Not on file     Emotionally abused: Not on file     Physically abused: Not on file     Forced sexual activity: Not on file   Other Topics Concern      Service Not Asked     Blood Transfusions Not Asked     Caffeine Concern Yes     Comment: 1 can QD     Occupational Exposure Not Asked     Hobby Hazards Not Asked     Sleep Concern Not Asked     Stress Concern Not  "Asked     Weight Concern Not Asked     Special Diet Not Asked     Back Care Not Asked     Exercise Yes     Comment: 4 days a week 1/2 hour     Bike Helmet No     Seat Belt Yes     Self-Exams Not Asked     Parent/sibling w/ CABG, MI or angioplasty before 65F 55M? No   Social History Narrative     Not on file        FAMILY HISTORY:   Family History   Problem Relation Age of Onset     Lipids Father         diet     Thyroid Disease Mother         unsure if hypo or hyper     Diabetes Paternal Grandfather         adult     Heart Disease Paternal Grandfather         bypass     Lipids Maternal Aunt         medication     Lipids Maternal Aunt         diet     Alzheimer Disease Maternal Grandfather      Hypertension No family hx of      Coronary Artery Disease No family hx of      Hyperlipidemia No family hx of      Cancer - colorectal No family hx of      Ovarian Cancer No family hx of      Prostate Cancer No family hx of      Depression/Anxiety No family hx of      Cerebrovascular Disease No family hx of      Anesthesia Reaction No family hx of      Asthma No family hx of      Osteoporosis No family hx of      Chemical Addiction No family hx of      Obesity No family hx of         EXAM:Vitals: /78 (BP Location: Right arm, Cuff Size: Adult Large)   Ht 1.664 m (5' 5.5\")   Wt 113.9 kg (251 lb)   BMI 41.13 kg/m    BMI= Body mass index is 41.13 kg/m .    General appearance: Patient is alert and fully cooperative with history & exam.  No sign of distress is noted during the visit.     Psychiatric: Affect is pleasant & appropriate.  Patient appears motivated to improve health.     Respiratory: Breathing is regular & unlabored while sitting.     HEENT: Hearing is intact to spoken word.  Speech is clear.  No gross evidence of visual impairment that would impact ambulation.     Vascular: DP & PT pulses are intact & regular bilaterally.  No significant edema or varicosities noted.  CFT and skin temperature is normal to both " lower extremities.     Neurologic: Lower extremity sensation is intact to light touch.  No evidence of weakness or contracture in the lower extremities.  No evidence of neuropathy.    Dermatologic: Skin is intact to both lower extremities with adequate texture, turgor and tone about the integument.  No paronychia or evidence of soft tissue infection is noted.     Musculoskeletal: Patient is ambulatory without assistive device or brace.  Gross prominence about the ball of both feet.  Crushed shoes and orthotic under the first met head.  Pinpoint area of pain about the lateral sesamoid and through the plantar first MPJ of the left foot only.  About 0 degrees of ankle joint dorsiflexion and more than this with flexion of the knee.  Gastroc equinus noted bilateral.  No crepitus or limited motion throughout the ankle subtalar midtarsal metatarsal phalangeal joints otherwise.  No bony block about the ankle.  Manual muscle strength was 5/5 to all 4 quadrants.    Radiographs: 3 views left foot demonstrate long first metatarsal abnormal metatarsal length parabola.  Sesamoid appears to be in good repair without fracture.  Fairly square first metatarsal head joint space rather than round.  This is consistent with early hallux limitus.     ASSESSMENT:       ICD-10-CM    1. Hallux limitus of left foot M20.5X2 ORTHOTICS REFERRAL     diclofenac (VOLTAREN) 75 MG EC tablet   2. Sesamoiditis M25.80 ORTHOTICS REFERRAL     diclofenac (VOLTAREN) 75 MG EC tablet        PLAN:  Reviewed patient[facility] in [facility].      3/19/2019   Discussed etiology and treatment options regarding hallux limitus.  Recommend refurbishing custom orthotics with forefoot extension and first met head cut out.  Add more cushion to the forefoot.  Recommend changes to shoe gear replacing shoes more frequently sturdy shank to reduce forefoot loading  Written instructions regarding proper shoe gear to obtain maximum forefoot cushion  Begin oral anti-inflammatory for  2-4 weeks until symptoms are nearly resolved then discontinue it.  Rx to begin Voltaren.  Discussed potential risks and complications.  All questions were answered.  Follow-up in 4-6 weeks to confirm resolution of symptoms.    Andres Rincon DPM

## 2019-03-19 NOTE — LETTER
3/19/2019         RE: Florina Marie  [address]  [address]  [facility] [address]        Dear Colleague,    Thank you for referring your patient, Florina Marie, to the [facility]. Please see a copy of my visit note below.    HPI:  Florina Marie is a 33 year old female who is seen in consultation at the request of Alanna Curiel MD.    Pt presents for eval of:   (Onset, Location, L/R, Character, Treatments, Injury if yes)    XR Left foot today, 3/19/2019     Onset early Jan 2019, plantar ball of Left foot pain. No injury. Presents today with supportive athletic shoes and orthotics.  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    Weight management plan: Patient was referred to their PCP to discuss a diet and exercise plan.     Patient to follow up with Primary Care provider regarding elevated blood pressure.    ROS:  10 point ROS neg other than the symptoms noted above in the HPI.    Patient Active Problem List   Diagnosis     Problems with learning     Morbid obesity (H)     Generalized anxiety disorder     Hyperlipidemia, unspecified     Attention deficit disorder without hyperactivity     Prolonged PTT     Right carpal tunnel syndrome       PAST MEDICAL HISTORY:   Past Medical History:   Diagnosis Date     Attention deficit disorder with hyperactivity(314.01)      Other kyphoscoliosis and scoliosis      Other specified delay in development     Microcephaly     Viral warts, unspecified     plantar warts        PAST SURGICAL HISTORY:   Past Surgical History:   Procedure Laterality Date     EXAM UNDER ANESTHESIA PELVIC N/A 9/9/2016    Procedure: EXAM UNDER ANESTHESIA PELVIC;  Surgeon: Rhoda Alcazar MD;  Location:  OR      TOOTH EXTRACTION W/FORCEP      wisdom        MEDICATIONS:   Current Outpatient Medications:      ALPRAZolam (XANAX) 0.5 MG tablet, TAKE ONE TABLET AS NEEDED FOR SEVERE ANXIETY MAX OF 2  TABLETS PER DAY, Disp: , Rfl: 0     diclofenac (VOLTAREN) 75 MG EC tablet, Take 1 tablet (75 mg) by mouth 2 times daily, Disp: 60 tablet, Rfl: 1     escitalopram (LEXAPRO) 20 MG tablet, TAKE 1 TABLET BY MOUTH DAILY., Disp: , Rfl: 5     etonogestrel (IMPLANON/NEXPLANON) 68 MG IMPL, 1 each (68 mg) by Subdermal route continuous, Disp: , Rfl: 0     phentermine (ADIPEX-P) 30 MG capsule, Take 1 capsule (30 mg) by mouth every morning, Disp: 30 capsule, Rfl: 0     ALLERGIES:    Allergies   Allergen Reactions     No Known Drug Allergies         SOCIAL HISTORY:   Social History     Socioeconomic History     Marital status: Single     Spouse name: Not on file     Number of children: 0     Years of education: Not on file     Highest education level: Not on file   Occupational History     Occupation: student     Comment: [employer]   Social Needs     Financial resource strain: Not on file     Food insecurity:     Worry: Not on file     Inability: Not on file     Transportation needs:     Medical: Not on file     Non-medical: Not on file   Tobacco Use     Smoking status: Never Smoker     Smokeless tobacco: Never Used     Tobacco comment: no smokers in the household   Substance and Sexual Activity     Alcohol use: No     Frequency: Never     Drug use: No     Sexual activity: No     Birth control/protection: Implant   Lifestyle     Physical activity:     Days per week: Not on file     Minutes per session: Not on file     Stress: Not on file   Relationships     Social connections:     Talks on phone: Not on file     Gets together: Not on file     Attends Jew service: Not on file     Active member of club or organization: Not on file     Attends meetings of clubs or organizations: Not on file     Relationship status: Not on file     Intimate partner violence:     Fear of current or ex partner: Not on file     Emotionally abused: Not on file     Physically abused: Not on file     Forced sexual activity: Not on file  "  Other Topics Concern      Service Not Asked     Blood Transfusions Not Asked     Caffeine Concern Yes     Comment: 1 can QD     Occupational Exposure Not Asked     Hobby Hazards Not Asked     Sleep Concern Not Asked     Stress Concern Not Asked     Weight Concern Not Asked     Special Diet Not Asked     Back Care Not Asked     Exercise Yes     Comment: 4 days a week 1/2 hour     Bike Helmet No     Seat Belt Yes     Self-Exams Not Asked     Parent/sibling w/ CABG, MI or angioplasty before 65F 55M? No   Social History Narrative     Not on file        FAMILY HISTORY:   Family History   Problem Relation Age of Onset     Lipids Father         diet     Thyroid Disease Mother         unsure if hypo or hyper     Diabetes Paternal Grandfather         adult     Heart Disease Paternal Grandfather         bypass     Lipids Maternal Aunt         medication     Lipids Maternal Aunt         diet     Alzheimer Disease Maternal Grandfather      Hypertension No family hx of      Coronary Artery Disease No family hx of      Hyperlipidemia No family hx of      Cancer - colorectal No family hx of      Ovarian Cancer No family hx of      Prostate Cancer No family hx of      Depression/Anxiety No family hx of      Cerebrovascular Disease No family hx of      Anesthesia Reaction No family hx of      Asthma No family hx of      Osteoporosis No family hx of      Chemical Addiction No family hx of      Obesity No family hx of         EXAM:Vitals: /78 (BP Location: Right arm, Cuff Size: Adult Large)   Ht 1.664 m (5' 5.5\")   Wt 113.9 kg (251 lb)   BMI 41.13 kg/m     BMI= Body mass index is 41.13 kg/m .    General appearance: Patient is alert and fully cooperative with history & exam.  No sign of distress is noted during the visit.     Psychiatric: Affect is pleasant & appropriate.  Patient appears motivated to improve health.     Respiratory: Breathing is regular & unlabored while sitting.     HEENT: Hearing is intact to " spoken word.  Speech is clear.  No gross evidence of visual impairment that would impact ambulation.     Vascular: DP & PT pulses are intact & regular bilaterally.  No significant edema or varicosities noted.  CFT and skin temperature is normal to both lower extremities.     Neurologic: Lower extremity sensation is intact to light touch.  No evidence of weakness or contracture in the lower extremities.  No evidence of neuropathy.    Dermatologic: Skin is intact to both lower extremities with adequate texture, turgor and tone about the integument.  No paronychia or evidence of soft tissue infection is noted.     Musculoskeletal: Patient is ambulatory without assistive device or brace.  Gross prominence about the ball of both feet.  Crushed shoes and orthotic under the first met head.  Pinpoint area of pain about the lateral sesamoid and through the plantar first MPJ of the left foot only.  About 0 degrees of ankle joint dorsiflexion and more than this with flexion of the knee.  Gastroc equinus noted bilateral.  No crepitus or limited motion throughout the ankle subtalar midtarsal metatarsal phalangeal joints otherwise.  No bony block about the ankle.  Manual muscle strength was 5/5 to all 4 quadrants.    Radiographs: 3 views left foot demonstrate long first metatarsal abnormal metatarsal length parabola.  Sesamoid appears to be in good repair without fracture.  Fairly square first metatarsal head joint space rather than round.  This is consistent with early hallux limitus.     ASSESSMENT:       ICD-10-CM    1. Hallux limitus of left foot M20.5X2 ORTHOTICS REFERRAL     diclofenac (VOLTAREN) 75 MG EC tablet   2. Sesamoiditis M25.80 ORTHOTICS REFERRAL     diclofenac (VOLTAREN) 75 MG EC tablet        PLAN:  Reviewed patient[facility] in [facility].      3/19/2019   Discussed etiology and treatment options regarding hallux limitus.  Recommend refurbishing custom orthotics with forefoot extension and first met head cut out.  Add  more cushion to the forefoot.  Recommend changes to shoe gear replacing shoes more frequently sturdy shank to reduce forefoot loading  Written instructions regarding proper shoe gear to obtain maximum forefoot cushion  Begin oral anti-inflammatory for 2-4 weeks until symptoms are nearly resolved then discontinue it.  Rx to begin Voltaren.  Discussed potential risks and complications.  All questions were answered.  Follow-up in 4-6 weeks to confirm resolution of symptoms.    Andres Rincon DPM        Again, thank you for allowing me to participate in the care of your patient.        Sincerely,        Andres Rincon DPM

## 2019-03-19 NOTE — PATIENT INSTRUCTIONS
Reliable shoe stores: To maximize your experience and provide the best possible fit.  Be sure to show them your foot concerns and tell them Dr. Rincon sent you.      Stores listed in bold have only athletic shoes, and stores that are not bold are mostly casual or variety of shoes    [employer]  [address] [place] [address]  [phone]    [employer] [place]  [address] [place] [address]  [phone]     [employer] [facility]  [address] [place] [address]  [phone]    [employer]  [address] [facility] [address]  [phone]    [employer]  [address] [place], [place] [address] [phone]    [employer]  [address] [place] [address]  [phone]                         [employer] Locations:     [address] [place] [address]   [phone]     [address] [place] [address] [address] [place] [address]   [phone]     [address] [place] [address]   [phone]     [address] [facility] [place] [address]   [phone]     [address] [place] [address]   [phone]     [address] [place] [address]   [phone]     [address] [facility] [address]   [place] [address]   [phone]     [address] [facility]   [place] [address]   [phone]             How to find reasonable shoes          The correct width    Correct Fitting    Correct Length      Foot Distortion    Posture Distortion                          Torsional Rigidity      Grasp behind the heel and underneath the foot and twist      Bad    Excessive torsion/twist in midfoot     Less torsion/twist in midfoot is better                   Heel Counter Rigidity      Grasp just above   midsole and squeeze      Bad    Soft heel counter      Good    Rigid Heel Counter      Flexion Rigidity      Grasp shoe and bend from forefoot to rearfoot              Refurbish the orthotic for more cushion and cut  out protection Reliable shoe stores: To maximize your experience and provide the best possible fit.  Be sure to show them your foot concerns and tell them Dr. Rincon sent you.      Stores listed in bold have only athletic shoes, and stores that are not bold are mostly casual or variety of shoes    [employer]  [address] [place] [address]  [phone]    [employer] [facility] [place]  [address] [place] [address]  [phone]    [facility] [employer] [facility]  [address] [place] [address]  [phone]    [employer]  [address]  [facility] [address]  [phone]    [employer]  [address] [place] [address]  [phone]    [employer]  [address] [place] [address]  [phone]                         [employer] Locations:     [address] [place] [address]   [phone]     [address] [place] [address] [address] [place] [address]   [phone]     [address] [place] [address]   [phone]     [address] [place] [place] [address]   [phone]     [address] [place] [address]   [phone]     [address] [place] [address]   [phone]     [address] [facility] [address]   [place] [address]   [phone]     [address] [facility]   [place] [address]   [phone]             How to find reasonable shoes          The correct width    Correct Fitting    Correct Length      Foot Distortion    Posture Distortion                          Torsional Rigidity      Grasp behind the heel and underneath the foot and twist      Bad    Excessive torsion/twist in midfoot     Less torsion/twist in midfoot is better                   Heel Counter Rigidity      Grasp just above   midsole and squeeze      Bad    Soft heel counter      Good    Rigid Heel Counter      Flexion Rigidity      Grasp shoe and bend from forefoot to rearfoot

## 2019-03-20 ASSESSMENT — ANXIETY QUESTIONNAIRES: GAD7 TOTAL SCORE: 3

## 2019-03-21 ENCOUNTER — ANESTHESIA EVENT (OUTPATIENT)
Dept: SURGERY | Facility: CLINIC | Age: 34
End: 2019-03-21

## 2019-03-21 ENCOUNTER — DOCUMENTATION ONLY (OUTPATIENT)
Dept: OTHER | Facility: CLINIC | Age: 34
End: 2019-03-21

## 2019-03-21 NOTE — ANESTHESIA PREPROCEDURE EVALUATION
Anesthesia Pre-Procedure Evaluation    Patient: Florina Marie   MRN: [id number] : 1985          Preoperative Diagnosis: Right carpal tunnel syndrome    Procedure(s):  RIGHT RELEASE CARPAL TUNNEL    Past Medical History:   Diagnosis Date     Attention deficit disorder with hyperactivity(314.01)      Other kyphoscoliosis and scoliosis      Other specified delay in development     Microcephaly     Viral warts, unspecified     plantar warts     Past Surgical History:   Procedure Laterality Date     EXAM UNDER ANESTHESIA PELVIC N/A 2016    Procedure: EXAM UNDER ANESTHESIA PELVIC;  Surgeon: Rhoda Alcazar MD;  Location:  OR      TOOTH EXTRACTION W/FORCEP      wisdom       Anesthesia Evaluation     . Pt has had prior anesthetic. Type: MAC    No history of anesthetic complications          ROS/MED HX    ENT/Pulmonary:  - neg pulmonary ROS     Neurologic:     (+)other neuro ADHD, and learning disability    Cardiovascular:  - neg cardiovascular ROS   (+) ----. : . . . :. . Previous cardiac testing date:results:date: results:ECG reviewed date:18 results:SB date: results:          METS/Exercise Tolerance:     Hematologic:  - neg hematologic  ROS       Musculoskeletal:  - neg musculoskeletal ROS       GI/Hepatic:  - neg GI/hepatic ROS       Renal/Genitourinary:  - ROS Renal section negative       Endo:     (+) Obesity, .      Psychiatric:     (+) psychiatric history anxiety      Infectious Disease:  - neg infectious disease ROS       Malignancy:      - no malignancy   Other:    - neg other ROS                      Physical Exam  Normal systems: cardiovascular, pulmonary and dental    Airway   Mallampati: II  Neck ROM: full    Dental     Cardiovascular   Rhythm and rate: regular and normal      Pulmonary    breath sounds clear to auscultation            Lab Results   Component Value Date    WBC 10.2 2019    HGB 13.2 2019    HCT 40.9 2019     2019    CRP 11.0 (H)  "07/10/2018    SED 13 07/10/2018     07/09/2018    POTASSIUM 3.9 07/09/2018    CHLORIDE 104 07/09/2018    CO2 25 07/09/2018    BUN 11 07/09/2018    CR 1.01 07/09/2018    GLC 88 07/09/2018    SY 8.8 07/09/2018    MAG 2.1 05/16/2002    ALBUMIN 3.8 07/09/2018    PROTTOTAL 7.9 07/09/2018    ALT 23 07/09/2018    AST 13 07/09/2018    ALKPHOS 71 07/09/2018    BILITOTAL 0.5 07/09/2018    LIPASE 100 11/15/2015    PTT 31 08/09/2018    INR 1.10 07/09/2018    TSH 1.18 07/10/2018    T4 0.84 08/24/2017    HCG Negative 09/09/2016       Preop Vitals  BP Readings from Last 3 Encounters:   03/19/19 118/78   03/19/19 118/78   03/14/19 124/60    Pulse Readings from Last 3 Encounters:   03/19/19 82   03/05/19 74   02/05/19 83      Resp Readings from Last 3 Encounters:   03/19/19 16   03/05/19 16   02/05/19 16    SpO2 Readings from Last 3 Encounters:   03/19/19 98%   03/05/19 98%   02/05/19 98%      Temp Readings from Last 1 Encounters:   03/19/19 98.7  F (37.1  C) (Temporal)    Ht Readings from Last 1 Encounters:   03/19/19 1.702 m (5' 7\")      Wt Readings from Last 1 Encounters:   03/19/19 115.7 kg (255 lb)    Estimated body mass index is 39.94 kg/m  as calculated from the following:    Height as of 3/19/19: 1.702 m (5' 7\").    Weight as of 3/19/19: 115.7 kg (255 lb).       Anesthesia Plan      History & Physical Review  History and physical reviewed and following examination; no interval change.    ASA Status:  2 .    NPO Status:  > 8 hours    Plan for MAC Reason for MAC:  Deep or markedly invasive procedure (G8)  PONV prophylaxis:  Ondansetron (or other 5HT-3)  Pt ate food on way in.  Dr. Wilburn may do this as a local.  He is going to talk to the patient      Postoperative Care  Postoperative pain management:  IV analgesics and Oral pain medications.      Consents  Anesthetic plan, risks, benefits and alternatives discussed with:  Patient.  Use of blood products discussed: No .   .                 ANGIE Nguyen CRNA  "

## 2019-03-22 ENCOUNTER — HOSPITAL ENCOUNTER (OUTPATIENT)
Facility: CLINIC | Age: 34
Discharge: HOME | End: 2019-03-22
Attending: ORTHOPAEDIC SURGERY | Admitting: ORTHOPAEDIC SURGERY
Payer: MEDICARE

## 2019-03-22 ENCOUNTER — ANESTHESIA (OUTPATIENT)
Dept: SURGERY | Facility: CLINIC | Age: 34
End: 2019-03-22

## 2019-03-22 VITALS
RESPIRATION RATE: 16 BRPM | TEMPERATURE: 99 F | DIASTOLIC BLOOD PRESSURE: 88 MMHG | SYSTOLIC BLOOD PRESSURE: 143 MMHG | OXYGEN SATURATION: 96 %

## 2019-03-22 DIAGNOSIS — G56.01 RIGHT CARPAL TUNNEL SYNDROME: Primary | ICD-10-CM

## 2019-03-22 PROCEDURE — 25000132 ZZH RX MED GY IP 250 OP 250 PS 637: Mod: GY | Performed by: ORTHOPAEDIC SURGERY

## 2019-03-22 PROCEDURE — 27210794 ZZH OR GENERAL SUPPLY STERILE: Performed by: ORTHOPAEDIC SURGERY

## 2019-03-22 PROCEDURE — 25000125 ZZHC RX 250: Performed by: NURSE ANESTHETIST, CERTIFIED REGISTERED

## 2019-03-22 PROCEDURE — 36000050 ZZH SURGERY LEVEL 2 1ST 30 MIN: Performed by: ORTHOPAEDIC SURGERY

## 2019-03-22 PROCEDURE — 64721 CARPAL TUNNEL SURGERY: CPT | Mod: RT | Performed by: ORTHOPAEDIC SURGERY

## 2019-03-22 PROCEDURE — 40000306 ZZH STATISTIC PRE PROC ASSESS II: Performed by: ORTHOPAEDIC SURGERY

## 2019-03-22 PROCEDURE — 36000052 ZZH SURGERY LEVEL 2 EA 15 ADDTL MIN: Performed by: ORTHOPAEDIC SURGERY

## 2019-03-22 PROCEDURE — A9270 NON-COVERED ITEM OR SERVICE: HCPCS | Mod: GY | Performed by: ORTHOPAEDIC SURGERY

## 2019-03-22 PROCEDURE — 71000027 ZZH RECOVERY PHASE 2 EACH 15 MINS: Performed by: ORTHOPAEDIC SURGERY

## 2019-03-22 PROCEDURE — 25000128 H RX IP 250 OP 636: Performed by: ORTHOPAEDIC SURGERY

## 2019-03-22 RX ORDER — LIDOCAINE 40 MG/G
CREAM TOPICAL
Status: DISCONTINUED | OUTPATIENT
Start: 2019-03-22 | End: 2019-03-22 | Stop reason: HOSPADM

## 2019-03-22 RX ORDER — BUPIVACAINE HYDROCHLORIDE 5 MG/ML
INJECTION, SOLUTION PERINEURAL PRN
Status: DISCONTINUED | OUTPATIENT
Start: 2019-03-22 | End: 2019-03-22 | Stop reason: HOSPADM

## 2019-03-22 RX ORDER — SODIUM CHLORIDE, SODIUM LACTATE, POTASSIUM CHLORIDE, CALCIUM CHLORIDE 600; 310; 30; 20 MG/100ML; MG/100ML; MG/100ML; MG/100ML
INJECTION, SOLUTION INTRAVENOUS CONTINUOUS
Status: DISCONTINUED | OUTPATIENT
Start: 2019-03-22 | End: 2019-03-22 | Stop reason: HOSPADM

## 2019-03-22 RX ADMIN — ACETAMINOPHEN AND CODEINE PHOSPHATE 1 TABLET: 300; 30 TABLET ORAL at 15:20

## 2019-03-22 NOTE — OR NURSING
Pt ate toast with peanut butter at 1030 today. Pt was given the choice of doing the procedure under local anesthesia only or rescheduling for another day. Pt has choosen to proceed today under local. This writer spoke with pt's mother (legal gaurdian) about the situation and was given verbal consent to proceed under local.

## 2019-03-22 NOTE — OR NURSING
S-(situation): Consent obtained    B-(background): pt's parents have legal guardianship, both are equally aware of the need for the present scheduled right carpal tunnel surgery to be done by Dr. Wilburn with anesthesia to provide MAC anesthesia.    A-(assessment): all required paperwork filed in Epic, mother contacted this AM and phone consent obtained for surgery and anesthesia as mother unable to be present at time of surgery.     R-(recommendations): consent signed per phone. Gisella TAY along with DAVID Carreno CRNA

## 2019-03-22 NOTE — LETTER
[facility] OR  [address] [place] [address]  Phone: [phone]    March 22, 2019        Florina Marie  [address]    [facility] [address]          To whom it may concern:    RE: Florina Marie    Patient was seen and treated today at our clinic.  Please excuse patient from work for the next 2 weeks. In two weeks (On April 3rd) will evaluate patient and can likely return to work with restrictions at that time.  Anticipate patient may return to work without restrictions starting Monday April 22nd. Patient will be provided a new letter at two week re-evaluation visit with specific restrictions.    Please contact me for questions or concerns.      Sincerely,        ANGIE Lin, CNP  Orthopedic Surgery

## 2019-03-22 NOTE — BRIEF OP NOTE
[facility] Brief Operative Note    Pre-operative diagnosis: Right carpal tunnel syndrome   Post-operative diagnosis: Same   Procedure: Procedure(s):  RIGHT RELEASE CARPAL TUNNEL   Surgeon:  Anesthesia: Anjum Wilburn DO  Local only   Assistant(s): ANGIE Wiggins, CNP, DNP   Estimated blood loss:  Fluids:  TT/Pressure: Less than 10 ml  0 Crystalloids  6 minutes at 250 mmHg   Specimens: None   Findings: See dictated operative report for full details      Akshat Wilburn D.O.

## 2019-03-22 NOTE — OP NOTE
PREOPERATIVE DIAGNOSES:   1. Right carpal tunnel syndrome.     POSTOPERATIVE DIAGNOSES:   1. Right carpal tunnel syndrome.     PROCEDURE:   1. Right carpal tunnel release.     SURGEON: Anjum Wilburn DO   ASSISTANT: Abdias Torres APRN, CNP, DNP  ANESTHESIA: Local with IV sedation.   COMPLICATIONS: None.   ESTIMATED BLOOD LOSS: Minimal.   COUNTS: Correct.   TOURNIQUET TIME: 6 minutes at 250mm Hg  GROSS FINDINGS: There was evidence of compression of the median nerve. There was an hourglass deformity to the nerve. There were no space-occupying lesions or masses in the carpal tunnel.   DISPOSITION: To PACU in stable condition.     NOTE/INDICATIONS:Ms. Marie is a pleasant 33 year old year-old female with complaints of Right hand numbness and tingling. All of the above pertinent physical exam and imaging modalities findings was reviewed with Florina.  The numbness and tingling is now constant and consistent with EMG report.  Has failed conservative therapy. Recommended carpal tunnel release.  She is agreeable to this. Dicussed sedation vs local only, would like under sedation.         The patient has attempted conservative treatments that include activity modifications, bracing , time, rest, hand therapy yet still continues to have significant issues. These issues include progressive numbness that is now constant numbness, difficulty with work due to the numbness. Worsening symptoms Secondary to these reasons I have offered surgery in the form of RIGHT carpal tunnel release.    All risks, benefits, complications, alternatives, anticipated postop course were discussed at length prior to surgery.  Complications such as infection, bleeding, nerve damage, incomplete release, repeat surgery, scar sensitivity, and skin problems were discussed. Also reviewed were expectations and the goal of surgery. With surgery the goal would be to prevent further damage to the nerve, the surgery may not be able to reverse any current damage.  Postoperatively there will be limitation in use for approximately 3-4 weeks or as clinically indicated. All questions were answered. The patient agrees to proceed with surgery.   DETAILS OF PROCEDURE: Ms. Marie was met in the preop care unit. Again, informed consent was verified. The appropriate extremity was marked and the patient was wheeled back to the Operative Suite at [facility]note 3]. she was transferred off the cart to the OR table without incident. All bony prominence were padded and the patient was secured to the table.     A time-out was performed. The appropriate patient, extremity and surgery were verified. Then the skin and carpal tunnel on the surgical wrist was injected with a total of 10 mL of 0.25% Marcaine. ,The Right upper extremity was prepped and draped in a normal manner. An Esmarch was utilized to exsanguinate the extremity. The tourniquet was inflated. A longitudinal skin incision was made through the skin over the carpal tunnel. Care was taken to protect all pertinent neurovascular structures. Using a combination of sharp and blunt dissection the incision was advanced down to the transverse carpal ligament. Utilizing a knife and a scissors, the transverse carpal ligament was released in its entirety. There were no space-occupying lesions or masses. There was evidence of compression on the nerve as noted above. Next, the tourniquet was deflated. Hemostasis was achieved. The skin was closed with 4-0 nylon suture in interrupted format. A sterile dressing  was applied to the Right upper extremity.     When deemed appropriate the patient was transferred to the PACU in stable condition. The patient will be discharged home with pain medication and detailed post-operative care instructions.     Anjum Wilburn D.O.

## 2019-03-22 NOTE — PROGRESS NOTES
Called and spoke with mother (Anna). Florina did well with surgery. Tylenol #3 has worked well in the past, prescription provided. Discharge instructions provided. No questions.

## 2019-03-22 NOTE — LETTER
[facility] OR  [address] [place] [address]  Phone: [phone]    March 22, 2019        Florina Marie  [address]    [facility] [address]          To whom it may concern:    RE: Florina Marie    Patient was seen and treated today at our clinic.  Please excuse patient from work for the next 4 weeks. Patient may return to work without restrictions starting Monday April 22nd.    Please contact me for questions or concerns.      Sincerely,        ANGIE Lin, CNP  Orthopedic Surgery

## 2019-03-25 NOTE — OR NURSING
Addendum Note from Thursday 3/22/19 @ 5623--  I contacted pt. To inform her of new surgery time & she asked me why nobody had contacted her mom to get the papers signed? Upon further questioning & some research into her Medical Record, pt. Informed me that her mom & dad were her guardians & needed to sign for the surgery. I looked under [employer] which did not confirm this info, so I made contact with Lyssa Lott. After a few phone calls,& speaking with Lyssa Lott it was determined that [place] did not have legal documentation for guardianship. I then contacted pt's mother again, Anna Marie who indicated she does have legal documentation available.  She stated she would bring them to me at [facility] & then I would email them to [employer] as as Urgent Status. We did receive the documentation & did email info to [employer] on an urgent status & will await a reply.  Benny TAY CAPA

## 2019-03-25 NOTE — OR NURSING
"[facility] Same Day Surgery  Discharge Call Back  Florina Marie  1985  MRN: [id number]  Home: [phone] (home)   PCP: Alanna Curiel    We are calling to see how you're doing since your surgery/procedure with us?   Comments: \"I'm doing well\"  Clinical Questions  1. Have you had time to look at your discharge instructions? Do you have any questions in regards to the instructions?   Comment: yes/no  2. Do you feel your pain is being controlled with the regimen the surgeon sent you home on? (ie: prescription medications, over the counter pain medications, ice packs)   Comments: yes, doing very well  3. Have you noticed any drainage on your dressing? Do you know what to do if you have bleeding as a result of your procedure?   Comments: no/yes  4. Have you had any nausea/vomiting? Do you know how to treat this?   Comment: no/yes  5. Have you had any signs/symptoms of infection? (ie: fever, swelling, heat, drainage or redness) Do you know what to do if you have?   Comment: no/yes  6. Do you have a follow up appointment made with your surgeon? Do you have a number to contact them at if you need it?   Comment: yes/yes  Retained Foreign Object (KEYLA, Hemovac, Penrose, Wound Packing, Vaginal Packing, Nasal Splints, Urethral Stents, Nichols Catheter)  1. Do you still have NA in place?   2. If the item is still in place, can you review the plan for removal with me? NA      You may be randomly selected to fill out a [facility] survey. We would appreciate you taking the time to fill this out. It is important to us if you would answer all of the questions on the survey.            "

## 2019-03-27 NOTE — PROGRESS NOTES
SUBJECTIVE:   Florina Marie is a 33 year old female who presents to clinic today for the following health issues:    HPI     Medication Followup of Phentermine    Taking Medication as prescribed: yes    Side Effects:  None    Medication Helping Symptoms:  yes     Answers for HPI/ROS submitted by the patient on 4/9/2019   If you checked off any problems, how difficult have these problems made it for you to do your work, take care of things at home, or get along with other people?: Not difficult at all  PHQ9 TOTAL SCORE: 0  ZEENAT 7 TOTAL SCORE: 4    Problem list and histories reviewed & adjusted, as indicated.  Additional history: as documented    Patient Active Problem List   Diagnosis     Problems with learning     Morbid obesity (H)     Generalized anxiety disorder     Hyperlipidemia, unspecified     Attention deficit disorder without hyperactivity     Prolonged PTT     Right carpal tunnel syndrome     S/P carpal tunnel release     Past Surgical History:   Procedure Laterality Date     EXAM UNDER ANESTHESIA PELVIC N/A 9/9/2016    Procedure: EXAM UNDER ANESTHESIA PELVIC;  Surgeon: Rhoda Alcazar MD;  Location:  OR      TOOTH EXTRACTION W/FORCEP      wisdom     RELEASE CARPAL TUNNEL Right 3/22/2019    Procedure: RIGHT RELEASE CARPAL TUNNEL;  Surgeon: Anjum Wilburn DO;  Location:  OR       Social History     Tobacco Use     Smoking status: Never Smoker     Smokeless tobacco: Never Used     Tobacco comment: no smokers in the household   Substance Use Topics     Alcohol use: No     Frequency: Never     Family History   Problem Relation Age of Onset     Lipids Father         diet     Thyroid Disease Mother         unsure if hypo or hyper     Diabetes Paternal Grandfather         adult     Heart Disease Paternal Grandfather         bypass/open hear surgery     Lipids Maternal Aunt         medication     Lipids Maternal Aunt         diet     Alzheimer Disease Maternal Grandfather      Hypertension  No family hx of      Coronary Artery Disease No family hx of      Hyperlipidemia No family hx of      Cancer - colorectal No family hx of      Ovarian Cancer No family hx of      Prostate Cancer No family hx of      Depression/Anxiety No family hx of      Cerebrovascular Disease No family hx of      Anesthesia Reaction No family hx of      Asthma No family hx of      Osteoporosis No family hx of      Chemical Addiction No family hx of      Obesity No family hx of            ROS:  CONSTITUTIONAL: NEGATIVE for fever, chills, change in weight  RESP: NEGATIVE for significant cough or shortness of breath   CV: NEGATIVE for chest pain, palpitations or peripheral edema    OBJECTIVE:     /82   Pulse 77   Temp 98.6  F (37  C) (Temporal)   Resp 16   Wt 110.7 kg (244 lb)   LMP  (LMP Unknown)   SpO2 100%   BMI 38.22 kg/m    Body mass index is 38.22 kg/m .  Gen: no apparent distress  Chest: clear to auscultation without wheeze, rale or rhonchi  Cor: regular rate and rhythm without murmur  Ext: warm and dry without edema  Psych: Alert and oriented times 3; coherent speech, normal   rate and volume, able to articulate logical thoughts, able   to abstract reason, no tangential thoughts, no hallucinations   or delusions  Her affect is neutral    ASSESSMENT/PLAN:     1. Morbid obesity (H)  Down 11 pounds since last month, overall down 44#.  She has a new puppy and is getting a lot of exercise taking care of it.  She is hoping to make it a service dog.  She wonders about a letter for her apartment saying it is a service dog, since it isn't right now, I can't write the letter.  I wonder if she is asking about a letter stating patient is disabled from her mental health issues, if that is the case, she should have Psychiatry write that letter/fill out that form since they are following her psychiatric issues.    - phentermine (ADIPEX-P) 30 MG capsule; Take 1 capsule (30 mg) by mouth every morning  Dispense: 30 capsule;  Refill: 0    Alanna Curiel MD  [facility]

## 2019-03-28 ENCOUNTER — TELEPHONE (OUTPATIENT)
Dept: FAMILY MEDICINE | Facility: OTHER | Age: 34
End: 2019-03-28

## 2019-03-28 NOTE — TELEPHONE ENCOUNTER
Reason for Call:  Form, our goal is to have forms completed with 72 hours, however, some forms may require a visit or additional information.    Type of letter, form or note:  [employer]    Who is the form from?: johanna (if other please explain)    Where did the form come from: form was faxed in    What clinic location was the form placed at?: [facility] - [phone]    Where the form was placed: 's Box    What number is listed as a contact on the form?:        Additional comments: calling to check status of this form. Patient states her land lord faxed it in a couple of weeks ago. Please call her at [phone]      Call taken on 3/28/2019 at 12:46 PM by Shannon Marcos

## 2019-03-29 ENCOUNTER — NURSE TRIAGE (OUTPATIENT)
Dept: NURSING | Facility: CLINIC | Age: 34
End: 2019-03-29

## 2019-03-29 NOTE — TELEPHONE ENCOUNTER
We have not received any forms from her [facility]. Please have them fax this. Left message for patient to call back.  Kellee Moore CMA

## 2019-03-29 NOTE — TELEPHONE ENCOUNTER
Post op carpal tunnel release with minimal pain so far, and wants to go back to work early.  Recommended patient call provider when clinic opens on Monday.  Tamiko Banuelos RN  [facility]    Reason for Disposition    [1] Caller requesting NON-URGENT health information AND [2] PCP's office is the best resource    Protocols used: INFORMATION ONLY CALL-ADULT-

## 2019-03-30 ENCOUNTER — NURSE TRIAGE (OUTPATIENT)
Dept: NURSING | Facility: CLINIC | Age: 34
End: 2019-03-30

## 2019-03-31 NOTE — TELEPHONE ENCOUNTER
Viktor (friend) calls and says that pt. Had right hand carpal tunnel surgery, on 3/22/2019, and the skin around her incision is very dry. Viktor says that pt. Wants to put lotion on that hand, for the dryness. Viktor says that they need to speak to the Dr. [facility]. [facility] DrJosé Miguel-was then paged, to call Viktor, at: [phone], at: [phone], via page .    Additional Information    Negative: [1] Caller is not with the adult (patient) AND [2] reporting urgent symptoms    Negative: Lab result questions    Negative: Medication questions    Negative: Caller cannot be reached by phone    Negative: Caller has already spoken to PCP or another triager    Negative: Requesting regular office appointment    Negative: [1] Caller requesting NON-URGENT health information AND [2] PCP's office is the best resource    Negative: Health Information question, no triage required and triager able to answer question    Negative: General information question, no triage required and triager able to answer question    Negative: Question about upcoming scheduled test, no triage required and triager able to answer question    Negative: [1] Caller is not with the adult (patient) AND [2] probable NON-URGENT symptoms    [1] Follow-up call to recent contact AND [2] information only call, no triage required    Protocols used: [facility]

## 2019-03-31 NOTE — TELEPHONE ENCOUNTER
Caller states the skin is dry and hard around carpal tunnel incision site; no redness or swelling no drainage   Advised not signs of infections and normal healing     advised to call for  signs of infection    Wants to change follow up appointment   advised appointment planned for suture removal  10 days postop   Advised to call clinic on Monday for these concerns   Understands and will comply   Edie Houser RN  [facility]      Additional Information    Negative: [1] Widespread rash AND [2] bright red, sunburn-like AND [3] too weak to stand    Negative: Sounds like a life-threatening emergency to the triager    Surgical wound infection suspected (post-op)    Negative: [1] Major abdominal surgical incision AND [2] wound gaping open AND [3] visible internal organs    Negative: Sounds like a life-threatening emergency to the triager    Negative: [1] Bleeding from incision AND [2] won't stop after 10 minutes of direct pressure    Negative: [1] Widespread rash AND [2] bright red, sunburn-like    Negative: Severe pain in the incision    Negative: [1] Suture came out early AND [2] wound gaping AND [3] < 48 hours since sutures placed    Negative: [1] Incision gaping open AND [2] length of opening > 2 inches (5 cm)    Negative: Patient sounds very sick or weak to the triager    Negative: Sounds like a serious complication to the triager    Negative: Fever > 100.5 F (38.1 C)    Negative: [1] Incision looks infected (spreading redness, pain) AND [2] fever > 99.5 F (37.5 C)    Negative: [1] Incision looks infected (spreading redness, pain) AND [2] large red area (> 2 in. or 5 cm)    Negative: [1] Incision looks infected (spreading redness, pain) AND [2] face wound    Negative: [1] Red streak runs from the incision AND [2] longer than 1 inch (2.5 cm)    Negative: [1] Pus or bad-smelling fluid draining from incision AND [2] no fever    Negative: [1] Post-op pain AND [2] not controlled with pain medications    Negative: Dressing  soaked with blood or body fluid (e.g., drainage)    Negative: Caller has URGENT question and triager unable to answer question    Negative: [1] Small red area or streak AND [2] no fever    Negative: [1] Clear or blood-tinged fluid draining from wound AND [2] no fever    Negative: [1] 48 hours since surgery AND [2] wound is becoming more tender    Negative: [1] Incision gaping open AND [2] on the face AND [3] > 48 hours since sutures placed    Negative: [1] Incision gaping open AND [3] length of opening > 1/2 inch (1 cm) AND [3] > 48 hours since sutures placed    Negative: Suture or staple removal is overdue    Negative: [1] Suture or staple came out early AND [2] caller wants wound checked    Negative: Caller has NON-URGENT question and triager unable to answer question    Negative: Pimple where a stitch comes through the skin    Negative: Suture or staple came out early    Negative: Suture removal date, questions about    Negative: Skin tape (e.g., Steri-strips) removal, questions about    Negative: Sutured or stapled surgical incision, questions about    Negative: Surgical incision after sutures or staples removed, questions about    Negative: Numbness at incision site, questions about    Negative: Wet to dry dressing (packing), questions about.    Protocols used: WOUND INFECTION-ADULT-, POST-OP INCISION SYMPTOMS-ADULT-

## 2019-04-01 NOTE — TELEPHONE ENCOUNTER
Notified patient that we have not received any forms as of yet. She will contact her landlord and have his re-fax them.

## 2019-04-02 ENCOUNTER — OFFICE VISIT (OUTPATIENT)
Dept: ORTHOPEDICS | Facility: CLINIC | Age: 34
End: 2019-04-02
Payer: MEDICARE

## 2019-04-02 VITALS — RESPIRATION RATE: 18 BRPM | TEMPERATURE: 97.2 F

## 2019-04-02 DIAGNOSIS — Z98.890 S/P CARPAL TUNNEL RELEASE: Primary | ICD-10-CM

## 2019-04-02 PROCEDURE — 99024 POSTOP FOLLOW-UP VISIT: CPT | Performed by: NURSE PRACTITIONER

## 2019-04-02 ASSESSMENT — PAIN SCALES - GENERAL: PAINLEVEL: NO PAIN (0)

## 2019-04-02 NOTE — PROGRESS NOTES
Orthopedic Clinic Post-Operative Note    CHIEF COMPLAINT:   Chief Complaint   Patient presents with     Surgical Followup     DOS: 3/22/2019~Right carpal tunnel release~11 days postop       HISTORY OF PRESENT ILLNESS  Returns to clinic. No issues or concerns. Numbness/tingling has resolved. No pain.      Patient's past medical, surgical, social and family histories reviewed.     Past Medical History:   Diagnosis Date     Attention deficit disorder with hyperactivity(314.01)      Other kyphoscoliosis and scoliosis      Other specified delay in development     Microcephaly     Viral warts, unspecified     plantar warts       Past Surgical History:   Procedure Laterality Date     EXAM UNDER ANESTHESIA PELVIC N/A 2016    Procedure: EXAM UNDER ANESTHESIA PELVIC;  Surgeon: Rhoda Alcazar MD;  Location: [facility] OR     HC TOOTH EXTRACTION W/FORCEP      wisdom     RELEASE CARPAL TUNNEL Right 3/22/2019    Procedure: RIGHT RELEASE CARPAL TUNNEL;  Surgeon: Anjum Wilburn DO;  Location: [facility] OR       Medications:    Current Outpatient Medications on File Prior to Visit:  [] acetaminophen-codeine (TYLENOL #3) 300-30 MG tablet Take 1-2 tablets by mouth every 6 hours as needed for severe pain   ALPRAZolam (XANAX) 0.5 MG tablet TAKE ONE TABLET AS NEEDED FOR SEVERE ANXIETY MAX OF 2 TABLETS PER DAY   diclofenac (VOLTAREN) 75 MG EC tablet Take 1 tablet (75 mg) by mouth 2 times daily   escitalopram (LEXAPRO) 20 MG tablet TAKE 1 TABLET BY MOUTH DAILY.   etonogestrel (IMPLANON/NEXPLANON) 68 MG IMPL 1 each (68 mg) by Subdermal route continuous   phentermine (ADIPEX-P) 30 MG capsule Take 1 capsule (30 mg) by mouth every morning     No current facility-administered medications on file prior to visit.     Allergies   Allergen Reactions     No Known Drug Allergies        Social History     Occupational History     Occupation: student     Comment: [employer]   Tobacco Use     Smoking status: Never Smoker      Smokeless tobacco: Never Used     Tobacco comment: no smokers in the household   Substance and Sexual Activity     Alcohol use: No     Frequency: Never     Drug use: No     Sexual activity: No     Birth control/protection: Implant       Family History   Problem Relation Age of Onset     Lipids Father         diet     Thyroid Disease Mother         unsure if hypo or hyper     Diabetes Paternal Grandfather         adult     Heart Disease Paternal Grandfather         bypass/open hear surgery     Lipids Maternal Aunt         medication     Lipids Maternal Aunt         diet     Alzheimer Disease Maternal Grandfather      Hypertension No family hx of      Coronary Artery Disease No family hx of      Hyperlipidemia No family hx of      Cancer - colorectal No family hx of      Ovarian Cancer No family hx of      Prostate Cancer No family hx of      Depression/Anxiety No family hx of      Cerebrovascular Disease No family hx of      Anesthesia Reaction No family hx of      Asthma No family hx of      Osteoporosis No family hx of      Chemical Addiction No family hx of      Obesity No family hx of        REVIEW OF SYSTEMS  General: negative for, night sweats, dizziness, fatigue  Resp: No shortness of breath and no cough  CV: negative for chest pain, syncope or near-syncope  GI: negative for nausea, vomiting and diarrhea  : negative for dysuria and hematuria  Musculoskeletal: as above  Neurologic: negative for syncope   Hematologic: negative for bleeding disorder    Physical Exam:  Vitals: Temp 97.2  F (36.2  C) (Temporal)   Resp 18   LMP  (LMP Unknown)   BMI= There is no height or weight on file to calculate BMI.  Constitutional: healthy, alert and no acute distress   Psychiatric: mentation appears normal and affect normal/bright  NEURO: no focal deficits  SKIN: .healing well, well approximated skin edges, without signs of infection including no erythema, incision breakdown or purlent drainage. Mild dry skin around  incision.  JOINT/EXTREMITIES: right hand: no swelling, effusion, bruising. Non-tender to incision area.  Sensation intact to all five fingers. Thumb to little, ring, middle, index finger opposition intact. Full motion to wrist.  Able to make a fist.  Fingers well perfused. Cap refill <2.  Radial pulse 2+.  GAIT: not tested     Diagnostic Modalities:  None today.  Independent visualization of the images was performed.      Impression:   Chief Complaint   Patient presents with     Surgical Followup     DOS: 3/22/2019~Right carpal tunnel release~11 days postop   Recovering as expected.    Plan:   Activity: slow increase as tolerated over the next 2 weeks.  Letter for work provided. No lift push pull > 5 pounds and no repetitive work for next 2 weeks, then unrestricted starting in 2 weeks.   Discussed wound care. No lotions, salves, vit E oil yet, no soaking, tubs, pools yet. Give it another 1 to 2 weeks to allow incision to fully heal. Ok to leave open to air for showers and washing hand.  Cover if working in dirty conditions for the next 1-2 weeks.      Staples/Sutures out: Yes.  Pain controlled: Yes. Continue to use: Tylenol if needed.  Immobilzation: No  Physical Therapy: none at this time.   Rest, Ice, Elevation as needed  Return to clinic PRN, or sooner as needed for changes.    Re-x-ray on return: No    ANGIE Lin, CNP  Orthopedic Surgery

## 2019-04-02 NOTE — LETTER
2019         RE: Florina Marie  [id number]  [address]  [facility] [facility] [address]        Dear Colleague,    Thank you for referring your patient, Florina Marie, to the [facility]. Please see a copy of my visit note below.    Orthopedic Clinic Post-Operative Note    CHIEF COMPLAINT:   Chief Complaint   Patient presents with     Surgical Followup     DOS: 3/22/2019~Right carpal tunnel release~11 days postop       HISTORY OF PRESENT ILLNESS  Returns to clinic. No issues or concerns. Numbness/tingling has resolved. No pain.      Patient's past medical, surgical, social and family histories reviewed.     Past Medical History:   Diagnosis Date     Attention deficit disorder with hyperactivity(314.01)      Other kyphoscoliosis and scoliosis      Other specified delay in development     Microcephaly     Viral warts, unspecified     plantar warts       Past Surgical History:   Procedure Laterality Date     EXAM UNDER ANESTHESIA PELVIC N/A 2016    Procedure: EXAM UNDER ANESTHESIA PELVIC;  Surgeon: Rhoda Alcazar MD;  Location: [facility] OR     HC TOOTH EXTRACTION W/FORCEP      wisdom     RELEASE CARPAL TUNNEL Right 3/22/2019    Procedure: RIGHT RELEASE CARPAL TUNNEL;  Surgeon: Anjum Wilburn DO;  Location: [facility] OR       Medications:    Current Outpatient Medications on File Prior to Visit:  [] acetaminophen-codeine (TYLENOL #3) 300-30 MG tablet Take 1-2 tablets by mouth every 6 hours as needed for severe pain   ALPRAZolam (XANAX) 0.5 MG tablet TAKE ONE TABLET AS NEEDED FOR SEVERE ANXIETY MAX OF 2 TABLETS PER DAY   diclofenac (VOLTAREN) 75 MG EC tablet Take 1 tablet (75 mg) by mouth 2 times daily   escitalopram (LEXAPRO) 20 MG tablet TAKE 1 TABLET BY MOUTH DAILY.   etonogestrel (IMPLANON/NEXPLANON) 68 MG IMPL 1 each (68 mg) by Subdermal route continuous   phentermine (ADIPEX-P) 30 MG capsule Take 1 capsule (30 mg) by mouth every morning     No current facility-administered  medications on file prior to visit.     Allergies   Allergen Reactions     No Known Drug Allergies        Social History     Occupational History     Occupation: student     Comment: [employer]   Tobacco Use     Smoking status: Never Smoker     Smokeless tobacco: Never Used     Tobacco comment: no smokers in the household   Substance and Sexual Activity     Alcohol use: No     Frequency: Never     Drug use: No     Sexual activity: No     Birth control/protection: Implant       Family History   Problem Relation Age of Onset     Lipids Father         diet     Thyroid Disease Mother         unsure if hypo or hyper     Diabetes Paternal Grandfather         adult     Heart Disease Paternal Grandfather         bypass/open hear surgery     Lipids Maternal Aunt         medication     Lipids Maternal Aunt         diet     Alzheimer Disease Maternal Grandfather      Hypertension No family hx of      Coronary Artery Disease No family hx of      Hyperlipidemia No family hx of      Cancer - colorectal No family hx of      Ovarian Cancer No family hx of      Prostate Cancer No family hx of      Depression/Anxiety No family hx of      Cerebrovascular Disease No family hx of      Anesthesia Reaction No family hx of      Asthma No family hx of      Osteoporosis No family hx of      Chemical Addiction No family hx of      Obesity No family hx of        REVIEW OF SYSTEMS  General: negative for, night sweats, dizziness, fatigue  Resp: No shortness of breath and no cough  CV: negative for chest pain, syncope or near-syncope  GI: negative for nausea, vomiting and diarrhea  : negative for dysuria and hematuria  Musculoskeletal: as above  Neurologic: negative for syncope   Hematologic: negative for bleeding disorder    Physical Exam:  Vitals: Temp 97.2  F (36.2  C) (Temporal)   Resp 18   LMP  (LMP Unknown)   BMI= There is no height or weight on file to calculate BMI.  Constitutional: healthy, alert and no acute distress    Psychiatric: mentation appears normal and affect normal/bright  NEURO: no focal deficits  SKIN: .healing well, well approximated skin edges, without signs of infection including no erythema, incision breakdown or purlent drainage. Mild dry skin around incision.  JOINT/EXTREMITIES: right hand: no swelling, effusion, bruising. Non-tender to incision area.  Sensation intact to all five fingers. Thumb to little, ring, middle, index finger opposition intact. Full motion to wrist.  Able to make a fist.  Fingers well perfused. Cap refill <2.  Radial pulse 2+.  GAIT: not tested     Diagnostic Modalities:  None today.  Independent visualization of the images was performed.      Impression:   Chief Complaint   Patient presents with     Surgical Followup     DOS: 3/22/2019~Right carpal tunnel release~11 days postop   Recovering as expected.    Plan:   Activity: slow increase as tolerated over the next 2 weeks.  Letter for work provided. No lift push pull > 5 pounds and no repetitive work for next 2 weeks, then unrestricted starting in 2 weeks.   Discussed wound care. No lotions, salves, vit E oil yet, no soaking, tubs, pools yet. Give it another 1 to 2 weeks to allow incision to fully heal. Ok to leave open to air for showers and washing hand.  Cover if working in dirty conditions for the next 1-2 weeks.      Staples/Sutures out: Yes.  Pain controlled: Yes. Continue to use: Tylenol if needed.  Immobilzation: No  Physical Therapy: none at this time.   Rest, Ice, Elevation as needed  Return to clinic PRN, or sooner as needed for changes.    Re-x-ray on return: No    ANGIE Lin, CNP  Orthopedic Surgery        Again, thank you for allowing me to participate in the care of your patient.        Sincerely,        Kyle Torres, ANGIE CNP

## 2019-04-02 NOTE — LETTER
[address] [address]  Phone: [phone]  Fax: [phone]    April 2, 2019        Florina Marie  [address]    [facility] [address]          To whom it may concern:    RE: Florina Marie    Patient was seen and treated today at our clinic. Patient may return to work starting today (4/2/19) with the following restrictions for the next 2 weeks.  --no lift push pull greater than 5 pounds with right hand  --no repetitive motion with the right hand  Starting 4/19/19 can work without any restrictions    Please contact me for questions or concerns.      Sincerely,        ANGIE Guzman CNP

## 2019-04-09 ENCOUNTER — OFFICE VISIT (OUTPATIENT)
Dept: FAMILY MEDICINE | Facility: OTHER | Age: 34
End: 2019-04-09
Payer: MEDICARE

## 2019-04-09 VITALS
BODY MASS INDEX: 38.22 KG/M2 | RESPIRATION RATE: 16 BRPM | WEIGHT: 244 LBS | SYSTOLIC BLOOD PRESSURE: 124 MMHG | TEMPERATURE: 98.6 F | DIASTOLIC BLOOD PRESSURE: 82 MMHG | OXYGEN SATURATION: 100 % | HEART RATE: 77 BPM

## 2019-04-09 DIAGNOSIS — E66.01 MORBID OBESITY (H): ICD-10-CM

## 2019-04-09 PROCEDURE — 99213 OFFICE O/P EST LOW 20 MIN: CPT | Performed by: FAMILY MEDICINE

## 2019-04-09 RX ORDER — PHENTERMINE HYDROCHLORIDE 30 MG/1
30 CAPSULE ORAL EVERY MORNING
Qty: 30 CAPSULE | Refills: 0 | Status: SHIPPED | OUTPATIENT
Start: 2019-04-09 | End: 2019-05-07

## 2019-04-09 ASSESSMENT — ANXIETY QUESTIONNAIRES
2. NOT BEING ABLE TO STOP OR CONTROL WORRYING: SEVERAL DAYS
7. FEELING AFRAID AS IF SOMETHING AWFUL MIGHT HAPPEN: NOT AT ALL
GAD7 TOTAL SCORE: 4
5. BEING SO RESTLESS THAT IT IS HARD TO SIT STILL: NOT AT ALL
1. FEELING NERVOUS, ANXIOUS, OR ON EDGE: SEVERAL DAYS
GAD7 TOTAL SCORE: 4
6. BECOMING EASILY ANNOYED OR IRRITABLE: NOT AT ALL
7. FEELING AFRAID AS IF SOMETHING AWFUL MIGHT HAPPEN: NOT AT ALL
3. WORRYING TOO MUCH ABOUT DIFFERENT THINGS: SEVERAL DAYS
GAD7 TOTAL SCORE: 4
4. TROUBLE RELAXING: SEVERAL DAYS

## 2019-04-09 ASSESSMENT — PATIENT HEALTH QUESTIONNAIRE - PHQ9
10. IF YOU CHECKED OFF ANY PROBLEMS, HOW DIFFICULT HAVE THESE PROBLEMS MADE IT FOR YOU TO DO YOUR WORK, TAKE CARE OF THINGS AT HOME, OR GET ALONG WITH OTHER PEOPLE: NOT DIFFICULT AT ALL
SUM OF ALL RESPONSES TO PHQ QUESTIONS 1-9: 0
SUM OF ALL RESPONSES TO PHQ QUESTIONS 1-9: 0

## 2019-04-10 ASSESSMENT — PATIENT HEALTH QUESTIONNAIRE - PHQ9: SUM OF ALL RESPONSES TO PHQ QUESTIONS 1-9: 0

## 2019-04-10 ASSESSMENT — ANXIETY QUESTIONNAIRES: GAD7 TOTAL SCORE: 4

## 2019-04-25 NOTE — PROGRESS NOTES
SUBJECTIVE:   Florina Marie is a 33 year old female who presents to clinic today for the following health issues:    HPI     Wt Readings from Last 2 Encounters:   05/07/19 109.3 kg (241 lb)   04/09/19 110.7 kg (244 lb)     Current medication dose: Phentermine 30 mg every day  Tolerating medication: yes  Side effects: none    Total weight loss as of 05/07/19: 47 lbs    Goal weight: Doesn't know    Diet: admits eats poorly.  Trying to not eat chips. Doesn't have any chips or cases of pop in the house.  Occasionally drinking pop.  Does well with drinking water.  Morning is usually PB&J Sandwhich something she can have at work and stay busy.  Lunch she will eat those make at work containers of mac and cheese, sometimes fruit.      Exercise: Walking dog.     Additional history: as documented    Reviewed and updated as needed this visit by clinical staff  Tobacco  Allergies  Meds  Med Hx  Surg Hx  Fam Hx  Soc Hx        Reviewed and updated as needed this visit by Provider           Current Outpatient Medications   Medication Sig Dispense Refill     ALPRAZolam (XANAX) 0.5 MG tablet TAKE ONE TABLET AS NEEDED FOR SEVERE ANXIETY MAX OF 2 TABLETS PER DAY  0     escitalopram (LEXAPRO) 20 MG tablet TAKE 1 TABLET BY MOUTH DAILY.  5     etonogestrel (IMPLANON/NEXPLANON) 68 MG IMPL 1 each (68 mg) by Subdermal route continuous  0     phentermine (ADIPEX-P) 30 MG capsule Take 1 capsule (30 mg) by mouth every morning 30 capsule 0     diclofenac (VOLTAREN) 75 MG EC tablet Take 1 tablet (75 mg) by mouth 2 times daily (Patient not taking: Reported on 5/7/2019) 60 tablet 1     Allergies   Allergen Reactions     No Known Drug Allergies        ROS:  Constitutional, HEENT, cardiovascular, pulmonary, GI, , musculoskeletal, neuro, skin, and psych systems are negative, except as otherwise noted.    OBJECTIVE:     /86   Pulse 78   Temp 98  F (36.7  C) (Temporal)   Resp 14   Wt 109.3 kg (241 lb)   SpO2 98%   BMI 37.75  kg/m    Body mass index is 37.75 kg/m .  GENERAL: healthy, alert and no distress  RESP: lungs clear to auscultation - no rales, rhonchi or wheezes  CV: regular rate and rhythm, normal S1 S2, no S3 or S4, no murmur, click or rub, no peripheral edema and peripheral pulses strong  MS: no gross musculoskeletal defects noted, no edema  SKIN: no suspicious lesions or rashes  NEURO: Normal strength and tone, mentation intact and speech normal  PSYCH: mentation appears normal, affect normal/bright    Diagnostic Test Results:  none     ASSESSMENT/PLAN:       ICD-10-CM    1. Morbid obesity (H) E66.01 phentermine (ADIPEX-P) 30 MG capsule       Weight continues to go down.  Additional 3 lbs since last visit.  Discussed diet and making small changes.  Encouraged morning to eat more protein dense foods that can be easy to grab on the go.  For lunch if looking for easy to buy options can consider healthy meals from freezer aisle that she can just leave in the freezer and heat up at work.  Continue with staying active.     Follow-up 1 month, sooner if needed.     Options for treatment and follow-up care were reviewed with the patient and/or guardian. Patient and/or guardian engaged in the decision making process and verbalized understanding of the options discussed and agreed with the final plan.     Marcy Campos PA-C  [facility]

## 2019-05-07 ENCOUNTER — OFFICE VISIT (OUTPATIENT)
Dept: FAMILY MEDICINE | Facility: OTHER | Age: 34
End: 2019-05-07
Payer: MEDICARE

## 2019-05-07 VITALS
DIASTOLIC BLOOD PRESSURE: 86 MMHG | RESPIRATION RATE: 14 BRPM | SYSTOLIC BLOOD PRESSURE: 122 MMHG | WEIGHT: 241 LBS | BODY MASS INDEX: 37.75 KG/M2 | TEMPERATURE: 98 F | HEART RATE: 78 BPM | OXYGEN SATURATION: 98 %

## 2019-05-07 DIAGNOSIS — E66.01 MORBID OBESITY (H): Primary | ICD-10-CM

## 2019-05-07 PROCEDURE — 99213 OFFICE O/P EST LOW 20 MIN: CPT | Performed by: PHYSICIAN ASSISTANT

## 2019-05-07 RX ORDER — PHENTERMINE HYDROCHLORIDE 30 MG/1
30 CAPSULE ORAL EVERY MORNING
Qty: 30 CAPSULE | Refills: 0 | Status: SHIPPED | OUTPATIENT
Start: 2019-05-07 | End: 2019-05-24 | Stop reason: SINTOL

## 2019-05-24 ENCOUNTER — TELEPHONE (OUTPATIENT)
Dept: FAMILY MEDICINE | Facility: OTHER | Age: 34
End: 2019-05-24

## 2019-05-24 NOTE — PROGRESS NOTES
Subjective     Florina Marie is a 33 year old female who presents to clinic today for the following health issues:    HPI   Medication Followup of Phentermine    Taking Medication as prescribed: yes - but was advised to stop taking it last week    Side Effects:  Increased anger    Medication Helping Symptoms:  yes     Patient presents today with her mother to discuss her moods.  Apparently a few months ago she began to become more irritable and this intensified a few weeks ago.  She saw psychiatry and her Lexapro was increased.  After this increase her irritability even worsened.  She contacted the clinic and stop the phentermine.  Then she had her menses and her irritability has greatly improved.  She does have an etonogestrel implant and has not had her menses for 6 months.  Both Florina and her mother are concerned about weight gain if phentermine is stopped.  Florina states that she has been more active walking her dog every day.    Patient Active Problem List   Diagnosis     Problems with learning     Morbid obesity (H)     Generalized anxiety disorder     Hyperlipidemia, unspecified     Attention deficit disorder without hyperactivity     Prolonged PTT     Right carpal tunnel syndrome     S/P carpal tunnel release     Past Surgical History:   Procedure Laterality Date     EXAM UNDER ANESTHESIA PELVIC N/A 9/9/2016    Procedure: EXAM UNDER ANESTHESIA PELVIC;  Surgeon: Rhoda Alcazar MD;  Location:  OR      TOOTH EXTRACTION W/FORCEP      wisdom     RELEASE CARPAL TUNNEL Right 3/22/2019    Procedure: RIGHT RELEASE CARPAL TUNNEL;  Surgeon: Anjum Wilburn DO;  Location:  OR       Social History     Tobacco Use     Smoking status: Never Smoker     Smokeless tobacco: Never Used     Tobacco comment: no smokers in the household   Substance Use Topics     Alcohol use: No     Frequency: Never     Family History   Problem Relation Age of Onset     Lipids Father         diet     Thyroid Disease Mother          unsure if hypo or hyper     Diabetes Paternal Grandfather         adult     Heart Disease Paternal Grandfather         bypass/open hear surgery     Lipids Maternal Aunt         medication     Lipids Maternal Aunt         diet     Alzheimer Disease Maternal Grandfather      Hypertension No family hx of      Coronary Artery Disease No family hx of      Hyperlipidemia No family hx of      Cancer - colorectal No family hx of      Ovarian Cancer No family hx of      Prostate Cancer No family hx of      Depression/Anxiety No family hx of      Cerebrovascular Disease No family hx of      Anesthesia Reaction No family hx of      Asthma No family hx of      Osteoporosis No family hx of      Chemical Addiction No family hx of      Obesity No family hx of            Reviewed and updated as needed this visit by Provider  Allergies  Meds  Problems         Review of Systems   ROS COMP: CONSTITUTIONAL: NEGATIVE for fever, chills  ENT/MOUTH: NEGATIVE for ear, mouth and throat problems  RESP: NEGATIVE for significant cough or shortness of breath  CV: NEGATIVE for chest pain, palpitations or peripheral edema      Objective    /70 (BP Location: Right arm, Patient Position: Chair, Cuff Size: Adult Large)   Pulse 80   Temp 98.7  F (37.1  C) (Temporal)   Resp 16   Wt 111.8 kg (246 lb 8 oz)   SpO2 98%   BMI 38.61 kg/m    Body mass index is 38.61 kg/m .  Physical Exam   Gen: no apparent distress  Psych: Alert and oriented times 3; coherent speech, normal   rate and volume, able to articulate logical thoughts, able   to abstract reason, no tangential thoughts, no hallucinations   or delusions  Her affect is neutral        Assessment & Plan     1. Morbid obesity (H)  She has been off the phentermine for the last week and has actually gained some weight.  Discussed with both the patient and her mother that the irritability could be a combination of factors.  Her irritability has been greatly improved after stopping the  "phentermine however this also coincides with the onset of her menses.  At this time we will try a lower dose of phentermine and have her follow-up in a month.  We will see what her mood is doing and if she was able to resume some weight loss.  If her mood is stable and weight loss is not continuing would then consider increasing back to her prior phentermine dose.  If her mood worsens on the phentermine would then consider stopping this.    - phentermine (ADIPEX-P) 15 MG capsule; Take 1 capsule (15 mg) by mouth every morning  Dispense: 30 capsule; Refill: 0    2. Generalized anxiety disorder  She follows closely with psychiatry       BMI:   Estimated body mass index is 38.61 kg/m  as calculated from the following:    Height as of 3/19/19: 1.702 m (5' 7\").    Weight as of this encounter: 111.8 kg (246 lb 8 oz).   Weight management plan: Discussed healthy diet and exercise guidelines      Return in about 1 month (around 7/2/2019) for phentermine recheck.    Alanna Curiel MD  [facility]    "

## 2019-05-24 NOTE — TELEPHONE ENCOUNTER
"Florina Marie is a 33 year old female who calls with questions about her medications.    NURSING ASSESSMENT:  Description:  She reports family and friends have noticed she is more angry, having more episodic \"melt downs\" as well as crying \"melt downs\", and is more reactive to stimuli. Reports when she has these episodes she upsets her dogs. Is making training her dog difficult.    She has been on phentermine since Agust of last year and has been having these episodes for the last few months. She also reports she has recent increase in Celexa to 30 mg by her psychiatrist. Denies suicidal ideation or acting out and hurting.    She is wondering about stopping Phentermine, but is concerned as the medication has helped her to lose a substantial amount of weight.    She will be at work later today but says it is ok to leave voicemail on her [phone] number.    Allergies:   Allergies   Allergen Reactions     No Known Drug Allergies          NURSING PLAN: Routed to provider Yes    RECOMMENDED DISPOSITION:  Waiting for provider input.  Will comply with recommendation: Yes  If further questions/concerns or if symptoms do not improve, worsen or new symptoms develop, call your PCP or [facility] as soon as possible.          Luis Bhandari RN    "

## 2019-06-04 ENCOUNTER — OFFICE VISIT (OUTPATIENT)
Dept: FAMILY MEDICINE | Facility: OTHER | Age: 34
End: 2019-06-04
Payer: MEDICARE

## 2019-06-04 VITALS
TEMPERATURE: 98.7 F | HEART RATE: 80 BPM | SYSTOLIC BLOOD PRESSURE: 108 MMHG | DIASTOLIC BLOOD PRESSURE: 70 MMHG | OXYGEN SATURATION: 98 % | BODY MASS INDEX: 38.61 KG/M2 | WEIGHT: 246.5 LBS | RESPIRATION RATE: 16 BRPM

## 2019-06-04 DIAGNOSIS — E66.01 MORBID OBESITY (H): Primary | ICD-10-CM

## 2019-06-04 DIAGNOSIS — F41.1 GENERALIZED ANXIETY DISORDER: ICD-10-CM

## 2019-06-04 PROCEDURE — 99213 OFFICE O/P EST LOW 20 MIN: CPT | Performed by: FAMILY MEDICINE

## 2019-06-04 RX ORDER — PHENTERMINE HYDROCHLORIDE 15 MG/1
15 CAPSULE ORAL EVERY MORNING
Qty: 30 CAPSULE | Refills: 0 | Status: SHIPPED | OUTPATIENT
Start: 2019-06-04 | End: 2019-07-08

## 2019-06-04 ASSESSMENT — ANXIETY QUESTIONNAIRES
4. TROUBLE RELAXING: SEVERAL DAYS
7. FEELING AFRAID AS IF SOMETHING AWFUL MIGHT HAPPEN: NOT AT ALL
GAD7 TOTAL SCORE: 9
3. WORRYING TOO MUCH ABOUT DIFFERENT THINGS: SEVERAL DAYS
2. NOT BEING ABLE TO STOP OR CONTROL WORRYING: SEVERAL DAYS
GAD7 TOTAL SCORE: 9
5. BEING SO RESTLESS THAT IT IS HARD TO SIT STILL: NOT AT ALL
7. FEELING AFRAID AS IF SOMETHING AWFUL MIGHT HAPPEN: NOT AT ALL
GAD7 TOTAL SCORE: 9
1. FEELING NERVOUS, ANXIOUS, OR ON EDGE: NEARLY EVERY DAY
6. BECOMING EASILY ANNOYED OR IRRITABLE: NEARLY EVERY DAY

## 2019-06-05 ASSESSMENT — ANXIETY QUESTIONNAIRES: GAD7 TOTAL SCORE: 9

## 2019-06-05 ASSESSMENT — PATIENT HEALTH QUESTIONNAIRE - PHQ9: SUM OF ALL RESPONSES TO PHQ QUESTIONS 1-9: 0

## 2019-06-12 ENCOUNTER — OFFICE VISIT (OUTPATIENT)
Dept: FAMILY MEDICINE | Facility: OTHER | Age: 34
End: 2019-06-12
Payer: MEDICARE

## 2019-06-12 ENCOUNTER — TELEPHONE (OUTPATIENT)
Dept: FAMILY MEDICINE | Facility: OTHER | Age: 34
End: 2019-06-12

## 2019-06-12 ENCOUNTER — ANCILLARY PROCEDURE (OUTPATIENT)
Dept: GENERAL RADIOLOGY | Facility: OTHER | Age: 34
End: 2019-06-12
Attending: NURSE PRACTITIONER
Payer: MEDICARE

## 2019-06-12 VITALS
DIASTOLIC BLOOD PRESSURE: 68 MMHG | TEMPERATURE: 98.5 F | SYSTOLIC BLOOD PRESSURE: 102 MMHG | OXYGEN SATURATION: 96 % | BODY MASS INDEX: 38.84 KG/M2 | HEART RATE: 83 BPM | WEIGHT: 248 LBS | RESPIRATION RATE: 16 BRPM

## 2019-06-12 DIAGNOSIS — M79.601 RIGHT ARM PAIN: ICD-10-CM

## 2019-06-12 DIAGNOSIS — S67.21XA HAND CRUSH INJURY, RIGHT, INITIAL ENCOUNTER: ICD-10-CM

## 2019-06-12 DIAGNOSIS — S67.21XA HAND CRUSH INJURY, RIGHT, INITIAL ENCOUNTER: Primary | ICD-10-CM

## 2019-06-12 PROCEDURE — 73090 X-RAY EXAM OF FOREARM: CPT | Mod: RT

## 2019-06-12 PROCEDURE — 99213 OFFICE O/P EST LOW 20 MIN: CPT | Performed by: NURSE PRACTITIONER

## 2019-06-12 PROCEDURE — 73130 X-RAY EXAM OF HAND: CPT | Mod: RT

## 2019-06-12 ASSESSMENT — ENCOUNTER SYMPTOMS
CONSTITUTIONAL NEGATIVE: 1
RESPIRATORY NEGATIVE: 1
CARDIOVASCULAR NEGATIVE: 1

## 2019-06-12 ASSESSMENT — PAIN SCALES - GENERAL: PAINLEVEL: MODERATE PAIN (4)

## 2019-06-12 NOTE — TELEPHONE ENCOUNTER
Saw patient today in clinic, she wanted me to let TC know that she lost her Phentermine script from 6/4/19 and curious if she is willing to replace this. I advised her that this is a controlled substance and we usually do not replace lost prescriptions. I spoke with TC and she agrees with this plan. Patient will need to wait for her next refill/OV.     ANGIE Kaba CNP

## 2019-06-12 NOTE — TELEPHONE ENCOUNTER
Reason for Call:  Medication or medication refill: Refill     Do you use a [facility]?  Name of the pharmacy and phone number for the current request:  [facility] - [phone]    Name of the medication requested: phentermine (ADIPEX-P) 15 MG capsule    Other request: n/a    Can we leave a detailed message on this number? YES    Phone number patient can be reached at: Cell number on file:    Telephone Information:   Mobile [phone]       Best Time: Anytime     Call taken on 6/12/2019 at 7:07 AM by Fatemeh Salas

## 2019-06-12 NOTE — PATIENT INSTRUCTIONS
- Recommend Xrays today of right hand and forearm   - Recommend ice 3-4 times during the day  - Tylenol or Ibuprofen for pain  - Start wearing your arm/hand brace daily for the next 1-2 weeks  - Start range of motion exercises with your hand as your pain resolves.   - I will follow up with you regarding your results.       Billie Victoria, ANGIE CNP    Patient Education     Crush Injury of the Hand, No Fracture  You have a crush injury of your hand. This causes local pain, swelling, and sometimes bruising. You don t have any broken bones. This injury may take from a few days to a few weeks to heal.  If a fingernail has been severely injured, it may fall off in 1 to 2 weeks. A new one will usually start to grow back within a month.  Home care  Follow these guidelines when caring for yourself at home:    You may be given a splint to prevent movement of the injured hand.    Keep your hand elevated to reduce pain and swelling. When sitting or lying down keep your arm raised above the level of your heart, if possible. You can do this by placing your arm on a pillow that rests on your chest or on a pillow at your side. This is most important during the first 2 days (48 hours) after the injury.    Put an ice pack on the injured area. Do this for 20 minutes every 1 to 2 hours the first day for pain relief. You can make an ice pack by wrapping a plastic bag of ice cubes in a thin towel. As the ice melts, be careful that the splint doesn t get wet. Continue to use the ice pack 3 to 4 times a day until the pain and swelling go away.    You may use over-the-counter pain medicine such as acetaminophen or ibuprofen to control pain, unless another pain medicine was prescribed. If you have chronic liver or kidney disease, talk with your healthcare provider before using these medicines. Also talk with your provider if you ve had a stomach ulcer or gastrointestinal bleeding.    If you have a splint, keep it dry at all times. Bathe with  your splint well out of the water. Protect it with a large plastic bag, rubber-banded at the top end. If a splint gets wet, you can dry it with a hair dryer on the cool setting.    Don t stick a needle into the wound to drain it.    Bruised skin may change colors over time. It may change from reddish to bluish to yellowish before the skin goes back to normal coloring.  Follow-up care  Follow up with your healthcare provider, or as advised, if you don t start to get better within the next 3 days.  If X-rays were taken, you will be told of any new findings that may affect your care.  When to seek medical advice  Call your healthcare provider right away if any of these occur:    The plaster splint becomes wet or soft    The plaster splint stays wet for more than 24 hours    Tightness or pain under the splint gets worse    Fingers become swollen, cold, blue, numb, or tingly    Redness, warmth, swelling, drainage from the wound, or foul odor from a splint    You can t move your fingers    Fever of 100.4 F (38 C) or higher, or as directed by your healthcare provider   Date Last Reviewed: 6/1/2017 2000-2018 [employer]. [address] [place] [address], [place] [address]. All rights reserved. This information is not intended as a substitute for professional medical care. Always follow your healthcare professional's instructions.
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
## 2019-06-13 ENCOUNTER — TELEPHONE (OUTPATIENT)
Dept: FAMILY MEDICINE | Facility: OTHER | Age: 34
End: 2019-06-13

## 2019-06-13 NOTE — TELEPHONE ENCOUNTER
Prior Authorization Retail Medication Request    Medication/Dose: phentermine (ADIPEX-P) 15 MG capsule  ICD code (if different than what is on RX):    Previously Tried and Failed:    Rationale:      Insurance Name:    Insurance ID:        Pharmacy Information (if different than what is on RX)  Name:  [facility]   Phone:  [phone]

## 2019-06-17 NOTE — TELEPHONE ENCOUNTER
PRIOR AUTHORIZATION DENIED    Medication: phentermine (ADIPEX-P) 15 MG capsule - DENIED    Denial Date: 6/17/2019    Denial Rational: weight loss meds are not covered by part D coverage - PLAN EXCLUSION  Called and verified with INS over the phone    Appeal Information: N/A

## 2019-06-26 ENCOUNTER — TELEPHONE (OUTPATIENT)
Dept: FAMILY MEDICINE | Facility: OTHER | Age: 34
End: 2019-06-26

## 2019-06-26 NOTE — TELEPHONE ENCOUNTER
Spoke to mom C2C on file and informed her this was denied it's excluded under the patients insurance.

## 2019-06-26 NOTE — TELEPHONE ENCOUNTER
Reason for Call:  Other PA needed    Detailed comments: Mom said she needs PA for Federmine. Mom said Alanna need to just ok it and send something back to [facility] in [place]    Phone Number Patient can be reached at: Home number on file [phone] (home)    Best Time: any    Can we leave a detailed message on this number? YES    Call taken on 6/26/2019 at 10:45 AM by Jodi Agee

## 2019-06-27 ENCOUNTER — OFFICE VISIT (OUTPATIENT)
Dept: ORTHOPEDICS | Facility: OTHER | Age: 34
End: 2019-06-27
Payer: MEDICARE

## 2019-06-27 ENCOUNTER — TELEPHONE (OUTPATIENT)
Dept: ORTHOPEDICS | Facility: OTHER | Age: 34
End: 2019-06-27

## 2019-06-27 VITALS
WEIGHT: 248 LBS | BODY MASS INDEX: 38.92 KG/M2 | HEIGHT: 67 IN | SYSTOLIC BLOOD PRESSURE: 110 MMHG | DIASTOLIC BLOOD PRESSURE: 68 MMHG

## 2019-06-27 DIAGNOSIS — G56.02 LEFT CARPAL TUNNEL SYNDROME: Primary | ICD-10-CM

## 2019-06-27 PROCEDURE — 99213 OFFICE O/P EST LOW 20 MIN: CPT | Performed by: ORTHOPAEDIC SURGERY

## 2019-06-27 ASSESSMENT — MIFFLIN-ST. JEOR: SCORE: 1862.55

## 2019-06-27 ASSESSMENT — PAIN SCALES - GENERAL: PAINLEVEL: NO PAIN (0)

## 2019-06-27 NOTE — LETTER
6/27/2019         RE: Florina Marie  [address]  [address]  [facility] [address]        Dear Colleague,    Thank you for referring your patient, Florina Marie, to the [facility]. Please see a copy of my visit note below.    Office Visit-Follow up    Chief Complaint: Florina Marie is a 33 year old female who is being seen for   Chief Complaint   Patient presents with     RECHECK     left wrist follow up       History of Present Illness:   Today's visit  Returns to discuss surgery for left carpal tunnel release.  Symptoms to the left include global hand numbness/tingling worst at night and with activity. Progressively more frequent but not constant.  Painful, and struggling with work and activities due to the pain. She had the same symptoms on the right side. Had right one done 3/22/19 with full resolution of symptoms. No issues with recovery.    Has tried bracing without any benefit, time, activity modification, rest.  No radiating pain or numbness from the neck to hand.   3/14/19  Returns to clinic. Mostly returns for the right hand. States hand is now constantly numb on the right, hand and all five fingers. Still intermittent on the left.  Right much worse than left. 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 Has pain into the wrist and hand, and occasional radiates up towards the elbow. Denies any shooting pains or numbness from the neck radiating down.  Has been trying night braces without benefit. Has had previous hand therapy without improvement.    1/24/19 visit   Florina Marie is a 33 year old female who is seen in consultation at the request of Ekaterina Nichols for evaluation of bilateral hand numbness.  Complains of at least 2 months worth of her entire hand going numb.  Is coming and going but getting more constant.  She is attempted some occupational therapy as well as bracing at night with no  "improvement.  It bothers her at work.  She reports all of her digits go numb.  No pain.      REVIEW OF SYSTEMS  General: negative for, night sweats, dizziness, fatigue  Resp: No shortness of breath and no cough  CV: negative for chest pain, syncope or near-syncope  GI: negative for nausea, vomiting and diarrhea  : negative for dysuria and hematuria  Musculoskeletal: as above  Neurologic: negative for syncope   Hematologic: negative for bleeding disorder    Physical Exam:  Vitals: /68   Ht 1.702 m (5' 7\")   Wt 112.5 kg (248 lb)   BMI 38.84 kg/m     BMI= Body mass index is 38.84 kg/m .  Constitutional: healthy, alert and no acute distress   Psychiatric: mentation appears normal and affect normal/bright  NEURO: no focal deficits  RESP: Normal with easy respirations and no use of accessory muscles to breathe, no audible wheezing or retractions  CV: LUE:   no edema         Regular rate and rhythm by palpation  SKIN: No erythema, rashes, excoriation, or breakdown. No evidence of infection.   JOINT/EXTREMITIES:left wrist: full active range of motion.  There is no edema or atrophy.  There is no weakness throughout.  Sensation is intact to light touch to all the digits.  Negative Tinel's.  Negative carpal compression.  Negative Phalen's.  Hand is well perfused with good capillary refill.                        Diagnostic Modalities:  None today.  Previous imaging reviewed.  Independent visualization of the images was performed.      Impression: left carpal tunnel syndrome    Plan:  All of the above pertinent physical exam and imaging modalities findings was reviewed with Florina and her mother.  Had the exact same symptoms on the right side that completely resolved with carpal tunnel release.  The left side symptoms are worsening and progressive. Would like this released.     The patient has attempted conservative treatments that include bracing, rest, activity modification, time yet still continues to have " significant issues. These issues include progressive numbness, waking at night due to the numbness/pain, difficulty with activity. . Secondary to these reasons I have offered surgery in the form of left carpal tunnel release.    The risks, benefits, alternatives, complication, limitations and expectations were reviewed at length. Complications such as infection, bleeding, nerve damage, incomplete release, pillar pain, repeat surgery, skin problems, scar sensitivity,  were discussed. Also reviewed were expectations and the goal of surgery. With surgery the goal would be to prevent further damage to the nerve, the surgery may not be able to reverse any current nerve damage. Postoperatively there will be limitation in use for approximately 3-4 weeks or as clinically indicated. All questions were answered. The patient agrees to proceed with surgery.     The patient's past medical and surgical history was reviewed; The patient will need a preoperative medical evaluation and clearance. Anticipate performing the surgery under local anesthetic with IV sedation.  I spoke with her guardian Anna over the phone.  We discussed the diagnosis and the treatment.  She is agreeable to the plan.    Return to clinic 10 days post-operatively. , or sooner as needed for changes.  Re-x-ray on return: No    Scribed by:  ANGIE Wiggins, CNP  6:26 PM  6/27/2019  I attest I have seen and evaluated the patient.  I agree with above impression and plan.       Akshat Wilburn D.O.          Again, thank you for allowing me to participate in the care of your patient.        Sincerely,        Anjum Wilburn, DO

## 2019-06-27 NOTE — PROGRESS NOTES
Office Visit-Follow up    Chief Complaint: Florina Marie is a 33 year old female who is being seen for   Chief Complaint   Patient presents with     RECHECK     left wrist follow up       History of Present Illness:   Today's visit  Returns to discuss surgery for left carpal tunnel release.  Symptoms to the left include global hand numbness/tingling worst at night and with activity. Progressively more frequent but not constant.  Painful, and struggling with work and activities due to the pain. She had the same symptoms on the right side. Had right one done 3/22/19 with full resolution of symptoms. No issues with recovery.    Has tried bracing without any benefit, time, activity modification, rest.  No radiating pain or numbness from the neck to hand.   3/14/19  Returns to clinic. Mostly returns for the right hand. States hand is now constantly numb on the right, hand and all five fingers. Still intermittent on the left.  Right much worse than left. 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 Has pain into the wrist and hand, and occasional radiates up towards the elbow. Denies any shooting pains or numbness from the neck radiating down.  Has been trying night braces without benefit. Has had previous hand therapy without improvement.    1/24/19 visit   Florina Marie is a 33 year old female who is seen in consultation at the request of Ekaterina Nichols for evaluation of bilateral hand numbness.  Complains of at least 2 months worth of her entire hand going numb.  Is coming and going but getting more constant.  She is attempted some occupational therapy as well as bracing at night with no improvement.  It bothers her at work.  She reports all of her digits go numb.  No pain.      REVIEW OF SYSTEMS  General: negative for, night sweats, dizziness, fatigue  Resp: No shortness of breath and no cough  CV: negative for chest pain, syncope or  "near-syncope  GI: negative for nausea, vomiting and diarrhea  : negative for dysuria and hematuria  Musculoskeletal: as above  Neurologic: negative for syncope   Hematologic: negative for bleeding disorder    Physical Exam:  Vitals: /68   Ht 1.702 m (5' 7\")   Wt 112.5 kg (248 lb)   BMI 38.84 kg/m    BMI= Body mass index is 38.84 kg/m .  Constitutional: healthy, alert and no acute distress   Psychiatric: mentation appears normal and affect normal/bright  NEURO: no focal deficits  RESP: Normal with easy respirations and no use of accessory muscles to breathe, no audible wheezing or retractions  CV: LUE:   no edema         Regular rate and rhythm by palpation  SKIN: No erythema, rashes, excoriation, or breakdown. No evidence of infection.   JOINT/EXTREMITIES:left wrist: full active range of motion.  There is no edema or atrophy.  There is no weakness throughout.  Sensation is intact to light touch to all the digits.  Negative Tinel's.  Negative carpal compression.  Negative Phalen's.  Hand is well perfused with good capillary refill.                        Diagnostic Modalities:  None today.  Previous imaging reviewed.  Independent visualization of the images was performed.      Impression: left carpal tunnel syndrome    Plan:  All of the above pertinent physical exam and imaging modalities findings was reviewed with Florina and her mother.  Had the exact same symptoms on the right side that completely resolved with carpal tunnel release.  The left side symptoms are worsening and progressive. Would like this released.     The patient has attempted conservative treatments that include bracing, rest, activity modification, time yet still continues to have significant issues. These issues include progressive numbness, waking at night due to the numbness/pain, difficulty with activity. . Secondary to these reasons I have offered surgery in the form of left carpal tunnel release.    The risks, benefits, " alternatives, complication, limitations and expectations were reviewed at length. Complications such as infection, bleeding, nerve damage, incomplete release, pillar pain, repeat surgery, skin problems, scar sensitivity,  were discussed. Also reviewed were expectations and the goal of surgery. With surgery the goal would be to prevent further damage to the nerve, the surgery may not be able to reverse any current nerve damage. Postoperatively there will be limitation in use for approximately 3-4 weeks or as clinically indicated. All questions were answered. The patient agrees to proceed with surgery.     The patient's past medical and surgical history was reviewed; The patient will need a preoperative medical evaluation and clearance. Anticipate performing the surgery under local anesthetic with IV sedation.  I spoke with her guardian Anna over the phone.  We discussed the diagnosis and the treatment.  She is agreeable to the plan.    Return to clinic 10 days post-operatively. , or sooner as needed for changes.  Re-x-ray on return: No    Scribed by:  ANGIE Wiggins, CNP  6:26 PM  6/27/2019  I attest I have seen and evaluated the patient.  I agree with above impression and plan.       Akshat Wilburn D.O.

## 2019-06-28 NOTE — H&P (VIEW-ONLY)
[facility]  [address] [place] [id number]  [phone]  Dept: [phone]    PRE-OP EVALUATION:  Today's date: 2019    Florina Marie (: 1985) presents for pre-operative evaluation assessment as requested by Dr. Ricardo.  She requires evaluation and anesthesia risk assessment prior to undergoing surgery/procedure for treatment of carpal tunnel release .    Primary Physician: Alanna Curiel  Type of Anesthesia Anticipated: to be determined    Patient has a Health Care Directive or Living Will:  NO    Preop Questions 2019   Who is doing your surgery? dr ricardo   What are you having done? carpal tunnel   Date of Surgery/Procedure: 1619   Facility or Hospital where procedure/surgery will be performed: [facility]   1.  Do you have a history of Heart attack, stroke, stent, coronary bypass surgery, or other heart surgery? No   2.  Do you ever have any pain or discomfort in your chest? No   3.  Do you have a history of  Heart Failure? No   4.   Are you troubled by shortness of breath when:  walking on a level surface, or up a slight hill, or at night? No   5.  Do you currently have a cold, bronchitis or other respiratory infection? No   6.  Do you have a cough, shortness of breath, or wheezing? No   7.  Do you sometimes get pains in the calves of your legs when you walk? No   8. Do you or anyone in your family have previous history of blood clots? No   9.  Do you or does anyone in your family have a serious bleeding problem such as prolonged bleeding following surgeries or cuts? No   10. Have you ever had problems with anemia or been told to take iron pills? No   11. Have you had any abnormal blood loss such as black, tarry or bloody stools, or abnormal vaginal bleeding? No   12. Have you ever had a blood transfusion? No   13. Have you or any of your relatives ever had problems with anesthesia? No   14. Do you have sleep apnea, excessive snoring or daytime drowsiness?  No   15. Do you have any prosthetic heart valves? No   16. Do you have prosthetic joints? No   17. Is there any chance that you may be pregnant? No         HPI:     HPI related to upcoming procedure: left carpal tunnel syndrome      See problem list for active medical problems.  Problems all longstanding and stable, except as noted/documented.  See ROS for pertinent symptoms related to these conditions.      MEDICAL HISTORY:     Patient Active Problem List    Diagnosis Date Noted     Left carpal tunnel syndrome 06/27/2019     Priority: Medium     S/P carpal tunnel release 04/02/2019     Priority: Medium     Right carpal tunnel syndrome 03/14/2019     Priority: Medium     Prolonged PTT 08/09/2018     Priority: Medium     Attention deficit disorder without hyperactivity 12/01/2011     Priority: Medium     Hyperlipidemia, unspecified 01/08/2010     Priority: Medium     Generalized anxiety disorder 10/24/2007     Priority: Medium     Morbid obesity (H) 01/01/2004     Priority: Medium     Problems with learning 12/31/2003     Priority: Medium      Past Medical History:   Diagnosis Date     Attention deficit disorder with hyperactivity(314.01)      Other kyphoscoliosis and scoliosis      Other specified delay in development     Microcephaly     Viral warts, unspecified     plantar warts     Past Surgical History:   Procedure Laterality Date     EXAM UNDER ANESTHESIA PELVIC N/A 9/9/2016    Procedure: EXAM UNDER ANESTHESIA PELVIC;  Surgeon: Rhoda Alcazar MD;  Location:  OR      TOOTH EXTRACTION W/FORCEP      wisdom     RELEASE CARPAL TUNNEL Right 3/22/2019    Procedure: RIGHT RELEASE CARPAL TUNNEL;  Surgeon: Anjum Wilburn DO;  Location: [facility] OR     Current Outpatient Medications   Medication Sig Dispense Refill     ALPRAZolam (XANAX) 0.5 MG tablet TAKE ONE TABLET AS NEEDED FOR SEVERE ANXIETY MAX OF 2 TABLETS PER DAY  0     escitalopram (LEXAPRO) 10 MG tablet TAKE 1 TABLET BY MOUTH ONCE DAILY WITH 20MG  "TABLET FOR A TOTAL OF 30MG  1     escitalopram (LEXAPRO) 20 MG tablet TAKE 1 TABLET BY MOUTH DAILY.  5     etonogestrel (IMPLANON/NEXPLANON) 68 MG IMPL 1 each (68 mg) by Subdermal route continuous  0     OTC products: None, except as noted above    Allergies   Allergen Reactions     No Known Drug Allergies       Latex Allergy: NO    Social History     Tobacco Use     Smoking status: Never Smoker     Smokeless tobacco: Never Used     Tobacco comment: no smokers in the household   Substance Use Topics     Alcohol use: No     Frequency: Never     History   Drug Use No       REVIEW OF SYSTEMS:   CONSTITUTIONAL: NEGATIVE for fever, chills, change in weight  INTEGUMENTARY/SKIN: NEGATIVE for worrisome rashes, moles or lesions  EYES: NEGATIVE for vision changes or irritation  ENT/MOUTH: NEGATIVE for ear, mouth and throat problems  RESP: NEGATIVE for significant cough or SOB  BREAST: NEGATIVE for masses, tenderness or discharge  CV: NEGATIVE for chest pain, palpitations or peripheral edema  GI: NEGATIVE for nausea, abdominal pain, heartburn, or change in bowel habits  : NEGATIVE for frequency, dysuria, or hematuria  MUSCULOSKELETAL: NEGATIVE for significant arthralgias or myalgia  NEURO: NEGATIVE for weakness, dizziness or paresthesias  ENDOCRINE: NEGATIVE for temperature intolerance, skin/hair changes  HEME: NEGATIVE for bleeding problems  PSYCHIATRIC: NEGATIVE for changes in mood or affect    EXAM:   /76   Pulse 80   Temp 98.2  F (36.8  C) (Temporal)   Resp 16   Ht 1.664 m (5' 5.5\")   Wt 117.5 kg (259 lb)   SpO2 100%   BMI 42.44 kg/m      GENERAL APPEARANCE: healthy, alert and no distress     EYES: EOMI, PERRL     HENT: ear canals and TM's normal and nose and mouth without ulcers or lesions     NECK: no adenopathy, no asymmetry, masses, or scars and thyroid normal to palpation     RESP: lungs clear to auscultation - no rales, rhonchi or wheezes     CV: regular rates and rhythm, normal S1 S2, no S3 or S4 and " no murmur, click or rub     ABDOMEN:  soft, nontender, no HSM or masses and bowel sounds normal     MS: extremities normal- no gross deformities noted, no evidence of inflammation in joints, FROM in all extremities.     SKIN: no suspicious lesions or rashes     NEURO: Normal strength and tone, sensory exam grossly normal, mentation intact and speech normal     PSYCH: mentation appears normal. and affect normal/bright     LYMPHATICS: No cervical adenopathy    DIAGNOSTICS:   No labs or EKG required for low risk surgery (cataract, skin procedure, breast biopsy, etc)    Recent Labs   Lab Test 03/19/19  0905 07/10/18  1334 07/09/18  1540 08/24/17  0823  05/04/12  1024   HGB 13.2 13.2 13.2 13.0   < >  --     296 299 278   < >  --    INR  --   --  1.10  --   --   --    NA  --   --  138 140   < >  --    POTASSIUM  --   --  3.9 4.2   < >  --    CR  --   --  1.01 1.07*   < >  --    A1C  --   --   --   --   --  5.6    < > = values in this interval not displayed.        IMPRESSION:   Reason for surgery/procedure: left carpal tunnel release    The proposed surgical procedure is considered LOW risk.    REVISED CARDIAC RISK INDEX  The patient has the following serious cardiovascular risks for perioperative complications such as (MI, PE, VFib and 3  AV Block):  No serious cardiac risks  INTERPRETATION: 0 risks: Class I (very low risk - 0.4% complication rate)    The patient has the following additional risks for perioperative complications:  No identified additional risks      ICD-10-CM    1. Preop general physical exam Z01.818    2. Left carpal tunnel syndrome G56.02        RECOMMENDATIONS:     --Consult hospital rounder / IM to assist post-op medical management    --Patient is to take all scheduled medications on the day of surgery EXCEPT for modifications listed below.   - Hold Phentermine the morning of surgery.    APPROVAL GIVEN to proceed with proposed procedure, without further diagnostic evaluation       Signed  Electronically by: Ekaterina Nichols PA-C    Copy of this evaluation report is provided to requesting physician.    [facility] Preop Guidelines    Revised Cardiac Risk Index

## 2019-06-28 NOTE — PROGRESS NOTES
[facility]  [id number] [place] [id number]  [phone]  Dept: [phone]    PRE-OP EVALUATION:  Today's date: 2019    Florina Marie (: 1985) presents for pre-operative evaluation assessment as requested by Dr. Ricardo.  She requires evaluation and anesthesia risk assessment prior to undergoing surgery/procedure for treatment of carpal tunnel release .    Primary Physician: Alanna Curiel  Type of Anesthesia Anticipated: to be determined    Patient has a Health Care Directive or Living Will:  NO    Preop Questions 2019   Who is doing your surgery? dr ricardo   What are you having done? carpal tunnel   Date of Surgery/Procedure: 1619   Facility or Hospital where procedure/surgery will be performed: [facility]   1.  Do you have a history of Heart attack, stroke, stent, coronary bypass surgery, or other heart surgery? No   2.  Do you ever have any pain or discomfort in your chest? No   3.  Do you have a history of  Heart Failure? No   4.   Are you troubled by shortness of breath when:  walking on a level surface, or up a slight hill, or at night? No   5.  Do you currently have a cold, bronchitis or other respiratory infection? No   6.  Do you have a cough, shortness of breath, or wheezing? No   7.  Do you sometimes get pains in the calves of your legs when you walk? No   8. Do you or anyone in your family have previous history of blood clots? No   9.  Do you or does anyone in your family have a serious bleeding problem such as prolonged bleeding following surgeries or cuts? No   10. Have you ever had problems with anemia or been told to take iron pills? No   11. Have you had any abnormal blood loss such as black, tarry or bloody stools, or abnormal vaginal bleeding? No   12. Have you ever had a blood transfusion? No   13. Have you or any of your relatives ever had problems with anesthesia? No   14. Do you have sleep apnea, excessive snoring or daytime drowsiness?  No   15. Do you have any prosthetic heart valves? No   16. Do you have prosthetic joints? No   17. Is there any chance that you may be pregnant? No         HPI:     HPI related to upcoming procedure: left carpal tunnel syndrome      See problem list for active medical problems.  Problems all longstanding and stable, except as noted/documented.  See ROS for pertinent symptoms related to these conditions.      MEDICAL HISTORY:     Patient Active Problem List    Diagnosis Date Noted     Left carpal tunnel syndrome 06/27/2019     Priority: Medium     S/P carpal tunnel release 04/02/2019     Priority: Medium     Right carpal tunnel syndrome 03/14/2019     Priority: Medium     Prolonged PTT 08/09/2018     Priority: Medium     Attention deficit disorder without hyperactivity 12/01/2011     Priority: Medium     Hyperlipidemia, unspecified 01/08/2010     Priority: Medium     Generalized anxiety disorder 10/24/2007     Priority: Medium     Morbid obesity (H) 01/01/2004     Priority: Medium     Problems with learning 12/31/2003     Priority: Medium      Past Medical History:   Diagnosis Date     Attention deficit disorder with hyperactivity(314.01)      Other kyphoscoliosis and scoliosis      Other specified delay in development     Microcephaly     Viral warts, unspecified     plantar warts     Past Surgical History:   Procedure Laterality Date     EXAM UNDER ANESTHESIA PELVIC N/A 9/9/2016    Procedure: EXAM UNDER ANESTHESIA PELVIC;  Surgeon: Rhoda Alcazar MD;  Location:  OR      TOOTH EXTRACTION W/FORCEP      wisdom     RELEASE CARPAL TUNNEL Right 3/22/2019    Procedure: RIGHT RELEASE CARPAL TUNNEL;  Surgeon: Anjum Wilburn DO;  Location: [facility] OR     Current Outpatient Medications   Medication Sig Dispense Refill     ALPRAZolam (XANAX) 0.5 MG tablet TAKE ONE TABLET AS NEEDED FOR SEVERE ANXIETY MAX OF 2 TABLETS PER DAY  0     escitalopram (LEXAPRO) 10 MG tablet TAKE 1 TABLET BY MOUTH ONCE DAILY WITH 20MG  "TABLET FOR A TOTAL OF 30MG  1     escitalopram (LEXAPRO) 20 MG tablet TAKE 1 TABLET BY MOUTH DAILY.  5     etonogestrel (IMPLANON/NEXPLANON) 68 MG IMPL 1 each (68 mg) by Subdermal route continuous  0     OTC products: None, except as noted above    Allergies   Allergen Reactions     No Known Drug Allergies       Latex Allergy: NO    Social History     Tobacco Use     Smoking status: Never Smoker     Smokeless tobacco: Never Used     Tobacco comment: no smokers in the household   Substance Use Topics     Alcohol use: No     Frequency: Never     History   Drug Use No       REVIEW OF SYSTEMS:   CONSTITUTIONAL: NEGATIVE for fever, chills, change in weight  INTEGUMENTARY/SKIN: NEGATIVE for worrisome rashes, moles or lesions  EYES: NEGATIVE for vision changes or irritation  ENT/MOUTH: NEGATIVE for ear, mouth and throat problems  RESP: NEGATIVE for significant cough or SOB  BREAST: NEGATIVE for masses, tenderness or discharge  CV: NEGATIVE for chest pain, palpitations or peripheral edema  GI: NEGATIVE for nausea, abdominal pain, heartburn, or change in bowel habits  : NEGATIVE for frequency, dysuria, or hematuria  MUSCULOSKELETAL: NEGATIVE for significant arthralgias or myalgia  NEURO: NEGATIVE for weakness, dizziness or paresthesias  ENDOCRINE: NEGATIVE for temperature intolerance, skin/hair changes  HEME: NEGATIVE for bleeding problems  PSYCHIATRIC: NEGATIVE for changes in mood or affect    EXAM:   /76   Pulse 80   Temp 98.2  F (36.8  C) (Temporal)   Resp 16   Ht 1.664 m (5' 5.5\")   Wt 117.5 kg (259 lb)   SpO2 100%   BMI 42.44 kg/m      GENERAL APPEARANCE: healthy, alert and no distress     EYES: EOMI, PERRL     HENT: ear canals and TM's normal and nose and mouth without ulcers or lesions     NECK: no adenopathy, no asymmetry, masses, or scars and thyroid normal to palpation     RESP: lungs clear to auscultation - no rales, rhonchi or wheezes     CV: regular rates and rhythm, normal S1 S2, no S3 or S4 and " no murmur, click or rub     ABDOMEN:  soft, nontender, no HSM or masses and bowel sounds normal     MS: extremities normal- no gross deformities noted, no evidence of inflammation in joints, FROM in all extremities.     SKIN: no suspicious lesions or rashes     NEURO: Normal strength and tone, sensory exam grossly normal, mentation intact and speech normal     PSYCH: mentation appears normal. and affect normal/bright     LYMPHATICS: No cervical adenopathy    DIAGNOSTICS:   No labs or EKG required for low risk surgery (cataract, skin procedure, breast biopsy, etc)    Recent Labs   Lab Test 03/19/19  0905 07/10/18  1334 07/09/18  1540 08/24/17  0823  05/04/12  1024   HGB 13.2 13.2 13.2 13.0   < >  --     296 299 278   < >  --    INR  --   --  1.10  --   --   --    NA  --   --  138 140   < >  --    POTASSIUM  --   --  3.9 4.2   < >  --    CR  --   --  1.01 1.07*   < >  --    A1C  --   --   --   --   --  5.6    < > = values in this interval not displayed.        IMPRESSION:   Reason for surgery/procedure: left carpal tunnel release    The proposed surgical procedure is considered LOW risk.    REVISED CARDIAC RISK INDEX  The patient has the following serious cardiovascular risks for perioperative complications such as (MI, PE, VFib and 3  AV Block):  No serious cardiac risks  INTERPRETATION: 0 risks: Class I (very low risk - 0.4% complication rate)    The patient has the following additional risks for perioperative complications:  No identified additional risks      ICD-10-CM    1. Preop general physical exam Z01.818    2. Left carpal tunnel syndrome G56.02        RECOMMENDATIONS:     --Consult hospital rounder / IM to assist post-op medical management    --Patient is to take all scheduled medications on the day of surgery EXCEPT for modifications listed below.   - Hold Phentermine the morning of surgery.    APPROVAL GIVEN to proceed with proposed procedure, without further diagnostic evaluation       Signed  Electronically by: Ekaterina Nichols PA-C    Copy of this evaluation report is provided to requesting physician.    [facility] Preop Guidelines    Revised Cardiac Risk Index

## 2019-06-28 NOTE — TELEPHONE ENCOUNTER
Type of surgery: left carpal tunnel release  Location of surgery: [facility]   Date of surgery: 7/16/19  Surgeon: Dr. Wilburn  Pre-Op Appt Date: 7/8/19  Post-Op Appt Date: 7/25/19   Packet sent out: Surgery packet was given to patient in clinic.   Pre-cert/Authorization completed: NA  Date: 6/28/2019    Billie Stark  Surgery Scheduler

## 2019-07-08 ENCOUNTER — OFFICE VISIT (OUTPATIENT)
Dept: FAMILY MEDICINE | Facility: OTHER | Age: 34
End: 2019-07-08
Payer: MEDICARE

## 2019-07-08 ENCOUNTER — TELEPHONE (OUTPATIENT)
Dept: FAMILY MEDICINE | Facility: OTHER | Age: 34
End: 2019-07-08

## 2019-07-08 VITALS
HEART RATE: 80 BPM | DIASTOLIC BLOOD PRESSURE: 76 MMHG | TEMPERATURE: 98.2 F | BODY MASS INDEX: 41.62 KG/M2 | SYSTOLIC BLOOD PRESSURE: 122 MMHG | HEIGHT: 66 IN | WEIGHT: 259 LBS | RESPIRATION RATE: 16 BRPM | OXYGEN SATURATION: 100 %

## 2019-07-08 DIAGNOSIS — G56.02 LEFT CARPAL TUNNEL SYNDROME: ICD-10-CM

## 2019-07-08 DIAGNOSIS — Z01.818 PREOP GENERAL PHYSICAL EXAM: Primary | ICD-10-CM

## 2019-07-08 PROCEDURE — 99214 OFFICE O/P EST MOD 30 MIN: CPT | Performed by: PHYSICIAN ASSISTANT

## 2019-07-08 RX ORDER — ESCITALOPRAM OXALATE 10 MG/1
20 TABLET ORAL DAILY
Refills: 1 | COMMUNITY
Start: 2019-06-11 | End: 2021-12-31

## 2019-07-08 ASSESSMENT — ANXIETY QUESTIONNAIRES
4. TROUBLE RELAXING: NOT AT ALL
GAD7 TOTAL SCORE: 8
GAD7 TOTAL SCORE: 8
3. WORRYING TOO MUCH ABOUT DIFFERENT THINGS: MORE THAN HALF THE DAYS
7. FEELING AFRAID AS IF SOMETHING AWFUL MIGHT HAPPEN: NOT AT ALL
5. BEING SO RESTLESS THAT IT IS HARD TO SIT STILL: MORE THAN HALF THE DAYS
1. FEELING NERVOUS, ANXIOUS, OR ON EDGE: MORE THAN HALF THE DAYS
GAD7 TOTAL SCORE: 8
7. FEELING AFRAID AS IF SOMETHING AWFUL MIGHT HAPPEN: NOT AT ALL
2. NOT BEING ABLE TO STOP OR CONTROL WORRYING: MORE THAN HALF THE DAYS
6. BECOMING EASILY ANNOYED OR IRRITABLE: NOT AT ALL

## 2019-07-08 ASSESSMENT — PATIENT HEALTH QUESTIONNAIRE - PHQ9
SUM OF ALL RESPONSES TO PHQ QUESTIONS 1-9: 1
SUM OF ALL RESPONSES TO PHQ QUESTIONS 1-9: 1

## 2019-07-08 ASSESSMENT — MIFFLIN-ST. JEOR: SCORE: 1888.63

## 2019-07-08 NOTE — TELEPHONE ENCOUNTER
Looks like [facility] is trying to get patients secondary insurance to cover the phentermine but if not- Are you okay with them to pay cash for it?

## 2019-07-08 NOTE — TELEPHONE ENCOUNTER
Reason for Call:  Medication or medication refill:    Do you use a [facility]?  Name of the pharmacy and phone number for the current request:  [facility] - [phone]    Name of the medication requested: phentermine 15    Other request: pharmacy calling. They are working on a denial from patients secondary insurance. When this comes through they will need permission from  to cash it out and no run it through insurance.    Can we leave a detailed message on this number? YES    Phone number patient can be reached at: Home number on file [phone] (home)    Best Time: any    Call taken on 7/8/2019 at 10:23 AM by Shannon Marcos

## 2019-07-09 ASSESSMENT — ANXIETY QUESTIONNAIRES: GAD7 TOTAL SCORE: 8

## 2019-07-09 ASSESSMENT — PATIENT HEALTH QUESTIONNAIRE - PHQ9: SUM OF ALL RESPONSES TO PHQ QUESTIONS 1-9: 1

## 2019-07-11 ENCOUNTER — NURSE TRIAGE (OUTPATIENT)
Dept: NURSING | Facility: CLINIC | Age: 34
End: 2019-07-11

## 2019-07-12 ENCOUNTER — TELEPHONE (OUTPATIENT)
Dept: FAMILY MEDICINE | Facility: OTHER | Age: 34
End: 2019-07-12

## 2019-07-12 NOTE — TELEPHONE ENCOUNTER
"\"I am having carpal tunnel pain in my left hand. I have surgery on 7/16 but this pain is severe(rates pain 10/10). I can't use that hand, the pain is even going up into my upper arm.It numb, I can't sleep. I have tried OTC meds and ice, it's not helping at all.\" Denies other sx. Triaged and advised ER. Pt not sure what she will do at this time.   Brunilda Townsend RN [facility]        Reason for Disposition    [1] SEVERE pain (e.g., excruciating, unable to use hand at all) AND [2] not improved after 2 hours of pain medicine    Additional Information    Negative: Followed a hand or wrist injury    Negative: Chest pain    Negative: Caused by an animal bite    Negative: Caused by a human bite    Negative: Wound looks infected    Negative: Finger pain is main symptom    Negative: Arm pain is main symptom    Negative: [1] Swollen joint AND [2] fever    Negative: [1] Red area or streak AND [2] fever    Negative: Patient sounds very sick or weak to the triager    Negative: [1] Looks infected (spreading redness, pus) AND [2] large red area (> 2 in. or 5 cm)    Protocols used: HAND AND WRIST PAIN-A-AH      "

## 2019-07-12 NOTE — TELEPHONE ENCOUNTER
Reason for call:  Symptom  Reason for call:  Patient reporting a symptom    Symptom or request: carpal tunnel ( left arm )    Duration (how long have symptoms been present): for awhile    Have you been treated for this before? Yes    Additional comments: pt is having surgery on Tuesday and she is wondering since she is really sore and its hard to use if she can get a pain medication prescribed. Please advise/     Phone Number patient can be reached at:  Cell number on file:    Telephone Information:   Mobile [phone]   Mobile [phone]       Best Time:  anytime    Can we leave a detailed message on this number:  YES    Call taken on 7/12/2019 at 8:40 AM by Rhoda Benites

## 2019-07-16 ENCOUNTER — TELEPHONE (OUTPATIENT)
Dept: NURSING | Facility: CLINIC | Age: 34
End: 2019-07-16

## 2019-07-16 ENCOUNTER — ANESTHESIA (OUTPATIENT)
Dept: SURGERY | Facility: CLINIC | Age: 34
End: 2019-07-16
Payer: MEDICARE

## 2019-07-16 ENCOUNTER — HOSPITAL ENCOUNTER (OUTPATIENT)
Facility: CLINIC | Age: 34
Discharge: HOME | End: 2019-07-16
Attending: ORTHOPAEDIC SURGERY | Admitting: ORTHOPAEDIC SURGERY
Payer: MEDICARE

## 2019-07-16 ENCOUNTER — ANESTHESIA EVENT (OUTPATIENT)
Dept: SURGERY | Facility: CLINIC | Age: 34
End: 2019-07-16
Payer: MEDICARE

## 2019-07-16 VITALS
SYSTOLIC BLOOD PRESSURE: 91 MMHG | HEART RATE: 70 BPM | RESPIRATION RATE: 16 BRPM | DIASTOLIC BLOOD PRESSURE: 58 MMHG | OXYGEN SATURATION: 98 % | TEMPERATURE: 98.2 F

## 2019-07-16 DIAGNOSIS — G56.02 LEFT CARPAL TUNNEL SYNDROME: Primary | ICD-10-CM

## 2019-07-16 LAB — HCG UR QL: NEGATIVE

## 2019-07-16 PROCEDURE — 64721 CARPAL TUNNEL SURGERY: CPT | Mod: LT | Performed by: ORTHOPAEDIC SURGERY

## 2019-07-16 PROCEDURE — 37000008 ZZH ANESTHESIA TECHNICAL FEE, 1ST 30 MIN: Performed by: ORTHOPAEDIC SURGERY

## 2019-07-16 PROCEDURE — 36000052 ZZH SURGERY LEVEL 2 EA 15 ADDTL MIN: Performed by: ORTHOPAEDIC SURGERY

## 2019-07-16 PROCEDURE — 25800030 ZZH RX IP 258 OP 636: Performed by: NURSE ANESTHETIST, CERTIFIED REGISTERED

## 2019-07-16 PROCEDURE — 25000125 ZZHC RX 250: Performed by: NURSE ANESTHETIST, CERTIFIED REGISTERED

## 2019-07-16 PROCEDURE — 37000009 ZZH ANESTHESIA TECHNICAL FEE, EACH ADDTL 15 MIN: Performed by: ORTHOPAEDIC SURGERY

## 2019-07-16 PROCEDURE — 25000128 H RX IP 250 OP 636: Performed by: ORTHOPAEDIC SURGERY

## 2019-07-16 PROCEDURE — 40000306 ZZH STATISTIC PRE PROC ASSESS II: Performed by: ORTHOPAEDIC SURGERY

## 2019-07-16 PROCEDURE — 25000128 H RX IP 250 OP 636: Performed by: NURSE ANESTHETIST, CERTIFIED REGISTERED

## 2019-07-16 PROCEDURE — 81025 URINE PREGNANCY TEST: CPT | Performed by: NURSE ANESTHETIST, CERTIFIED REGISTERED

## 2019-07-16 PROCEDURE — 71000027 ZZH RECOVERY PHASE 2 EACH 15 MINS: Performed by: ORTHOPAEDIC SURGERY

## 2019-07-16 PROCEDURE — 36000050 ZZH SURGERY LEVEL 2 1ST 30 MIN: Performed by: ORTHOPAEDIC SURGERY

## 2019-07-16 PROCEDURE — 27210794 ZZH OR GENERAL SUPPLY STERILE: Performed by: ORTHOPAEDIC SURGERY

## 2019-07-16 RX ORDER — SODIUM CHLORIDE, SODIUM LACTATE, POTASSIUM CHLORIDE, CALCIUM CHLORIDE 600; 310; 30; 20 MG/100ML; MG/100ML; MG/100ML; MG/100ML
INJECTION, SOLUTION INTRAVENOUS CONTINUOUS
Status: DISCONTINUED | OUTPATIENT
Start: 2019-07-16 | End: 2019-07-16 | Stop reason: HOSPADM

## 2019-07-16 RX ORDER — LIDOCAINE 40 MG/G
CREAM TOPICAL
Status: DISCONTINUED | OUTPATIENT
Start: 2019-07-16 | End: 2019-07-16 | Stop reason: HOSPADM

## 2019-07-16 RX ORDER — MEPERIDINE HYDROCHLORIDE 25 MG/ML
12.5 INJECTION INTRAMUSCULAR; INTRAVENOUS; SUBCUTANEOUS
Status: DISCONTINUED | OUTPATIENT
Start: 2019-07-16 | End: 2019-07-16 | Stop reason: HOSPADM

## 2019-07-16 RX ORDER — FENTANYL CITRATE 50 UG/ML
25-50 INJECTION, SOLUTION INTRAMUSCULAR; INTRAVENOUS
Status: DISCONTINUED | OUTPATIENT
Start: 2019-07-16 | End: 2019-07-16 | Stop reason: HOSPADM

## 2019-07-16 RX ORDER — HYDRALAZINE HYDROCHLORIDE 20 MG/ML
2.5-5 INJECTION INTRAMUSCULAR; INTRAVENOUS EVERY 10 MIN PRN
Status: DISCONTINUED | OUTPATIENT
Start: 2019-07-16 | End: 2019-07-16 | Stop reason: HOSPADM

## 2019-07-16 RX ORDER — ONDANSETRON 2 MG/ML
4 INJECTION INTRAMUSCULAR; INTRAVENOUS EVERY 30 MIN PRN
Status: DISCONTINUED | OUTPATIENT
Start: 2019-07-16 | End: 2019-07-16 | Stop reason: HOSPADM

## 2019-07-16 RX ORDER — NALOXONE HYDROCHLORIDE 0.4 MG/ML
.1-.4 INJECTION, SOLUTION INTRAMUSCULAR; INTRAVENOUS; SUBCUTANEOUS
Status: DISCONTINUED | OUTPATIENT
Start: 2019-07-16 | End: 2019-07-16 | Stop reason: HOSPADM

## 2019-07-16 RX ORDER — LIDOCAINE HYDROCHLORIDE 20 MG/ML
INJECTION, SOLUTION INFILTRATION; PERINEURAL PRN
Status: DISCONTINUED | OUTPATIENT
Start: 2019-07-16 | End: 2019-07-16

## 2019-07-16 RX ORDER — PROPOFOL 10 MG/ML
INJECTION, EMULSION INTRAVENOUS CONTINUOUS PRN
Status: DISCONTINUED | OUTPATIENT
Start: 2019-07-16 | End: 2019-07-16

## 2019-07-16 RX ORDER — HYDROMORPHONE HYDROCHLORIDE 1 MG/ML
.3-.5 INJECTION, SOLUTION INTRAMUSCULAR; INTRAVENOUS; SUBCUTANEOUS EVERY 10 MIN PRN
Status: DISCONTINUED | OUTPATIENT
Start: 2019-07-16 | End: 2019-07-16 | Stop reason: HOSPADM

## 2019-07-16 RX ORDER — ONDANSETRON 4 MG/1
4 TABLET, ORALLY DISINTEGRATING ORAL EVERY 30 MIN PRN
Status: DISCONTINUED | OUTPATIENT
Start: 2019-07-16 | End: 2019-07-16 | Stop reason: HOSPADM

## 2019-07-16 RX ORDER — OXYCODONE HYDROCHLORIDE 5 MG/1
5-10 TABLET ORAL EVERY 6 HOURS PRN
Qty: 4 TABLET | Refills: 0 | Status: SHIPPED | OUTPATIENT
Start: 2019-07-16 | End: 2019-07-25

## 2019-07-16 RX ORDER — DIMENHYDRINATE 50 MG/ML
25 INJECTION, SOLUTION INTRAMUSCULAR; INTRAVENOUS
Status: DISCONTINUED | OUTPATIENT
Start: 2019-07-16 | End: 2019-07-16 | Stop reason: HOSPADM

## 2019-07-16 RX ORDER — PROPOFOL 10 MG/ML
INJECTION, EMULSION INTRAVENOUS PRN
Status: DISCONTINUED | OUTPATIENT
Start: 2019-07-16 | End: 2019-07-16

## 2019-07-16 RX ORDER — ALBUTEROL SULFATE 0.83 MG/ML
2.5 SOLUTION RESPIRATORY (INHALATION) EVERY 4 HOURS PRN
Status: DISCONTINUED | OUTPATIENT
Start: 2019-07-16 | End: 2019-07-16 | Stop reason: HOSPADM

## 2019-07-16 RX ORDER — BUPIVACAINE HYDROCHLORIDE 5 MG/ML
INJECTION, SOLUTION PERINEURAL PRN
Status: DISCONTINUED | OUTPATIENT
Start: 2019-07-16 | End: 2019-07-16 | Stop reason: HOSPADM

## 2019-07-16 RX ORDER — FENTANYL CITRATE 50 UG/ML
INJECTION, SOLUTION INTRAMUSCULAR; INTRAVENOUS PRN
Status: DISCONTINUED | OUTPATIENT
Start: 2019-07-16 | End: 2019-07-16

## 2019-07-16 RX ORDER — ONDANSETRON 2 MG/ML
INJECTION INTRAMUSCULAR; INTRAVENOUS PRN
Status: DISCONTINUED | OUTPATIENT
Start: 2019-07-16 | End: 2019-07-16

## 2019-07-16 RX ADMIN — MIDAZOLAM 2 MG: 1 INJECTION INTRAMUSCULAR; INTRAVENOUS at 13:02

## 2019-07-16 RX ADMIN — ONDANSETRON 4 MG: 2 INJECTION INTRAMUSCULAR; INTRAVENOUS at 13:19

## 2019-07-16 RX ADMIN — SODIUM CHLORIDE, POTASSIUM CHLORIDE, SODIUM LACTATE AND CALCIUM CHLORIDE: 600; 310; 30; 20 INJECTION, SOLUTION INTRAVENOUS at 12:42

## 2019-07-16 RX ADMIN — PROPOFOL 50 MG: 10 INJECTION, EMULSION INTRAVENOUS at 13:06

## 2019-07-16 RX ADMIN — PROPOFOL 150 MCG/KG/MIN: 10 INJECTION, EMULSION INTRAVENOUS at 13:06

## 2019-07-16 RX ADMIN — FENTANYL CITRATE 50 MCG: 50 INJECTION, SOLUTION INTRAMUSCULAR; INTRAVENOUS at 13:02

## 2019-07-16 RX ADMIN — LIDOCAINE HYDROCHLORIDE 40 MG: 20 INJECTION, SOLUTION INFILTRATION; PERINEURAL at 13:06

## 2019-07-16 NOTE — ANESTHESIA PREPROCEDURE EVALUATION
Anesthesia Pre-Procedure Evaluation    Patient: Florina Marie   MRN: [id number] : 1985          Preoperative Diagnosis: Right carpal tunnel syndrome    Procedure(s):  RIGHT RELEASE CARPAL TUNNEL    Past Medical History:   Diagnosis Date     Attention deficit disorder with hyperactivity(314.01)      Other kyphoscoliosis and scoliosis      Other specified delay in development     Microcephaly     Viral warts, unspecified     plantar warts     Past Surgical History:   Procedure Laterality Date     EXAM UNDER ANESTHESIA PELVIC N/A 2016    Procedure: EXAM UNDER ANESTHESIA PELVIC;  Surgeon: Rhoda Alcazar MD;  Location[facility]     HC TOOTH EXTRACTION W/FORCEP      wisdom     RELEASE CARPAL TUNNEL Right 3/22/2019    Procedure: RIGHT RELEASE CARPAL TUNNEL;  Surgeon: Anjum Wilburn DO;  Location: [facility] OR       Anesthesia Evaluation     . Pt has had prior anesthetic. Type: MAC    No history of anesthetic complications          ROS/MED HX    ENT/Pulmonary:  - neg pulmonary ROS     Neurologic:     (+)other neuro ADHD, and learning disability    Cardiovascular:  - neg cardiovascular ROS   (+) ----. : . . . :. . Previous cardiac testing date:results:date: results:ECG reviewed date:18 results:SB date: results:          METS/Exercise Tolerance:     Hematologic:  - neg hematologic  ROS       Musculoskeletal:  - neg musculoskeletal ROS       GI/Hepatic:  - neg GI/hepatic ROS      (-) GERD   Renal/Genitourinary:  - ROS Renal section negative       Endo:     (+) Obesity, .      Psychiatric:     (+) psychiatric history anxiety      Infectious Disease:  - neg infectious disease ROS       Malignancy:      - no malignancy   Other:    - neg other ROS                        Physical Exam  Normal systems: cardiovascular, pulmonary and dental    Airway   Mallampati: II  Neck ROM: full    Dental     Cardiovascular   Rhythm and rate: regular and normal      Pulmonary    breath sounds clear to  "auscultation            Lab Results   Component Value Date    WBC 10.2 03/19/2019    HGB 13.2 03/19/2019    HCT 40.9 03/19/2019     03/19/2019    CRP 11.0 (H) 07/10/2018    SED 13 07/10/2018     07/09/2018    POTASSIUM 3.9 07/09/2018    CHLORIDE 104 07/09/2018    CO2 25 07/09/2018    BUN 11 07/09/2018    CR 1.01 07/09/2018    GLC 88 07/09/2018    SY 8.8 07/09/2018    MAG 2.1 05/16/2002    ALBUMIN 3.8 07/09/2018    PROTTOTAL 7.9 07/09/2018    ALT 23 07/09/2018    AST 13 07/09/2018    ALKPHOS 71 07/09/2018    BILITOTAL 0.5 07/09/2018    LIPASE 100 11/15/2015    PTT 31 08/09/2018    INR 1.10 07/09/2018    TSH 1.18 07/10/2018    T4 0.84 08/24/2017    HCG Negative 09/09/2016       Preop Vitals  BP Readings from Last 3 Encounters:   07/08/19 122/76   06/27/19 110/68   06/12/19 102/68    Pulse Readings from Last 3 Encounters:   07/08/19 80   06/12/19 83   06/04/19 80      Resp Readings from Last 3 Encounters:   07/08/19 16   06/12/19 16   06/04/19 16    SpO2 Readings from Last 3 Encounters:   07/08/19 100%   06/12/19 96%   06/04/19 98%      Temp Readings from Last 1 Encounters:   07/08/19 98.2  F (36.8  C) (Temporal)    Ht Readings from Last 1 Encounters:   07/08/19 1.664 m (5' 5.5\")      Wt Readings from Last 1 Encounters:   07/08/19 117.5 kg (259 lb)    Estimated body mass index is 42.44 kg/m  as calculated from the following:    Height as of 7/8/19: 1.664 m (5' 5.5\").    Weight as of 7/8/19: 117.5 kg (259 lb).       Anesthesia Plan      History & Physical Review  History and physical reviewed and following examination; no interval change.    ASA Status:  2 .    NPO Status:  > 8 hours    Plan for MAC Reason for MAC:  Deep or markedly invasive procedure (G8)  PONV prophylaxis:  Ondansetron (or other 5HT-3)  Pt ate food on way in.  Dr. Wilburn may do this as a local.  He is going to talk to the patient      Postoperative Care  Postoperative pain management:  IV analgesics and Oral pain medications.  "     Consents  Anesthetic plan, risks, benefits and alternatives discussed with:  Patient.  Use of blood products discussed: No .   .                   ANGIE Caldwell CRNA

## 2019-07-16 NOTE — LETTER
[facility] PHASE II  [address] [place] [address]  Phone: [phone]    July 16, 2019        Florina Marie  [address]    [facility] [address]          To whom it may concern:    RE: Florina Marie    Patient was seen and treated today at our clinic.  Patient may return to work with the following restrictions:   ---No use of the left hand or arm  ---No push pull lift with the left hand or arm.   Re-evaluation will be 7/25/19    Please contact me for questions or concerns.      Sincerely,        ANGIE Lin, CNP  Orthopedic Surgery

## 2019-07-16 NOTE — DISCHARGE INSTRUCTIONS
Carpal Tunnel Release Discharge Instructions  Dr. Wilburn                                       [phone]  Bone and Joint Service Line for issues or concerns      General Care:  After surgery you may feel tired/sleepy. This is normal. Please have someone stay with you for 24 hours after surgery. You should avoid driving until released by your physician to do so. If you have any question along the way please contact the office. If you feel it is an issue cannot wait for normal office hours, contact the on-call physician.    Bandages:   Remove your bandage at 3-4 days after surgery. Keep this bandage clean and dry. Then keep the area clean, dry, and covered.    Bathing:  Do not submerge your incision in water such as a bath or pool. Keep your splint/bandage clean and dry. Keep your incision/splint covered with a sealed bandage when bathing. Saran wrap and a bag works well.     Follow up:  Your follow up appointment should already be scheduled. If its not, please call the office to verify an appointment about 10 days after surgery.     Diet:  Start with non-alcoholic liquids at first, particularly water or sports drinks after surgery. Progress to bland foods such as crackers and bread and finally to your normal diet if you have no problems.     Pain control:  Take your pain medications as prescribed. These medications may make you sleepy. Do not drive, operate equipment, or drink alcohol when taking these.  You may take Tylenol (Generic name is acetaminophen) as directed on the bottle in place of the prescribed pain medications as your pain gets better. You may take NSAIDs (Motrin, Ibuprofen, Aleve, Naproxen) as directed on the bottle for minor discomfort. If the medications cause a reaction such as nausea or skin rash, stop taking them and contact your doctor. Please plan accordingly, pain medications will not be re-filled on the weekends or at night. Call the office during the day if you need more  medications.    Physical Therapy/Occupational Therapy:  At your first post-operative visit, your doctor will direct you on your personal therapy program if needed.     Activity:  Keep your hand elevated for the first couple days after surgery. Avoid lifting, pushing, and or pulling with the operated hand.       Normal findings after surgery:  Numbness and tenderness around the incisions is normal.  You may have bruising around the incisions.  Low grade fevers less than 100.5 degrees Fahrenheit are normal.     When to call the Office:  Temperature greater than 101.5 degrees Fahreheit.  Fever, chills, and increasing pain in the hand and wrist.  Excessive drainage from the incisions that include bright red blood.  Drainage from the incisions sites that appear yellow, pus-like, or foul smelling.  Persistent nausea or vomiting.  Any other effects you feel are significant.

## 2019-07-16 NOTE — OP NOTE
PREOPERATIVE DIAGNOSES:   1. Left carpal tunnel syndrome.     POSTOPERATIVE DIAGNOSES:   1. Left carpal tunnel syndrome.     PROCEDURE:   1. Left carpal tunnel release.     SURGEON: Anjum Wilburn DO   ASSISTANT: Abdias Torres APRN, CNP, DNP  ANESTHESIA: Local with IV sedation.   COMPLICATIONS: None.   ESTIMATED BLOOD LOSS: Minimal.   COUNTS: Correct.   TOURNIQUET TIME: 4 minutes at 250mm Hg  GROSS FINDINGS: There was evidence of compression of the median nerve. There was an hourglass deformity to the nerve. There were no space-occupying lesions or masses in the carpal tunnel.   DISPOSITION: To PACU in stable condition.     NOTE/INDICATIONS:Ms. Marie is a pleasant 33 year old year-old female with complaints of Left hand numbness and tingling. The patient has attempted conservative treatments that include bracing, rest, activity modification, time yet still continues to have significant issues. These issues include progressive numbness, waking at night due to the numbness/pain, difficulty with activity. . Secondary to these reasons I have offered surgery in the form of left carpal tunnel release.    All risks, benefits, complications, alternatives, anticipated postop course were discussed at length prior to surgery.  Complications such as infection, bleeding, nerve damage, incomplete release, repeat surgery, scar sensitivity, and skin problems were discussed. Also reviewed were expectations and the goal of surgery. With surgery the goal would be to prevent further damage to the nerve, the surgery may not be able to reverse any current damage. Postoperatively there will be limitation in use for approximately 3-4 weeks or as clinically indicated. All questions were answered. The patient agrees to proceed with surgery.   DETAILS OF PROCEDURE: Ms. Marie was met in the preop care unit. Again, informed consent was verified. The appropriate extremity was marked and the patient was wheeled back to the Operative Suite at  [facility]. she was transferred off the cart to the OR table without incident. All bony prominence were padded and the patient was secured to the table.     A time-out was performed. The appropriate patient, extremity and surgery were verified. Then the skin and carpal tunnel on the surgical wrist was injected with a total of 10 mL of 0.25% Marcaine. ,The Left upper extremity was prepped and draped in a normal manner. An Esmarch was utilized to exsanguinate the extremity. The tourniquet was inflated. A longitudinal skin incision was made through the skin over the carpal tunnel. Care was taken to protect all pertinent neurovascular structures. Using a combination of sharp and blunt dissection the incision was advanced down to the transverse carpal ligament. Utilizing a knife and a scissors, the transverse carpal ligament was released in its entirety. There were no space-occupying lesions or masses. There was evidence of compression on the nerve as noted above. Next, the tourniquet was deflated. Hemostasis was achieved. The skin was closed with 4-0 nylon suture in interrupted format. A sterile dressing  was applied to the Left upper extremity.     When deemed appropriate the patient was transferred to the PACU in stable condition. The patient will be discharged home with pain medication and detailed post-operative care instructions.     Anjum Wilburn D.O.

## 2019-07-16 NOTE — INTERVAL H&P NOTE
This H&P has been reviewed and there are no clinically significant changes in the patient s condition.  The patient was evaluated by myself as well as Ekaterina VALLEJO prior to surgery. Will involve IM if admitted and medically appropriate. The Patient is approved for the surgery and the stated surgical procedure is still clinically indicated.

## 2019-07-16 NOTE — BRIEF OP NOTE
[facility] Brief Operative Note    Pre-operative diagnosis: Left carpal tunnel syndrome   Post-operative diagnosis: Same   Procedure: Procedure(s):  RELEASE, CARPAL TUNNEL-Left   Surgeon:  Anesthesia: Anjum Wilburn,   Local with MAC   Assistant(s): ANGIE Wiggins, CNP, DNP   Estimated blood loss:  Fluids: Less than 10 ml  800 ml Crystalloids   Specimens: None   Findings: See dictated operative report for full details      Akshat Wilburn D.O.

## 2019-07-17 NOTE — TELEPHONE ENCOUNTER
"Caller is wanting me to look up a 3 digit number on the top of Florina's pill bottle\" Out dog chewed half the paper off of it.\" Advised to call a 24 hr pharmacy.  Brunilda Townsend RN [facility]      "

## 2019-07-17 NOTE — ANESTHESIA POSTPROCEDURE EVALUATION
Patient: Florina Marie    Procedure(s):  RELEASE, CARPAL TUNNEL-Left    Diagnosis:Left carpal tunnel syndrome  Diagnosis Additional Information: No value filed.    Anesthesia Type:  MAC    Note:  Anesthesia Post Evaluation    Patient location during evaluation: Phase 2  Patient participation: Able to fully participate in evaluation  Level of consciousness: awake  Pain management: adequate  Airway patency: patent  Cardiovascular status: acceptable and hemodynamically stable  Respiratory status: acceptable, room air and nonlabored ventilation  Hydration status: stable  PONV: none     Anesthetic complications: None    Comments: Patient was happy with the anesthesia care received and no anesthesia related complications were noted.  I will follow up with the patient again if it is needed.        Last vitals:  Vitals:    07/16/19 1340 07/16/19 1345 07/16/19 1400   BP: 104/69 121/89 91/58   Pulse: 70 71 70   Resp:  16 16   Temp:      SpO2: 100% 99% 98%         Electronically Signed By: ANGIE Caldwell CRNA  July 16, 2019  11:14 PM

## 2019-07-17 NOTE — ANESTHESIA CARE TRANSFER NOTE
Patient: Florina Marie    Procedure(s):  RELEASE, CARPAL TUNNEL-Left    Diagnosis: Left carpal tunnel syndrome  Diagnosis Additional Information: No value filed.    Anesthesia Type:   MAC     Note:  Airway :Face Mask  Patient transferred to:Phase II  Handoff Report: Identifed the Patient, Identified the Reponsible Provider, Reviewed the pertinent medical history, Discussed the surgical course, Reviewed Intra-OP anesthesia mangement and issues during anesthesia, Set expectations for post-procedure period and Allowed opportunity for questions and acknowledgement of understanding      Vitals: (Last set prior to Anesthesia Care Transfer)    CRNA VITALS  7/16/2019 1307 - 7/16/2019 1407      7/16/2019             EKG:  Sinus rhythm                Electronically Signed By: ANGIE Caldwell CRNA  July 16, 2019  11:13 PM

## 2019-07-18 NOTE — PROGRESS NOTES
[facility]  Discharge Call Back  Florina Marie  1985  MRN: [id number]  Home: [phone] (home)   PCP: Alanna Curiel    We are calling to see how you're doing since your surgery/procedure with us?   Comments: Doing good  Clinical Questions  1. Have you had time to look at your discharge instructions? Do you have any questions in regards to the instructions?   Comment: yes, do I need to put a clean bandage on after I remove this one tomorrow?  2. Do you feel your pain is being controlled with the regimen the surgeon sent you home on? (ie: prescription medications, over the counter pain medications, ice packs)   Comments: Yes  3. Have you noticed any drainage on your dressing? Do you know what to do if you have bleeding as a result of your procedure?   Comments: no  4. Have you had any nausea/vomiting? Do you know how to treat this?   Comment: no  5. Have you had any signs/symptoms of infection? (ie: fever, swelling, heat, drainage or redness) Do you know what to do if you have?   Comment: no  6. Do you have a follow up appointment made with your surgeon? Do you have a number to contact them at if you need it?   Comment: yes, yes  Retained Foreign Object (KEYLA, Hemovac, Penrose, Wound Packing, Vaginal Packing, Nasal Splints, Urethral Stents, Nichols Catheter)  1. Do you still have na in place?   2. If the item is still in place, can you review the plan for removal with me? na      You may be randomly selected to fill out a [facility] survey. We would appreciate you taking the time to fill this out. It is important to us if you would answer all of the questions on the survey.

## 2019-07-25 ENCOUNTER — OFFICE VISIT (OUTPATIENT)
Dept: ORTHOPEDICS | Facility: OTHER | Age: 34
End: 2019-07-25
Payer: MEDICARE

## 2019-07-25 VITALS — BODY MASS INDEX: 41.62 KG/M2 | WEIGHT: 259 LBS | TEMPERATURE: 97.8 F | HEIGHT: 66 IN

## 2019-07-25 DIAGNOSIS — Z98.890 S/P CARPAL TUNNEL RELEASE: Primary | ICD-10-CM

## 2019-07-25 PROCEDURE — 99024 POSTOP FOLLOW-UP VISIT: CPT | Performed by: NURSE PRACTITIONER

## 2019-07-25 ASSESSMENT — MIFFLIN-ST. JEOR: SCORE: 1888.63

## 2019-07-25 ASSESSMENT — PAIN SCALES - GENERAL: PAINLEVEL: NO PAIN (0)

## 2019-07-25 NOTE — PROGRESS NOTES
Orthopedic Clinic Post-Operative Note    CHIEF COMPLAINT:   Chief Complaint   Patient presents with     Surgical Followup     DOS: 7/16/2019~Left carpal tunnel release~9 DAYS POSTOP       HISTORY OF PRESENT ILLNESS  Returns to clinic. Doing very well. Numbness/tingling has nearly completely resolved.  Denies pain or swelling, no incision concerns. Very happy with outcome.  Would like to start back to work. Works in housekeeping at a hotel.     Patient's past medical, surgical, social and family histories reviewed.     Past Medical History:   Diagnosis Date     Attention deficit disorder with hyperactivity(314.01)      Other kyphoscoliosis and scoliosis      Other specified delay in development     Microcephaly     Viral warts, unspecified     plantar warts       Past Surgical History:   Procedure Laterality Date     EXAM UNDER ANESTHESIA PELVIC N/A 9/9/2016    Procedure: EXAM UNDER ANESTHESIA PELVIC;  Surgeon: Rhoda Alcazar MD;  [facility]     HC TOOTH EXTRACTION W/FORCEP      wisdom     RELEASE CARPAL TUNNEL Right 3/22/2019    Procedure: RIGHT RELEASE CARPAL TUNNEL;  Surgeon: Anjum Wilburn DO;  [facility]     RELEASE CARPAL TUNNEL Left 7/16/2019    Procedure: RELEASE, CARPAL TUNNEL-Left;  Surgeon: Anjum Wilburn DO;  [facility]       Medications:    Current Outpatient Medications on File Prior to Visit:  ALPRAZolam (XANAX) 0.5 MG tablet TAKE ONE TABLET AS NEEDED FOR SEVERE ANXIETY MAX OF 2 TABLETS PER DAY   escitalopram (LEXAPRO) 10 MG tablet TAKE 1 TABLET BY MOUTH ONCE DAILY WITH 20MG TABLET FOR A TOTAL OF 30MG   escitalopram (LEXAPRO) 20 MG tablet TAKE 1 TABLET BY MOUTH DAILY.   etonogestrel (IMPLANON/NEXPLANON) 68 MG IMPL 1 each (68 mg) by Subdermal route continuous     No current facility-administered medications on file prior to visit.     Allergies   Allergen Reactions     No Known Drug Allergies        Social History     Occupational History     Occupation: student  "    Comment: [employer]   Tobacco Use     Smoking status: Never Smoker     Smokeless tobacco: Never Used     Tobacco comment: no smokers in the household   Substance and Sexual Activity     Alcohol use: No     Frequency: Never     Drug use: No     Sexual activity: Never     Birth control/protection: Implant       Family History   Problem Relation Age of Onset     Lipids Father         diet     Thyroid Disease Mother         unsure if hypo or hyper     Diabetes Paternal Grandfather         adult     Heart Disease Paternal Grandfather         bypass/open hear surgery     Lipids Maternal Aunt         medication     Lipids Maternal Aunt         diet     Alzheimer Disease Maternal Grandfather      Hypertension No family hx of      Coronary Artery Disease No family hx of      Hyperlipidemia No family hx of      Cancer - colorectal No family hx of      Ovarian Cancer No family hx of      Prostate Cancer No family hx of      Depression/Anxiety No family hx of      Cerebrovascular Disease No family hx of      Anesthesia Reaction No family hx of      Asthma No family hx of      Osteoporosis No family hx of      Chemical Addiction No family hx of      Obesity No family hx of        REVIEW OF SYSTEMS  General: negative for, night sweats, dizziness, fatigue  Resp: No shortness of breath and no cough  CV: negative for chest pain, syncope or near-syncope  GI: negative for nausea, vomiting and diarrhea  : negative for dysuria and hematuria  Musculoskeletal: as above  Neurologic: negative for syncope   Hematologic: negative for bleeding disorder    Physical Exam:  Vitals: Temp 97.8  F (36.6  C) (Temporal)   Ht 1.664 m (5' 5.5\")   Wt 117.5 kg (259 lb)   BMI 42.44 kg/m    BMI= Body mass index is 42.44 kg/m .  Constitutional: healthy, alert and no acute distress   Psychiatric: mentation appears normal and affect normal/bright  NEURO: no focal deficits  SKIN: .healing well, well approximated skin edges, without signs of " infection including no erythema, incision breakdown or purlent drainage  JOINT/EXTREMITIES: left hand: full motion of wrist, fingers, thumb opposition, able to make a closed fist. No bruising. Very mild swelling of wrist.  Non-tender to incision area.  Sensation intact to fingers.  Fingers well perfused. Radial pulse 2+.   GAIT: not tested     Diagnostic Modalities:  None today.  Independent visualization of the images was performed.      Impression:   Chief Complaint   Patient presents with     Surgical Followup     DOS: 7/16/2019~Left carpal tunnel release~9 DAYS POSTOP   recovering as expected.     Plan:   Activity: slowly increasing as tolerated over the next 2 weeks. Discussed work, works as a  for a hotel, usually able to self limit to protect herself.  Discussed restrictions, plan will be to return starting next Tuesday (2 weeks after surgery) with a 5 pound lifting restriction to the left hand for 2 weeks then unrestricted.  Letter provided  Staples/Sutures out: Yes, no steri strips required.  Recommended open to air except if working in dirty or wet conditions then cover for the next 1-2 weeks.  Do not soak for the next 1-2 weeks.  Ok to shower and for hygiene to leave uncovered.   Pain controlled: Yes. Continue to use: ice, elevation, rest, OTC analgesia.   Physical Therapy: none at this time. work on hand and wrist motion at home.    Return to clinic PRN, or sooner as needed for changes.    Re-x-ray on return: No    ANGIE Lin, CNP  Orthopedic Surgery

## 2019-07-25 NOTE — LETTER
7/25/2019         RE: Florina Marie  [address]  [address]  [place] [address]        Dear Colleague,    Thank you for referring your patient, Florina Marie, to the [facility]. Please see a copy of my visit note below.    Orthopedic Clinic Post-Operative Note    CHIEF COMPLAINT:   Chief Complaint   Patient presents with     Surgical Followup     DOS: 7/16/2019~Left carpal tunnel release~9 DAYS POSTOP       HISTORY OF PRESENT ILLNESS  Returns to clinic. Doing very well. Numbness/tingling has nearly completely resolved.  Denies pain or swelling, no incision concerns. Very happy with outcome.  Would like to start back to work. Works in housekeeping at a hotel.     Patient's past medical, surgical, social and family histories reviewed.     Past Medical History:   Diagnosis Date     Attention deficit disorder with hyperactivity(314.01)      Other kyphoscoliosis and scoliosis      Other specified delay in development     Microcephaly     Viral warts, unspecified     plantar warts       Past Surgical History:   Procedure Laterality Date     EXAM UNDER ANESTHESIA PELVIC N/A 9/9/2016    Procedure: EXAM UNDER ANESTHESIA PELVIC;  Surgeon: Rhoda Alcazar MD;  [facility]     HC TOOTH EXTRACTION W/FORCEP      wisdom     RELEASE CARPAL TUNNEL Right 3/22/2019    Procedure: RIGHT RELEASE CARPAL TUNNEL;  Surgeon: Anjum Wilburn DO;  [facility]     RELEASE CARPAL TUNNEL Left 7/16/2019    Procedure: RELEASE, CARPAL TUNNEL-Left;  Surgeon: Anjum Wilburn DO;  [facility]       Medications:    Current Outpatient Medications on File Prior to Visit:  ALPRAZolam (XANAX) 0.5 MG tablet TAKE ONE TABLET AS NEEDED FOR SEVERE ANXIETY MAX OF 2 TABLETS PER DAY   escitalopram (LEXAPRO) 10 MG tablet TAKE 1 TABLET BY MOUTH ONCE DAILY WITH 20MG TABLET FOR A TOTAL OF 30MG   escitalopram (LEXAPRO) 20 MG tablet TAKE 1 TABLET BY MOUTH DAILY.   etonogestrel (IMPLANON/NEXPLANON) 68 MG  "IMPL 1 each (68 mg) by Subdermal route continuous     No current facility-administered medications on file prior to visit.     Allergies   Allergen Reactions     No Known Drug Allergies        Social History     Occupational History     Occupation: student     Comment: [employer]   Tobacco Use     Smoking status: Never Smoker     Smokeless tobacco: Never Used     Tobacco comment: no smokers in the household   Substance and Sexual Activity     Alcohol use: No     Frequency: Never     Drug use: No     Sexual activity: Never     Birth control/protection: Implant       Family History   Problem Relation Age of Onset     Lipids Father         diet     Thyroid Disease Mother         unsure if hypo or hyper     Diabetes Paternal Grandfather         adult     Heart Disease Paternal Grandfather         bypass/open hear surgery     Lipids Maternal Aunt         medication     Lipids Maternal Aunt         diet     Alzheimer Disease Maternal Grandfather      Hypertension No family hx of      Coronary Artery Disease No family hx of      Hyperlipidemia No family hx of      Cancer - colorectal No family hx of      Ovarian Cancer No family hx of      Prostate Cancer No family hx of      Depression/Anxiety No family hx of      Cerebrovascular Disease No family hx of      Anesthesia Reaction No family hx of      Asthma No family hx of      Osteoporosis No family hx of      Chemical Addiction No family hx of      Obesity No family hx of        REVIEW OF SYSTEMS  General: negative for, night sweats, dizziness, fatigue  Resp: No shortness of breath and no cough  CV: negative for chest pain, syncope or near-syncope  GI: negative for nausea, vomiting and diarrhea  : negative for dysuria and hematuria  Musculoskeletal: as above  Neurologic: negative for syncope   Hematologic: negative for bleeding disorder    Physical Exam:  Vitals: Temp 97.8  F (36.6  C) (Temporal)   Ht 1.664 m (5' 5.5\")   Wt 117.5 kg (259 lb)   BMI 42.44 " kg/m     BMI= Body mass index is 42.44 kg/m .  Constitutional: healthy, alert and no acute distress   Psychiatric: mentation appears normal and affect normal/bright  NEURO: no focal deficits  SKIN: .healing well, well approximated skin edges, without signs of infection including no erythema, incision breakdown or purlent drainage  JOINT/EXTREMITIES: left hand: full motion of wrist, fingers, thumb opposition, able to make a closed fist. No bruising. Very mild swelling of wrist.  Non-tender to incision area.  Sensation intact to fingers.  Fingers well perfused. Radial pulse 2+.   GAIT: not tested     Diagnostic Modalities:  None today.  Independent visualization of the images was performed.      Impression:   Chief Complaint   Patient presents with     Surgical Followup     DOS: 7/16/2019~Left carpal tunnel release~9 DAYS POSTOP   recovering as expected.     Plan:   Activity: slowly increasing as tolerated over the next 2 weeks. Discussed work, works as a  for a hotel, usually able to self limit to protect herself.  Discussed restrictions, plan will be to return starting next Tuesday (2 weeks after surgery) with a 5 pound lifting restriction to the left hand for 2 weeks then unrestricted.  Letter provided  Staples/Sutures out: Yes, no steri strips required.  Recommended open to air except if working in dirty or wet conditions then cover for the next 1-2 weeks.  Do not soak for the next 1-2 weeks.  Ok to shower and for hygiene to leave uncovered.   Pain controlled: Yes. Continue to use: ice, elevation, rest, OTC analgesia.   Physical Therapy: none at this time. work on hand and wrist motion at home.    Return to clinic PRN, or sooner as needed for changes.    Re-x-ray on return: No    ANGIE Lin, CNP  Orthopedic Surgery      Again, thank you for allowing me to participate in the care of your patient.        Sincerely,        Anjum Wilburn,

## 2019-07-25 NOTE — LETTER
[address]  [address]  [facility] [address]  Phone: [phone]    July 25, 2019        Florina Marie  [address]    [facility] [address]          To whom it may concern:    RE: Florina Marie    Patient was seen and treated today at our clinic.  Patient may return to work 7/30/2019 with the following:  No push, pull or lift greater than 5lbs with left upper extremity. Starting 8/13/2019 unrestricted.    Please contact me for questions or concerns.      Sincerely,        Anjum Wilburn, DO

## 2019-08-09 ENCOUNTER — TELEPHONE (OUTPATIENT)
Dept: FAMILY MEDICINE | Facility: OTHER | Age: 34
End: 2019-08-09

## 2019-08-09 NOTE — TELEPHONE ENCOUNTER
Reason for Call:  Form, our goal is to have forms completed with 72 hours, however, some forms may require a visit or additional information.    Type of letter, form or note:  Social Security Admin.     Who is the form from?: same (if other please explain)    Where did the form come from: form was mailed in    What clinic location was the form placed at?: [facility] - [phone]    Where the form was placed: box Box/Folder    What number is listed as a contact on the form?: [phone]       Additional comments: please fill out and send in envelope enclosed     Call taken on 8/9/2019 at 11:16 AM by Liv Roblero

## 2019-08-19 ENCOUNTER — TELEPHONE (OUTPATIENT)
Dept: FAMILY MEDICINE | Facility: OTHER | Age: 34
End: 2019-08-19

## 2019-08-19 NOTE — TELEPHONE ENCOUNTER
Reason for Call:  Same Day Appointment, Requested Provider:  Alanna Curiel MD     PCP: Alanna Curiel    Reason for visit: Weight check, discuss weight loss    Duration of symptoms: ---    Have you been treated for this in the past? Yes    Additional comments: Patient is asking to be worked in tomorrow with TC. She did not want to schedule for Friday.     Can we leave a detailed message on this number? YES    Phone number patient can be reached at: Home number on file [phone] (home)    Best Time: any    Call taken on 8/19/2019 at 8:06 AM by Kit Martinez

## 2019-08-19 NOTE — PROGRESS NOTES
Group Topic: Monitoring Safety and Relapse    Start Time: 9:00 AM  End Time: 12:00 PM    Number in attendance: 8       Safety Concerns: Suicidal ideation and Urge to harm self  Types of Safety Concerns: SI 3/10; MARCO 3/10  Physical Concerns: No  Use of Street Drugs: No  Taking Medication as Prescribed?: Yes    Pt continues with passive thoughts, but expressed decreasing in frequency and intensity.  Pt has no plan or intent, and cites her grandchildren as a protective factor.  Pt also uses distraction and recently self-soothing to help her manage, and will continue to do so going forward.   Subjective     Florina Marie is a 34 year old female who presents to clinic today for the following health issues:    History of Present Illness        She eats 2-3 servings of fruits and vegetables daily.She consumes 2 sweetened beverage(s) daily.  She is taking medications regularly.   Answers for HPI/ROS submitted by the patient on 8/20/2019   Chronic problems general questions HPI Form  If you checked off any problems, how difficult have these problems made it for you to do your work, take care of things at home, or get along with other people?: Not difficult at all  PHQ9 TOTAL SCORE: 1  ZEENAT 7 TOTAL SCORE: 10    Medication Followup of Phentermine    Taking Medication as prescribed: yes    Side Effects:  None    Medication Helping Symptoms:  yes     Her insurance does not cover the phentermine or any weight loss medications and so she was off of this for a while.  Her weight did increase.  She did pick this up about a month ago and has had a few pounds of weight loss.  She states she is active, states she does a lot of biking.  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  She also has a puppy who keeps her active.    Patient Active Problem List   Diagnosis     Problems with learning     Morbid obesity (H)     Generalized anxiety disorder     Hyperlipidemia, unspecified     Attention deficit disorder without hyperactivity     Prolonged PTT     Right carpal tunnel syndrome     S/P carpal tunnel release     Left carpal tunnel syndrome     Past Surgical History:   Procedure Laterality Date     EXAM UNDER ANESTHESIA PELVIC N/A 9/9/2016    Procedure: EXAM UNDER ANESTHESIA PELVIC;  Surgeon: Rhoda Alcazar MD;  [facility]     HC TOOTH EXTRACTION W/FORCEP      wisdom     RELEASE CARPAL TUNNEL Right 3/22/2019    Procedure: RIGHT RELEASE CARPAL TUNNEL;  Surgeon: Anjum Wilburn DO;  [facility]     RELEASE CARPAL TUNNEL Left 7/16/2019    Procedure: RELEASE, CARPAL TUNNEL-Left;  Surgeon: Anjum Wilburn  DO Kyle;  Location: [facility]       Social History     Tobacco Use     Smoking status: Never Smoker     Smokeless tobacco: Never Used     Tobacco comment: no smokers in the household   Substance Use Topics     Alcohol use: No     Frequency: Never     Family History   Problem Relation Age of Onset     Lipids Father         diet     Thyroid Disease Mother         unsure if hypo or hyper     Diabetes Paternal Grandfather         adult     Heart Disease Paternal Grandfather         bypass/open hear surgery     Lipids Maternal Aunt         medication     Lipids Maternal Aunt         diet     Alzheimer Disease Maternal Grandfather      Hypertension No family hx of      Coronary Artery Disease No family hx of      Hyperlipidemia No family hx of      Cancer - colorectal No family hx of      Ovarian Cancer No family hx of      Prostate Cancer No family hx of      Depression/Anxiety No family hx of      Cerebrovascular Disease No family hx of      Anesthesia Reaction No family hx of      Asthma No family hx of      Osteoporosis No family hx of      Chemical Addiction No family hx of      Obesity No family hx of            Reviewed and updated as needed this visit by Provider  Allergies  Problems         Review of Systems   ROS COMP: CONSTITUTIONAL: NEGATIVE for fever, chills  ENT/MOUTH: NEGATIVE for ear, mouth and throat problems  RESP: NEGATIVE for significant cough or SOB  CV: NEGATIVE for chest pain, palpitations or peripheral edema      Objective    /72 (BP Location: Right arm, Patient Position: Chair, Cuff Size: Adult Large)   Pulse 105   Temp 98.6  F (37  C) (Temporal)   Resp 16   Wt 116.1 kg (256 lb)   SpO2 98%   BMI 41.95 kg/m    Body mass index is 41.95 kg/m .  Physical Exam   Gen: no apparent distress  Chest/CV: S1 and S2 normal, no murmurs, clicks, gallops or rubs. Regular rate and rhythm. Chest is clear; no wheezes or rales.   Psych: Alert and oriented times 3; coherent speech, normal   rate and  volume, able to articulate logical thoughts, able   to abstract reason, no tangential thoughts, no hallucinations   or delusions  Her affect is neutral.        Assessment & Plan     1. Morbid obesity (H)  She had been taking phentermine 50 mg daily.  Will increase to 18.75 mg daily.  She does state by decreasing the phentermine her irritability greatly improved and so hesitant to try increasing back to previous levels.  She prefers to follow-up monthly.    - phentermine (ADIPEX-P) 37.5 MG tablet; Take 0.5 tablets (18.75 mg) by mouth every morning (before breakfast)  Dispense: 15 tablet; Refill: 1  - Lipid panel reflex to direct LDL Fasting; Future  - **Glucose FUTURE anytime; Future    2. Hyperlipidemia, unspecified hyperlipidemia type  She is due to have cholesterol and blood sugar done.  She plans to come in fasting tomorrow to have these drawn.    - Lipid panel reflex to direct LDL Fasting; Future  - **Glucose FUTURE anytime; Future    Return in about 4 weeks (around 9/17/2019) for weight loss check.    Alanna Curiel MD  [facility]

## 2019-08-20 ENCOUNTER — OFFICE VISIT (OUTPATIENT)
Dept: FAMILY MEDICINE | Facility: OTHER | Age: 34
End: 2019-08-20
Payer: MEDICARE

## 2019-08-20 VITALS
TEMPERATURE: 98.6 F | DIASTOLIC BLOOD PRESSURE: 72 MMHG | RESPIRATION RATE: 16 BRPM | OXYGEN SATURATION: 98 % | BODY MASS INDEX: 41.95 KG/M2 | WEIGHT: 256 LBS | HEART RATE: 105 BPM | SYSTOLIC BLOOD PRESSURE: 118 MMHG

## 2019-08-20 DIAGNOSIS — E78.5 HYPERLIPIDEMIA, UNSPECIFIED HYPERLIPIDEMIA TYPE: ICD-10-CM

## 2019-08-20 DIAGNOSIS — E66.01 MORBID OBESITY (H): Primary | ICD-10-CM

## 2019-08-20 PROCEDURE — 99213 OFFICE O/P EST LOW 20 MIN: CPT | Performed by: FAMILY MEDICINE

## 2019-08-20 RX ORDER — PHENTERMINE HYDROCHLORIDE 15 MG/1
CAPSULE ORAL
Refills: 0 | COMMUNITY
Start: 2019-07-19 | End: 2019-09-20

## 2019-08-20 RX ORDER — PHENTERMINE HYDROCHLORIDE 37.5 MG/1
18.75 TABLET ORAL
Qty: 15 TABLET | Refills: 1 | Status: SHIPPED | OUTPATIENT
Start: 2019-08-20 | End: 2019-09-20

## 2019-08-20 ASSESSMENT — PATIENT HEALTH QUESTIONNAIRE - PHQ9
SUM OF ALL RESPONSES TO PHQ QUESTIONS 1-9: 1
SUM OF ALL RESPONSES TO PHQ QUESTIONS 1-9: 1
10. IF YOU CHECKED OFF ANY PROBLEMS, HOW DIFFICULT HAVE THESE PROBLEMS MADE IT FOR YOU TO DO YOUR WORK, TAKE CARE OF THINGS AT HOME, OR GET ALONG WITH OTHER PEOPLE: NOT DIFFICULT AT ALL

## 2019-08-20 ASSESSMENT — ANXIETY QUESTIONNAIRES
GAD7 TOTAL SCORE: 10
5. BEING SO RESTLESS THAT IT IS HARD TO SIT STILL: MORE THAN HALF THE DAYS
7. FEELING AFRAID AS IF SOMETHING AWFUL MIGHT HAPPEN: NOT AT ALL
7. FEELING AFRAID AS IF SOMETHING AWFUL MIGHT HAPPEN: NOT AT ALL
2. NOT BEING ABLE TO STOP OR CONTROL WORRYING: MORE THAN HALF THE DAYS
3. WORRYING TOO MUCH ABOUT DIFFERENT THINGS: MORE THAN HALF THE DAYS
6. BECOMING EASILY ANNOYED OR IRRITABLE: NOT AT ALL
GAD7 TOTAL SCORE: 10
GAD7 TOTAL SCORE: 10
1. FEELING NERVOUS, ANXIOUS, OR ON EDGE: MORE THAN HALF THE DAYS
4. TROUBLE RELAXING: MORE THAN HALF THE DAYS

## 2019-08-21 DIAGNOSIS — E78.5 HYPERLIPIDEMIA, UNSPECIFIED HYPERLIPIDEMIA TYPE: ICD-10-CM

## 2019-08-21 DIAGNOSIS — E66.01 MORBID OBESITY (H): ICD-10-CM

## 2019-08-21 LAB
CHOLEST SERPL-MCNC: 182 MG/DL
GLUCOSE SERPL-MCNC: 93 MG/DL (ref 70–99)
HDLC SERPL-MCNC: 39 MG/DL
LDLC SERPL CALC-MCNC: 111 MG/DL
NONHDLC SERPL-MCNC: 143 MG/DL
TRIGL SERPL-MCNC: 162 MG/DL

## 2019-08-21 PROCEDURE — 36415 COLL VENOUS BLD VENIPUNCTURE: CPT | Performed by: FAMILY MEDICINE

## 2019-08-21 PROCEDURE — 82947 ASSAY GLUCOSE BLOOD QUANT: CPT | Performed by: FAMILY MEDICINE

## 2019-08-21 PROCEDURE — 80061 LIPID PANEL: CPT | Performed by: FAMILY MEDICINE

## 2019-08-21 ASSESSMENT — PATIENT HEALTH QUESTIONNAIRE - PHQ9: SUM OF ALL RESPONSES TO PHQ QUESTIONS 1-9: 1

## 2019-08-21 ASSESSMENT — ANXIETY QUESTIONNAIRES: GAD7 TOTAL SCORE: 10

## 2019-09-09 NOTE — PROGRESS NOTES
Subjective     Florina Marie is a 34 year old female who presents to clinic today for the following health issues:    History of Present Illness        She eats 2-3 servings of fruits and vegetables daily.She consumes 1 sweetened beverage(s) daily.  She is taking medications regularly.     Medication Followup of Phentermine    Taking Medication as prescribed: yes    Side Effects:  None    Medication Helping Symptoms:  Unsure, has gained 3 pounds since last visit    Answers for HPI/ROS submitted by the patient on 9/20/2019   Chronic problems general questions HPI Form  If you checked off any problems, how difficult have these problems made it for you to do your work, take care of things at home, or get along with other people?: Not difficult at all  PHQ9 TOTAL SCORE: 2  ZEENAT 7 TOTAL SCORE: 0    She states she is being active.  She is biking.  She is playing with her dog.  She is trying not to do much sitting around.  Higher dose phentermine caused irritability.  She is wondering about taking a lower dose twice a day to see if it is more effective for her weight loss.    She also states that her left foot is still bothering her.  She saw podiatry earlier this year and was given orthotics but she still is having a lot of pain in her left first metatarsal head.  She also complains about some pain in her left calf with standing or walking.  She was seen in emergency department and had a negative ultrasound for DVT.  She denies pain in her back, buttock or upper thigh.      Patient Active Problem List   Diagnosis     Problems with learning     Morbid obesity (H)     Generalized anxiety disorder     Hyperlipidemia, unspecified     Attention deficit disorder without hyperactivity     Prolonged PTT     Right carpal tunnel syndrome     S/P carpal tunnel release     Left carpal tunnel syndrome     Past Surgical History:   Procedure Laterality Date     EXAM UNDER ANESTHESIA PELVIC N/A 9/9/2016    Procedure: EXAM UNDER  ANESTHESIA PELVIC;  Surgeon: Rhoda Alcazar MD;  [facility]     HC TOOTH EXTRACTION W/FORCEP      wisdom     RELEASE CARPAL TUNNEL Right 3/22/2019    Procedure: RIGHT RELEASE CARPAL TUNNEL;  Surgeon: Anjum Wilburn DO;  Location: [facility]     RELEASE CARPAL TUNNEL Left 7/16/2019    Procedure: RELEASE, CARPAL TUNNEL-Left;  Surgeon: Anjum Wilburn DO;  Location: [facility]       Social History     Tobacco Use     Smoking status: Never Smoker     Smokeless tobacco: Never Used     Tobacco comment: no smokers in the household   Substance Use Topics     Alcohol use: No     Frequency: Never     Family History   Problem Relation Age of Onset     Lipids Father         diet     Thyroid Disease Mother         unsure if hypo or hyper     Diabetes Paternal Grandfather         adult     Heart Disease Paternal Grandfather         bypass/open hear surgery     Lipids Maternal Aunt         medication     Lipids Maternal Aunt         diet     Alzheimer Disease Maternal Grandfather      Hypertension No family hx of      Coronary Artery Disease No family hx of      Hyperlipidemia No family hx of      Cancer - colorectal No family hx of      Ovarian Cancer No family hx of      Prostate Cancer No family hx of      Depression/Anxiety No family hx of      Cerebrovascular Disease No family hx of      Anesthesia Reaction No family hx of      Asthma No family hx of      Osteoporosis No family hx of      Chemical Addiction No family hx of      Obesity No family hx of            Reviewed and updated as needed this visit by Provider  Allergies  Meds  Problems         Review of Systems   CONSTITUTIONAL: NEGATIVE for fever, chills, change in weight  RESP: NEGATIVE for significant cough or shortness of breath   CV: NEGATIVE for chest pain, palpitations or peripheral edema      Objective    /68 (BP Location: Right arm, Patient Position: Chair, Cuff Size: Adult Large)   Pulse 73   Temp 98.5  F (36.9  C) (Temporal)    "Resp 14   Wt 117.5 kg (259 lb)   SpO2 99%   BMI 42.44 kg/m     Body mass index is 42.44 kg/m .  Physical Exam   Gen: no apparent distress  Chest/CV: S1 and S2 normal, no murmurs, clicks, gallops or rubs. Regular rate and rhythm. Chest is clear; no wheezes or rales. No edema or JVD.  Left leg: Calf is soft and nontender.  No skin changes.  No redness of her foot.  Has full range of motion of her ankle.  Psych: Alert and oriented times 3; coherent speech, normal   rate and volume, able to articulate logical thoughts, able   to abstract reason, no tangential thoughts, no hallucinations   or delusions  Her affect is neutral        Assessment & Plan     1. Morbid obesity (H)  We will try phentermine 15 mg twice a day.  Discussed that if she misses the second dose not to do it late at night.  Also will pay close attention to her irritability and mood status.  If these are worsening would then stop the second dose.  She will follow-up in 1 month.    - phentermine (ADIPEX-P) 15 MG capsule; Take 1 capsule (15 mg) by mouth 2 times daily  Dispense: 60 capsule; Refill: 0    2. Left foot pain  Will return her back to podiatry for her foot pain.    - PODIATRY/FOOT & ANKLE SURGERY REFERRAL    3. Need for prophylactic vaccination and inoculation against influenza    - INFLUENZA VACCINE IM > 6 MONTHS VALENT IIV4 [14758]  - ADMIN INFLUENZA (For MEDICARE Patients ONLY) []     BMI:   Estimated body mass index is 42.44 kg/m  as calculated from the following:    Height as of 7/25/19: 1.664 m (5' 5.5\").    Weight as of this encounter: 117.5 kg (259 lb).   Weight management plan: Discussed healthy diet and exercise guidelines      Return in about 4 weeks (around 10/18/2019) for weight management.    Alanna Curiel MD  [facility]"

## 2019-09-12 ENCOUNTER — TELEPHONE (OUTPATIENT)
Dept: FAMILY MEDICINE | Facility: OTHER | Age: 34
End: 2019-09-12

## 2019-09-12 NOTE — TELEPHONE ENCOUNTER
"Patient presents to the clinic today with left calf pain.   She states it started \"this week\" and is 10/10.  It hurts when she walks or stretches it.   She has tried heat without relief.   There is no redness, swelling, or warmth.   Discussed recommendation to be seen today, but there is no availability in clinic today.   Patient will go to Urgent Care for evaluation.     Jazlyn Torres RN, BSN    "

## 2019-09-20 ENCOUNTER — OFFICE VISIT (OUTPATIENT)
Dept: FAMILY MEDICINE | Facility: OTHER | Age: 34
End: 2019-09-20
Payer: MEDICARE

## 2019-09-20 VITALS
TEMPERATURE: 98.5 F | OXYGEN SATURATION: 99 % | SYSTOLIC BLOOD PRESSURE: 106 MMHG | WEIGHT: 259 LBS | RESPIRATION RATE: 14 BRPM | BODY MASS INDEX: 42.44 KG/M2 | HEART RATE: 73 BPM | DIASTOLIC BLOOD PRESSURE: 68 MMHG

## 2019-09-20 DIAGNOSIS — E66.01 MORBID OBESITY (H): Primary | ICD-10-CM

## 2019-09-20 DIAGNOSIS — Z23 NEED FOR PROPHYLACTIC VACCINATION AND INOCULATION AGAINST INFLUENZA: ICD-10-CM

## 2019-09-20 DIAGNOSIS — M79.672 LEFT FOOT PAIN: ICD-10-CM

## 2019-09-20 PROCEDURE — 99213 OFFICE O/P EST LOW 20 MIN: CPT | Mod: 25 | Performed by: FAMILY MEDICINE

## 2019-09-20 PROCEDURE — 90686 IIV4 VACC NO PRSV 0.5 ML IM: CPT | Performed by: FAMILY MEDICINE

## 2019-09-20 PROCEDURE — G0008 ADMIN INFLUENZA VIRUS VAC: HCPCS | Performed by: FAMILY MEDICINE

## 2019-09-20 RX ORDER — PHENTERMINE HYDROCHLORIDE 15 MG/1
15 CAPSULE ORAL 2 TIMES DAILY
Qty: 60 CAPSULE | Refills: 0 | Status: SHIPPED | OUTPATIENT
Start: 2019-09-20 | End: 2019-10-29 | Stop reason: DRUGHIGH

## 2019-09-20 ASSESSMENT — PATIENT HEALTH QUESTIONNAIRE - PHQ9
SUM OF ALL RESPONSES TO PHQ QUESTIONS 1-9: 2
SUM OF ALL RESPONSES TO PHQ QUESTIONS 1-9: 2
10. IF YOU CHECKED OFF ANY PROBLEMS, HOW DIFFICULT HAVE THESE PROBLEMS MADE IT FOR YOU TO DO YOUR WORK, TAKE CARE OF THINGS AT HOME, OR GET ALONG WITH OTHER PEOPLE: NOT DIFFICULT AT ALL

## 2019-09-20 ASSESSMENT — ANXIETY QUESTIONNAIRES
7. FEELING AFRAID AS IF SOMETHING AWFUL MIGHT HAPPEN: NOT AT ALL
GAD7 TOTAL SCORE: 0
2. NOT BEING ABLE TO STOP OR CONTROL WORRYING: NOT AT ALL
6. BECOMING EASILY ANNOYED OR IRRITABLE: NOT AT ALL
1. FEELING NERVOUS, ANXIOUS, OR ON EDGE: NOT AT ALL
5. BEING SO RESTLESS THAT IT IS HARD TO SIT STILL: NOT AT ALL
GAD7 TOTAL SCORE: 0
GAD7 TOTAL SCORE: 0
4. TROUBLE RELAXING: NOT AT ALL
7. FEELING AFRAID AS IF SOMETHING AWFUL MIGHT HAPPEN: NOT AT ALL
3. WORRYING TOO MUCH ABOUT DIFFERENT THINGS: NOT AT ALL

## 2019-09-21 ASSESSMENT — ANXIETY QUESTIONNAIRES: GAD7 TOTAL SCORE: 0

## 2019-09-21 ASSESSMENT — PATIENT HEALTH QUESTIONNAIRE - PHQ9: SUM OF ALL RESPONSES TO PHQ QUESTIONS 1-9: 2

## 2019-10-11 ENCOUNTER — TELEPHONE (OUTPATIENT)
Dept: FAMILY MEDICINE | Facility: OTHER | Age: 34
End: 2019-10-11

## 2019-10-11 ENCOUNTER — VIRTUAL VISIT (OUTPATIENT)
Dept: FAMILY MEDICINE | Facility: CLINIC | Age: 34
End: 2019-10-11
Payer: MEDICARE

## 2019-10-11 ENCOUNTER — NURSE TRIAGE (OUTPATIENT)
Dept: NURSING | Facility: CLINIC | Age: 34
End: 2019-10-11

## 2019-10-11 DIAGNOSIS — R30.0 DYSURIA: Primary | ICD-10-CM

## 2019-10-11 LAB
NON-SQ EPI CELLS #/AREA URNS LPF: ABNORMAL /LPF
RBC #/AREA URNS AUTO: ABNORMAL /HPF
WBC #/AREA URNS AUTO: ABNORMAL /HPF

## 2019-10-11 PROCEDURE — G2012 BRIEF CHECK IN BY MD/QHP: HCPCS | Performed by: FAMILY MEDICINE

## 2019-10-11 PROCEDURE — 87186 SC STD MICRODIL/AGAR DIL: CPT | Performed by: FAMILY MEDICINE

## 2019-10-11 PROCEDURE — 87086 URINE CULTURE/COLONY COUNT: CPT | Performed by: FAMILY MEDICINE

## 2019-10-11 PROCEDURE — 81015 MICROSCOPIC EXAM OF URINE: CPT | Performed by: FAMILY MEDICINE

## 2019-10-11 PROCEDURE — 87088 URINE BACTERIA CULTURE: CPT | Performed by: FAMILY MEDICINE

## 2019-10-11 RX ORDER — NITROFURANTOIN 25; 75 MG/1; MG/1
100 CAPSULE ORAL 2 TIMES DAILY
Qty: 10 CAPSULE | Refills: 0 | Status: SHIPPED | OUTPATIENT
Start: 2019-10-11 | End: 2019-10-22

## 2019-10-11 NOTE — TELEPHONE ENCOUNTER
"Clinic Action Needed:Yes, Please call patient at  she is requesting lab orders  Reason for Call:Patient calling stating she started with \"UTI\" symptoms yesterday. Reporting dysuria, frequency. Afebrile. Stating she has taken over the counter urinary pain relief which has turned urine orange.   Patient stating she has always just \"dropped off urine sample\" without appointment. Advised patient she would need orders from MD to leave lab sample.  Advised message would be left for provider.    Caller verbalized understanding. Denies further questions.    Per guidelines advice to be seen with in 24 hours.    Karen Gutierres RN  [facility]      Reason for Disposition    Urinating more frequently than usual (i.e., frequency)    Additional Information    Negative: Shock suspected (e.g., cold/pale/clammy skin, too weak to stand, low BP, rapid pulse)    Negative: Sounds like a life-threatening emergency to the triager    Negative: [1] Unable to urinate (or only a few drops) > 4 hours AND     [2] bladder feels very full (e.g., palpable bladder or strong urge to urinate)    Negative: [1] Decreased urination and [2] drinking very little AND [2] dehydration suspected (e.g., dark urine, no urine > 12 hours, very dry mouth, very lightheaded)    Negative: Patient sounds very sick or weak to the triager    Negative: Fever > 100.5 F (38.1 C)    Negative: Side (flank) or lower back pain present    Negative: [1] Can't control passage of urine (i.e., urinary incontinence) AND [2] new onset (< 2 weeks) or worsening    Protocols used: URINARY SYMPTOMS-A-AH  "

## 2019-10-11 NOTE — PROGRESS NOTES
"Florina Marie is a 34 year old female who is being evaluated via a telephone visit.      The patient has been notified of following (by M.A) Nan Sandra MA      \"We have found that certain health care needs can be provided without the need for a physical exam.  This service lets us provide the care you need with a short phone conversation.  If a prescription is necessary we can send it directly to your pharmacy.  If lab work is needed we can place an order for that and you can then stop by our lab to have the test done at a later time.    This telephone visit will be conducted via 3 way call with the you (the patient) , the physician/provider, and a me all on the line at the same time.  This allows your physician/provider to have the phone conversation with you while I will be taking notes for your medical record.  We will have full access to your [place] medical record during this entire phone call.    Since this is like an office visit,  will bill your insurance company for this service.  Please check with your medical insurance if this type of telephone/virtual is covered . You may be responsible for the cost of this service if insurance coverage is denied.  The typical cost is $30 (10min), $59(11-20min) and $85 (21-30min)     If during the course of the call the physician/provider feels a telephone visit is not appropriate, you will not be charged for this service\"    Consent has been obtained for this service by care team member: yes.  See the scanned image in the medical record.    ------------------------------------------------------------------------------------------------    Start time of call: 10:46 am 10/11/2019  End time of call: 10:57 am 10/11/2019     S: The history as provided by the patient to the provider during this call includes:     Florina has had symptoms of change in urine color since approximately Monday of this week.  The last 2 days she has noticed dysuria and a foul smell with her " urine.  She denies any fevers, chills, new back pain/CVA tenderness, nausea, vomiting, abdominal pain or genitourinary lesions or injuries.  She is not currently sexually active and not been for some time.  She has no concern for STDs at this time.    She did take Pyridium today which has not helped her dysuria symptoms.  She also now has associated urinary frequency.  She has history of UTIs in the past each growing out E. Coli up to 2018 (per reviewed records).  It was pansensitive at that time.    Due to the her Pyridium that was taken we are unable to obtain a UA today but are able to do microscopy which showed 2-5 RBCs and no RBCs.  There were some squamous epithelial cells.  The urine was orange in color due to the Pyridium.    She states she had her LMP last week but is not limited specific date.  She is not currently having any bleeding or spotting.    She has no known drug allergies.  She confirms her pharmacy is the [email or web address] in [place].    Total time of call between patient and provider was 11 minutes     Houston Fernandes MD  [facility]  ===================================================    I have reviewed the note as documented above.  This accurately captures the substance of my conversation with the patient,    Additional provider notes:    Assessment/Plan:  1. Dysuria: Given the symptoms of dysuria, foul smell, and urinary frequency as well as history of UTIs I will offer the patient a course of Macrobid and call her with urine culture results when available.  We are unable to obtain a full UA given her Pyridium use.  I did discuss other etiologies could cause dysuria including but not limited to nephrolithiasis and urethritis from STDs or other causes.  She is amenable to this and will  the medication at her pharmacy and await my call when results return.  - Urine Microscopic  - Urine Culture Aerobic Bacterial  - nitroFURantoin  macrocrystal-monohydrate (MACROBID) 100 MG capsule; Take 1 capsule (100 mg) by mouth 2 times daily for 5 days  Dispense: 10 capsule; Refill: 0    Houston Fernandes MD  [facility]

## 2019-10-11 NOTE — TELEPHONE ENCOUNTER
Patient scheduled for telephone visit.     Next 5 appointments (look out 90 days)    Oct 11, 2019 11:00 AM CDT  [facility] [address], [address]  [facility] [address]  [phone]   Oct 15, 2019  3:20 PM CDT  [facility] [address]   [place] [address]  [phone]        Jazlyn Torres, RN, BSN

## 2019-10-11 NOTE — TELEPHONE ENCOUNTER
"Clinic Action Needed:Yes, Please call patient at   Reason for Call:Patient calling stating she started with \"UTI\" symptoms yesterday. Reporting dysuria, frequency. Afebrile. Stating she has taken over the counter urinary pain relief which has turned urine orange.   Patient stating she has always just \"dropped off urine sample\" without appointment. Advised patient she would need orders from MD to leave lab sample.  Advised message would be left for provider.    Caller verbalized understanding. Denies further questions.    Karen Gutierres RN  [facility]          Reason for Disposition    Urinating more frequently than usual (i.e., frequency)    Additional Information    Negative: Shock suspected (e.g., cold/pale/clammy skin, too weak to stand, low BP, rapid pulse)    Negative: Sounds like a life-threatening emergency to the triager    Negative: [1] Unable to urinate (or only a few drops) > 4 hours AND     [2] bladder feels very full (e.g., palpable bladder or strong urge to urinate)    Negative: [1] Decreased urination and [2] drinking very little AND [2] dehydration suspected (e.g., dark urine, no urine > 12 hours, very dry mouth, very lightheaded)    Negative: Patient sounds very sick or weak to the triager    Negative: Fever > 100.5 F (38.1 C)    Negative: Side (flank) or lower back pain present    Negative: [1] Can't control passage of urine (i.e., urinary incontinence) AND [2] new onset (< 2 weeks) or worsening    Protocols used: URINARY SYMPTOMS-A-AH      "

## 2019-10-12 LAB
BACTERIA SPEC CULT: ABNORMAL
BACTERIA SPEC CULT: ABNORMAL
Lab: ABNORMAL
SPECIMEN SOURCE: ABNORMAL

## 2019-10-15 ENCOUNTER — TELEPHONE (OUTPATIENT)
Dept: FAMILY MEDICINE | Facility: CLINIC | Age: 34
End: 2019-10-15

## 2019-10-15 DIAGNOSIS — B96.20 E. COLI UTI (URINARY TRACT INFECTION): Primary | ICD-10-CM

## 2019-10-15 DIAGNOSIS — N39.0 E. COLI UTI (URINARY TRACT INFECTION): Primary | ICD-10-CM

## 2019-10-15 RX ORDER — CEPHALEXIN 500 MG/1
500 CAPSULE ORAL 2 TIMES DAILY
Qty: 8 CAPSULE | Refills: 0 | Status: SHIPPED | OUTPATIENT
Start: 2019-10-15 | End: 2019-10-22

## 2019-10-15 NOTE — TELEPHONE ENCOUNTER
"Please Advise:     Spoke to patient. States when she takes Macrobid, she feels really nauseated and gets a headache soon after taking the med.   States this morning she feels very nauseated and hot/overheated after taking the medication.     Patient states she hasn't taken the med on an empty stomach, and she drinks \"a ton\" of water throughout the day.     Patient wondering if the medication is too strong and if she can change to another medication?     Charlotte Irving RN BSN        "

## 2019-10-15 NOTE — TELEPHONE ENCOUNTER
Reason for call:  Patient reporting a symptom    Symptom or request: Patient feeling nauseous after starting medication for Urine Infection.     Duration (how long have symptoms been present): 1+day    Have you been treated for this before? No    Additional comments: Patient states she took the meds with food but thinks it is causing her to become nauseous. Worried it may be interacting with her other medications.     Phone Number patient can be reached at:  Home number on file [phone] (home)    Best Time:  any    Can we leave a detailed message on this number:  YES    Call taken on 10/15/2019 at 8:37 AM by Keyonna Bettencourt

## 2019-10-15 NOTE — PROGRESS NOTES
Received call from patient. Uti symptoms are improving, but has only taken 1 days worth of medication thus far. Will send new Rx of Keflex 500 mg BID for 4 more days to her verified [facility] pharmacy.     Houston Fernandes MD  [facility]

## 2019-10-16 NOTE — TELEPHONE ENCOUNTER
Reason for Call:  Medication or medication refill:    Do you use a [facility]?  Name of the pharmacy and phone number for the current request:  [facility] - [phone]    Name of the medication requested: Phentermine (ADIPEX-P) 15 MG capsule     Other request: Patient sees Alanna Curiel but is needing a refill, wants covering provider to review and send refill if possible    Can we leave a detailed message on this number? YES    Phone number patient can be reached at: Home number on file [phone] (home)    Best Time: any    Call taken on 10/16/2019 at 9:22 AM by Keyonna Bettencourt

## 2019-10-17 NOTE — PROGRESS NOTES
"Subjective     Florina Marie is a 34 year old female who presents to clinic today for the following health issues:    History of Present Illness        She eats 2-3 servings of fruits and vegetables daily.She consumes 0 sweetened beverage(s) daily.  She is taking medications regularly.     Medication Followup of Phentermine    Taking Medication as prescribed: NO-has trouble remembering to take second dose so has only been taking 15mg daily    Side Effects:  None    Medication Helping Symptoms:  yes     Feels more irritable, not motivated to exercise, states she \"fell off the wagon\" in regards to her weight loss.  Hasn't been taking her medication twice a day.    Patient Active Problem List   Diagnosis     Problems with learning     Morbid obesity (H)     Generalized anxiety disorder     Hyperlipidemia, unspecified     Attention deficit disorder without hyperactivity     Prolonged PTT     S/P carpal tunnel release     Past Surgical History:   Procedure Laterality Date     EXAM UNDER ANESTHESIA PELVIC N/A 9/9/2016    Procedure: EXAM UNDER ANESTHESIA PELVIC;  Surgeon: Rhoda Alcazar MD;  [facility]     HC TOOTH EXTRACTION W/FORCEP      wisdom     RELEASE CARPAL TUNNEL Right 3/22/2019    Procedure: RIGHT RELEASE CARPAL TUNNEL;  Surgeon: Anjum Wilburn DO;  Location[facility]     RELEASE CARPAL TUNNEL Left 7/16/2019    Procedure: RELEASE, CARPAL TUNNEL-Left;  Surgeon: Anjum Wilburn DO;  Location: [facility]       Social History     Tobacco Use     Smoking status: Never Smoker     Smokeless tobacco: Never Used     Tobacco comment: no smokers in the household   Substance Use Topics     Alcohol use: No     Frequency: Never     Family History   Problem Relation Age of Onset     Lipids Father         diet     Thyroid Disease Mother         unsure if hypo or hyper     Diabetes Paternal Grandfather         adult     Heart Disease Paternal Grandfather         bypass/open hear surgery     Lipids " "Maternal Aunt         medication     Lipids Maternal Aunt         diet     Alzheimer Disease Maternal Grandfather      Hypertension No family hx of      Coronary Artery Disease No family hx of      Hyperlipidemia No family hx of      Cancer - colorectal No family hx of      Ovarian Cancer No family hx of      Prostate Cancer No family hx of      Depression/Anxiety No family hx of      Cerebrovascular Disease No family hx of      Anesthesia Reaction No family hx of      Asthma No family hx of      Osteoporosis No family hx of      Chemical Addiction No family hx of      Obesity No family hx of            Reviewed and updated as needed this visit by Provider  Allergies  Meds  Problems         Review of Systems   ROS COMP: CONSTITUTIONAL: NEGATIVE for fever, chills, change in weight  RESP: NEGATIVE for significant cough or SOB  CV: NEGATIVE for chest pain, palpitations or peripheral edema      Objective    /72 (BP Location: Right arm, Patient Position: Chair, Cuff Size: Adult Large)   Pulse 85   Temp 98.9  F (37.2  C) (Temporal)   Resp 16   Ht 1.664 m (5' 5.5\")   Wt 117 kg (258 lb)   SpO2 98%   BMI 42.28 kg/m    Body mass index is 42.28 kg/m .  Physical Exam   Gen: no apparent distress  NECK: no adenopathy, no asymmetry, no masses  Chest: clear to auscultation without wheeze, rale or rhonchi  Cor: regular rate and rhythm without murmur  ABDOMEN: soft, nontender, no masses and bowel sounds normal  Ext: warm and dry without edema  Psych: Alert and oriented times 3; coherent speech, normal   rate and volume, able to articulate logical thoughts, able   to abstract reason, no tangential thoughts, no hallucinations   or delusions  Her affect is neutral          Assessment & Plan     1. Morbid obesity (H)  Patient doesn't feel the 15 mg was effective, couldn't remember to take it twice a day, will change back to 18.75 mg and follow up in 1 month.    - phentermine (ADIPEX-P) 37.5 MG tablet; Take 0.5 tablets " (18.75 mg) by mouth every morning (before breakfast)  Dispense: 15 tablet; Refill: 0    Return in about 4 weeks (around 11/26/2019) for phentermine follow up.    Alanna Curiel MD  [facility]

## 2019-10-22 NOTE — TELEPHONE ENCOUNTER
Left message for pt to return call, when call is returned give information below and assist with scheduling. [facility] does have an opening on Friday at 11am if pt would like to be seen sooner than 10/29 since she no showed he r appt on 10/15  Ciarra Mejia MA

## 2019-10-29 ENCOUNTER — OFFICE VISIT (OUTPATIENT)
Dept: FAMILY MEDICINE | Facility: OTHER | Age: 34
End: 2019-10-29
Payer: MEDICARE

## 2019-10-29 VITALS
WEIGHT: 258 LBS | DIASTOLIC BLOOD PRESSURE: 72 MMHG | BODY MASS INDEX: 41.46 KG/M2 | SYSTOLIC BLOOD PRESSURE: 120 MMHG | RESPIRATION RATE: 16 BRPM | HEART RATE: 85 BPM | OXYGEN SATURATION: 98 % | TEMPERATURE: 98.9 F | HEIGHT: 66 IN

## 2019-10-29 DIAGNOSIS — E66.01 MORBID OBESITY (H): Primary | ICD-10-CM

## 2019-10-29 PROBLEM — G56.01 RIGHT CARPAL TUNNEL SYNDROME: Status: RESOLVED | Noted: 2019-03-14 | Resolved: 2019-10-29

## 2019-10-29 PROBLEM — G56.02 LEFT CARPAL TUNNEL SYNDROME: Status: RESOLVED | Noted: 2019-06-27 | Resolved: 2019-10-29

## 2019-10-29 PROCEDURE — 99213 OFFICE O/P EST LOW 20 MIN: CPT | Performed by: FAMILY MEDICINE

## 2019-10-29 RX ORDER — PHENTERMINE HYDROCHLORIDE 37.5 MG/1
18.75 TABLET ORAL
Qty: 15 TABLET | Refills: 0 | Status: SHIPPED | OUTPATIENT
Start: 2019-10-29 | End: 2019-11-26

## 2019-10-29 ASSESSMENT — ANXIETY QUESTIONNAIRES
GAD7 TOTAL SCORE: 0
7. FEELING AFRAID AS IF SOMETHING AWFUL MIGHT HAPPEN: NOT AT ALL
GAD7 TOTAL SCORE: 0
5. BEING SO RESTLESS THAT IT IS HARD TO SIT STILL: NOT AT ALL
1. FEELING NERVOUS, ANXIOUS, OR ON EDGE: NOT AT ALL
GAD7 TOTAL SCORE: 0
3. WORRYING TOO MUCH ABOUT DIFFERENT THINGS: NOT AT ALL
6. BECOMING EASILY ANNOYED OR IRRITABLE: NOT AT ALL
2. NOT BEING ABLE TO STOP OR CONTROL WORRYING: NOT AT ALL
4. TROUBLE RELAXING: NOT AT ALL
7. FEELING AFRAID AS IF SOMETHING AWFUL MIGHT HAPPEN: NOT AT ALL

## 2019-10-29 ASSESSMENT — MIFFLIN-ST. JEOR: SCORE: 1879.09

## 2019-10-29 ASSESSMENT — PATIENT HEALTH QUESTIONNAIRE - PHQ9
SUM OF ALL RESPONSES TO PHQ QUESTIONS 1-9: 0
10. IF YOU CHECKED OFF ANY PROBLEMS, HOW DIFFICULT HAVE THESE PROBLEMS MADE IT FOR YOU TO DO YOUR WORK, TAKE CARE OF THINGS AT HOME, OR GET ALONG WITH OTHER PEOPLE: NOT DIFFICULT AT ALL
SUM OF ALL RESPONSES TO PHQ QUESTIONS 1-9: 0

## 2019-10-30 ASSESSMENT — PATIENT HEALTH QUESTIONNAIRE - PHQ9: SUM OF ALL RESPONSES TO PHQ QUESTIONS 1-9: 0

## 2019-10-30 ASSESSMENT — ANXIETY QUESTIONNAIRES: GAD7 TOTAL SCORE: 0

## 2019-11-08 NOTE — PROGRESS NOTES
Subjective     Florina Marie is a 34 year old female who presents to clinic today for the following health issues:    HPI   Medication Followup of Phentermine    Taking Medication as prescribed: uncertain - has been taking a full pill rather than the half pill that was prescribed, she was only given 15 tabs but states she just ran out yesterday     Side Effects:  None    Medication Helping Symptoms:  yes     Discussed with patient.  She states that the pills were not cut in half.  Discussed that she was taking a full tablet daily she should have been out a couple weeks ago.  She is unclear if she started taking this later or she has been missing days.  She does not feel that she was as mad and irritable as she was previously on this dose, however once again were not sure if she has been taking this regularly for couple of weeks or sporadically.  She is frustrated that her weight is not continuing to go down.    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  She feels that is going better.    Patient Active Problem List   Diagnosis     Problems with learning     Morbid obesity (H)     Generalized anxiety disorder     Hyperlipidemia, unspecified     Attention deficit disorder without hyperactivity     Prolonged PTT     S/P carpal tunnel release     Past Surgical History:   Procedure Laterality Date     EXAM UNDER ANESTHESIA PELVIC N/A 9/9/2016    Procedure: EXAM UNDER ANESTHESIA PELVIC;  Surgeon: Rhoda Alcazar MD;  [facility]     HC TOOTH EXTRACTION W/FORCEP      wisdom     RELEASE CARPAL TUNNEL Right 3/22/2019    Procedure: RIGHT RELEASE CARPAL TUNNEL;  Surgeon: Anjum Wilburn DO;  [facility]     RELEASE CARPAL TUNNEL Left 7/16/2019    Procedure: RELEASE, CARPAL TUNNEL-Left;  Surgeon: Anjum Wilburn DO;  [facility]       Social History     Tobacco Use     Smoking status: Never Smoker     Smokeless tobacco: Never Used      "Tobacco comment: no smokers in the household   Substance Use Topics     Alcohol use: No     Frequency: Never     Family History   Problem Relation Age of Onset     Lipids Father         diet     Thyroid Disease Mother         unsure if hypo or hyper     Diabetes Paternal Grandfather         adult     Heart Disease Paternal Grandfather         bypass/open hear surgery     Lipids Maternal Aunt         medication     Lipids Maternal Aunt         diet     Alzheimer Disease Maternal Grandfather      Hypertension No family hx of      Coronary Artery Disease No family hx of      Hyperlipidemia No family hx of      Cancer - colorectal No family hx of      Ovarian Cancer No family hx of      Prostate Cancer No family hx of      Depression/Anxiety No family hx of      Cerebrovascular Disease No family hx of      Anesthesia Reaction No family hx of      Asthma No family hx of      Osteoporosis No family hx of      Chemical Addiction No family hx of      Obesity No family hx of            Reviewed and updated as needed this visit by Provider  Tobacco  Allergies  Meds  Problems  Med Hx  Surg Hx  Fam Hx         Review of Systems   ROS COMP: CONSTITUTIONAL: NEGATIVE for fever, chills, change in weight  RESP: NEGATIVE for significant cough or shortness of breath  CV: NEGATIVE for chest pain, palpitations or peripheral edema      Objective    /70 (BP Location: Right arm, Patient Position: Chair, Cuff Size: Adult Large)   Pulse 88   Temp 98.5  F (36.9  C) (Temporal)   Resp 14   Ht 1.664 m (5' 5.5\")   Wt 118.4 kg (261 lb)   SpO2 98%   BMI 42.77 kg/m    Body mass index is 42.77 kg/m .  Physical Exam   Gen: no apparent distress  NECK: no adenopathy, no asymmetry, no masses  Chest: clear to auscultation without wheeze, rale or rhonchi  Cor: regular rate and rhythm without murmur  Ext: warm and dry without edema  Psych: Alert and oriented times 3; coherent speech, normal   rate and volume, able to articulate logical " "thoughts, able   to abstract reason, no tangential thoughts, no hallucinations   or delusions  Her affect is neutral        Assessment & Plan     1. Morbid obesity (H)  We talked about trying the full tablet again.  She is to pay close attention to her mood.  If she is finding herself more irritable or angry she will then cut the tablet in half.  She will otherwise follow-up in 1 month.    - phentermine (ADIPEX-P) 37.5 MG tablet; Take 1 tablet (37.5 mg) by mouth every morning (before breakfast)  Dispense: 30 tablet; Refill: 0     BMI:   Estimated body mass index is 42.77 kg/m  as calculated from the following:    Height as of this encounter: 1.664 m (5' 5.5\").    Weight as of this encounter: 118.4 kg (261 lb).   Weight management plan: Phentermine      Return in about 4 weeks (around 12/24/2019) for Weight check.    Alanna Curiel MD  [facility]    "

## 2019-11-26 ENCOUNTER — TELEPHONE (OUTPATIENT)
Dept: FAMILY MEDICINE | Facility: OTHER | Age: 34
End: 2019-11-26

## 2019-11-26 ENCOUNTER — OFFICE VISIT (OUTPATIENT)
Dept: FAMILY MEDICINE | Facility: OTHER | Age: 34
End: 2019-11-26
Payer: MEDICARE

## 2019-11-26 VITALS
OXYGEN SATURATION: 98 % | RESPIRATION RATE: 14 BRPM | BODY MASS INDEX: 41.95 KG/M2 | HEIGHT: 66 IN | HEART RATE: 88 BPM | DIASTOLIC BLOOD PRESSURE: 70 MMHG | WEIGHT: 261 LBS | TEMPERATURE: 98.5 F | SYSTOLIC BLOOD PRESSURE: 114 MMHG

## 2019-11-26 DIAGNOSIS — E66.01 MORBID OBESITY (H): ICD-10-CM

## 2019-11-26 PROCEDURE — 99213 OFFICE O/P EST LOW 20 MIN: CPT | Performed by: FAMILY MEDICINE

## 2019-11-26 RX ORDER — PHENTERMINE HYDROCHLORIDE 37.5 MG/1
37.5 TABLET ORAL
Qty: 30 TABLET | Refills: 0 | Status: SHIPPED | OUTPATIENT
Start: 2019-11-26 | End: 2019-12-24

## 2019-11-26 ASSESSMENT — MIFFLIN-ST. JEOR: SCORE: 1892.7

## 2019-11-26 NOTE — TELEPHONE ENCOUNTER
Per TC OK for patient to either come at 2:15 or early this morning. I spoke with patient and she will come to clinic now  Meliza Bruce CMA

## 2019-11-26 NOTE — TELEPHONE ENCOUNTER
Reason for Call:  Same Day Appointment, Requested Provider:  Alanna Curiel MD     PCP: Alanna Curiel    Reason for visit: Phentermine follow up     Duration of symptoms: ----    Have you been treated for this in the past? Yes    Additional comments:  Patient called clinic asking if she can be worked in around 2:15pm today. She is scheduled at 1pm but she will not be off work until that time so she will not make her appt. Patient says to leave detailed voicemail if calling after 9am because she will be at work.    Can we leave a detailed message on this number? YES    Phone number patient can be reached at: Home number on file [phone] (home)    Best Time: any    Call taken on 11/26/2019 at 7:10 AM by Kit Martinez

## 2019-12-12 NOTE — MR AVS SNAPSHOT
After Visit Summary   7/26/2017    Florina Marie    MRN: [id number]           Patient Information     Date Of Birth          1985        Visit Information        Provider Department      7/26/2017 10:20 AM Ekaterina Tristan PA-C [facility]        Today's Diagnoses     Impacted cerumen of left ear    -  1      Care Instructions    Your left ear were irrigated today.  Discouraged use of Q-tips/chris pins. This may only aggravate the problem.   Do not use ear candles or home irrigation as these techniques may cause injury and have not been proven to be effective.  In the future if wax becomes an issue again, may use 1:1 peroxide/water solution, mineral oil or Debrox.  -Place 5-10 drops in ear  -Let this set for 5 minutes  -Tilt ear to the side to allow solution to drain  -May flush with warm water using a shower head.  May dip a cotton ball in mineral oil and place in ear for 10-15 min to soften wax as well.  Do this for 1-3 days before coming in to have ear flushed as this softens ear wax.  Using these techniques monthly can prevent wax from building up in ear canal.      FOr itchy ears, consider using oils drops (mineral oil, baby oil, olive, canola oil, coconut oil, vegetable oil, etc.   Few drops on cotton ball or eyedropper and place few drops in each ear once a day for few days          Follow-ups after your visit        Your next 10 appointments already scheduled     Aug 29, 2017  9:00 AM CDT   PHYSICAL with Alanna Curiel MD   [facility]    [address] [place] [id number] [phone]              Who to contact     You can reach your care team any time of the day by calling [phone].  Notification of test results:  If you have an abnormal lab result, we will notify you by phone as soon as possible.         Additional Information About Your Visit        MyChart Information     MyChart gives  Letter mailed to patient with results and recommendations from provider you secure access to your electronic health record. If you see a primary care provider, you can also send messages to your care team and make appointments. If you have questions, please call your primary care clinic.  If you do not have a primary care provider, please call [phone] and they will assist you.        Care EveryWhere ID     This is your Care EveryWhere ID. This could be used by other organizations to access your [place] medical records  [id number]        Your Vitals Were     Pulse Temperature                87 97.1  F (36.2  C) (Temporal)           Blood Pressure from Last 3 Encounters:   07/26/17 134/88   04/18/17 122/74   03/14/17 122/76    Weight from Last 3 Encounters:   04/18/17 247 lb 8 oz (112.3 kg)   09/09/16 237 lb 12 oz (107.8 kg)   09/01/16 237 lb 12 oz (107.8 kg)              Today, you had the following     No orders found for display       Primary Care Provider Office Phone # Fax #    Alanna CORONA MD Veena [phone]       [facility] [address]  [facility] [address]        Equal Access to Services     [facility]: Hadii aad ku hadasho Soomaali, waaxda luqadaha, qaybta kaalmada adeegyada, rosalee monreal hayjerrican chloe gonzalez . So [facility] [phone].    ATENCIÓN: Si habla español, tiene a berkowitz disposición servicios gratuitos de asistencia lingüística. Llame al [phone].    We comply with applicable federal civil rights laws and [place] laws. We do not discriminate on the basis of race, color, national origin, age, disability sex, sexual orientation or gender identity.            Thank you!     Thank you for choosing [facility] [place] [facility]  for your care. Our goal is always to provide you with excellent care. Hearing back from our patients is one way we can continue to improve our services. Please take a few minutes to complete the written survey that you may receive in the mail after your visit with us. Thank you!             Your  Updated Medication List - Protect others around you: Learn how to safely use, store and throw away your medicines at www.disposemymeds.org.          This list is accurate as of: 7/26/17 10:36 AM.  Always use your most recent med list.                   Brand Name Dispense Instructions for use Diagnosis    ABILIFY 10 MG tablet   Generic drug:  ARIPiprazole      Take 10 mg by mouth daily        clonazePAM 0.5 MG tablet    klonoPIN    60 tablet    Take 0.5 tablets (0.25 mg) by mouth 2 times daily as needed for anxiety    Generalized anxiety disorder       desogestrel-ethinyl estradiol 0.15-30 MG-MCG per tablet    APRI    84 tablet    Take 1 tablet by mouth daily Use continuously for 3 months, then use placebo tablets    Irregular menstrual cycle

## 2019-12-13 NOTE — PROGRESS NOTES
Subjective     Florina Marie is a 34 year old female who presents to clinic today for the following health issues:    HPI   Medication Followup of Phentermine    Taking Medication as prescribed: yes    Side Effects:  None    Medication Helping Symptoms:  yes     Patient is now been on phentermine for over a year.  Unfortunately her weight is consistently going up despite the phentermine.  She tells me she walks her dog once a day but not even for half an hour.  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  She attempts to do some portion control.    She also complains of left thumb pain for the last few weeks.  No known injury.  Denies redness, swelling, numbness or tingling.  It hurts when she picks things up.  She also complains of right elbow pain also for a few weeks.  She denies injury.  Denies redness or swelling.  She states she notices it when doing activities such as vacuuming.  She denies numbness or tingling.    Patient Active Problem List   Diagnosis     Problems with learning     Morbid obesity (H)     Generalized anxiety disorder     Hyperlipidemia, unspecified     Attention deficit disorder without hyperactivity     Prolonged PTT     S/P carpal tunnel release     Past Surgical History:   Procedure Laterality Date     EXAM UNDER ANESTHESIA PELVIC N/A 9/9/2016    Procedure: EXAM UNDER ANESTHESIA PELVIC;  Surgeon: Rhoda Alcazar MD;  [facility]     HC TOOTH EXTRACTION W/FORCEP      wisdom     RELEASE CARPAL TUNNEL Right 3/22/2019    Procedure: RIGHT RELEASE CARPAL TUNNEL;  Surgeon: Anjum Wilburn DO;  [facility]     RELEASE CARPAL TUNNEL Left 7/16/2019    Procedure: RELEASE, CARPAL TUNNEL-Left;  Surgeon: Anjum Wilburn DO;  [facility]: [facility]       Social History     Tobacco Use     Smoking status: Never Smoker     Smokeless tobacco: Never Used     Tobacco comment: no smokers in the household   Substance Use Topics     Alcohol use: No     Frequency:  "Never     Family History   Problem Relation Age of Onset     Lipids Father         diet     Thyroid Disease Mother         unsure if hypo or hyper     Diabetes Paternal Grandfather         adult     Heart Disease Paternal Grandfather         bypass/open hear surgery     Lipids Maternal Aunt         medication     Lipids Maternal Aunt         diet     Alzheimer Disease Maternal Grandfather      Hypertension No family hx of      Coronary Artery Disease No family hx of      Hyperlipidemia No family hx of      Cancer - colorectal No family hx of      Ovarian Cancer No family hx of      Prostate Cancer No family hx of      Depression/Anxiety No family hx of      Cerebrovascular Disease No family hx of      Anesthesia Reaction No family hx of      Asthma No family hx of      Osteoporosis No family hx of      Chemical Addiction No family hx of      Obesity No family hx of            Reviewed and updated as needed this visit by Provider  Tobacco  Allergies  Meds  Problems  Med Hx  Surg Hx  Fam Hx         Review of Systems    CONSTITUTIONAL: NEGATIVE for fever, chills, change in weight  RESP: NEGATIVE for significant cough or shortness of breath   CV: NEGATIVE for chest pain, palpitations or peripheral edema      Objective    /70   Pulse 86   Temp 98.3  F (36.8  C) (Temporal)   Resp 18   Ht 1.676 m (5' 6\")   Wt 120.2 kg (265 lb)   LMP 12/09/2019 (Approximate)   Breastfeeding No   BMI 42.77 kg/m    Body mass index is 42.77 kg/m .  Physical Exam   Gen: no apparent distress  Left hand: No swelling or erythema.  She has full range of motion of her thumb.  There is mild tenderness to palpation over the thumb CMC joint.  No thenar atrophy.  Sensations grossly intact.  Strength is intact.  Right elbow has full range of motion.  No epicondylar tenderness.  No erythema.  Strength is intact.  Sensation is intact.  Psych: Alert and oriented times 3; coherent speech, normal   rate and volume, able to articulate " logical thoughts, no tangential thoughts, no hallucinations or delusions  Her affect is neutral          Assessment & Plan     1. Thumb pain, left  Suspect some inflammation or overuse injury.  Will try Mobic.  If not helpful could consider hand therapy.    - meloxicam (MOBIC) 15 MG tablet; Take 1 tablet (15 mg) by mouth daily  Dispense: 30 tablet; Refill: 2    2. Right elbow pain  Suspect overuse syndrome.  Will try meloxicam.  If not helpful would consider physical therapy.  Actually offered physical therapy now but she had declined.    - meloxicam (MOBIC) 15 MG tablet; Take 1 tablet (15 mg) by mouth daily  Dispense: 30 tablet; Refill: 2    3. Morbid obesity (H)  Discussed that phentermine does not seem to be effective anymore.  Will stop this medication.  Offered to return her to bariatrics who had originally started the phentermine.  She declines.  She would prefer to increase her exercise.  We discussed walking her dog at least twice a day.  She will work more on portion control.  She will follow-up in 3 months.  May consider phentermine again at that time.    Return in about 3 months (around 3/24/2020) for weight management.    Alanna Curiel MD  [facility]

## 2019-12-18 ENCOUNTER — NURSE TRIAGE (OUTPATIENT)
Dept: NURSING | Facility: CLINIC | Age: 34
End: 2019-12-18

## 2019-12-18 NOTE — TELEPHONE ENCOUNTER
I connected the patient with scheduling, for an appointment.  Kate Gale RN-[facility]

## 2019-12-24 ENCOUNTER — OFFICE VISIT (OUTPATIENT)
Dept: FAMILY MEDICINE | Facility: OTHER | Age: 34
End: 2019-12-24
Payer: MEDICARE

## 2019-12-24 VITALS
DIASTOLIC BLOOD PRESSURE: 70 MMHG | RESPIRATION RATE: 18 BRPM | BODY MASS INDEX: 42.59 KG/M2 | SYSTOLIC BLOOD PRESSURE: 114 MMHG | HEART RATE: 86 BPM | WEIGHT: 265 LBS | TEMPERATURE: 98.3 F | HEIGHT: 66 IN

## 2019-12-24 DIAGNOSIS — M79.645 THUMB PAIN, LEFT: Primary | ICD-10-CM

## 2019-12-24 DIAGNOSIS — E66.01 MORBID OBESITY (H): ICD-10-CM

## 2019-12-24 DIAGNOSIS — M25.521 RIGHT ELBOW PAIN: ICD-10-CM

## 2019-12-24 PROCEDURE — 99214 OFFICE O/P EST MOD 30 MIN: CPT | Performed by: FAMILY MEDICINE

## 2019-12-24 RX ORDER — PHENTERMINE HYDROCHLORIDE 37.5 MG/1
37.5 TABLET ORAL
Qty: 30 TABLET | Refills: 0 | Status: CANCELLED | OUTPATIENT
Start: 2019-12-24

## 2019-12-24 RX ORDER — MELOXICAM 15 MG/1
15 TABLET ORAL DAILY
Qty: 30 TABLET | Refills: 2 | Status: SHIPPED | OUTPATIENT
Start: 2019-12-24 | End: 2020-03-19

## 2019-12-24 ASSESSMENT — MIFFLIN-ST. JEOR: SCORE: 1918.78

## 2019-12-24 ASSESSMENT — PAIN SCALES - GENERAL: PAINLEVEL: NO PAIN (0)

## 2020-01-22 NOTE — PROGRESS NOTES
Subjective     Florina Marie is a 34 year old female who presents to clinic today for the following health issues:  Answers for HPI/ROS submitted by the patient on 1/23/2020   If you checked off any problems, how difficult have these problems made it for you to do your work, take care of things at home, or get along with other people?: Not difficult at all  PHQ9 TOTAL SCORE: 1  ZEENAT 7 TOTAL SCORE: 1    History of Present Illness        She eats 2-3 servings of fruits and vegetables daily.She consumes 2 sweetened beverage(s) daily.She exercises with enough effort to increase her heart rate 9 or less minutes per day.  She exercises with enough effort to increase her heart rate 3 or less days per week.   She is taking medications regularly.     Joint Pain    Onset: 2 months    Description:   Location: left elbow and right elbow  Character: Dull ache    Intensity: mild    Progression of Symptoms: same    Accompanying Signs & Symptoms:  Other symptoms: none    History:   Previous similar pain: no       Precipitating factors:   Trauma or overuse: no     Alleviating factors:  Improved by: immobilization    Therapies Tried and outcome: Tried heat with no relief      -------------------------------------    Patient Active Problem List   Diagnosis     Problems with learning     Morbid obesity (H)     Generalized anxiety disorder     Hyperlipidemia, unspecified     Attention deficit disorder without hyperactivity     Prolonged PTT     S/P carpal tunnel release     Pain of both elbows     Past Surgical History:   Procedure Laterality Date     EXAM UNDER ANESTHESIA PELVIC N/A 9/9/2016    Procedure: EXAM UNDER ANESTHESIA PELVIC;  Surgeon: Rhoda Alcazar MD;  [facility]     HC TOOTH EXTRACTION W/FORCEP      wisdom     RELEASE CARPAL TUNNEL Right 3/22/2019    Procedure: RIGHT RELEASE CARPAL TUNNEL;  Surgeon: Anjum Wilburn DO;  [facility]     RELEASE CARPAL TUNNEL Left 7/16/2019    Procedure: RELEASE,  CARPAL TUNNEL-Left;  Surgeon: Anjum Wilburn DO;  Location: [facility]       Social History     Tobacco Use     Smoking status: Never Smoker     Smokeless tobacco: Never Used     Tobacco comment: no smokers in the household   Substance Use Topics     Alcohol use: No     Frequency: Never     Family History   Problem Relation Age of Onset     Lipids Father         diet     Thyroid Disease Mother         unsure if hypo or hyper     Diabetes Paternal Grandfather         adult     Heart Disease Paternal Grandfather         bypass/open hear surgery     Lipids Maternal Aunt         medication     Lipids Maternal Aunt         diet     Alzheimer Disease Maternal Grandfather      Hypertension No family hx of      Coronary Artery Disease No family hx of      Hyperlipidemia No family hx of      Cancer - colorectal No family hx of      Ovarian Cancer No family hx of      Prostate Cancer No family hx of      Depression/Anxiety No family hx of      Cerebrovascular Disease No family hx of      Anesthesia Reaction No family hx of      Asthma No family hx of      Osteoporosis No family hx of      Chemical Addiction No family hx of      Obesity No family hx of          Current Outpatient Medications   Medication Sig Dispense Refill     ALPRAZolam (XANAX) 0.5 MG tablet TAKE ONE TABLET AS NEEDED FOR SEVERE ANXIETY MAX OF 2 TABLETS PER DAY  0     diclofenac (VOLTAREN) 1 % topical gel Apply 2 g topically 4 times daily 100 g 1     escitalopram (LEXAPRO) 10 MG tablet TAKE 1 TABLET BY MOUTH ONCE DAILY WITH 20MG TABLET FOR A TOTAL OF 30MG  1     escitalopram (LEXAPRO) 20 MG tablet TAKE 1 TABLET BY MOUTH DAILY.  5     etonogestrel (IMPLANON/NEXPLANON) 68 MG IMPL 1 each (68 mg) by Subdermal route continuous  0     meloxicam (MOBIC) 15 MG tablet Take 1 tablet (15 mg) by mouth daily 30 tablet 2     Allergies   Allergen Reactions     Macrobid [Nitrofurantoin] Headache and Nausea     Recent Labs   Lab Test 08/21/19  0749 09/05/18  3870  07/10/18  1334 07/09/18  1540 03/02/18  1444 08/24/17  0823  11/15/15  1415  05/04/12  1024   A1C  --   --   --   --   --   --   --   --   --  5.6   * 105*  --   --   --  107*   < >  --    < > 153*   HDL 39* 36*  --   --   --  43*   < >  --    < > 49*   TRIG 162* 206*  --   --   --  160*   < >  --    < > 108   ALT  --   --   --  23  --  21  --  41  --   --    CR  --   --   --  1.01  --  1.07*  --  1.08*   < >  --    GFRESTIMATED  --   --   --  63  --  59*  --  59*   < >  --    GFRESTBLACK  --   --   --  76  --  72  --  72   < >  --    POTASSIUM  --   --   --  3.9  --  4.2  --  3.9   < >  --    TSH  --   --  1.18  --  1.37 1.73  --   --    < >  --     < > = values in this interval not displayed.      BP Readings from Last 3 Encounters:   01/23/20 110/78   12/24/19 114/70   11/26/19 114/70    Wt Readings from Last 3 Encounters:   01/23/20 124.7 kg (275 lb)   12/24/19 120.2 kg (265 lb)   11/26/19 118.4 kg (261 lb)                    Reviewed and updated as needed this visit by Provider         Review of Systems   ROS COMP: Constitutional, HEENT, cardiovascular, pulmonary, GI, , musculoskeletal, neuro, skin, endocrine and psych systems are negative, except as otherwise noted.      Objective    /78   Pulse 74   Temp 97.6  F (36.4  C) (Temporal)   Resp 14   Wt 124.7 kg (275 lb)   SpO2 98%   BMI 44.39 kg/m    Body mass index is 44.39 kg/m .  Physical Exam  Constitutional:       Appearance: Normal appearance.   HENT:      Head: Normocephalic and atraumatic.   Neck:      Musculoskeletal: Normal range of motion and neck supple.   Cardiovascular:      Rate and Rhythm: Normal rate and regular rhythm.      Pulses: Normal pulses.      Heart sounds: Normal heart sounds. No murmur. No friction rub. No gallop.    Pulmonary:      Effort: Pulmonary effort is normal. No respiratory distress.      Breath sounds: Normal breath sounds. No stridor. No wheezing, rhonchi or rales.   Chest:      Chest wall: No tenderness.    Musculoskeletal: Normal range of motion.         General: No swelling, tenderness, deformity or signs of injury.      Right lower leg: No edema.      Left lower leg: No edema.   Neurological:      General: No focal deficit present.      Mental Status: She is alert and oriented to person, place, and time.      Cranial Nerves: No cranial nerve deficit.      Sensory: No sensory deficit.   Psychiatric:         Mood and Affect: Mood normal.         Behavior: Behavior normal.         Thought Content: Thought content normal.         Judgment: Judgment normal.            Diagnostic Test Results:  Labs reviewed in Epic        Assessment & Plan   Problem List Items Addressed This Visit     Morbid obesity (H)     She has been on phentermine in the past with minimal results.  She is gained weight back since she stopped the medication.  She would like to go back on the medication.  Discussed the importance of diet and lifestyle modifications while taking this medication.  Restart phentermine.  Patient to follow-up with PCP in 1 month for weight check.         Pain of both elbows - Primary     No evident joint effusion noted.  She seems to have pain mostly with extension raising concerns for possible referred pain either from shoulder pathology or cervical radiculopathy. Normal supination and pronation. Will consider physical therapy. Follow up in 4-6 weeks.          Relevant Medications    diclofenac (VOLTAREN) 1 % topical gel    Other Relevant Orders    [facility]               See Patient Instructions  Return in about 1 month (around 2/23/2020).    Emperatriz Stover MD  [facility]

## 2020-01-23 ENCOUNTER — OFFICE VISIT (OUTPATIENT)
Dept: FAMILY MEDICINE | Facility: OTHER | Age: 35
End: 2020-01-23
Payer: MEDICARE

## 2020-01-23 VITALS
DIASTOLIC BLOOD PRESSURE: 78 MMHG | WEIGHT: 275 LBS | BODY MASS INDEX: 44.39 KG/M2 | SYSTOLIC BLOOD PRESSURE: 110 MMHG | HEART RATE: 74 BPM | TEMPERATURE: 97.6 F | RESPIRATION RATE: 14 BRPM | OXYGEN SATURATION: 98 %

## 2020-01-23 DIAGNOSIS — M25.522 PAIN OF BOTH ELBOWS: Primary | ICD-10-CM

## 2020-01-23 DIAGNOSIS — E66.01 MORBID OBESITY (H): ICD-10-CM

## 2020-01-23 DIAGNOSIS — M25.521 PAIN OF BOTH ELBOWS: Primary | ICD-10-CM

## 2020-01-23 PROCEDURE — 99214 OFFICE O/P EST MOD 30 MIN: CPT | Performed by: FAMILY MEDICINE

## 2020-01-23 ASSESSMENT — ANXIETY QUESTIONNAIRES
1. FEELING NERVOUS, ANXIOUS, OR ON EDGE: NOT AT ALL
4. TROUBLE RELAXING: NOT AT ALL
6. BECOMING EASILY ANNOYED OR IRRITABLE: NOT AT ALL
7. FEELING AFRAID AS IF SOMETHING AWFUL MIGHT HAPPEN: NOT AT ALL
5. BEING SO RESTLESS THAT IT IS HARD TO SIT STILL: NOT AT ALL
GAD7 TOTAL SCORE: 1
7. FEELING AFRAID AS IF SOMETHING AWFUL MIGHT HAPPEN: NOT AT ALL
GAD7 TOTAL SCORE: 1
2. NOT BEING ABLE TO STOP OR CONTROL WORRYING: NOT AT ALL
3. WORRYING TOO MUCH ABOUT DIFFERENT THINGS: SEVERAL DAYS
GAD7 TOTAL SCORE: 1

## 2020-01-23 ASSESSMENT — PATIENT HEALTH QUESTIONNAIRE - PHQ9
10. IF YOU CHECKED OFF ANY PROBLEMS, HOW DIFFICULT HAVE THESE PROBLEMS MADE IT FOR YOU TO DO YOUR WORK, TAKE CARE OF THINGS AT HOME, OR GET ALONG WITH OTHER PEOPLE: NOT DIFFICULT AT ALL
SUM OF ALL RESPONSES TO PHQ QUESTIONS 1-9: 1
SUM OF ALL RESPONSES TO PHQ QUESTIONS 1-9: 1

## 2020-01-24 PROBLEM — M25.521 PAIN OF BOTH ELBOWS: Status: ACTIVE | Noted: 2020-01-24

## 2020-01-24 PROBLEM — M25.522 PAIN OF BOTH ELBOWS: Status: ACTIVE | Noted: 2020-01-24

## 2020-01-24 RX ORDER — PHENTERMINE HYDROCHLORIDE 37.5 MG/1
37.5 TABLET ORAL
Qty: 30 TABLET | Refills: 0 | Status: SHIPPED | OUTPATIENT
Start: 2020-01-24 | End: 2020-02-25

## 2020-01-24 ASSESSMENT — ANXIETY QUESTIONNAIRES: GAD7 TOTAL SCORE: 1

## 2020-01-24 ASSESSMENT — PATIENT HEALTH QUESTIONNAIRE - PHQ9: SUM OF ALL RESPONSES TO PHQ QUESTIONS 1-9: 1

## 2020-01-24 NOTE — ASSESSMENT & PLAN NOTE
No evident joint effusion noted.  She seems to have pain mostly with extension raising concerns for possible referred pain either from shoulder pathology or cervical radiculopathy. Normal supination and pronation. Will consider physical therapy. Follow up in 4-6 weeks.

## 2020-01-24 NOTE — ASSESSMENT & PLAN NOTE
She has been on phentermine in the past with minimal results.  She is gained weight back since she stopped the medication.  She would like to go back on the medication.  Discussed the importance of diet and lifestyle modifications while taking this medication.  Restart phentermine.  Patient to follow-up with PCP in 1 month for weight check.

## 2020-01-28 ENCOUNTER — NURSE TRIAGE (OUTPATIENT)
Dept: NURSING | Facility: CLINIC | Age: 35
End: 2020-01-28

## 2020-01-28 ENCOUNTER — OFFICE VISIT (OUTPATIENT)
Dept: FAMILY MEDICINE | Facility: OTHER | Age: 35
End: 2020-01-28
Payer: MEDICARE

## 2020-01-28 VITALS
HEART RATE: 74 BPM | WEIGHT: 275 LBS | OXYGEN SATURATION: 98 % | SYSTOLIC BLOOD PRESSURE: 116 MMHG | DIASTOLIC BLOOD PRESSURE: 70 MMHG | TEMPERATURE: 98.6 F | RESPIRATION RATE: 16 BRPM | HEIGHT: 66 IN | BODY MASS INDEX: 44.2 KG/M2

## 2020-01-28 DIAGNOSIS — R31.0 GROSS HEMATURIA: Primary | ICD-10-CM

## 2020-01-28 DIAGNOSIS — N30.01 ACUTE CYSTITIS WITH HEMATURIA: ICD-10-CM

## 2020-01-28 LAB
ALBUMIN UR-MCNC: 100 MG/DL
APPEARANCE UR: ABNORMAL
BACTERIA #/AREA URNS HPF: ABNORMAL /HPF
BILIRUB UR QL STRIP: NEGATIVE
COLOR UR AUTO: ABNORMAL
GLUCOSE UR STRIP-MCNC: NEGATIVE MG/DL
HGB UR QL STRIP: ABNORMAL
KETONES UR STRIP-MCNC: NEGATIVE MG/DL
LEUKOCYTE ESTERASE UR QL STRIP: ABNORMAL
NITRATE UR QL: NEGATIVE
NON-SQ EPI CELLS #/AREA URNS LPF: ABNORMAL /LPF
PH UR STRIP: 5 PH (ref 5–7)
RBC #/AREA URNS AUTO: >100 /HPF
SOURCE: ABNORMAL
SP GR UR STRIP: 1.02 (ref 1–1.03)
UROBILINOGEN UR STRIP-ACNC: 0.2 EU/DL (ref 0.2–1)
WBC #/AREA URNS AUTO: ABNORMAL /HPF

## 2020-01-28 PROCEDURE — 99213 OFFICE O/P EST LOW 20 MIN: CPT | Performed by: PHYSICIAN ASSISTANT

## 2020-01-28 PROCEDURE — 87086 URINE CULTURE/COLONY COUNT: CPT | Performed by: PHYSICIAN ASSISTANT

## 2020-01-28 PROCEDURE — 81001 URINALYSIS AUTO W/SCOPE: CPT | Performed by: PHYSICIAN ASSISTANT

## 2020-01-28 RX ORDER — SULFAMETHOXAZOLE/TRIMETHOPRIM 800-160 MG
1 TABLET ORAL 2 TIMES DAILY
Qty: 10 TABLET | Refills: 0 | Status: SHIPPED | OUTPATIENT
Start: 2020-01-28 | End: 2020-02-25

## 2020-01-28 ASSESSMENT — PAIN SCALES - GENERAL: PAINLEVEL: SEVERE PAIN (7)

## 2020-01-28 ASSESSMENT — MIFFLIN-ST. JEOR: SCORE: 1964.14

## 2020-01-28 NOTE — PROGRESS NOTES
Subjective     Florina Marie is a 34 year old female who presents to clinic today for the following health issues:    HPI   URINARY TRACT SYMPTOMS  Onset: 1/27/20    Description:   Painful urination (Dysuria): no            Frequency: no   Blood in urine (Hematuria): YES  Delay in urine (Hesitency): YES    Intensity: moderate    Progression of Symptoms:  same    Accompanying Signs & Symptoms:  Fever/chills: YES  Flank pain YES  Nausea and vomiting: no   Any vaginal symptoms: none  Abdominal/Pelvic Pain: YES- abdomin    History:   History of frequent UTI's: YES  History of kidney stones: no   Sexually Active: no   Possibility of pregnancy: No    Precipitating factors:   none    Therapies Tried and outcome: Increase fluid intake    - Noticed yesterday that when she would urinate she had blood present.  No clots but it was bright red.   - She has no bleeding between urination so doesn't think it is her period and had her period earlier in the month.    - She has had persistent blood in urine, not worse but not better.   - Has a history of UTIs, last in October.   - Feels like bladder is full. Having suprapubic pain.   - No history of pylonephritis. No history of kidney stones.  Back is feeling ok.   - No concerns about STDs. No vaginal symptoms.   - Bowel movements have been normal.   - Has had chills since yesterday but no fevers.       Patient Active Problem List   Diagnosis     Problems with learning     Morbid obesity (H)     Generalized anxiety disorder     Hyperlipidemia, unspecified     Attention deficit disorder without hyperactivity     Prolonged PTT     S/P carpal tunnel release     Pain of both elbows     Past Surgical History:   Procedure Laterality Date     EXAM UNDER ANESTHESIA PELVIC N/A 9/9/2016    Procedure: EXAM UNDER ANESTHESIA PELVIC;  Surgeon: Rhoda Alcazar MD;  Location:  OR      TOOTH EXTRACTION W/FORCEP      wisdom     RELEASE CARPAL TUNNEL Right 3/22/2019    Procedure: RIGHT RELEASE  CARPAL TUNNEL;  Surgeon: Anjum Wilburn DO;  Location: [facility]     RELEASE CARPAL TUNNEL Left 7/16/2019    Procedure: RELEASE, CARPAL TUNNEL-Left;  Surgeon: Anjum Wilburn DO;  Location: [facility]       Social History     Tobacco Use     Smoking status: Never Smoker     Smokeless tobacco: Never Used     Tobacco comment: no smokers in the household   Substance Use Topics     Alcohol use: No     Frequency: Never     Family History   Problem Relation Age of Onset     Lipids Father         diet     Thyroid Disease Mother         unsure if hypo or hyper     Diabetes Paternal Grandfather         adult     Heart Disease Paternal Grandfather         bypass/open hear surgery     Lipids Maternal Aunt         medication     Lipids Maternal Aunt         diet     Alzheimer Disease Maternal Grandfather      Hypertension No family hx of      Coronary Artery Disease No family hx of      Hyperlipidemia No family hx of      Cancer - colorectal No family hx of      Ovarian Cancer No family hx of      Prostate Cancer No family hx of      Depression/Anxiety No family hx of      Cerebrovascular Disease No family hx of      Anesthesia Reaction No family hx of      Asthma No family hx of      Osteoporosis No family hx of      Chemical Addiction No family hx of      Obesity No family hx of          Current Outpatient Medications   Medication Sig Dispense Refill     ALPRAZolam (XANAX) 0.5 MG tablet TAKE ONE TABLET AS NEEDED FOR SEVERE ANXIETY MAX OF 2 TABLETS PER DAY  0     diclofenac (VOLTAREN) 1 % topical gel Apply 2 g topically 4 times daily 100 g 1     escitalopram (LEXAPRO) 10 MG tablet TAKE 1 TABLET BY MOUTH ONCE DAILY WITH 20MG TABLET FOR A TOTAL OF 30MG  1     escitalopram (LEXAPRO) 20 MG tablet TAKE 1 TABLET BY MOUTH DAILY.  5     etonogestrel (IMPLANON/NEXPLANON) 68 MG IMPL 1 each (68 mg) by Subdermal route continuous  0     meloxicam (MOBIC) 15 MG tablet Take 1 tablet (15 mg) by mouth daily 30 tablet 2      "phentermine (ADIPEX-P) 37.5 MG tablet Take 1 tablet (37.5 mg) by mouth every morning (before breakfast) 30 tablet 0     sulfamethoxazole-trimethoprim (BACTRIM DS/SEPTRA DS) 800-160 MG tablet Take 1 tablet by mouth 2 times daily for 5 days 10 tablet 0     Allergies   Allergen Reactions     Macrobid [Nitrofurantoin] Headache and Nausea       Reviewed and updated as needed this visit by Provider  Tobacco  Allergies  Meds  Problems  Med Hx  Surg Hx  Fam Hx         Review of Systems   ROS COMP: Constitutional, GI, ,  skin  systems are negative, except as otherwise noted.      Objective    /70   Pulse 74   Temp 98.6  F (37  C)   Resp 16   Ht 1.676 m (5' 6\")   Wt 124.7 kg (275 lb)   LMP 01/01/2020 (Exact Date)   SpO2 98%   Breastfeeding No   BMI 44.39 kg/m    Body mass index is 44.39 kg/m .  Physical Exam   GENERAL: healthy, alert and no distress  RESP: lungs clear to auscultation - no rales, rhonchi or wheezes  CV: regular rate and rhythm, normal S1 S2, no S3 or S4, no murmur, click or rub, no peripheral edema and peripheral pulses strong  ABDOMEN: bowel sounds normal and soft, mildly tender to deep palpation suprapubic region, negative CVA tenderness bilaterally.   MS: no gross musculoskeletal defects noted, no edema    Diagnostic Test Results:  Labs reviewed in Epic  Results for orders placed or performed in visit on 01/28/20 (from the past 24 hour(s))   *UA reflex to Microscopic and Culture ([place] and [facility] (except [place] and [place])   Result Value Ref Range    Color Urine Brown     Appearance Urine Slightly Cloudy     Glucose Urine Negative NEG^Negative mg/dL    Bilirubin Urine Negative NEG^Negative    Ketones Urine Negative NEG^Negative mg/dL    Specific Gravity Urine 1.025 1.003 - 1.035    Blood Urine Large (A) NEG^Negative    pH Urine 5.0 5.0 - 7.0 pH    Protein Albumin Urine 100 (A) NEG^Negative mg/dL    Urobilinogen Urine 0.2 0.2 - 1.0 EU/dL    Nitrite Urine Negative " NEG^Negative    Leukocyte Esterase Urine Trace (A) NEG^Negative    Source Unspecified Urine    Urine Microscopic   Result Value Ref Range    WBC Urine 0 - 5 OTO5^0 - 5 /HPF    RBC Urine >100 (A) OTO2^O - 2 /HPF    Squamous Epithelial /LPF Urine Moderate (A) FEW^Few /LPF    Bacteria Urine Many (A) NEG^Negative /HPF           Assessment & Plan       ICD-10-CM    1. Gross hematuria R31.0 *UA reflex to Microscopic and Culture ([place] and [facility] (except [place] and [place])     Urine Microscopic   2. Acute cystitis with hematuria N30.01 sulfamethoxazole-trimethoprim (BACTRIM DS/SEPTRA DS) 800-160 MG tablet     Urine Culture Aerobic Bacterial          Her UA shows a large amount of blood and patient declines that this is her period.  There is trace leukocyte esterase but 0-5 WBC.  Reviewed her last UA which showed no WBC but culture was positive >100,000 colonies of E. Coli.  Given her symptoms recommended starting antibiotic for cystitis while we wait for UC results which we will contact her with when available and make adjustments to therapy as is appropriate.  Low suspicion for kidney stone at this time given lack of acute pain but something to consider and work-up if symptoms persist.  Otherwise urology if no infection or stone present.  Elected to start on bactrim.  Discussed potential side effects.     Side note she is wondering about getting Nexplanon removed, she has had more periods (first 6 months didn't have periods), now having more cramping.  Recommended talking with OB/GYN who placed Nexplanon to discuss other options. She does not want to trial OCP to help with bleeding at this time.     Return in about 2 days (around 1/30/2020) for If not improving, sooner if worse or new concerns.     Options for treatment and follow-up care were reviewed with the patient and/or guardian. Patient and/or guardian engaged in the decision making process and verbalized understanding of the options discussed and  agreed with the final plan.     Marcy Campos PA-C  [facility]

## 2020-01-28 NOTE — TELEPHONE ENCOUNTER
Caller states she is having redness or blood in urine. Caller denies any fever. Triage guidelines recommend to see provider within 24 hours. Caller verbalized and understands directives.    Reason for Disposition    Side (flank) or back pain present    Blood in urine  (Exception: could be normal menstrual bleeding)    Additional Information    Negative: Shock suspected (e.g., cold/pale/clammy skin, too weak to stand, low BP, rapid pulse)    Negative: Sounds like a life-threatening emergency to the triager    Negative: Urinary catheter, questions about    Negative: Recent back or abdominal injury    Negative: Recent genital injury    Negative: [1] Unable to urinate (or only a few drops) > 4 hours AND [2] bladder feels very full (e.g., palpable bladder or strong urge to urinate)    Negative: Passing pure blood or large blood clots (i.e., size > a dime) (Exception: ismael or small strands)    Negative: Fever > 100.5 F (38.1 C)    Negative: Patient sounds very sick or weak to the triager    Negative: Known sickle cell disease    Negative: Taking Coumadin (warfarin) or other strong blood thinner, or known bleeding disorder (e.g., thrombocytopenia)    Negative: Pain or burning with passing urine    Negative: [1] Pink or red-colored urine and likely from food (beets, rhubarb, red food dye) AND [2] lasts > 24 hours after stopping food    Protocols used: URINE - BLOOD IN-A-

## 2020-01-29 ENCOUNTER — TELEPHONE (OUTPATIENT)
Dept: OBGYN | Facility: OTHER | Age: 35
End: 2020-01-29

## 2020-01-29 ENCOUNTER — OFFICE VISIT (OUTPATIENT)
Dept: OBGYN | Facility: OTHER | Age: 35
End: 2020-01-29
Payer: MEDICARE

## 2020-01-29 VITALS — SYSTOLIC BLOOD PRESSURE: 104 MMHG | HEART RATE: 80 BPM | DIASTOLIC BLOOD PRESSURE: 72 MMHG

## 2020-01-29 DIAGNOSIS — Z30.46 NEXPLANON REMOVAL: ICD-10-CM

## 2020-01-29 DIAGNOSIS — N92.1 BREAKTHROUGH BLEEDING ON NEXPLANON: ICD-10-CM

## 2020-01-29 DIAGNOSIS — Z97.5 BREAKTHROUGH BLEEDING ON NEXPLANON: ICD-10-CM

## 2020-01-29 DIAGNOSIS — Z30.011 ORAL CONTRACEPTION INITIATION: Primary | ICD-10-CM

## 2020-01-29 LAB
BACTERIA SPEC CULT: NORMAL
Lab: NORMAL
SPECIMEN SOURCE: NORMAL

## 2020-01-29 PROCEDURE — 99213 OFFICE O/P EST LOW 20 MIN: CPT | Mod: 25 | Performed by: ADVANCED PRACTICE MIDWIFE

## 2020-01-29 PROCEDURE — 11982 REMOVE DRUG IMPLANT DEVICE: CPT | Performed by: ADVANCED PRACTICE MIDWIFE

## 2020-01-29 RX ORDER — DESOGESTREL AND ETHINYL ESTRADIOL 0.15-0.03
1 KIT ORAL DAILY
Qty: 84 TABLET | Refills: 3 | Status: SHIPPED | OUTPATIENT
Start: 2020-01-29 | End: 2021-12-31

## 2020-01-29 NOTE — TELEPHONE ENCOUNTER
Reason for Call:  Other call back and prescription    Detailed comments: [facility] called clinic. They are looking for clarification on APRI. Rx has directions to take one daily. Patient has been skipping placebo tablets. If this is the case it needs to be stated on the prescription. [phone]    Phone Number Patient can be reached at: Home number on file [phone] (home)    Best Time: any    Can we leave a detailed message on this number? YES    Call taken on 1/29/2020 at 10:38 AM by Kit Martinez

## 2020-01-29 NOTE — TELEPHONE ENCOUNTER
"RN called and spoke to patient, this medication was just ordered today so there is no way that she is \"skipping the placebo weeks\".     RN called and spoke to pharmacist to clarify instructions are correct. They state it must have been the old way she took her OCPs. RN verified she just had her Nexplanon removed today and hasn't started APRI prescription yet. They verbalized understanding and will stick to current orders.     Vida Oswald RN on 1/29/2020 at 12:04 PM    "

## 2020-01-29 NOTE — PROGRESS NOTES
Florina Marie is a 34 year old who presents to the clinic for discussion of birth control methods.   She has used the following methods in the past: VENESSA and Nexplanon  Today she is interested in discussing VENESSA.  She is complaining about excessive bleeding with the nexplanon.  Wants it removed today and to start her old pill Apri  Histories reviewed and updated  Past Medical History:   Diagnosis Date     Attention deficit disorder with hyperactivity(314.01)      Left carpal tunnel syndrome 6/27/2019     Other kyphoscoliosis and scoliosis      Other specified delay in development     Microcephaly     Right carpal tunnel syndrome 3/14/2019     Viral warts, unspecified     plantar warts     Past Surgical History:   Procedure Laterality Date     EXAM UNDER ANESTHESIA PELVIC N/A 9/9/2016    Procedure: EXAM UNDER ANESTHESIA PELVIC;  Surgeon: Rhoda Alcazar MD;  [facility]     HC TOOTH EXTRACTION W/FORCEP      wisdom     RELEASE CARPAL TUNNEL Right 3/22/2019    Procedure: RIGHT RELEASE CARPAL TUNNEL;  Surgeon: Anjum Wilburn DO;  [facility]     RELEASE CARPAL TUNNEL Left 7/16/2019    Procedure: RELEASE, CARPAL TUNNEL-Left;  Surgeon: Anjum Wilburn DO;  Location: [facility] OR     Social History     Socioeconomic History     Marital status: Single     Spouse name: Not on file     Number of children: 0     Years of education: Not on file     Highest education level: Not on file   Occupational History     Occupation: student     Comment: [employer]   Social Needs     Financial resource strain: Not on file     Food insecurity:     Worry: Not on file     Inability: Not on file     Transportation needs:     Medical: Not on file     Non-medical: Not on file   Tobacco Use     Smoking status: Never Smoker     Smokeless tobacco: Never Used     Tobacco comment: no smokers in the household   Substance and Sexual Activity     Alcohol use: No     Frequency: Never     Drug use: No     Sexual  activity: Never     Birth control/protection: Implant   Lifestyle     Physical activity:     Days per week: Not on file     Minutes per session: Not on file     Stress: Not on file   Relationships     Social connections:     Talks on phone: Not on file     Gets together: Not on file     Attends Shinto service: Not on file     Active member of club or organization: Not on file     Attends meetings of clubs or organizations: Not on file     Relationship status: Not on file     Intimate partner violence:     Fear of current or ex partner: Not on file     Emotionally abused: Not on file     Physically abused: Not on file     Forced sexual activity: Not on file   Other Topics Concern      Service Not Asked     Blood Transfusions Not Asked     Caffeine Concern Yes     Comment: 1 can QD     Occupational Exposure Not Asked     Hobby Hazards Not Asked     Sleep Concern Not Asked     Stress Concern Not Asked     Weight Concern Not Asked     Special Diet Not Asked     Back Care Not Asked     Exercise Yes     Comment: 4 days a week 1/2 hour     Bike Helmet No     Seat Belt Yes     Self-Exams Not Asked     Parent/sibling w/ CABG, MI or angioplasty before 65F 55M? No   Social History Narrative     Not on file     Family History   Problem Relation Age of Onset     Lipids Father         diet     Thyroid Disease Mother         unsure if hypo or hyper     Diabetes Paternal Grandfather         adult     Heart Disease Paternal Grandfather         bypass/open hear surgery     Lipids Maternal Aunt         medication     Lipids Maternal Aunt         diet     Alzheimer Disease Maternal Grandfather      Hypertension No family hx of      Coronary Artery Disease No family hx of      Hyperlipidemia No family hx of      Cancer - colorectal No family hx of      Ovarian Cancer No family hx of      Prostate Cancer No family hx of      Depression/Anxiety No family hx of      Cerebrovascular Disease No family hx of      Anesthesia  Reaction No family hx of      Asthma No family hx of      Osteoporosis No family hx of      Chemical Addiction No family hx of      Obesity No family hx of        Menstrual History    Menses every 28 days.  Length of menses: 5 days  Menstrual description: normal    Denies the following contraindications to OCP use:  Migraine and migraine with aura  Smoking  Liver disease  Personal and family history of blood clot or stroke under the age of 50.  History of heart disease  History of breast cancer  History of lupus  History of hypertension       ROS: 10 point ROS neg other than the symptoms noted above in the HPI.    EXAM:  /72   Pulse 80   LMP 01/29/2020           ASSESSMENT/PLAN:  There are no contraindications to the use of VENESSA    (Z30.011) Oral contraception initiation  (primary encounter diagnosis)  Comment:   Plan: desogestrel-ethinyl estradiol (APRI) 0.15-30         MG-MCG tablet    The use of the oral contraceptive pill has been fully discussed with the patient. This includes the proper method to initiate  and continue the pill, the need for regular compliance to ensure adequate contraceptive effect, the physiology which makes the pill effective, the instructions for what to do in event of a missed pill, and warnings about anticipated minor side effects such as breakthrough spotting, nausea, breast tenderness, weight changes, acne, headaches, etc. She was informed of the irregular bleeding pattern that can occur when the pill is first started or a new form is changed over for the first 2-3 months.  She has been told of the more serious potential side effects such as MI, stroke, and deep vein thrombosis, all of which are very unlikely.  She has been asked to report any signs of such serious problems immediately.   She understands and wishes to take the medication as prescribed.          (Z30.46) Nexplanon removal  Comment:   Plan: REMOVAL NON-BIODEGRADABLE DRUG DELIVERY IMPLANT            (N92.1,  Z97.5)  Breakthrough bleeding on Nexplanon  Comment:   Plan: REMOVAL NON-BIODEGRADABLE DRUG DELIVERY IMPLANT          Allergies were confirmed.  Consent signed.     The Nexplanon elma was located in the left  arm.  The distal and proximal ends of the elma were located and marked with a marking pen and the area cleansed with betadine. Approximately 3 cc's of 1% lidocaine with epinephrine was injected into the area under the Nexplanon elma and a small skin incision made using a #11 blade.  The Nexplanon was gently manipulated until the tip was at the incision. The elma tip was grasped with a  Billie clamp and was gently removed from the incision.  Both elma tips were inspected following removal and found to be completely intact.  The incision site was hemostatic and a steri-strip applied to the area along with a small pressure dressing.

## 2020-02-07 NOTE — PROGRESS NOTES
Subjective     Florina Marie is a 34 year old female who presents to clinic today for the following health issues:    History of Present Illness        She eats 2-3 servings of fruits and vegetables daily.She consumes 1 sweetened beverage(s) daily.She exercises with enough effort to increase her heart rate 30 to 60 minutes per day.  She exercises with enough effort to increase her heart rate 3 or less (one day a week) days per week.   She is taking medications regularly.     Answers for HPI/ROS submitted by the patient on 2/25/2020   If you checked off any problems, how difficult have these problems made it for you to do your work, take care of things at home, or get along with other people?: Not difficult at all  PHQ9 TOTAL SCORE: 1  ZEENAT 7 TOTAL SCORE: 2      Medication Followup of Phentermine    Taking Medication as prescribed: yes    Side Effects:  None    Medication Helping Symptoms:  somewhat     I last saw patient 2 months ago.  At that time it was not thought the phentermine was helping and so we stopped this.  She then saw a colleague about a month later for elbow pain.  She had noticed that her weight was going up and she asked to be restarted on the phentermine and she was.  She denies having any issues with anger or irritability that she had in the past.  She is going to the [facility] once a week to walk and do a dance class.  She denies insomnia, headache, chest pain or shortness of breath.    Also reports right thumbnail is infected.  She is not sure how it started.  She has been soaking this in water and hydrogen peroxide.  It is tender to touch.  She picks at it so she wears a Band-Aid to stop her self from picking at it.    She continues to have right elbow pain with vacuuming.  She had been referred to physical therapy but has not made that appointment yet.    Patient Active Problem List   Diagnosis     Problems with learning     Morbid obesity (H)     Generalized anxiety disorder     Hyperlipidemia,  unspecified     Attention deficit disorder without hyperactivity     Prolonged PTT     S/P carpal tunnel release     Pain of both elbows     Past Surgical History:   Procedure Laterality Date     EXAM UNDER ANESTHESIA PELVIC N/A 9/9/2016    Procedure: EXAM UNDER ANESTHESIA PELVIC;  Surgeon: Rhoda Alcazar MD;  [facility]: [facility] OR     HC TOOTH EXTRACTION W/FORCEP      wisdom     RELEASE CARPAL TUNNEL Right 3/22/2019    Procedure: RIGHT RELEASE CARPAL TUNNEL;  Surgeon: Anjum Wilburn DO;  Location[facility]     RELEASE CARPAL TUNNEL Left 7/16/2019    Procedure: RELEASE, CARPAL TUNNEL-Left;  Surgeon: Anjum Wilburn DO;  Location: [facility]       Social History     Tobacco Use     Smoking status: Never Smoker     Smokeless tobacco: Never Used     Tobacco comment: no smokers in the household   Substance Use Topics     Alcohol use: No     Frequency: Never     Family History   Problem Relation Age of Onset     Lipids Father         diet     Thyroid Disease Mother         unsure if hypo or hyper     Diabetes Paternal Grandfather         adult     Heart Disease Paternal Grandfather         bypass/open hear surgery     Lipids Maternal Aunt         medication     Lipids Maternal Aunt         diet     Alzheimer Disease Maternal Grandfather      Hypertension No family hx of      Coronary Artery Disease No family hx of      Hyperlipidemia No family hx of      Cancer - colorectal No family hx of      Ovarian Cancer No family hx of      Prostate Cancer No family hx of      Depression/Anxiety No family hx of      Cerebrovascular Disease No family hx of      Anesthesia Reaction No family hx of      Asthma No family hx of      Osteoporosis No family hx of      Chemical Addiction No family hx of      Obesity No family hx of            Reviewed and updated as needed this visit by Provider  Tobacco  Allergies  Meds  Problems  Med Hx  Surg Hx  Fam Hx         Review of Systems   CONSTITUTIONAL: NEGATIVE for fever,  "chills  RESP: NEGATIVE for significant cough or shortness of breath   CV: NEGATIVE for chest pain, palpitations or peripheral edema      Objective    /68 (BP Location: Right arm, Patient Position: Chair, Cuff Size: Adult Large)   Pulse 78   Temp 97.7  F (36.5  C) (Temporal)   Resp 14   Ht 1.676 m (5' 6\")   Wt 122.9 kg (271 lb)   LMP 01/29/2020   SpO2 98%   BMI 43.74 kg/m    Body mass index is 43.74 kg/m .  Physical Exam   Gen: no apparent distress  Chest: clear to auscultation without wheeze, rale or rhonchi  Cor: regular rate and rhythm without murmur  Ext: warm and dry without edema  Thumb: Right nail is cut very short.  There is some swelling and redness at the edge.  This is tender to touch.  Psych: Alert and oriented times 3; coherent speech, normal   rate and volume, able to articulate logical thoughts, no hallucinations   or delusions  Her affect is neutral        Assessment & Plan     1. Morbid obesity (H)  She is down 4 pounds.  We discussed trying to increase her exercise 2-3 times a week at the .  We will continue the phentermine.  She will continue with monthly checks.    - phentermine (ADIPEX-P) 37.5 MG tablet; Take 1 tablet (37.5 mg) by mouth every morning (before breakfast)  Dispense: 30 tablet; Refill: 0    2. Paronychia of right thumb  Encouraged her to do soaking without the hydrogen peroxide.  Just use water and mild soap.  Will treat with Keflex 3 times a day for 7 days.    - cephALEXin (KEFLEX) 500 MG capsule; Take 1 capsule (500 mg) by mouth 3 times daily for 7 days  Dispense: 21 capsule; Refill: 0     Alanna Curiel MD  [facility]"

## 2020-02-25 ENCOUNTER — OFFICE VISIT (OUTPATIENT)
Dept: FAMILY MEDICINE | Facility: OTHER | Age: 35
End: 2020-02-25
Payer: MEDICARE

## 2020-02-25 VITALS
DIASTOLIC BLOOD PRESSURE: 68 MMHG | WEIGHT: 271 LBS | SYSTOLIC BLOOD PRESSURE: 108 MMHG | HEIGHT: 66 IN | RESPIRATION RATE: 14 BRPM | BODY MASS INDEX: 43.55 KG/M2 | HEART RATE: 78 BPM | OXYGEN SATURATION: 98 % | TEMPERATURE: 97.7 F

## 2020-02-25 DIAGNOSIS — E66.01 MORBID OBESITY (H): Primary | ICD-10-CM

## 2020-02-25 DIAGNOSIS — L03.011 PARONYCHIA OF RIGHT THUMB: ICD-10-CM

## 2020-02-25 PROCEDURE — 99214 OFFICE O/P EST MOD 30 MIN: CPT | Performed by: FAMILY MEDICINE

## 2020-02-25 RX ORDER — CEPHALEXIN 500 MG/1
500 CAPSULE ORAL 3 TIMES DAILY
Qty: 21 CAPSULE | Refills: 0 | Status: SHIPPED | OUTPATIENT
Start: 2020-02-25 | End: 2020-03-19

## 2020-02-25 RX ORDER — PHENTERMINE HYDROCHLORIDE 37.5 MG/1
37.5 TABLET ORAL
Qty: 30 TABLET | Refills: 0 | Status: SHIPPED | OUTPATIENT
Start: 2020-02-25 | End: 2020-03-19

## 2020-02-25 ASSESSMENT — ANXIETY QUESTIONNAIRES
7. FEELING AFRAID AS IF SOMETHING AWFUL MIGHT HAPPEN: NOT AT ALL
4. TROUBLE RELAXING: NOT AT ALL
1. FEELING NERVOUS, ANXIOUS, OR ON EDGE: NOT AT ALL
GAD7 TOTAL SCORE: 2
3. WORRYING TOO MUCH ABOUT DIFFERENT THINGS: NOT AT ALL
7. FEELING AFRAID AS IF SOMETHING AWFUL MIGHT HAPPEN: NOT AT ALL
GAD7 TOTAL SCORE: 2
2. NOT BEING ABLE TO STOP OR CONTROL WORRYING: NOT AT ALL
GAD7 TOTAL SCORE: 2
5. BEING SO RESTLESS THAT IT IS HARD TO SIT STILL: SEVERAL DAYS
6. BECOMING EASILY ANNOYED OR IRRITABLE: SEVERAL DAYS

## 2020-02-25 ASSESSMENT — MIFFLIN-ST. JEOR: SCORE: 1946

## 2020-02-26 ASSESSMENT — ANXIETY QUESTIONNAIRES: GAD7 TOTAL SCORE: 2

## 2020-02-26 ASSESSMENT — PATIENT HEALTH QUESTIONNAIRE - PHQ9: SUM OF ALL RESPONSES TO PHQ QUESTIONS 1-9: 1

## 2020-02-28 ENCOUNTER — OFFICE VISIT (OUTPATIENT)
Dept: FAMILY MEDICINE | Facility: CLINIC | Age: 35
End: 2020-02-28
Payer: MEDICARE

## 2020-02-28 VITALS
RESPIRATION RATE: 18 BRPM | DIASTOLIC BLOOD PRESSURE: 82 MMHG | HEIGHT: 66 IN | TEMPERATURE: 101.2 F | OXYGEN SATURATION: 100 % | HEART RATE: 99 BPM | WEIGHT: 270.5 LBS | BODY MASS INDEX: 43.47 KG/M2 | SYSTOLIC BLOOD PRESSURE: 122 MMHG

## 2020-02-28 DIAGNOSIS — B34.9 VIRAL ILLNESS: Primary | ICD-10-CM

## 2020-02-28 DIAGNOSIS — E66.01 MORBID OBESITY (H): ICD-10-CM

## 2020-02-28 DIAGNOSIS — R68.89 FLU-LIKE SYMPTOMS: ICD-10-CM

## 2020-02-28 DIAGNOSIS — J02.9 SORE THROAT: ICD-10-CM

## 2020-02-28 LAB
DEPRECATED S PYO AG THROAT QL EIA: NEGATIVE
FLUAV+FLUBV AG SPEC QL: NEGATIVE
FLUAV+FLUBV AG SPEC QL: NEGATIVE
SPECIMEN SOURCE: NORMAL
STREP GROUP A PCR: NOT DETECTED

## 2020-02-28 PROCEDURE — 87651 STREP A DNA AMP PROBE: CPT | Performed by: NURSE PRACTITIONER

## 2020-02-28 PROCEDURE — 99214 OFFICE O/P EST MOD 30 MIN: CPT | Performed by: NURSE PRACTITIONER

## 2020-02-28 PROCEDURE — 40001204 ZZHCL STATISTIC STREP A RAPID: Performed by: NURSE PRACTITIONER

## 2020-02-28 PROCEDURE — 87804 INFLUENZA ASSAY W/OPTIC: CPT | Performed by: NURSE PRACTITIONER

## 2020-02-28 ASSESSMENT — MIFFLIN-ST. JEOR: SCORE: 1943.73

## 2020-02-28 NOTE — PATIENT INSTRUCTIONS
Influenza testing negative.  Ensure you are drinking plenty of fluids - recommend half Gatorade (G2) and half water, scheduled Ibuprofen, and rest.    Rapid Strep test negative in clinic today.  Throat culture pending.  Encourage fluids, rest, and can alternate Tylenol and Motrin as needed for fever/discomfort.      Please call or return to clinic for any questions, concerns, or symptoms that are not improving or becoming worse.    Kathleen Galvez, CNP      Patient Education     Viral Upper Respiratory Illness (Adult)    You have a viral upper respiratory illness (URI), which is another term for the common cold. This illness is contagious during the first few days. It is spread through the air by coughing and sneezing. It may also be spread by direct contact (touching the sick person and then touching your own eyes, nose, or mouth). Frequent handwashing will decrease risk of spread. Most viral illnesses go away within 7 to 10 days with rest and simple home remedies. Sometimes the illness may last for several weeks. Antibiotics will not kill a virus, and they are generally not prescribed for this condition.  Home care    If symptoms are severe, rest at home for the first 2 to 3 days. When you resume activity, don't let yourself get too tired.    Don't smoke. If you need help stopping, talk with your healthcare provider.    Avoid being exposed to cigarette smoke (yours or others ).    You may use acetaminophen or ibuprofen to control pain and fever, unless another medicine was prescribed. If you have chronic liver or kidney disease, have ever had a stomach ulcer or gastrointestinal bleeding, or are taking blood-thinning medicines, talk with your healthcare provider before using these medicines. Aspirin should never be given to anyone under 18 years of age who is ill with a viral infection or fever. It may cause severe liver or brain damage.    Your appetite may be poor, so a light diet is fine. Stay well hydrated by  drinking 6 to 8 glasses of fluids per day (water, soft drinks, juices, tea, or soup). Extra fluids will help loosen secretions in the nose and lungs.    Over-the-counter cold medicines will not shorten the length of time you re sick, but they may be helpful for the following symptoms: cough, sore throat, and nasal and sinus congestion. If you take prescription medicines, ask your healthcare provider or pharmacist which over-the-counter medicines are safe to use. (Note: Don't use decongestants if you have high blood pressure.)  Follow-up care  Follow up with your healthcare provider, or as advised.  When to seek medical advice  Call your healthcare provider right away if any of these occur:    Cough with lots of colored sputum (mucus)    Severe headache; face, neck, or ear pain    Difficulty swallowing due to throat pain    Fever of 100.4 F (38 C) or higher, or as directed by your healthcare provider  Call 911  Call 911 if any of these occur:    Chest pain, shortness of breath, wheezing, or difficulty breathing    Coughing up blood    Very severe pain with swallowing, especially if it goes along with a muffled voice   Date Last Reviewed: 6/1/2018 2000-2019 [employer]. [address] [place] [address], [place] [address]. All rights reserved. This information is not intended as a substitute for professional medical care. Always follow your healthcare professional's instructions.

## 2020-02-28 NOTE — PROGRESS NOTES
Subjective     Florina Marie is a 34 year old female who presents to clinic today for the following health issues:    HPI   Acute Illness   Acute illness concerns: cough , sore throat   Onset: yesterday     Fever: YES. 101.2     Chills/Sweats: YES. Chills. No sweats.     Headache (location?): YES    Sinus Pressure:YES    Conjunctivitis:  no    Ear Pain: no    Rhinorrhea: YES    Congestion: YES    Sore Throat: YES     Cough: YES.    Wheeze: YES    Decreased Appetite: YES    Nausea: YES    Vomiting: YES. Threw up food from last night.     Diarrhea:  no     Dysuria/Freq.: no     Fatigue/Achiness: YES.both.    Sick/Strep Exposure: Yes.     Therapies Tried and outcome:Ibupofen this morning.     - last dose Ibuprofen at 0600    Patient Active Problem List   Diagnosis     Problems with learning     Morbid obesity (H)     Generalized anxiety disorder     Hyperlipidemia, unspecified     Attention deficit disorder without hyperactivity     Prolonged PTT     S/P carpal tunnel release     Pain of both elbows     Past Surgical History:   Procedure Laterality Date     EXAM UNDER ANESTHESIA PELVIC N/A 9/9/2016    Procedure: EXAM UNDER ANESTHESIA PELVIC;  Surgeon: Rhoda Alcazar MD;  Location:  OR      TOOTH EXTRACTION W/FORCEP      wisdom     RELEASE CARPAL TUNNEL Right 3/22/2019    Procedure: RIGHT RELEASE CARPAL TUNNEL;  Surgeon: Anjum Wilburn DO;  Location: [facility]     RELEASE CARPAL TUNNEL Left 7/16/2019    Procedure: RELEASE, CARPAL TUNNEL-Left;  Surgeon: Anjum Wilburn DO;  Location: [facility]       Social History     Tobacco Use     Smoking status: Never Smoker     Smokeless tobacco: Never Used     Tobacco comment: no smokers in the household   Substance Use Topics     Alcohol use: No     Frequency: Never     Family History   Problem Relation Age of Onset     Lipids Father         diet     Thyroid Disease Mother         unsure if hypo or hyper     Diabetes Paternal Grandfather         adult      Heart Disease Paternal Grandfather         bypass/open hear surgery     Lipids Maternal Aunt         medication     Lipids Maternal Aunt         diet     Alzheimer Disease Maternal Grandfather      Hypertension No family hx of      Coronary Artery Disease No family hx of      Hyperlipidemia No family hx of      Cancer - colorectal No family hx of      Ovarian Cancer No family hx of      Prostate Cancer No family hx of      Depression/Anxiety No family hx of      Cerebrovascular Disease No family hx of      Anesthesia Reaction No family hx of      Asthma No family hx of      Osteoporosis No family hx of      Chemical Addiction No family hx of      Obesity No family hx of          Current Outpatient Medications   Medication Sig Dispense Refill     ALPRAZolam (XANAX) 0.5 MG tablet TAKE ONE TABLET AS NEEDED FOR SEVERE ANXIETY MAX OF 2 TABLETS PER DAY  0     cephALEXin (KEFLEX) 500 MG capsule Take 1 capsule (500 mg) by mouth 3 times daily for 7 days 21 capsule 0     desogestrel-ethinyl estradiol (APRI) 0.15-30 MG-MCG tablet Take 1 tablet by mouth daily Take one tablet daily. 84 tablet 3     diclofenac (VOLTAREN) 1 % topical gel Apply 2 g topically 4 times daily 100 g 1     escitalopram (LEXAPRO) 10 MG tablet TAKE 1 TABLET BY MOUTH ONCE DAILY WITH 20MG TABLET FOR A TOTAL OF 30MG  1     escitalopram (LEXAPRO) 20 MG tablet TAKE 1 TABLET BY MOUTH DAILY.  5     etonogestrel (IMPLANON/NEXPLANON) 68 MG IMPL 1 each (68 mg) by Subdermal route continuous  0     meloxicam (MOBIC) 15 MG tablet Take 1 tablet (15 mg) by mouth daily 30 tablet 2     phentermine (ADIPEX-P) 37.5 MG tablet Take 1 tablet (37.5 mg) by mouth every morning (before breakfast) 30 tablet 0     Allergies   Allergen Reactions     Macrobid [Nitrofurantoin] Headache and Nausea     Recent Labs   Lab Test 08/21/19  0749 09/05/18  0750 07/10/18  1334 07/09/18  1540 03/02/18  1444 08/24/17  0823  11/15/15  1415  05/04/12  1024   A1C  --   --   --   --   --   --   --   --  "  --  5.6   * 105*  --   --   --  107*   < >  --    < > 153*   HDL 39* 36*  --   --   --  43*   < >  --    < > 49*   TRIG 162* 206*  --   --   --  160*   < >  --    < > 108   ALT  --   --   --  23  --  21  --  41  --   --    CR  --   --   --  1.01  --  1.07*  --  1.08*   < >  --    GFRESTIMATED  --   --   --  63  --  59*  --  59*   < >  --    GFRESTBLACK  --   --   --  76  --  72  --  72   < >  --    POTASSIUM  --   --   --  3.9  --  4.2  --  3.9   < >  --    TSH  --   --  1.18  --  1.37 1.73  --   --    < >  --     < > = values in this interval not displayed.      BP Readings from Last 3 Encounters:   02/28/20 122/82   02/25/20 108/68   01/29/20 104/72    Wt Readings from Last 3 Encounters:   02/28/20 122.7 kg (270 lb 8 oz)   02/25/20 122.9 kg (271 lb)   01/28/20 124.7 kg (275 lb)                    Reviewed and updated as needed this visit by Provider         Review of Systems   ROS COMP: Constitutional, HEENT, cardiovascular, pulmonary, gi and gu systems are negative, except as otherwise noted.      Objective    /82   Pulse 99   Temp 101.2  F (38.4  C) (Oral)   Resp 18   Ht 1.676 m (5' 6\")   Wt 122.7 kg (270 lb 8 oz)   LMP 02/28/2020 (Exact Date)   SpO2 100%   Breastfeeding No   BMI 43.66 kg/m    Body mass index is 43.66 kg/m .  Physical Exam   GENERAL: healthy, alert and mild distress  EYES: Eyes grossly normal to inspection, PERRL and conjunctivae and sclerae normal  HENT: normal cephalic/atraumatic, ear canals and TM's normal, nose and mouth without ulcers or lesions, oropharynx clear, oral mucous membranes moist and tonsillar erythema  NECK: no adenopathy, no asymmetry, masses, or scars and thyroid normal to palpation  RESP: lungs clear to auscultation - no rales, rhonchi or wheezes  CV: regular rate and rhythm, normal S1 S2, no S3 or S4, no murmur, click or rub, no peripheral edema and peripheral pulses strong  MS: no gross musculoskeletal defects noted, no edema  SKIN: no suspicious " lesions or rashes  NEURO: Normal strength and tone, mentation intact and speech normal  PSYCH: mentation appears normal, affect normal/bright    Diagnostic Test Results:  Labs reviewed in Epic  Results for orders placed or performed in visit on 02/28/20 (from the past 24 hour(s))   Streptococcus A Rapid Scr w Reflx to PCR   Result Value Ref Range    Strep Specimen Description Throat     Streptococcus Group A Rapid Screen Negative NEG^Negative           Assessment & Plan     1. Viral illness  2. Sore throat  3. Flu-like symptoms  Rapid strep negative and influenza testing negative.  Discussed importance of home care measures including scheduled Ibuprofen, good hydration, rest.  Home care measures discussed in detail and these were included in her AVS.  Advised to follow-up for symptoms that are not improving or becoming worse.  Mom/Patient voiced understanding and agreement with treatment plan.    - Streptococcus A Rapid Scr w Reflx to PCR  - Group A Streptococcus PCR Throat Swab  - Influenza A/B antigen    4. Morbid obesity (H)  Patient needs long-term weight loss.  She is working on this, and had an appointment with her PCP earlier this week for a weigh-in.         Patient Instructions     Influenza testing negative.  Ensure you are drinking plenty of fluids - recommend half Gatorade (G2) and half water, scheduled Ibuprofen, and rest.    Rapid Strep test negative in clinic today.  Throat culture pending.  Encourage fluids, rest, and can alternate Tylenol and Motrin as needed for fever/discomfort.      Please call or return to clinic for any questions, concerns, or symptoms that are not improving or becoming worse.    Kathleen Galvez, CNP      Patient Education     Viral Upper Respiratory Illness (Adult)    You have a viral upper respiratory illness (URI), which is another term for the common cold. This illness is contagious during the first few days. It is spread through the air by coughing and sneezing. It may  also be spread by direct contact (touching the sick person and then touching your own eyes, nose, or mouth). Frequent handwashing will decrease risk of spread. Most viral illnesses go away within 7 to 10 days with rest and simple home remedies. Sometimes the illness may last for several weeks. Antibiotics will not kill a virus, and they are generally not prescribed for this condition.  Home care    If symptoms are severe, rest at home for the first 2 to 3 days. When you resume activity, don't let yourself get too tired.    Don't smoke. If you need help stopping, talk with your healthcare provider.    Avoid being exposed to cigarette smoke (yours or others ).    You may use acetaminophen or ibuprofen to control pain and fever, unless another medicine was prescribed. If you have chronic liver or kidney disease, have ever had a stomach ulcer or gastrointestinal bleeding, or are taking blood-thinning medicines, talk with your healthcare provider before using these medicines. Aspirin should never be given to anyone under 18 years of age who is ill with a viral infection or fever. It may cause severe liver or brain damage.    Your appetite may be poor, so a light diet is fine. Stay well hydrated by drinking 6 to 8 glasses of fluids per day (water, soft drinks, juices, tea, or soup). Extra fluids will help loosen secretions in the nose and lungs.    Over-the-counter cold medicines will not shorten the length of time you re sick, but they may be helpful for the following symptoms: cough, sore throat, and nasal and sinus congestion. If you take prescription medicines, ask your healthcare provider or pharmacist which over-the-counter medicines are safe to use. (Note: Don't use decongestants if you have high blood pressure.)  Follow-up care  Follow up with your healthcare provider, or as advised.  When to seek medical advice  Call your healthcare provider right away if any of these occur:    Cough with lots of colored sputum  (mucus)    Severe headache; face, neck, or ear pain    Difficulty swallowing due to throat pain    Fever of 100.4 F (38 C) or higher, or as directed by your healthcare provider  Call 911  Call 911 if any of these occur:    Chest pain, shortness of breath, wheezing, or difficulty breathing    Coughing up blood    Very severe pain with swallowing, especially if it goes along with a muffled voice   Date Last Reviewed: 6/1/2018 2000-2019 [employer]. [address] [place] [address]. All rights reserved. This information is not intended as a substitute for professional medical care. Always follow your healthcare professional's instructions.               Return in about 3 days (around 3/2/2020) for Symptoms Not Improving/Getting Worse.    Kathleen Galvez, [facility]    Answers for HPI/ROS submitted by the patient on 2/28/2020   If you checked off any problems, how difficult have these problems made it for you to do your work, take care of things at home, or get along with other people?: Not difficult at all  PHQ9 TOTAL SCORE: 0

## 2020-02-29 ASSESSMENT — PATIENT HEALTH QUESTIONNAIRE - PHQ9: SUM OF ALL RESPONSES TO PHQ QUESTIONS 1-9: 0

## 2020-03-17 ENCOUNTER — TELEPHONE (OUTPATIENT)
Dept: FAMILY MEDICINE | Facility: OTHER | Age: 35
End: 2020-03-17

## 2020-03-17 NOTE — TELEPHONE ENCOUNTER
TC patient. Can come in for weight check if she feels safe to do so and is not ill. Otherwise schedule med check with another provider in TC absence.     Morris Perez PA-C  [facility]

## 2020-03-17 NOTE — TELEPHONE ENCOUNTER
Reason for Call:  Other appointment and call back    Detailed comments: Patient was scheduled with TC on 3/27 for a med check, but provider is out. She normally does weigh in during appointments. Please call patient on how to proceed with rescheduling.     Phone Number Patient can be reached at: Home number on file [phone] (home)    Best Time: any    Can we leave a detailed message on this number? YES    Call taken on 3/17/2020 at 7:56 AM by Kit Martinez

## 2020-03-17 NOTE — TELEPHONE ENCOUNTER
TC - would you be ok with patient coming in on the float schedule for a weight check and then possibly doing a phone visit? Or OK to just fill rx after seeing current weight?  Meliza Bruce, CMA

## 2020-03-18 NOTE — PROGRESS NOTES
Subjective     Florina Marie is a 34 year old female who presents to clinic today for the following health issues:    HPI   Medication Followup of Phentermine     Taking Medication as prescribed: yes    Side Effects:  None    Medication Helping Symptoms:  Yes, patient states that she has been back on this medication for about one month, feels she has lost weight. States she has been walking a lot.      Walking most days now, but just when it is nice out. Was doing this about 1 time a week and has tried to increase to 2-3 times weekly  -------------------------------------    Patient Active Problem List   Diagnosis     Problems with learning     Morbid obesity (H)     Generalized anxiety disorder     Hyperlipidemia, unspecified     Attention deficit disorder without hyperactivity     Prolonged PTT     S/P carpal tunnel release     Pain of both elbows     Past Surgical History:   Procedure Laterality Date     EXAM UNDER ANESTHESIA PELVIC N/A 9/9/2016    Procedure: EXAM UNDER ANESTHESIA PELVIC;  Surgeon: Rhoda Alcazar MD;  Location: [facility]     HC TOOTH EXTRACTION W/FORCEP      wisdom     RELEASE CARPAL TUNNEL Right 3/22/2019    Procedure: RIGHT RELEASE CARPAL TUNNEL;  Surgeon: Anjum Wilburn DO;  Location: [facility]     RELEASE CARPAL TUNNEL Left 7/16/2019    Procedure: RELEASE, CARPAL TUNNEL-Left;  Surgeon: Anjum Wilburn DO;  Location: [facility]       Social History     Tobacco Use     Smoking status: Never Smoker     Smokeless tobacco: Never Used     Tobacco comment: no smokers in the household   Substance Use Topics     Alcohol use: No     Frequency: Never     Family History   Problem Relation Age of Onset     Lipids Father         diet     Thyroid Disease Mother         unsure if hypo or hyper     Diabetes Paternal Grandfather         adult     Heart Disease Paternal Grandfather         bypass/open hear surgery     Lipids Maternal Aunt         medication     Lipids Maternal Aunt         diet  "    Alzheimer Disease Maternal Grandfather      Hypertension No family hx of      Coronary Artery Disease No family hx of      Hyperlipidemia No family hx of      Cancer - colorectal No family hx of      Ovarian Cancer No family hx of      Prostate Cancer No family hx of      Depression/Anxiety No family hx of      Cerebrovascular Disease No family hx of      Anesthesia Reaction No family hx of      Asthma No family hx of      Osteoporosis No family hx of      Chemical Addiction No family hx of      Obesity No family hx of            Reviewed and updated as needed this visit by Provider  Tobacco  Allergies  Meds  Problems  Med Hx  Surg Hx  Fam Hx         Review of Systems   ROS COMP: Constitutional, HEENT, cardiovascular, pulmonary, GI, , musculoskeletal, neuro, skin, endocrine and psych systems are negative, except as otherwise noted.      Objective    /84 (BP Location: Right arm, Patient Position: Chair, Cuff Size: Adult Large)   Pulse 88   Temp 98.1  F (36.7  C) (Temporal)   Resp 20   Ht 1.664 m (5' 5.5\")   Wt 121.3 kg (267 lb 8 oz)   LMP 02/28/2020 (Exact Date)   SpO2 97%   Breastfeeding No   BMI 43.84 kg/m    Body mass index is 43.84 kg/m .  Physical Exam   GENERAL: healthy, alert and no distress  RESP: lungs clear to auscultation - no rales, rhonchi or wheezes  CV: regular rate and rhythm, normal S1 S2, no S3 or S4, no murmur, click or rub, no peripheral edema and peripheral pulses strong  MS: no gross musculoskeletal defects noted, no edema            Assessment & Plan       ICD-10-CM    1. Morbid obesity (H)  E66.01 phentermine (ADIPEX-P) 37.5 MG tablet          Plan to keep the meds the same, as having success. Discussed trends are not necessarily matching her med dosing and likely there is more to the story. Asked to monitor food and /or exercise to try to see what else may be impacting the changes. She states monitoring food is too hard, she cannot keep up with this. Plans to work " on an activity goal.  Goal to increase activity to 30 min most days of the week.     Return in about 4 weeks (around 4/16/2020) for weight.  Would like in office with TC, aware she may be rescheduled.    Valorie Juarez MD, MD  [facility]

## 2020-03-19 ENCOUNTER — OFFICE VISIT (OUTPATIENT)
Dept: FAMILY MEDICINE | Facility: OTHER | Age: 35
End: 2020-03-19
Payer: MEDICARE

## 2020-03-19 VITALS
TEMPERATURE: 98.1 F | OXYGEN SATURATION: 97 % | HEART RATE: 88 BPM | WEIGHT: 267.5 LBS | RESPIRATION RATE: 20 BRPM | HEIGHT: 66 IN | BODY MASS INDEX: 42.99 KG/M2 | SYSTOLIC BLOOD PRESSURE: 126 MMHG | DIASTOLIC BLOOD PRESSURE: 84 MMHG

## 2020-03-19 DIAGNOSIS — E66.01 MORBID OBESITY (H): ICD-10-CM

## 2020-03-19 PROCEDURE — 99214 OFFICE O/P EST MOD 30 MIN: CPT | Performed by: FAMILY MEDICINE

## 2020-03-19 RX ORDER — PHENTERMINE HYDROCHLORIDE 37.5 MG/1
37.5 TABLET ORAL
Qty: 30 TABLET | Refills: 0 | Status: SHIPPED | OUTPATIENT
Start: 2020-03-19 | End: 2020-04-10

## 2020-03-19 ASSESSMENT — MIFFLIN-ST. JEOR: SCORE: 1922.18

## 2020-04-04 ENCOUNTER — NURSE TRIAGE (OUTPATIENT)
Dept: NURSING | Facility: CLINIC | Age: 35
End: 2020-04-04

## 2020-04-04 NOTE — TELEPHONE ENCOUNTER
Calling in saying she has symptoms of a UTI, for the last few days. Burning frequency, no pain at this time. She thinks it started out as a yeast infection and she had discharge and itching. She is going to the [facility] in [facility] in [place] when it opens, or doing an oncare visit.    Malina Bruno RN/ [facility]        Reason for Disposition    > 2 UTI's in last year    Additional Information    Negative: Shock suspected (e.g., cold/pale/clammy skin, too weak to stand, low BP, rapid pulse)    Negative: Sounds like a life-threatening emergency to the triager    [1] Discomfort (pain, burning or stinging) when passing urine AND [2] female    Negative: [1] Unable to urinate (or only a few drops) > 4 hours AND [2] bladder feels very full (e.g., palpable bladder or strong urge to urinate)    Negative: Vomiting    Negative: Patient sounds very sick or weak to the triager    Negative: [1] SEVERE pain with urination  (e.g., excruciating) AND [2] not improved after 2 hours of pain medicine and Sitz bath    Negative: Fever > 100.5 F (38.1 C)    Negative: Side (flank) or lower back pain present    Negative: Diabetes mellitus or weak immune system (e.g., HIV positive, cancer chemotherapy, transplant patient)    Negative: Bedridden (e.g., nursing home patient, CVA, chronic illness, recovering from surgery)    Negative: Artificial heart valve or artificial joint    Negative: Unusual vaginal discharge (e.g., bad smelling, yellow, green, or foamy-white)    Negative: Patient is worried about sexually transmitted disease (STD)    Negative: Possibility of pregnancy    Negative: Blood in urine (red, pink, or tea-colored)    Negative: Age > 50 years    Protocols used: URINATION PAIN - FEMALE-A-AH, URINARY SYMPTOMS-A-AH

## 2020-04-08 ENCOUNTER — TELEPHONE (OUTPATIENT)
Dept: FAMILY MEDICINE | Facility: OTHER | Age: 35
End: 2020-04-08

## 2020-04-08 NOTE — TELEPHONE ENCOUNTER
Left message for patient to return call. See TC message below and ask patient preference  Meliza Bruce, [facility]

## 2020-04-08 NOTE — TELEPHONE ENCOUNTER
I would be fine doing this by telephone, but patient had requested at visit with AE to come in clinic.  Please check with patient if she is willing to do telephone visit, if not as long as she is asymptomatic, she can come in office.

## 2020-04-08 NOTE — TELEPHONE ENCOUNTER
Patient has appointment on 4/10/20 for phentermine follow up. Do you want to keep as office visit so her weight can be checked? Or change to phone visit and have her stop in another time to have weight checked on float schedule?

## 2020-04-09 NOTE — TELEPHONE ENCOUNTER
Noted.   Next 5 appointments (look out 90 days)    Apr 10, 2020  9:00 AM CDT  Office Visit with Alanna Curiel MD  [facility] ([facility]) [address] [place] [id number]  [phone]        Ciarra Mejia MA

## 2020-04-09 NOTE — PROGRESS NOTES
SUBJECTIVE:    Florina Marie is a 34 year old female who presents in follow up of phentermine use.  She denies chest pain, shortness of breath, headache, insomnia or double vision.  She feels it is helping curb her appetite.  She is doing the following for exercise: Getting outside and biking and walking.  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  She is not ready to set goals on her diet and would rather focus on being more active.  She is happy with her progress.    Past Medical History:   Diagnosis Date     Attention deficit disorder with hyperactivity(314.01)      Left carpal tunnel syndrome 6/27/2019     Other kyphoscoliosis and scoliosis      Other specified delay in development     Microcephaly     Right carpal tunnel syndrome 3/14/2019     Viral warts, unspecified     plantar warts       Current Outpatient Medications   Medication Sig Dispense Refill     ALPRAZolam (XANAX) 0.5 MG tablet TAKE ONE TABLET AS NEEDED FOR SEVERE ANXIETY MAX OF 2 TABLETS PER DAY  0     desogestrel-ethinyl estradiol (APRI) 0.15-30 MG-MCG tablet Take 1 tablet by mouth daily Take one tablet daily. 84 tablet 3     diclofenac (VOLTAREN) 1 % topical gel Apply 2 g topically 4 times daily 100 g 1     escitalopram (LEXAPRO) 10 MG tablet TAKE 1 TABLET BY MOUTH ONCE DAILY WITH 20MG TABLET FOR A TOTAL OF 30MG  1     escitalopram (LEXAPRO) 20 MG tablet TAKE 1 TABLET BY MOUTH DAILY.  5     nitroFURantoin macrocrystal-monohydrate (MACROBID) 100 MG capsule Take 100 mg by mouth       phentermine (ADIPEX-P) 37.5 MG tablet Take 1 tablet (37.5 mg) by mouth every morning (before breakfast) 30 tablet 0         OBJECTIVE:  /86   Pulse 84   Temp 97.7  F (36.5  C) (Temporal)   Resp 18   Wt 120 kg (264 lb 8 oz)   BMI 43.35 kg/m      Body mass index is 43.35 kg/m .     She has lost 3 pounds since her last visit.    Gen: no apparent distress   Chest: clear to auscultation  Cor: regular rate and rhythm without  murmur or gallop  Skin: no rash  Abd: soft, nontender, no mass    ASSESSMENT:    Morbid obesity    PLAN:  Continue phentermine for another month.  Her blood pressure is stable.  Encouraged routine exercise and monitoring of her eating habits so she can continue her weight loss after cessation of phentermine use.  She prefers to continue to be seen in office to have weight checks on her scale.  She feels that monthly visits help her stay motivated.    Electronically signed by Alanna Curiel MD on 4/10/2020

## 2020-04-09 NOTE — TELEPHONE ENCOUNTER
Patient would like to come in to see dr proctor as she says she needs to be weighed.  She will keep her appt. She has no cough, fever or sob in the past 14 days and no contact with positive covid 19 person.   Thank you.

## 2020-04-09 NOTE — TELEPHONE ENCOUNTER
Left message with woman who answered phone to have patient return call. (C2C on file but nothing is checked)    Ciarra Mejia MA

## 2020-04-10 ENCOUNTER — OFFICE VISIT (OUTPATIENT)
Dept: FAMILY MEDICINE | Facility: OTHER | Age: 35
End: 2020-04-10
Payer: MEDICARE

## 2020-04-10 VITALS
WEIGHT: 264.5 LBS | DIASTOLIC BLOOD PRESSURE: 86 MMHG | SYSTOLIC BLOOD PRESSURE: 124 MMHG | TEMPERATURE: 97.7 F | BODY MASS INDEX: 43.35 KG/M2 | RESPIRATION RATE: 18 BRPM | HEART RATE: 84 BPM

## 2020-04-10 DIAGNOSIS — E66.01 MORBID OBESITY (H): ICD-10-CM

## 2020-04-10 PROCEDURE — 99213 OFFICE O/P EST LOW 20 MIN: CPT | Performed by: FAMILY MEDICINE

## 2020-04-10 RX ORDER — PHENTERMINE HYDROCHLORIDE 37.5 MG/1
37.5 TABLET ORAL
Qty: 30 TABLET | Refills: 0 | Status: SHIPPED | OUTPATIENT
Start: 2020-04-10 | End: 2020-05-01

## 2020-04-10 RX ORDER — NITROFURANTOIN 25; 75 MG/1; MG/1
100 CAPSULE ORAL
COMMUNITY
Start: 2020-04-05 | End: 2020-04-17

## 2020-04-10 ASSESSMENT — PATIENT HEALTH QUESTIONNAIRE - PHQ9
5. POOR APPETITE OR OVEREATING: NOT AT ALL
SUM OF ALL RESPONSES TO PHQ QUESTIONS 1-9: 4

## 2020-04-10 ASSESSMENT — ANXIETY QUESTIONNAIRES
6. BECOMING EASILY ANNOYED OR IRRITABLE: SEVERAL DAYS
3. WORRYING TOO MUCH ABOUT DIFFERENT THINGS: SEVERAL DAYS
GAD7 TOTAL SCORE: 4
IF YOU CHECKED OFF ANY PROBLEMS ON THIS QUESTIONNAIRE, HOW DIFFICULT HAVE THESE PROBLEMS MADE IT FOR YOU TO DO YOUR WORK, TAKE CARE OF THINGS AT HOME, OR GET ALONG WITH OTHER PEOPLE: SOMEWHAT DIFFICULT
5. BEING SO RESTLESS THAT IT IS HARD TO SIT STILL: SEVERAL DAYS
7. FEELING AFRAID AS IF SOMETHING AWFUL MIGHT HAPPEN: NOT AT ALL
2. NOT BEING ABLE TO STOP OR CONTROL WORRYING: NOT AT ALL
1. FEELING NERVOUS, ANXIOUS, OR ON EDGE: SEVERAL DAYS

## 2020-04-10 ASSESSMENT — PAIN SCALES - GENERAL: PAINLEVEL: NO PAIN (0)

## 2020-04-11 ASSESSMENT — ANXIETY QUESTIONNAIRES: GAD7 TOTAL SCORE: 4

## 2020-04-16 ENCOUNTER — NURSE TRIAGE (OUTPATIENT)
Dept: FAMILY MEDICINE | Facility: OTHER | Age: 35
End: 2020-04-16

## 2020-04-16 NOTE — TELEPHONE ENCOUNTER
Reason for call:  Patient reporting a symptom    Symptom or request: Possible allergic reaction    Duration (how long have symptoms been present): today    Have you been treated for this before? No    Additional comments: Patient has rash all over body;  may be an allergic reaction. Red, raised, and itchy. Please triage.     Phone Number patient can be reached at:  Home number on file [phone] (home)    Best Time:  any    Can we leave a detailed message on this number:  YES    Call taken on 4/16/2020 at 3:37 PM by Kit Martinez

## 2020-04-16 NOTE — PROGRESS NOTES
"Florina Marie is a 34 year old female who is being evaluated via a billable telephone visit.      The patient has been notified of following:     \"This telephone visit will be conducted via a call between you and your physician/provider. We have found that certain health care needs can be provided without the need for a physical exam.  This service lets us provide the care you need with a short phone conversation.  If a prescription is necessary we can send it directly to your pharmacy.  If lab work is needed we can place an order for that and you can then stop by our lab to have the test done at a later time.    Telephone visits are billed at different rates depending on your insurance coverage. During this emergency period, for some insurers they may be billed the same as an in-person visit.  Please reach out to your insurance provider with any questions.    If during the course of the call the physician/provider feels a telephone visit is not appropriate, you will not be charged for this service.\"    Patient has given verbal consent for Telephone visit?  Yes    How would you like to obtain your AVS? {AVS Preference:404536}    Subjective     Florina Marie is a 34 year old female who presents to clinic today for the following health issues:    See triage note from yesterday..   Florina states that she is using calamine spray and also taking Benadryl  {PEDS Chronic and Acute Problems (Optional):647083}     {additonal problems for provider to add (Optional):697419}    {HIST REVIEW/ LINKS 2 (Optional):271288}    Reviewed and updated as needed this visit by Provider         Review of Systems   {ROS COMP (Optional):306703}       Objective   Reported vitals:  There were no vitals taken for this visit.   {GENERAL APPEARANCE:50::\"healthy\",\"alert\",\"no distress\"}  PSYCH: Alert and oriented times 3; coherent speech, normal   rate and volume, able to articulate logical thoughts, able   to abstract reason, no tangential " "thoughts, no hallucinations   or delusions  Her affect is { :5460320::\"normal\"}  RESP: No cough, no audible wheezing, able to talk in full sentences  Remainder of exam unable to be completed due to telephone visits    {Diagnostic Test Results (Optional):802002::\"Diagnostic Test Results:\",\"Labs reviewed in Epic\"}        Assessment/Plan:  {Diagnosis, Associated Orders and Comment:241355}    No follow-ups on file.      Phone call duration:  *** minutes    {signature options:517429}      "

## 2020-04-16 NOTE — TELEPHONE ENCOUNTER
"I spoke with patient.   This afternoon she developed a red, raised rash \"all over\", including her back, arms, and torso.   It is extremely itchy.  She has not tried anything.   The spots are the size of a quarter.   Recommended virtual visit today, patient declines but did schedule with her PCP.     Jazlyn Torres, RN, BSN    "

## 2020-04-17 ENCOUNTER — VIRTUAL VISIT (OUTPATIENT)
Dept: FAMILY MEDICINE | Facility: OTHER | Age: 35
End: 2020-04-17
Payer: MEDICARE

## 2020-04-17 ENCOUNTER — MYC MEDICAL ADVICE (OUTPATIENT)
Dept: FAMILY MEDICINE | Facility: OTHER | Age: 35
End: 2020-04-17

## 2020-04-17 DIAGNOSIS — L50.9 HIVES: Primary | ICD-10-CM

## 2020-04-17 PROCEDURE — 99213 OFFICE O/P EST LOW 20 MIN: CPT | Mod: 95 | Performed by: FAMILY MEDICINE

## 2020-04-17 RX ORDER — PREDNISONE 20 MG/1
20 TABLET ORAL DAILY
Qty: 5 TABLET | Refills: 0 | Status: SHIPPED | OUTPATIENT
Start: 2020-04-17 | End: 2020-05-01

## 2020-04-17 NOTE — PROGRESS NOTES
"Florina Marie is a 34 year old female who is being evaluated via a billable video visit.      The patient has been notified of following:     \"This video visit will be conducted via a call between you and your physician/provider. We have found that certain health care needs can be provided without the need for an in-person physical exam.  This service lets us provide the care you need with a video conversation.  If a prescription is necessary we can send it directly to your pharmacy.  If lab work is needed we can place an order for that and you can then stop by our lab to have the test done at a later time.    Video visits are billed at different rates depending on your insurance coverage.  Please reach out to your insurance provider with any questions.    If during the course of the call the physician/provider feels a video visit is not appropriate, you will not be charged for this service.\"    Patient has given verbal consent for Video visit? Yes    How would you like to obtain your AVS? Ashutosh    Patient would like the video invitation sent by: Text to cell phone: [phone]      Subjective     Florina Marie is a 34 year old female who presents to clinic today for the following health issues:    Patient complains of a red and itchy rash since last night.  It is on her arms, axilla, back, stomach.  She denies any rash on her legs.  She denies swelling.  She denies redness or fever.  She denies shortness of breath or throat tightness.  She remembers using the lotion she is never used before prior to this.  She took some Benadryl last night which made her sleepy.    Video Start Time: 10:49      Patient Active Problem List   Diagnosis     Problems with learning     Morbid obesity (H)     Generalized anxiety disorder     Hyperlipidemia, unspecified     Attention deficit disorder without hyperactivity     Prolonged PTT     S/P carpal tunnel release     Pain of both elbows     Past Surgical History:   Procedure " "Laterality Date     EXAM UNDER ANESTHESIA PELVIC N/A 9/9/2016    Procedure: EXAM UNDER ANESTHESIA PELVIC;  Surgeon: Rhoda Alcazar MD;  Location: [facility]     HC TOOTH EXTRACTION W/FORCEP      wisdom     RELEASE CARPAL TUNNEL Right 3/22/2019    Procedure: RIGHT RELEASE CARPAL TUNNEL;  Surgeon: Anjum Wilburn DO;  Location: [facility]     RELEASE CARPAL TUNNEL Left 7/16/2019    Procedure: RELEASE, CARPAL TUNNEL-Left;  Surgeon: Anjum Wilburn DO;  Location: [facility]       Social History     Tobacco Use     Smoking status: Never Smoker     Smokeless tobacco: Never Used     Tobacco comment: no smokers in the household   Substance Use Topics     Alcohol use: No     Frequency: Never     Family History   Problem Relation Age of Onset     Lipids Father         diet     Thyroid Disease Mother         unsure if hypo or hyper     Diabetes Paternal Grandfather         adult     Heart Disease Paternal Grandfather         bypass/open hear surgery     Lipids Maternal Aunt         medication     Lipids Maternal Aunt         diet     Alzheimer Disease Maternal Grandfather      Hypertension No family hx of      Coronary Artery Disease No family hx of      Hyperlipidemia No family hx of      Cancer - colorectal No family hx of      Ovarian Cancer No family hx of      Prostate Cancer No family hx of      Depression/Anxiety No family hx of      Cerebrovascular Disease No family hx of      Anesthesia Reaction No family hx of      Asthma No family hx of      Osteoporosis No family hx of      Chemical Addiction No family hx of      Obesity No family hx of            Reviewed and updated as needed this visit by Provider  Tobacco  Allergies  Meds  Problems  Med Hx  Surg Hx  Fam Hx               Objective    There were no vitals taken for this visit.  Estimated body mass index is 43.35 kg/m  as calculated from the following:    Height as of 3/19/20: 1.664 m (5' 5.5\").    Weight as of 4/10/20: 120 kg (264 lb 8 " oz).  Physical Exam   GENERAL: healthy, alert and no distress  Skin: multiple pleomorphic, raised, well-defined, blanching patches with wheals and flares on her axilla, arms and stomach.  Did not attempt to get a video of her back.           Assessment & Plan     1. Hives  Possibly related to the new lotion.  Hives are extensive on her upper body.  Therefore feel prednisone is warranted.  Discussed side effects such as irritability, insomnia, feeling hot and hungry.  Also discussed that if she has trouble with breathing, throat tightness or evidence of fever that she should seek more help.  Patient is agreeable.    - predniSONE (DELTASONE) 20 MG tablet; Take 1 tablet (20 mg) by mouth daily for 5 days  Dispense: 5 tablet; Refill: 0     Alanna Curiel MD  [facility]      Video-Visit Details    Type of service:  Video Visit    Video End Time (time video stopped): 10:54    Originating Location (pt. Location): Home    Distant Location (provider location):  [facility]     Mode of Communication:  Video Conference via [employer]    No follow-ups on file.       Alanna Curiel MD       1 pair

## 2020-05-01 ENCOUNTER — TELEPHONE (OUTPATIENT)
Dept: FAMILY MEDICINE | Facility: OTHER | Age: 35
End: 2020-05-01

## 2020-05-01 ENCOUNTER — VIRTUAL VISIT (OUTPATIENT)
Dept: FAMILY MEDICINE | Facility: OTHER | Age: 35
End: 2020-05-01
Payer: MEDICARE

## 2020-05-01 DIAGNOSIS — E66.01 MORBID OBESITY (H): ICD-10-CM

## 2020-05-01 DIAGNOSIS — N89.8 VAGINAL ITCHING: ICD-10-CM

## 2020-05-01 DIAGNOSIS — L50.9 HIVES: Primary | ICD-10-CM

## 2020-05-01 PROCEDURE — 99214 OFFICE O/P EST MOD 30 MIN: CPT | Mod: 95 | Performed by: FAMILY MEDICINE

## 2020-05-01 RX ORDER — FLUCONAZOLE 150 MG/1
150 TABLET ORAL ONCE
Qty: 1 TABLET | Refills: 0 | Status: SHIPPED | OUTPATIENT
Start: 2020-05-01 | End: 2020-05-01

## 2020-05-01 RX ORDER — CETIRIZINE HYDROCHLORIDE 10 MG/1
10 TABLET ORAL DAILY PRN
Qty: 30 TABLET | Refills: 11 | Status: SHIPPED | OUTPATIENT
Start: 2020-05-01 | End: 2020-12-18

## 2020-05-01 NOTE — PROGRESS NOTES
"Florina Marie is a 34 year old female who is being evaluated via a billable video visit.      The patient has been notified of following:     \"This video visit will be conducted via a call between you and your physician/provider. We have found that certain health care needs can be provided without the need for an in-person physical exam.  This service lets us provide the care you need with a video conversation.  If a prescription is necessary we can send it directly to your pharmacy.  If lab work is needed we can place an order for that and you can then stop by our lab to have the test done at a later time.    Video visits are billed at different rates depending on your insurance coverage.  Please reach out to your insurance provider with any questions.    If during the course of the call the physician/provider feels a video visit is not appropriate, you will not be charged for this service.\"    Patient has given verbal consent for Video visit? Yes    How would you like to obtain your AVS? Ashutosh    Patient would like the video invitation sent by: Text to cell phone: [phone]    Will anyone else be joining your video visit? No    Subjective     Florina Marie is a 34 year old female who presents to clinic today for the following health issues:    HPI     Rash  Onset:     Description:   Location: Under arms  Character: blotchy  Itching (Pruritis): YES    Progression of Symptoms:  improving    Accompanying Signs & Symptoms:  Fever: no   Body aches or joint pain: no   Sore throat symptoms: no   Recent cold symptoms: no     History:   Previous similar rash: YES    Precipitating factors:   Exposure to similar rash: no   New exposures: Believes from a construction site- [facility]  Recent travel: no     Alleviating factors:  None  Therapies Tried and outcome: Prednisone, Benadryl      Patient reports that her rash did go away with the prednisone however it recurred yesterday.  She wonders if it is related to being in " a construction site and being exposed to dust.    She would also like to stop her phentermine.  She is feeling a little more irritable and wonders if it is related to the phentermine.  This has happened in the past and with the stay at home order she feels a little more agitated at home.    Video Start Time: 3:31        Patient Active Problem List   Diagnosis     Problems with learning     Morbid obesity (H)     Generalized anxiety disorder     Hyperlipidemia, unspecified     Attention deficit disorder without hyperactivity     Prolonged PTT     S/P carpal tunnel release     Pain of both elbows     Past Surgical History:   Procedure Laterality Date     EXAM UNDER ANESTHESIA PELVIC N/A 9/9/2016    Procedure: EXAM UNDER ANESTHESIA PELVIC;  Surgeon: Rhoda Alcazar MD;  [facility]     HC TOOTH EXTRACTION W/FORCEP      wisdom     RELEASE CARPAL TUNNEL Right 3/22/2019    Procedure: RIGHT RELEASE CARPAL TUNNEL;  Surgeon: Anjum Wilburn DO;  [facility]: [facility]     RELEASE CARPAL TUNNEL Left 7/16/2019    Procedure: RELEASE, CARPAL TUNNEL-Left;  Surgeon: Anjum Wilburn DO;  Location: [facility]       Social History     Tobacco Use     Smoking status: Never Smoker     Smokeless tobacco: Never Used     Tobacco comment: no smokers in the household   Substance Use Topics     Alcohol use: No     Frequency: Never     Family History   Problem Relation Age of Onset     Lipids Father         diet     Thyroid Disease Mother         unsure if hypo or hyper     Diabetes Paternal Grandfather         adult     Heart Disease Paternal Grandfather         bypass/open hear surgery     Lipids Maternal Aunt         medication     Lipids Maternal Aunt         diet     Alzheimer Disease Maternal Grandfather      Hypertension No family hx of      Coronary Artery Disease No family hx of      Hyperlipidemia No family hx of      Cancer - colorectal No family hx of      Ovarian Cancer No family hx of      Prostate Cancer No  "family hx of      Depression/Anxiety No family hx of      Cerebrovascular Disease No family hx of      Anesthesia Reaction No family hx of      Asthma No family hx of      Osteoporosis No family hx of      Chemical Addiction No family hx of      Obesity No family hx of            Reviewed and updated as needed this visit by Provider         Review of Systems   CONSTITUTIONAL: NEGATIVE for fever, chills, change in weight  ENT/MOUTH: NEGATIVE for ear, mouth and throat problems  RESP: NEGATIVE for significant cough or SOB  CV: NEGATIVE for chest pain, palpitations or peripheral edema      Objective    There were no vitals taken for this visit.  Estimated body mass index is 43.35 kg/m  as calculated from the following:    Height as of 3/19/20: 1.664 m (5' 5.5\").    Weight as of 4/10/20: 120 kg (264 lb 8 oz).  Physical Exam     GENERAL: Healthy, alert and no distress  SKIN: multiple pleomorphic, raised, well-defined, blanching patches with wheals and flares.   EYES: Eyes grossly normal to inspection, conjunctivae and sclerae normal  RESP: no audible wheeze, cough, or visible cyanosis.  No visible retractions or increased work of breathing.  Able to speak fully in complete sentences.  NEURO: Cranial nerves grossly intact, mentation intact and speech normal  PSYCH: mentation appears normal, affect normal/bright, judgement and insight intact, normal speech and appearance well-groomed        Assessment & Plan     1. Hives  This is recurrent but is mild.  Will use Zyrtec daily as needed for her allergies.    - cetirizine (ZYRTEC) 10 MG tablet; Take 1 tablet (10 mg) by mouth daily as needed for allergies (hives)  Dispense: 30 tablet; Refill: 11    2. Vaginal itching  She reports 1 day of vaginal itching.  Will try 1 tablet of Diflucan.    - fluconazole (DIFLUCAN) 150 MG tablet; Take 1 tablet (150 mg) by mouth once for 1 dose  Dispense: 1 tablet; Refill: 0    3.  Obesity  At this time we will stop the phentermine.  It has " caused some irritability in the past and staying at home seems to compound at this.  However would consider restarting this in the future when she feels her mood is more stable.  Patient does continue to follow with a psychiatrist for her mood.     Alanna Curiel MD  [facility]      Video-Visit Details    Type of service:  Video Visit    Video End Time:3:42    Originating Location (pt. Location): Home    Distant Location (provider location):  [facility]     Platform used for Video Visit: Doximity        Alanna Curiel MD

## 2020-05-01 NOTE — TELEPHONE ENCOUNTER
I would sugget another video visit (would do this through [employer] and not [employer], so she doesn't need to download an gabe)

## 2020-05-01 NOTE — TELEPHONE ENCOUNTER
I spoke with the pt who states she finished prednisone about a week ago. The hives started to return yesterday morning on her arms, back, flank. Itchy. She thinks she might have a yeast infection. Symptoms started Wednesday. Some itchiness, no discharged, odor. No currently taking any OTC medications such as benadryl or Zyrtec.    Will route to TC to review and advise.  Video visit was 4/17/20    Vida Degroot, MSN, RN

## 2020-05-01 NOTE — TELEPHONE ENCOUNTER
Reason for call:  Patient reporting a symptom    Symptom or request: rash    Duration (how long have symptoms been present): two weeks    Have you been treated for this before? Yes    Additional comments: please call patient. She spoke with TC about a week ago. The rash keeps coming back. She is wondering what else she could try    Phone Number patient can be reached at:  Home number on file [phone] (home)    Best Time:  any    Can we leave a detailed message on this number:  YES    Call taken on 5/1/2020 at 8:13 AM by Shannon Marcos

## 2020-07-21 ENCOUNTER — TRANSFERRED RECORDS (OUTPATIENT)
Dept: HEALTH INFORMATION MANAGEMENT | Facility: CLINIC | Age: 35
End: 2020-07-21

## 2020-08-21 ENCOUNTER — NURSE TRIAGE (OUTPATIENT)
Dept: NURSING | Facility: CLINIC | Age: 35
End: 2020-08-21

## 2020-08-21 ENCOUNTER — TELEPHONE (OUTPATIENT)
Dept: FAMILY MEDICINE | Facility: OTHER | Age: 35
End: 2020-08-21

## 2020-08-21 NOTE — TELEPHONE ENCOUNTER
Reason for Call:  Same Day Appointment, Requested Provider:  any     PCP: Alanna Curiel    Reason for visit: was cleaning ear and pushed q tip in too far and now ear is plugged    Duration of symptoms:  today    Have you been treated for this in the past? No    Additional comments: is wondering if anyone would work her into [employer] [facility] [employer] today    Can we leave a detailed message on this number? YES    Phone number patient can be reached at: Home number on file [phone] (home)    Best Time: any    Call taken on 8/21/2020 at 12:47 PM by Shannon Marcos

## 2020-08-21 NOTE — TELEPHONE ENCOUNTER
She was in to urgent care today for a head cold . She was found to have a ruptured ear drum, and was surprised to only get ear drops for it. She can't hear out of it and it seems like she should be doing more for it. I read the signs and symptoms of a ruptured ear drum and the treatments , so she could see what the Dr did for her at the clinic was the right course to take. I told her the timeline and what to expect for healing and when to follow up. She said she feels better about it now and will give it time to heal. I suggested decongestants for her congestion to see if it will help. She agrees with plan.    Malina Bruno RN/ [facility]        Additional Information    Negative: Earache persists > 1 hour    Negative: Pus or cloudy discharge from ear canal    Negative: Ear congestion present > 48 hours    Ear congestion    Protocols used: EAR - CONGESTION-A-

## 2020-08-21 NOTE — TELEPHONE ENCOUNTER
Patient will be coming in to see AN  Next 5 appointments (look out 90 days)    Aug 21, 2020  1:40 PM CDT  [facility] Visit with Ekaterina Nichols PA-C  [facility] [address] [place] [address] [address]  [facility] [address]  [phone]   Sep 18, 2020  1:00 PM CDT  PHYSICAL with Alanna Curiel MD  [facility] [address] [place] [address] [address]  [facility] [address]  [phone]        Ciarra Mejia MA

## 2020-08-23 ENCOUNTER — NURSE TRIAGE (OUTPATIENT)
Dept: NURSING | Facility: CLINIC | Age: 35
End: 2020-08-23

## 2020-08-23 ENCOUNTER — HOSPITAL ENCOUNTER (EMERGENCY)
Facility: CLINIC | Age: 35
Discharge: HOME | End: 2020-08-24
Attending: PHYSICIAN ASSISTANT | Admitting: PHYSICIAN ASSISTANT
Payer: MEDICARE

## 2020-08-23 DIAGNOSIS — H61.22 IMPACTED CERUMEN OF LEFT EAR: ICD-10-CM

## 2020-08-23 DIAGNOSIS — R09.81 SINUS CONGESTION: ICD-10-CM

## 2020-08-23 PROCEDURE — 69210 REMOVE IMPACTED EAR WAX UNI: CPT | Mod: Z6 | Performed by: PHYSICIAN ASSISTANT

## 2020-08-23 PROCEDURE — 99282 EMERGENCY DEPT VISIT SF MDM: CPT | Performed by: PHYSICIAN ASSISTANT

## 2020-08-23 PROCEDURE — 99284 EMERGENCY DEPT VISIT MOD MDM: CPT | Mod: 25 | Performed by: PHYSICIAN ASSISTANT

## 2020-08-23 PROCEDURE — 69210 REMOVE IMPACTED EAR WAX UNI: CPT | Performed by: PHYSICIAN ASSISTANT

## 2020-08-23 NOTE — ED AVS SNAPSHOT
[facility] Emergency Department  [address] [place] [address]  Phone:  [phone]  Fax:  [phone]                                    Florina Marie   MRN: [id number]    Department:  [facility] Emergency Department   Date of Visit:  8/23/2020           After Visit Summary Signature Page    I have received my discharge instructions, and my questions have been answered. I have discussed any challenges I see with this plan with the nurse or doctor.    ..........................................................................................................................................  Patient/Patient Representative Signature      ..........................................................................................................................................  Patient Representative Print Name and Relationship to Patient    ..................................................               ................................................  Date                                   Time    ..........................................................................................................................................  Reviewed by Signature/Title    ...................................................              ..............................................  Date                                               Time          22EPIC Rev 08/18

## 2020-08-24 VITALS
DIASTOLIC BLOOD PRESSURE: 98 MMHG | WEIGHT: 280 LBS | SYSTOLIC BLOOD PRESSURE: 175 MMHG | RESPIRATION RATE: 18 BRPM | TEMPERATURE: 97.8 F | OXYGEN SATURATION: 97 % | BODY MASS INDEX: 45.89 KG/M2

## 2020-08-24 NOTE — ED TRIAGE NOTES
Pt presents with ear pain and sinus pain. Pt states she was seen Friday for a ruptured left ear drum. Pt states she was given drops and it is not getting better. Pt also having sinus pressure and a headache. Patient's airway, breathing, circulation, and disability/mental status (ABCDs) intact/WDL during triage.

## 2020-08-24 NOTE — TELEPHONE ENCOUNTER
Thursday woke up with a dry throat - normally a dry throat leads to a head cold. Friday morning but ears were itchy on the inside. Cutip in L ear - went too far and she ruptured her eardrum - was seen at [facility] (not a  location). Nurse said if it doesn't get better in 48 hours to call back. States ear is still plugged and she can't hear out of it - has been using the drops since Friday.     Per protocol advised PCP evaluation - warm transferred to scheduling.  Kellie Yu RN on 8/23/2020 at 11:01 PM    Additional Information    Negative: Knocked out (unconscious) > 1 minute    Negative: Difficult to awaken or acting confused (e.g., disoriented, slurred speech)    Negative: Severe neck pain    Negative: Sounds like a life-threatening emergency to the triager    Negative: Wound looks infected    Negative: Pierced ear, problems and symptoms    Negative: Caused by lightning    Negative: [1] Bleeding AND [2] won't stop after 10 minutes of direct pressure (using correct technique)    Negative: Skin is split open or gaping (or length > 1/4 inch or 6 mm)    Negative: [1] Animal or human bite AND [2] skin is broken (abraded, lacerated)    Negative: Walking is unsteady (i.e., falling or tilting to one side)    Negative: Sounds like a serious injury to the triager    Negative: [1] SEVERE pain AND [2] not improved 2 hours after pain medicine    Negative: [1] Pointed object was inserted into the ear canal AND [2] now has bleeding or earache     (Exception: doctor's ear exam)    Negative: [1] Cotton swab (e.g., Q-tip) inserted with force AND [2] pain persists > 30 minutes    Negative: [1] Bleeding recurs AND [2] from cotton swab or other minor trauma    Negative: [1] Direct blow to area (e.g., ball, slapped hard) AND [2] bleeding from ear canal    Hearing is decreased on injured side    Protocols used: EAR INJURY-A-

## 2020-08-24 NOTE — DISCHARGE INSTRUCTIONS
I am glad you feel better.  Stop using the eardrops because your eardrum is absolutely fine.  You can continue with over-the-counter congestion medications and isolate yourself until you are 72 hours without symptoms. If you have any other worsening concerns please do not hesitate to return to the emergency department.    Thank you for choosing [facility] Emergency Department. It was a pleasure taking care of you today. If you have any questions, please call [phone].    Jenni Craft PA-C

## 2020-08-24 NOTE — ED PROVIDER NOTES
History     Chief Complaint   Patient presents with     Otalgia     Sinusitis     HPI  Florina Marie is a 35 year old female who presents to the emergency department complaining of left ear and sinus pressure.  The patient reports Thursday, 3 days ago, she had some stuffy nose symptoms coming on.  Her ears felt itchy on Friday so she took Q-tips and vigorously scratched the inside of her ears with them and accidentally pushed the one on the left in too far.  After she did that her ear sounded muffled and she had pressure in her ear and face.  She denies any significant pain in the ear.  She went to the urgent care and received antibiotic drops because she was told her eardrum is perforated.  She denies any cough, fever, shortness of breath.  She has been using sinus Sudafed and Tylenol medications but she still has pressure in the ear and in the left face with muffled hearing on the left.        Allergies:  Allergies   Allergen Reactions     Macrobid [Nitrofurantoin] Headache and Nausea       Problem List:    Patient Active Problem List    Diagnosis Date Noted     Pain of both elbows 01/24/2020     Priority: Medium     S/P carpal tunnel release 04/02/2019     Priority: Medium     Prolonged PTT 08/09/2018     Priority: Medium     Attention deficit disorder without hyperactivity 12/01/2011     Priority: Medium     Hyperlipidemia, unspecified 01/08/2010     Priority: Medium     Generalized anxiety disorder 10/24/2007     Priority: Medium     Morbid obesity (H) 01/01/2004     Priority: Medium     Problems with learning 12/31/2003     Priority: Medium        Past Medical History:    Past Medical History:   Diagnosis Date     Attention deficit disorder with hyperactivity(314.01)      Left carpal tunnel syndrome 6/27/2019     Other kyphoscoliosis and scoliosis      Other specified delay in development      Right carpal tunnel syndrome 3/14/2019     Viral warts, unspecified        Past Surgical History:    Past Surgical  History:   Procedure Laterality Date     EXAM UNDER ANESTHESIA PELVIC N/A 9/9/2016    Procedure: EXAM UNDER ANESTHESIA PELVIC;  Surgeon: Rhoda Alcazar MD;  Location: [facility] OR     HC TOOTH EXTRACTION W/FORCEP      wisdom     RELEASE CARPAL TUNNEL Right 3/22/2019    Procedure: RIGHT RELEASE CARPAL TUNNEL;  Surgeon: Anjum Wilburn DO;  Location: [facility] OR     RELEASE CARPAL TUNNEL Left 7/16/2019    Procedure: RELEASE, CARPAL TUNNEL-Left;  Surgeon: Anjum Wilburn DO;  Location: [facility] OR       Family History:    Family History   Problem Relation Age of Onset     Lipids Father         diet     Thyroid Disease Mother         unsure if hypo or hyper     Diabetes Paternal Grandfather         adult     Heart Disease Paternal Grandfather         bypass/open hear surgery     Lipids Maternal Aunt         medication     Lipids Maternal Aunt         diet     Alzheimer Disease Maternal Grandfather      Hypertension No family hx of      Coronary Artery Disease No family hx of      Hyperlipidemia No family hx of      Cancer - colorectal No family hx of      Ovarian Cancer No family hx of      Prostate Cancer No family hx of      Depression/Anxiety No family hx of      Cerebrovascular Disease No family hx of      Anesthesia Reaction No family hx of      Asthma No family hx of      Osteoporosis No family hx of      Chemical Addiction No family hx of      Obesity No family hx of        Social History:  Marital Status:  Single [1]  Social History     Tobacco Use     Smoking status: Never Smoker     Smokeless tobacco: Never Used     Tobacco comment: no smokers in the household   Substance Use Topics     Alcohol use: No     Frequency: Never     Drug use: No        Medications:    ALPRAZolam (XANAX) 0.5 MG tablet  cetirizine (ZYRTEC) 10 MG tablet  desogestrel-ethinyl estradiol (APRI) 0.15-30 MG-MCG tablet  diclofenac (VOLTAREN) 1 % topical gel  escitalopram (LEXAPRO) 10 MG tablet  escitalopram (LEXAPRO) 20 MG  tablet          Review of Systems   All other systems reviewed and are negative.      Physical Exam   BP: (!) 175/98  Temp: 97.8  F (36.6  C)  Resp: 18  Weight: 127 kg (280 lb)  SpO2: 97 %      Physical Exam  Vitals signs and nursing note reviewed.   Constitutional:       General: She is not in acute distress.     Appearance: Normal appearance. She is well-developed. She is obese. She is not ill-appearing, toxic-appearing or diaphoretic.   HENT:      Head: Normocephalic and atraumatic.      Right Ear: Tympanic membrane normal.      Left Ear: Tympanic membrane normal. There is impacted cerumen.      Ears:      Comments: Left TM was impacted with cerumen, this was irrigated with warm tap water and afterwards TM appeared completely normal without signs of perforation or infection.     Nose: Congestion present.      Comments: No tenderness to the frontal or maxillary sinuses.     Mouth/Throat:      Mouth: Mucous membranes are moist.      Pharynx: Oropharynx is clear.   Eyes:      Conjunctiva/sclera: Conjunctivae normal.      Pupils: Pupils are equal, round, and reactive to light.   Neck:      Musculoskeletal: Normal range of motion and neck supple.   Cardiovascular:      Rate and Rhythm: Normal rate and regular rhythm.      Heart sounds: Normal heart sounds.   Pulmonary:      Effort: Pulmonary effort is normal. No respiratory distress.      Breath sounds: Normal breath sounds.   Musculoskeletal:         General: No deformity.   Skin:     General: Skin is warm and dry.   Neurological:      Mental Status: She is alert and oriented to person, place, and time. Mental status is at baseline.      Coordination: Coordination normal.   Psychiatric:         Mood and Affect: Mood normal.         Behavior: Behavior normal.         ED Course        Procedures      No results found for this or any previous visit (from the past 24 hour(s)).    Medications - No data to display    Assessments & Plan (with Medical Decision Making)  Florina  ALYCE Marie is a 35 year old female who presented to the ED complaining of left ear pressure and left facial pressure after pushing a Q-tip in her left ear couple days ago.  Seen 2 days ago at urgent care and told her eardrum was perforated, was put on ofloxacin drops but symptoms are not improving.  She was afebrile on arrival.  Exam revealed that the left TM was completely obscured with wax so ear canal was irrigated and once wax was removed the TM looked completely normal.  Right TM was also normal and there was no sinus tenderness.  I suspect that she caused the impaction of wax when she pushed the Q-tip into her ear 2 days ago.  She did not have any significant pain at that time or currently and without signs of perforation I think she can discontinue the use of the ofloxacin drops.  Discussed option of COVID-19 tested but she declined, stating it feels like a minor head cold and most of her symptoms of facial pressure and ear pressure with muffled hearing resolve once ear was cleaned out.  I did advise her to practice isolation until she is 72 hours without symptoms and if she develops a cough or fever she be tested for COVID-19 which she agrees to do.  She can continue with over-the-counter decongestants as needed.  Otherwise if she has any other worsening concerns she was advised to return to the ED.  All questions answered and patient discharged home in suitable condition.     I have reviewed the nursing notes.    I have reviewed the findings, diagnosis, plan and need for follow up with the patient.    New Prescriptions    No medications on file       Final diagnoses:   Impacted cerumen of left ear   Sinus congestion     Note: Chart documentation done in part with Dragon Voice Recognition software. Although reviewed after completion, some word and grammatical errors may remain.     8/23/2020   [facility] EMERGENCY DEPARTMENT     Jenni Craft PA-C  08/24/20 0038

## 2020-08-25 ENCOUNTER — VIRTUAL VISIT (OUTPATIENT)
Dept: FAMILY MEDICINE | Facility: CLINIC | Age: 35
End: 2020-08-25
Payer: MEDICARE

## 2020-08-25 DIAGNOSIS — J01.10 ACUTE NON-RECURRENT FRONTAL SINUSITIS: Primary | ICD-10-CM

## 2020-08-25 DIAGNOSIS — Z20.822 SUSPECTED COVID-19 VIRUS INFECTION: ICD-10-CM

## 2020-08-25 PROCEDURE — 99441 ZZC PHYSICIAN TELEPHONE EVALUATION 5-10 MIN: CPT | Performed by: FAMILY MEDICINE

## 2020-08-25 NOTE — PATIENT INSTRUCTIONS
Patient Education     Coronavirus Disease 2019 (COVID-19): Caring for Yourself or Others   If you or a household member have symptoms of COVID-19, follow the guidelines below. This will help you manage symptoms and keep the virus from spreading.  If you have symptoms of COVID-19    Stay home and contact your care team. They will tell you what to do    Don t panic. Keep in mind that other illnesses can cause similar symptoms.    Stay away from work, school, and public places.    Limit physical contact with others in your home. Limit visitors. No kissing.    Clean surfaces you touch with disinfectant.    If you need to cough or sneeze, do it into a tissue. Then, throw the tissue into the trash. If you don't have tissues, cough or sneeze into the bend of your elbow.    Don t share food or personal items with people in your household. This includes items like eating and drinking utensils, towels, and bedding.    Wear a cloth face mask around other people. It should cover your nose and mouth. You may need to make your own mask using a bandana, T-shirt, or other cloth. See the CDC s instructions on how to make a mask.    If you need to go to a hospital or clinic, call ahead to let them know. Expect the care team to wear masks, gowns, gloves, and eye protection. You may be put in a separate room.    Follow all instructions from your care team.    If you have been diagnosed with COVID-19    Stay home and away from others, including other people in your home. (This is called self-isolation.) Don t leave home unless you need to get medical care. Don't go to work, school, or public places. Don't use buses, taxis, or other public transportation.    Follow all instructions from your care team.    If you need to go to a hospital or clinic, call ahead to let them know. Expect the care team to wear masks, gowns, gloves, and eye protection. You may be put in a separate room.    Wear a face mask over your nose and mouth. This is to  protect others from your germs. If you can t wear a mask, your caregivers should wear one. You may need to make your own mask using a bandana, T-shirt, or other cloth. See the CDC s instructions on how to make a mask.    Have no contact with pets and other animals.    Don t share food or personal items with people in your household. This includes items like eating and drinking utensils, towels, and bedding.    Don t share food or personal items with people in your household. This includes items like eating and drinking utensils, towels, and bedding.    If you need to cough or sneeze, do it into a tissue. Then, throw the tissue into the trash. If you don't have tissues, cough or sneeze into the bend of your elbow.    Wash your hands often.    Self-care at home   At this time, there is no medicine approved to prevent or treat COVID-19. Experts are testing different medicines, trying to find one that works.  So far, the most proven treatment is to support your body while it fights the virus.    Get extra rest.    Drink extra fluids (6 to 8 glasses of liquids each day), unless a doctor has told you not to. Ask your care team which fluids are best for you. Avoid fluids that contain caffeine or alcohol.    Taking over-the-counter (OTC) pain medicine to reduce pain and fever. Your care team will tell you which medicine to use.  If you ve been in the hospital for COVID-19, follow your care team s instructions. They will tell you when to stop self-isolation. They may also have you change positions to help your breathing (such as lying on your belly).  If a test showed that you have COVID-19, you may be asked to donate plasma after you ve recovered. (This is called COVID-19 convalescent plasma donation.) The plasma may contain antibodies to help fight the virus in others. Scientists are testing whether this might be a treatment in the future. For more information, talk to your care team.  Caring for a sick person     Follow  all instructions from the care team.    Wash your hands often.    Wear protective clothing as advised.    Make sure the sick person wears a mask. If they can't wear a mask, don't stay in the same room with them. If you must be in the same room, wear a face mask. Make sure the mask covers both the nose and mouth.    Keep track of the sick person s symptoms.    Clean surfaces often with disinfectant. This includes phones, kitchen counters, fridge door handles, bathroom surfaces, and others.    Don t let anyone share household items with the sick person. This includes eating and drinking tools, towels, sheets, and blankets.    Clean fabrics and laundry well.    Keep other people and pets away from the sick person.    When you can stop self-isolation  When you are sick with COVID-19, you should stay away from other people. This is called self-isolation. The rules for ending self-isolation depend on your health in general.  If you are normally healthy  You can stop self-isolation when all 3 of these are true:  1. You ve had no fever--and no medicine that reduces fever--for at least 24 hours. And   2. Your symptoms are better, such as cough or trouble breathing. And   3. At least 10 days have passed since your symptoms first started.  Talk with your care team before you leave home. They may tell you it s okay to leave, or they may give you different advice.  If you have a weak immune system  If you re being treated for cancer, have an immune disorder (such as HIV), or have had a transplant (organ or bone marrow), follow your care team s instructions. You may be able to end self-isolation when all 3 of these are true:  1. You have no fever without fever-reducing medicine. And   2. Your symptoms are better, such as cough or trouble breathing. And   3. You ve had 2 tests for COVID-19. The tests happened at least 24 hours apart, and both show that you don t have the virus. (If no tests are available, your care team may tell  you to follow the rules for normally healthy people above.)  When you return to public settings  When you are well enough to go outside your home, follow the CDC s guidance on cloth face masks.    Anyone over age 2 should wear a face mask in public, especially when it's hard to socially distance. This includes public transit, public protests and marches, and crowded stores, bars, and restaurants.    Face masks are most likely to reduce the spread of COVID-19 when they are widely used by people who are out in the public.  Certain people should not wear a face covering. These include:     Children under 2 years old    Anyone with a health, developmental, or mental health condition that can be made worse by wearing a mask    Anyone who is unconscious or unable to remove the face covering without help. See the CDC's guidance on who should not wear a face mask.  When to call your care team  Call your care team right away if a sick person has any of these:    Trouble breathing    Pain or pressure in chest  If a sick person has any of these, call 911:    Trouble breathing that gets worse    Pain or pressure in chest that gets worse    Blue tint to lips or face    Fast or irregular heartbeat    Confusion or trouble waking    Fainting or loss of consciousness    Coughing up blood  Going home from the hospital   If you have COVID-19 and were recently in the hospital:    Follow the instructions above for self-care and isolation.    Follow the hospital care team s instructions.    Ask questions if anything is unclear to you. Write down answers so you remember them.  Date last modified: 8/12/2020  [employer] last reviewed this educational content on 4/1/2020 2000-2020 [employer]. [address] [place] [address], [place] [address]. All rights reserved. This information is not intended as a substitute for professional medical care. Always follow your healthcare professional's instructions.

## 2020-08-25 NOTE — PROGRESS NOTES
"Florina Marie is a 35 year old female who is being evaluated via a billable telephone visit.      The patient has been notified of following:     \"This telephone visit will be conducted via a call between you and your physician/provider. We have found that certain health care needs can be provided without the need for a physical exam.  This service lets us provide the care you need with a short phone conversation.  If a prescription is necessary we can send it directly to your pharmacy.  If lab work is needed we can place an order for that and you can then stop by our lab to have the test done at a later time.    Telephone visits are billed at different rates depending on your insurance coverage. During this emergency period, for some insurers they may be billed the same as an in-person visit.  Please reach out to your insurance provider with any questions.    If during the course of the call the physician/provider feels a telephone visit is not appropriate, you will not be charged for this service.\"    Patient has given verbal consent for Telephone visit?  Yes    What phone number would you like to be contacted at? [phone]    How would you like to obtain your AVS? Ashutosh    Subjective     Florina Marie is a 35 year old female who presents via phone visit today for the following health issues:    HPI    Acute Illness  Acute illness concerns: sinuses  Onset/Duration: Thursday   Symptoms:  Fever: no  Chills/Sweats: YES- hot flashes   Headache (location?): no  Sinus Pressure: YES  Conjunctivitis:  no  Ear Pain: no  Rhinorrhea: no  Congestion: YES  Sore Throat: better now   Cough: no  Wheeze: no  Decreased Appetite: no  Nausea: YES- occasionally   Vomiting: no  Diarrhea: no  Dysuria/Freq.: no  Dysuria or Hematuria: no  Fatigue/Achiness: YES- achey  Sick/Strep Exposure: no  Therapies tried and outcome: sudafed, ibuprofen, tylenol    Began having symptoms of a cold since last Thursday. Was seen in the ER on 8/23 " for ear cerumen impaction causing ear pain. Patient currently has bilateral frontal sinus pain/pressure, nasal congestion, feeling achy, and occasional nausea. Also having intermittent hot and cold flashes.    Tried meds as above. Worried about nasal swab collection.    Review of Systems   Constitutional, HEENT, cardiovascular, pulmonary, gi and gu systems are negative, except as otherwise noted.       Objective      Vitals:  No vitals were obtained today due to virtual visit.    healthy, alert and no distress  PSYCH: Alert and oriented times 3; coherent speech, normal   rate and volume, able to articulate logical thoughts, able   to abstract reason, no tangential thoughts, no hallucinations   or delusions  Her affect is normal  RESP: No cough, no audible wheezing, able to talk in full sentences  Remainder of exam unable to be completed due to telephone visits    Labs    COVID test pending    Assessment & Plan   1. Acute non-recurrent frontal sinusitis: Discussed symptoms <10 days is not an indication for antibiotics. Recommend nasal Flonase and saline rinses/neti pot. Should self isolate and get covid test as well. Patient agreeable. Test ordered. Follow-up if symptoms fail to improve, new symptoms arise, or symptoms worsen.  - Symptomatic COVID-19 Virus (Coronavirus) by PCR; Future    2. Suspected COVID-19 virus infection: As above.  - Symptomatic COVID-19 Virus (Coronavirus) by PCR; Future      Return in about 1 week (around 9/1/2020), or if symptoms worsen or fail to improve.    Houston Fernandes MD  [facility]    This chart is completed utilizing dictation software; typos and/or incorrect word substitutions may unintentionally occur.    Phone call duration:  9 minutes

## 2020-09-01 ENCOUNTER — VIRTUAL VISIT (OUTPATIENT)
Dept: FAMILY MEDICINE | Facility: OTHER | Age: 35
End: 2020-09-01
Payer: MEDICARE

## 2020-09-01 DIAGNOSIS — E78.5 HYPERLIPIDEMIA, UNSPECIFIED HYPERLIPIDEMIA TYPE: ICD-10-CM

## 2020-09-01 DIAGNOSIS — E66.01 MORBID OBESITY (H): Primary | ICD-10-CM

## 2020-09-01 PROCEDURE — 99213 OFFICE O/P EST LOW 20 MIN: CPT | Mod: 95 | Performed by: FAMILY MEDICINE

## 2020-09-01 RX ORDER — PHENTERMINE HYDROCHLORIDE 37.5 MG/1
37.5 CAPSULE ORAL EVERY MORNING
Qty: 30 CAPSULE | Refills: 0 | Status: SHIPPED | OUTPATIENT
Start: 2020-09-01 | End: 2020-09-18

## 2020-09-01 NOTE — PROGRESS NOTES
"Florina Marie is a 35 year old female who is being evaluated via a billable video visit.      The patient has been notified of following:     \"This video visit will be conducted via a call between you and your physician/provider. We have found that certain health care needs can be provided without the need for an in-person physical exam.  This service lets us provide the care you need with a video conversation.  If a prescription is necessary we can send it directly to your pharmacy.  If lab work is needed we can place an order for that and you can then stop by our lab to have the test done at a later time.    Video visits are billed at different rates depending on your insurance coverage.  Please reach out to your insurance provider with any questions.    If during the course of the call the physician/provider feels a video visit is not appropriate, you will not be charged for this service.\"    Patient has given verbal consent for Video visit? Yes  How would you like to obtain your AVS? MyChart  If you are dropped from the video visit, the video invite should be resent to: Text to cell phone: [phone]  Will anyone else be joining your video visit? No      Subjective     Florina Marie is a 35 year old female who presents today via video visit for the following health issues:    HPI    Concern - Restart Phentermine     Description: Has been off the phentramine for a few months now. Has gained a noticeable amount of weight but does not know the number of pounds since stopping the medication.  However according to her chart she was 264 pounds last April and 280 pounds when she was at the emergency department about a week ago.    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  Eats fruits and veggies, admits that she doesn't eat that well.    She does feel that the phentermine increases her irritability but she is willing to put up with that if it " "helps out with weight loss.  She feels her anger issues are well controlled.  She continues to follow with psychiatry.    Video Start Time: 5:53 PM        Review of Systems   CONSTITUTIONAL: NEGATIVE for fever, chills, change in weight  RESP: NEGATIVE for significant cough or shortness of breath  CV: NEGATIVE for chest pain, palpitations or peripheral edema      Objective           Vitals:  No vitals were obtained today due to virtual visit.    Physical Exam     GENERAL: Healthy, alert and no distress  EYES: Eyes grossly normal to inspection.  No discharge or erythema, or obvious scleral/conjunctival abnormalities.  RESP: No audible wheeze, cough, or visible cyanosis.  No visible retractions or increased work of breathing.    SKIN: Visible skin clear. No significant rash, abnormal pigmentation or lesions.  NEURO: Cranial nerves grossly intact.  Mentation and speech appropriate for age.  PSYCH: Mentation appears normal, affect normal/bright, judgement and insight intact, normal speech and appearance well-groomed.          Assessment & Plan     Morbid obesity (H)  We will restart phentermine 37.5 mg.  When trying lower doses in the past but she did not respond well.  She already has an appointment in a few weeks for a physical for follow-up.    - phentermine (ADIPEX-P) 37.5 MG capsule; Take 1 capsule (37.5 mg) by mouth every morning    Hyperlipidemia, unspecified hyperlipidemia type  She would like to come in earlier for fasting labs since her physical appointment is in the afternoon    - Lipid panel reflex to direct LDL Fasting; Future  - **Glucose FUTURE anytime; Future  - **TSH with free T4 reflex FUTURE anytime; Future     BMI:   Estimated body mass index is 45.89 kg/m  as calculated from the following:    Height as of 3/19/20: 1.664 m (5' 5.5\").    Weight as of 8/24/20: 127 kg (280 lb).   Weight management plan: Will start phentermine      Alanna Curiel MD  [facility]      Video-Visit " Details    Type of service:  Video Visit    Video End Time:5:59 PM    Originating Location (pt. Location): Home    Distant Location (provider location):  [facility]     Platform used for Video Visit: [employer]

## 2020-09-02 DIAGNOSIS — E66.01 MORBID OBESITY (H): ICD-10-CM

## 2020-09-02 NOTE — TELEPHONE ENCOUNTER
Pending Prescriptions:                       Disp   Refills    phentermine (ADIPEX-P) 37.5 MG capsule [Ph*30 cap*0        Sig: TAKE ONE CAPSULE BY MOUTH EVERY MORNING    Routing refill request to provider for review/approval because:  Last filled 09/01/2020 would like to switch to tablets due to cost

## 2020-09-04 RX ORDER — PHENTERMINE HYDROCHLORIDE 37.5 MG/1
37.5 TABLET ORAL
Qty: 30 TABLET | Refills: 0 | Status: SHIPPED | OUTPATIENT
Start: 2020-09-04 | End: 2020-09-18

## 2020-09-04 RX ORDER — PHENTERMINE HYDROCHLORIDE 37.5 MG/1
CAPSULE ORAL
Qty: 30 CAPSULE | Refills: 0 | OUTPATIENT
Start: 2020-09-04

## 2020-09-16 ENCOUNTER — TELEPHONE (OUTPATIENT)
Dept: PODIATRY | Facility: CLINIC | Age: 35
End: 2020-09-16

## 2020-09-16 NOTE — PROGRESS NOTES
"SUBJECTIVE:   Florina Marie is a 35 year old female who presents for Preventive Visit.    Patient has been advised of split billing requirements and indicates understanding: Yes   Are you in the first 12 months of your Medicare coverage?  No    Healthy Habits:     In general, how would you rate your overall health?  Good    Frequency of exercise:  4-5 days/week    Duration of exercise:  30-45 minutes    Do you usually eat at least 4 servings of fruit and vegetables a day, include whole grains    & fiber and avoid regularly eating high fat or \"junk\" foods?  Yes    Taking medications regularly:  Yes    Medication side effects:  None    Ability to successfully perform activities of daily living:  No assistance needed    Home Safety:  No safety concerns identified    Hearing Impairment:  No hearing concerns    In the past 6 months, have you been bothered by leaking of urine?  No    In general, how would you rate your overall mental or emotional health?  Good      PHQ-2 Total Score: 2    Additional concerns today:  No    Do you feel safe in your environment? Yes    Have you ever done Advance Care Planning? (For example, a Health Directive, POLST, or a discussion with a medical provider or your loved ones about your wishes): No, advance care planning information given to patient to review.  Patient declined advance care planning discussion at this time.    Fall risk  Fallen 2 or more times in the past year?: Yes  Any fall with injury in the past year?: Yes  click delete button to remove this line now  Cognitive Screening   1) Repeat 3 items (Leader, Season, Table)    2) Clock draw: Refused  3) 3 item recall: Recalls 3 objects  Results: Recalls 3 objects, refuses clock    Mini-CogTM Copyright JAYDEN Parra. Licensed by the author for use in [facility]; reprinted with permission (javier@.[facility]). All rights reserved.      Do you have sleep apnea, excessive snoring or daytime drowsiness?: no    Reviewed and updated " as needed this visit by clinical staff  Tobacco  Allergies  Meds  Problems  Med Hx  Surg Hx  Fam Hx  Soc Hx          Reviewed and updated as needed this visit by Provider  Tobacco  Allergies  Meds  Problems  Med Hx  Surg Hx  Fam Hx        Social History     Tobacco Use     Smoking status: Never Smoker     Smokeless tobacco: Never Used     Tobacco comment: no smokers in the household   Substance Use Topics     Alcohol use: No     Frequency: Never     If you drink alcohol do you typically have >3 drinks per day or >7 drinks per week? No    Alcohol Use 9/18/2020   Prescreen: >3 drinks/day or >7 drinks/week? No   Prescreen: >3 drinks/day or >7 drinks/week? -       Current providers sharing in care for this patient include:   Patient Care Team:  Alanna Curiel MD as PCP - General  Alanna Curiel MD as Assigned PCP    The following health maintenance items are reviewed in Epic and correct as of today:  Health Maintenance   Topic Date Due     LIPID  08/21/2020     INFLUENZA VACCINE (1) 09/01/2020     HPV TEST  09/08/2021     PAP  09/08/2021     MEDICARE ANNUAL WELLNESS VISIT  09/18/2021     ADVANCE CARE PLANNING  03/21/2024     DTAP/TDAP/TD IMMUNIZATION (4 - Td) 11/20/2028     HIV SCREENING  Completed     DEPRESSION ACTION PLAN  Completed     HEPATITIS B IMMUNIZATION  Completed     IPV IMMUNIZATION  Aged Out     MENINGITIS IMMUNIZATION  Aged Out       Review of Systems   Constitutional: Negative for chills and fever.   HENT: Negative for congestion, ear pain, hearing loss and sore throat.    Eyes: Negative for pain and visual disturbance.   Respiratory: Negative for cough and shortness of breath.    Cardiovascular: Negative for chest pain, palpitations and peripheral edema.   Gastrointestinal: Negative for abdominal pain, constipation, diarrhea, heartburn, hematochezia and nausea.   Breasts:  Negative for tenderness, breast mass and discharge.   Genitourinary: Negative for dysuria, frequency,  "genital sores, hematuria, pelvic pain, urgency, vaginal bleeding and vaginal discharge.   Musculoskeletal: Negative for arthralgias, joint swelling and myalgias.   Skin: Negative for rash.   Neurological: Negative for dizziness, weakness, headaches and paresthesias.   Psychiatric/Behavioral: Negative for mood changes. The patient is nervous/anxious.        OBJECTIVE:   /88   Pulse 99   Temp 97.6  F (36.4  C) (Temporal)   Resp 16   Wt 128.4 kg (283 lb)   SpO2 97%   BMI 46.38 kg/m   Estimated body mass index is 46.38 kg/m  as calculated from the following:    Height as of 3/19/20: 1.664 m (5' 5.5\").    Weight as of this encounter: 128.4 kg (283 lb).  Physical Exam  Constitutional:       General: She is not in acute distress.     Appearance: She is well-developed.   HENT:      Right Ear: Tympanic membrane and external ear normal.      Left Ear: Tympanic membrane and external ear normal.      Nose: Nose normal.      Mouth/Throat:      Pharynx: No oropharyngeal exudate.   Eyes:      General:         Right eye: No discharge.         Left eye: No discharge.      Conjunctiva/sclera: Conjunctivae normal.      Pupils: Pupils are equal, round, and reactive to light.   Neck:      Musculoskeletal: Neck supple.      Thyroid: No thyromegaly.      Trachea: No tracheal deviation.   Cardiovascular:      Rate and Rhythm: Normal rate and regular rhythm.      Pulses: Normal pulses.      Heart sounds: Normal heart sounds, S1 normal and S2 normal. No murmur. No friction rub. No S3 or S4 sounds.    Pulmonary:      Effort: Pulmonary effort is normal. No respiratory distress.      Breath sounds: Normal breath sounds. No wheezing or rales.   Abdominal:      General: Bowel sounds are normal.      Palpations: Abdomen is soft. There is no mass.      Tenderness: There is no abdominal tenderness.   Musculoskeletal: Normal range of motion.   Lymphadenopathy:      Cervical: No cervical adenopathy.   Skin:     General: Skin is warm and " "dry.      Findings: No rash.   Neurological:      Mental Status: She is alert and oriented to person, place, and time.      Motor: No abnormal muscle tone.      Deep Tendon Reflexes: Reflexes are normal and symmetric.   Psychiatric:         Thought Content: Thought content normal.         Judgment: Judgment normal.           ASSESSMENT / PLAN:   1. Encounter for Medicare annual wellness exam  We will follow-up phentermine next month    2. Need for prophylactic vaccination and inoculation against influenza    - INFLUENZA VACCINE IM > 6 MONTHS VALENT IIV4 [87350]  - Vaccine Administration, Initial [59139]    Patient has been advised of split billing requirements and indicates understanding: No  COUNSELING:  Reviewed preventive health counseling, as reflected in patient instructions    Estimated body mass index is 46.38 kg/m  as calculated from the following:    Height as of 3/19/20: 1.664 m (5' 5.5\").    Weight as of this encounter: 128.4 kg (283 lb).    Weight management plan: Discussed healthy diet and exercise guidelines    She reports that she has never smoked. She has never used smokeless tobacco.      Appropriate preventive services were discussed with this patient, including applicable screening as appropriate for cardiovascular disease, diabetes, osteopenia/osteoporosis, and glaucoma.  As appropriate for age/gender, discussed screening for colorectal cancer, prostate cancer, breast cancer, and cervical cancer. Checklist reviewing preventive services available has been given to the patient.    Reviewed patients plan of care and provided an AVS. The Basic Care Plan (routine screening as documented in Health Maintenance) for Florina meets the Care Plan requirement. This Care Plan has been established and reviewed with the Patient.    Counseling Resources:  ATP IV Guidelines  Pooled Cohorts Equation Calculator  Breast Cancer Risk Calculator  Breast Cancer: Medication to Reduce Risk  FRAX Risk Assessment  ICSI " Preventive Guidelines  Dietary Guidelines for Americans, 2010  USDA's MyPlate  ASA Prophylaxis  Lung CA Screening    Alanna Curiel MD  [facility]    Identified Health Risks:

## 2020-09-16 NOTE — PROGRESS NOTES
SUBJECTIVE:   CC: Florina Marie is an 35 year old woman who presents for preventive health visit.     {Split Bill scripting  The purpose of this visit is to discuss your medical history and prevent health problems before you are sick. You may be responsible for a co-pay, coinsurance, or deductible if your visit today includes services such as checking on a sore throat, having an x-ray or lab test, or treating and evaluating a new or existing condition :467641}  Patient has been advised of split billing requirements and indicates understanding: {Yes and No:129498}  HPI  {Add if <65 person on Medicare  - Required Questions (Optional):625447}  {Outside tests to abstract? :977507}    {additional problems to add (Optional):559621}    Today's PHQ-2 Score:   PHQ-2 ( 1999 Pfizer) 4/10/2020   Q1: Little interest or pleasure in doing things 1   Q2: Feeling down, depressed or hopeless 1   PHQ-2 Score 2   Q1: Little interest or pleasure in doing things -   Q2: Feeling down, depressed or hopeless -   PHQ-2 Score -       Abuse: Current or Past (Physical, Sexual or Emotional) - { :094405}  Do you feel safe in your environment? { :102063}        Social History     Tobacco Use     Smoking status: Never Smoker     Smokeless tobacco: Never Used     Tobacco comment: no smokers in the household   Substance Use Topics     Alcohol use: No     Frequency: Never     {Rooming Staff- Complete this question if Prescreen response is not shown below for today's visit. If you drink alcohol do you typically have >3 drinks per day or >7 drinks per week? (Optional):129825}    Alcohol Use 9/4/2018   Prescreen: >3 drinks/day or >7 drinks/week? Not Applicable   Prescreen: >3 drinks/day or >7 drinks/week? -   {add AUDIT responses (Optional) (A score of 7 for adult men is an indication of hazardous drinking; a score of 8 or more is an indication of an alcohol use disorder.  A score of 7 or more for adult women is an indication of hazardous drinking  "or an alchohol use disorder):898857}    Reviewed orders with patient.  Reviewed health maintenance and updated orders accordingly - { :609766::\"Yes\"}  {Chronicprobdata (optional):797369}    {Mammo Decision Support (Optional):513510}    Pertinent mammograms are reviewed under the imaging tab.  History of abnormal Pap smear: { :327315}  PAP / HPV Latest Ref Rng & Units 9/9/2016 9/8/2016 7/24/2013   PAP - - NIL NIL   HPV 16 DNA NEG Negative - -   HPV 18 DNA NEG Negative - -   OTHER HR HPV NEG Negative - -     Reviewed and updated as needed this visit by clinical staff         Reviewed and updated as needed this visit by Provider        {HISTORY OPTIONS (Optional):180125}    Review of Systems  {FEMALE ROS (Optional):741489}     OBJECTIVE:   There were no vitals taken for this visit.  Physical Exam  {Exam Choices (Optional):169038}    {Diagnostic Test Results (Optional):712441::\"Diagnostic Test Results:\",\"Labs reviewed in Epic\"}    ASSESSMENT/PLAN:   {Diag Picklist:950188}    Patient has been advised of split billing requirements and indicates understanding: {YES / NO:365739::\"Yes\"}  COUNSELING:  {FEMALE COUNSELING MESSAGES:590906::\"Reviewed preventive health counseling, as reflected in patient instructions\"}    Estimated body mass index is 45.89 kg/m  as calculated from the following:    Height as of 3/19/20: 1.664 m (5' 5.5\").    Weight as of 8/24/20: 127 kg (280 lb).    {Weight Management Plan (ACO) Complete if BMI is abnormal-  Ages 18-64  BMI >24.9.  Age 65+ with BMI <23 or >30 (Optional):569476}    She reports that she has never smoked. She has never used smokeless tobacco.      Counseling Resources:  ATP IV Guidelines  Pooled Cohorts Equation Calculator  Breast Cancer Risk Calculator  BRCA-Related Cancer Risk Assessment: FHS-7 Tool  FRAX Risk Assessment  ICSI Preventive Guidelines  Dietary Guidelines for Americans, 2010  USDA's MyPlate  ASA Prophylaxis  Lung CA Screening    Alanna Curiel MD  [place] " [facility]

## 2020-09-18 ENCOUNTER — OFFICE VISIT (OUTPATIENT)
Dept: FAMILY MEDICINE | Facility: OTHER | Age: 35
End: 2020-09-18
Payer: MEDICARE

## 2020-09-18 VITALS
RESPIRATION RATE: 16 BRPM | OXYGEN SATURATION: 97 % | TEMPERATURE: 97.6 F | HEART RATE: 99 BPM | BODY MASS INDEX: 46.38 KG/M2 | DIASTOLIC BLOOD PRESSURE: 88 MMHG | SYSTOLIC BLOOD PRESSURE: 130 MMHG | WEIGHT: 283 LBS

## 2020-09-18 DIAGNOSIS — Z00.00 ENCOUNTER FOR MEDICARE ANNUAL WELLNESS EXAM: Primary | ICD-10-CM

## 2020-09-18 DIAGNOSIS — E78.5 HYPERLIPIDEMIA, UNSPECIFIED HYPERLIPIDEMIA TYPE: ICD-10-CM

## 2020-09-18 DIAGNOSIS — Z23 NEED FOR PROPHYLACTIC VACCINATION AND INOCULATION AGAINST INFLUENZA: ICD-10-CM

## 2020-09-18 LAB
CHOLEST SERPL-MCNC: 229 MG/DL
GLUCOSE SERPL-MCNC: 88 MG/DL (ref 70–99)
HDLC SERPL-MCNC: 44 MG/DL
LDLC SERPL CALC-MCNC: 143 MG/DL
NONHDLC SERPL-MCNC: 185 MG/DL
TRIGL SERPL-MCNC: 212 MG/DL
TSH SERPL DL<=0.005 MIU/L-ACNC: 1.62 MU/L (ref 0.4–4)

## 2020-09-18 PROCEDURE — G0438 PPPS, INITIAL VISIT: HCPCS | Performed by: FAMILY MEDICINE

## 2020-09-18 PROCEDURE — 84443 ASSAY THYROID STIM HORMONE: CPT | Performed by: FAMILY MEDICINE

## 2020-09-18 PROCEDURE — 36415 COLL VENOUS BLD VENIPUNCTURE: CPT | Performed by: FAMILY MEDICINE

## 2020-09-18 PROCEDURE — G0008 ADMIN INFLUENZA VIRUS VAC: HCPCS | Performed by: FAMILY MEDICINE

## 2020-09-18 PROCEDURE — 80061 LIPID PANEL: CPT | Performed by: FAMILY MEDICINE

## 2020-09-18 PROCEDURE — 82947 ASSAY GLUCOSE BLOOD QUANT: CPT | Performed by: FAMILY MEDICINE

## 2020-09-18 PROCEDURE — 90686 IIV4 VACC NO PRSV 0.5 ML IM: CPT | Performed by: FAMILY MEDICINE

## 2020-09-18 ASSESSMENT — ENCOUNTER SYMPTOMS
MYALGIAS: 0
HEMATOCHEZIA: 0
CHILLS: 0
EYE PAIN: 0
JOINT SWELLING: 0
CONSTIPATION: 0
BREAST MASS: 0
HEADACHES: 0
SORE THROAT: 0
HEMATURIA: 0
FEVER: 0
DIARRHEA: 0
ABDOMINAL PAIN: 0
NAUSEA: 0
DYSURIA: 0
ARTHRALGIAS: 0
HEARTBURN: 0
COUGH: 0
SHORTNESS OF BREATH: 0
NERVOUS/ANXIOUS: 1
WEAKNESS: 0
PALPITATIONS: 0
PARESTHESIAS: 0
DIZZINESS: 0
FREQUENCY: 0

## 2020-09-18 ASSESSMENT — ACTIVITIES OF DAILY LIVING (ADL): CURRENT_FUNCTION: NO ASSISTANCE NEEDED

## 2020-09-18 ASSESSMENT — PAIN SCALES - GENERAL: PAINLEVEL: NO PAIN (0)

## 2020-09-22 ENCOUNTER — ANCILLARY PROCEDURE (OUTPATIENT)
Dept: GENERAL RADIOLOGY | Facility: CLINIC | Age: 35
End: 2020-09-22
Attending: PODIATRIST
Payer: MEDICARE

## 2020-09-22 ENCOUNTER — OFFICE VISIT (OUTPATIENT)
Dept: PODIATRY | Facility: CLINIC | Age: 35
End: 2020-09-22
Payer: MEDICARE

## 2020-09-22 VITALS
WEIGHT: 283 LBS | SYSTOLIC BLOOD PRESSURE: 138 MMHG | DIASTOLIC BLOOD PRESSURE: 84 MMHG | BODY MASS INDEX: 45.48 KG/M2 | HEIGHT: 66 IN

## 2020-09-22 DIAGNOSIS — M72.2 PLANTAR FASCIITIS: ICD-10-CM

## 2020-09-22 DIAGNOSIS — E66.01 MORBID OBESITY (H): ICD-10-CM

## 2020-09-22 DIAGNOSIS — M72.2 PLANTAR FASCIITIS: Primary | ICD-10-CM

## 2020-09-22 DIAGNOSIS — Q66.70 CAVUS DEFORMITY OF FOOT: ICD-10-CM

## 2020-09-22 PROCEDURE — 73630 X-RAY EXAM OF FOOT: CPT | Mod: TC

## 2020-09-22 PROCEDURE — 99213 OFFICE O/P EST LOW 20 MIN: CPT | Performed by: PODIATRIST

## 2020-09-22 RX ORDER — DICLOFENAC SODIUM 75 MG/1
75 TABLET, DELAYED RELEASE ORAL 2 TIMES DAILY
Qty: 60 TABLET | Refills: 1 | Status: SHIPPED | OUTPATIENT
Start: 2020-09-22 | End: 2021-02-19

## 2020-09-22 ASSESSMENT — PAIN SCALES - GENERAL: PAINLEVEL: EXTREME PAIN (8)

## 2020-09-22 ASSESSMENT — MIFFLIN-ST. JEOR: SCORE: 1987.49

## 2020-09-22 NOTE — PATIENT INSTRUCTIONS
PLANTAR FASCIITIS  The  plantar fascia  is a thick fibrous layer of tissue that covers the bones on the bottom of your foot. It supports the foot bones in an arched position.  Plantar fasciitis  is a painful inflammation of the plantar fascia due to overuse. This can develop gradually or suddenly. It usually affects one foot at a time but can affect both feet. Heel pain can be sharp and feel like a knife sticking in the bottom of your foot. Pain may occur after exercising, long distance jogging, stair climbing, long periods of standing, or after getting up from a seated position.  Risk factors include arthritis, diabetes, obesity or recent weight gain, flat-foot, high arch, wearing high heels or loose shoes or shoes with poor arch support.  Sudden changes in activity or shoe gear may contribute to symptoms.  Foot pain from this condition is usually worse in the morning and improves with walking. By the end of the day there may be a dull aching. Treatment requires improved support of feet, short-term rest and controlling inflammation. It may take up to nine months before all symptoms go away with the measures described below.  A steroid injection into the foot, or surgery may be needed if this is becomes long standing or severe.  HOME CARE  1. If you are overweight, lose weight to promote healing.  2. Choose supportive shoes (stiff through the shank) with good arch support and shock absorbency. Replace athletic shoes when they become worn out. Don t walk or run barefoot.  3. Shoe inserts are an important part of treatment. You can buy off-the-shelf shoe inserts inexpensively such as [employer].  The best ones are custom molded to your foot with a prescription.  4. Night splints keep the plantar fascia gently stretched while you sleep and will eliminate morning pain. Wear it ALL NIGHT EVERY NIGHT, or any time you sit for a long time.  5. Reduce by 10% or more the activities that stress the feet: jogging, prolonged  standing or walking, high impact sports, etc.  6. Stretch your feet. Gently flex your ankle by leaning into a wall or counter or drop your heel from a step.  Stretch two minutes of every hour you are awake.  7. Icing or massage may help heel pain. Apply an ice pack or frozen water or Coke bottle to the heel for 10-20 minutes as a preventive or after an acute flare of symptoms. You may repeat this as needed.   Follow up with your Doctor in 3 weeks as instructed.      Reliable shoe stores: To maximize your experience and provide the best possible fit.  Be sure to show them your foot concerns and tell them Dr. Rincon sent you.      Stores listed in bold have only athletic shoes, and stores that are not bold are mostly casual or variety of shoes    [employer]  [address] [place] [address]  [phone]     [employer] [facility]  [address] [place] [address]  [phone]     [employer]  [address] [place] [address]  [phone]    [employer]  [address] [facility] [address]  [phone]    [employer]  [address] [place], [place] [address] [phone]    [employer]  [address] [place] [address]  [phone]                         [employer] Locations:     [address] [place] [address]   [phone]     [address] [place] [address]   [phone]     [address] [place] [address]   [phone]     [address] [place] [address]   [phone]     [address] [place] [address]   [phone]     [address] [place] [address]   [phone]     [address] [facility] [facility] [address] [facility] [address]   [place] [address]   [phone]     [address]   [place], [place] [id number] [phone]             How to find reasonable shoes          The correct width    Correct Fitting    Correct Length      Foot Distortion    Posture Distortion                           Torsional Rigidity      Grasp behind the heel and underneath the foot and twist      Bad    Excessive torsion/twist in midfoot     Less torsion/twist in midfoot is better                   Heel Counter Rigidity      Grasp just above   midsole and squeeze      Bad    Soft heel counter      Good    Rigid Heel Counter      Flexion Rigidity      Grasp shoe and bend from forefoot to rearfoot

## 2020-09-22 NOTE — LETTER
9/22/2020         RE: Florina Marie  [address]  [facility] [address]        Dear Colleague,    Thank you for referring your patient, Florina Marie, to the [facility]. Please see a copy of my visit note below.    HPI:  Florina Marie is a 35 year old female who is seen in consultation at the request of self.    Pt presents for eval of:   (Onset, Location, L/R, Character, Treatments, Injury if yes)    XR Left and Right foot today 9/22/2020     Onset Summer 2019, plantar Left and Right heel/arch pain > Right. No injury noted. Presents today with supportive athletic shoes and orthotics.  With first steps after lying or sitting, sharp, throbbing, tightness, pain 8, limping.    Works at home doing .     Weight management plan: Patient was referred to their PCP to discuss a diet and exercise plan.     ROS: 10 point ROS neg other than the symptoms noted above in the HPI.    Patient Active Problem List   Diagnosis     Problems with learning     Morbid obesity (H)     Generalized anxiety disorder     Hyperlipidemia, unspecified     Attention deficit disorder without hyperactivity     Prolonged PTT     S/P carpal tunnel release     Pain of both elbows       PAST MEDICAL HISTORY:   Past Medical History:   Diagnosis Date     Attention deficit disorder with hyperactivity(314.01)      Left carpal tunnel syndrome 6/27/2019     Other kyphoscoliosis and scoliosis      Other specified delay in development     Microcephaly     Right carpal tunnel syndrome 3/14/2019     Viral warts, unspecified     plantar warts     PAST SURGICAL HISTORY:   Past Surgical History:   Procedure Laterality Date     EXAM UNDER ANESTHESIA PELVIC N/A 9/9/2016    Procedure: EXAM UNDER ANESTHESIA PELVIC;  Surgeon: Rhoda Alcazar MD;  [facility]     HC TOOTH EXTRACTION W/FORCEP      wisdom     RELEASE CARPAL TUNNEL Right 3/22/2019    Procedure: RIGHT RELEASE CARPAL TUNNEL;  Surgeon: Anjum Wilburn,  ;  Location: [facility]     RELEASE CARPAL TUNNEL Left 7/16/2019    Procedure: RELEASE, CARPAL TUNNEL-Left;  Surgeon: Anjum Wilburn DO;  Location: [facility] OR     MEDICATIONS:   Current Outpatient Medications:      ALPRAZolam (XANAX) 0.5 MG tablet, TAKE ONE TABLET AS NEEDED FOR SEVERE ANXIETY MAX OF 2 TABLETS PER DAY, Disp: , Rfl: 0     cetirizine (ZYRTEC) 10 MG tablet, Take 1 tablet (10 mg) by mouth daily as needed for allergies (hives), Disp: 30 tablet, Rfl: 11     desogestrel-ethinyl estradiol (APRI) 0.15-30 MG-MCG tablet, Take 1 tablet by mouth daily Take one tablet daily., Disp: 84 tablet, Rfl: 3     diclofenac (VOLTAREN) 1 % topical gel, Apply 2 g topically 4 times daily, Disp: 100 g, Rfl: 1     diclofenac (VOLTAREN) 75 MG EC tablet, Take 1 tablet (75 mg) by mouth 2 times daily, Disp: 60 tablet, Rfl: 1     escitalopram (LEXAPRO) 10 MG tablet, TAKE 1 TABLET BY MOUTH ONCE DAILY WITH 20MG TABLET FOR A TOTAL OF 30MG, Disp: , Rfl: 1     escitalopram (LEXAPRO) 20 MG tablet, TAKE 1 TABLET BY MOUTH DAILY., Disp: , Rfl: 5  ALLERGIES:    Allergies   Allergen Reactions     Macrobid [Nitrofurantoin] Headache and Nausea     SOCIAL HISTORY:   Social History     Socioeconomic History     Marital status: Single     Spouse name: Not on file     Number of children: 0     Years of education: Not on file     Highest education level: Not on file   Occupational History     Occupation: student     Comment: [employer]   Social Needs     Financial resource strain: Not on file     Food insecurity     Worry: Not on file     Inability: Not on file     Transportation needs     Medical: Not on file     Non-medical: Not on file   Tobacco Use     Smoking status: Never Smoker     Smokeless tobacco: Never Used     Tobacco comment: no smokers in the household   Substance and Sexual Activity     Alcohol use: No     Frequency: Never     Drug use: No     Sexual activity: Never     Birth control/protection: Pill   Lifestyle      Physical activity     Days per week: Not on file     Minutes per session: Not on file     Stress: Not on file   Relationships     Social connections     Talks on phone: Not on file     Gets together: Not on file     Attends Episcopal service: Not on file     Active member of club or organization: Not on file     Attends meetings of clubs or organizations: Not on file     Relationship status: Not on file     Intimate partner violence     Fear of current or ex partner: Not on file     Emotionally abused: Not on file     Physically abused: Not on file     Forced sexual activity: Not on file   Other Topics Concern      Service Not Asked     Blood Transfusions Not Asked     Caffeine Concern Yes     Comment: 1 can QD     Occupational Exposure Not Asked     Hobby Hazards Not Asked     Sleep Concern Not Asked     Stress Concern Not Asked     Weight Concern Not Asked     Special Diet Not Asked     Back Care Not Asked     Exercise Yes     Comment: 4 days a week 1/2 hour     Bike Helmet No     Seat Belt Yes     Self-Exams Not Asked     Parent/sibling w/ CABG, MI or angioplasty before 65F 55M? No   Social History Narrative     Not on file     FAMILY HISTORY:   Family History   Problem Relation Age of Onset     Lipids Father         diet     Thyroid Disease Mother         unsure if hypo or hyper     Diabetes Paternal Grandfather         adult     Heart Disease Paternal Grandfather         bypass/open hear surgery     Lipids Maternal Aunt         medication     Lipids Maternal Aunt         diet     Alzheimer Disease Maternal Grandfather      Hypertension No family hx of      Coronary Artery Disease No family hx of      Hyperlipidemia No family hx of      Cancer - colorectal No family hx of      Ovarian Cancer No family hx of      Prostate Cancer No family hx of      Depression/Anxiety No family hx of      Cerebrovascular Disease No family hx of      Anesthesia Reaction No family hx of      Asthma No family hx of       "Osteoporosis No family hx of      Chemical Addiction No family hx of      Obesity No family hx of        EXAM:Vitals: /84 (BP Location: Left arm, Patient Position: Sitting, Cuff Size: Adult Large)   Ht 1.664 m (5' 5.5\")   Wt 128.4 kg (283 lb)   BMI 46.38 kg/m    BMI= Body mass index is 46.38 kg/m .    General appearance: Patient is alert and fully cooperative with history & exam.  No sign of distress is noted during the visit.     Psychiatric: Affect is pleasant & appropriate.  Patient appears motivated to improve health.     Respiratory: Breathing is regular & unlabored while sitting.     HEENT: Hearing is intact to spoken word.  Speech is clear.  No gross evidence of visual impairment that would impact ambulation.     Vascular: DP & PT 2/4 & regular bilaterally.  No significant edema, rubor or varicosities noted.  CFT and skin temperature is normal to both lower extremities.       Neurologic: Lower extremity sensation is intact to light touch.  No evidence of weakness in the lower extremities.  No evidence of neuropathy and negative tinel sign.     Dermatologic: Skin is intact to both lower extremities without significant lesions, rash or abrasion.  Normal texture turgor and tone. No paronychia or evidence of soft tissue infection is noted.    Musculoskeletal: Patient is ambulatory without assistive device or brace. Pain is noted with firm palpation along the medial band of the plantar fascia bilateral foot right more than left, most notably at the origination upon the calcaneus not through the arch.  No pain with compression of the calcaneus medial to lateral or with palpation of the achilles, peroneal or posterior tibial tendons.  No more than 0  of ankle joint dorsiflexion without crepitus or pain throughout the ankle, subtalar or midtarsal joints.  No pain or limitations throughout manual muscle strength testing plus 5/5 to all four quadrants bilateral.  No palpable edema noted.  Patient is obese.  " Very high arched cavus foot type noted.    Radiographs:  Osteophyte noted about the plantar calcaneus consistent with plantar fasciitis.  Also high calcaneal inclination angle consistent with cavus foot or high arch.     ASSESSMENT:       ICD-10-CM    1. Plantar fasciitis  M72.2 XR Foot Bilateral G/E 3 Views     ORTHOTICS REFERRAL     diclofenac (VOLTAREN) 75 MG EC tablet   2. Cavus deformity of foot  Q66.70    3. Morbid obesity (H)  E66.01        PLAN:  Reviewed patient's chart in [facility] and discussed etiology and treatment options.      Treatments:  9/22/2020  Obtained and interpreted radiographs.   Discontinue barefoot walking or unsupported walking in shoes without shank.  Dispensed written instructions to obtain appropriate shoe gear and/or OTC inserts.  Dispensed anterior night splint to use all night every night.  Prescription oral Voltaren for a short course. Discussed risks.  Prescription for custom molded orthotics 9/22/2020  Follow up in 4-5 weeks     She is already had orthotics in the past but would like multiple pairs that she is not utilizing them in all shoes and all activities.    Andres Rincon DPM      Again, thank you for allowing me to participate in the care of your patient.        Sincerely,        Andres Rincon DPM

## 2020-09-22 NOTE — NURSING NOTE
Dispensed 2 Dorsal (Anterior) Night Splint, Size S/M, with [facility] agreement signed by patient. Tonya Shore CMA, September 22, 2020

## 2020-09-22 NOTE — PROGRESS NOTES
HPI:  Florina Marie is a 35 year old female who is seen in consultation at the request of self.    Pt presents for eval of:   (Onset, Location, L/R, Character, Treatments, Injury if yes)    XR Left and Right foot today 9/22/2020     Onset Summer 2019, plantar Left and Right heel/arch pain > Right. No injury noted. Presents today with supportive athletic shoes and orthotics.  With first steps after lying or sitting, sharp, throbbing, tightness, pain 8, limping.    Works at home doing .     Weight management plan: Patient was referred to their PCP to discuss a diet and exercise plan.     ROS: 10 point ROS neg other than the symptoms noted above in the HPI.    Patient Active Problem List   Diagnosis     Problems with learning     Morbid obesity (H)     Generalized anxiety disorder     Hyperlipidemia, unspecified     Attention deficit disorder without hyperactivity     Prolonged PTT     S/P carpal tunnel release     Pain of both elbows       PAST MEDICAL HISTORY:   Past Medical History:   Diagnosis Date     Attention deficit disorder with hyperactivity(314.01)      Left carpal tunnel syndrome 6/27/2019     Other kyphoscoliosis and scoliosis      Other specified delay in development     Microcephaly     Right carpal tunnel syndrome 3/14/2019     Viral warts, unspecified     plantar warts     PAST SURGICAL HISTORY:   Past Surgical History:   Procedure Laterality Date     EXAM UNDER ANESTHESIA PELVIC N/A 9/9/2016    Procedure: EXAM UNDER ANESTHESIA PELVIC;  Surgeon: Rhoda Alcazar MD;  [facility]     HC TOOTH EXTRACTION W/FORCEP      wisdom     RELEASE CARPAL TUNNEL Right 3/22/2019    Procedure: RIGHT RELEASE CARPAL TUNNEL;  Surgeon: Anjum Wilburn DO;  Location: [facility]     RELEASE CARPAL TUNNEL Left 7/16/2019    Procedure: RELEASE, CARPAL TUNNEL-Left;  Surgeon: Anjum Wilburn DO;  Location[facility]     MEDICATIONS:   Current Outpatient Medications:      ALPRAZolam (XANAX) 0.5 MG  tablet, TAKE ONE TABLET AS NEEDED FOR SEVERE ANXIETY MAX OF 2 TABLETS PER DAY, Disp: , Rfl: 0     cetirizine (ZYRTEC) 10 MG tablet, Take 1 tablet (10 mg) by mouth daily as needed for allergies (hives), Disp: 30 tablet, Rfl: 11     desogestrel-ethinyl estradiol (APRI) 0.15-30 MG-MCG tablet, Take 1 tablet by mouth daily Take one tablet daily., Disp: 84 tablet, Rfl: 3     diclofenac (VOLTAREN) 1 % topical gel, Apply 2 g topically 4 times daily, Disp: 100 g, Rfl: 1     diclofenac (VOLTAREN) 75 MG EC tablet, Take 1 tablet (75 mg) by mouth 2 times daily, Disp: 60 tablet, Rfl: 1     escitalopram (LEXAPRO) 10 MG tablet, TAKE 1 TABLET BY MOUTH ONCE DAILY WITH 20MG TABLET FOR A TOTAL OF 30MG, Disp: , Rfl: 1     escitalopram (LEXAPRO) 20 MG tablet, TAKE 1 TABLET BY MOUTH DAILY., Disp: , Rfl: 5  ALLERGIES:    Allergies   Allergen Reactions     Macrobid [Nitrofurantoin] Headache and Nausea     SOCIAL HISTORY:   Social History     Socioeconomic History     Marital status: Single     Spouse name: Not on file     Number of children: 0     Years of education: Not on file     Highest education level: Not on file   Occupational History     Occupation: student     Comment: [employer]   Social Needs     Financial resource strain: Not on file     Food insecurity     Worry: Not on file     Inability: Not on file     Transportation needs     Medical: Not on file     Non-medical: Not on file   Tobacco Use     Smoking status: Never Smoker     Smokeless tobacco: Never Used     Tobacco comment: no smokers in the household   Substance and Sexual Activity     Alcohol use: No     Frequency: Never     Drug use: No     Sexual activity: Never     Birth control/protection: Pill   Lifestyle     Physical activity     Days per week: Not on file     Minutes per session: Not on file     Stress: Not on file   Relationships     Social connections     Talks on phone: Not on file     Gets together: Not on file     Attends Jain service: Not on  "file     Active member of club or organization: Not on file     Attends meetings of clubs or organizations: Not on file     Relationship status: Not on file     Intimate partner violence     Fear of current or ex partner: Not on file     Emotionally abused: Not on file     Physically abused: Not on file     Forced sexual activity: Not on file   Other Topics Concern      Service Not Asked     Blood Transfusions Not Asked     Caffeine Concern Yes     Comment: 1 can QD     Occupational Exposure Not Asked     Hobby Hazards Not Asked     Sleep Concern Not Asked     Stress Concern Not Asked     Weight Concern Not Asked     Special Diet Not Asked     Back Care Not Asked     Exercise Yes     Comment: 4 days a week 1/2 hour     Bike Helmet No     Seat Belt Yes     Self-Exams Not Asked     Parent/sibling w/ CABG, MI or angioplasty before 65F 55M? No   Social History Narrative     Not on file     FAMILY HISTORY:   Family History   Problem Relation Age of Onset     Lipids Father         diet     Thyroid Disease Mother         unsure if hypo or hyper     Diabetes Paternal Grandfather         adult     Heart Disease Paternal Grandfather         bypass/open hear surgery     Lipids Maternal Aunt         medication     Lipids Maternal Aunt         diet     Alzheimer Disease Maternal Grandfather      Hypertension No family hx of      Coronary Artery Disease No family hx of      Hyperlipidemia No family hx of      Cancer - colorectal No family hx of      Ovarian Cancer No family hx of      Prostate Cancer No family hx of      Depression/Anxiety No family hx of      Cerebrovascular Disease No family hx of      Anesthesia Reaction No family hx of      Asthma No family hx of      Osteoporosis No family hx of      Chemical Addiction No family hx of      Obesity No family hx of        EXAM:Vitals: /84 (BP Location: Left arm, Patient Position: Sitting, Cuff Size: Adult Large)   Ht 1.664 m (5' 5.5\")   Wt 128.4 kg (283 lb)   " BMI 46.38 kg/m    BMI= Body mass index is 46.38 kg/m .    General appearance: Patient is alert and fully cooperative with history & exam.  No sign of distress is noted during the visit.     Psychiatric: Affect is pleasant & appropriate.  Patient appears motivated to improve health.     Respiratory: Breathing is regular & unlabored while sitting.     HEENT: Hearing is intact to spoken word.  Speech is clear.  No gross evidence of visual impairment that would impact ambulation.     Vascular: DP & PT 2/4 & regular bilaterally.  No significant edema, rubor or varicosities noted.  CFT and skin temperature is normal to both lower extremities.       Neurologic: Lower extremity sensation is intact to light touch.  No evidence of weakness in the lower extremities.  No evidence of neuropathy and negative tinel sign.     Dermatologic: Skin is intact to both lower extremities without significant lesions, rash or abrasion.  Normal texture turgor and tone. No paronychia or evidence of soft tissue infection is noted.    Musculoskeletal: Patient is ambulatory without assistive device or brace. Pain is noted with firm palpation along the medial band of the plantar fascia bilateral foot right more than left, most notably at the origination upon the calcaneus not through the arch.  No pain with compression of the calcaneus medial to lateral or with palpation of the achilles, peroneal or posterior tibial tendons.  No more than 0  of ankle joint dorsiflexion without crepitus or pain throughout the ankle, subtalar or midtarsal joints.  No pain or limitations throughout manual muscle strength testing plus 5/5 to all four quadrants bilateral.  No palpable edema noted.  Patient is obese.  Very high arched cavus foot type noted.    Radiographs:  Osteophyte noted about the plantar calcaneus consistent with plantar fasciitis.  Also high calcaneal inclination angle consistent with cavus foot or high arch.     ASSESSMENT:       ICD-10-CM    1.  Plantar fasciitis  M72.2 XR Foot Bilateral G/E 3 Views     ORTHOTICS REFERRAL     diclofenac (VOLTAREN) 75 MG EC tablet   2. Cavus deformity of foot  Q66.70    3. Morbid obesity (H)  E66.01        PLAN:  Reviewed patient's chart in [facility] and discussed etiology and treatment options.      Treatments:  9/22/2020  Obtained and interpreted radiographs.   Discontinue barefoot walking or unsupported walking in shoes without shank.  Dispensed written instructions to obtain appropriate shoe gear and/or OTC inserts.  Dispensed anterior night splint to use all night every night.  Prescription oral Voltaren for a short course. Discussed risks.  Prescription for custom molded orthotics 9/22/2020  Follow up in 4-5 weeks     She is already had orthotics in the past but would like multiple pairs that she is not utilizing them in all shoes and all activities.    Andres Rincon DPM

## 2020-10-16 NOTE — PROGRESS NOTES
Subjective     Florina Marie is a 35 year old female who presents to clinic today for the following health issues:    History of Present Illness       She eats 0-1 servings of fruits and vegetables daily.She consumes 1 sweetened beverage(s) daily.She exercises with enough effort to increase her heart rate 30 to 60 minutes per day.  She exercises with enough effort to increase her heart rate 4 days per week.   She is taking medications regularly.   Answers for HPI/ROS submitted by the patient on 10/23/2020   Chronic problems general questions HPI Form  If you checked off any problems, how difficult have these problems made it for you to do your work, take care of things at home, or get along with other people?: Not difficult at all  PHQ9 TOTAL SCORE: 2  ZEENAT 7 TOTAL SCORE: 6          Medication Followup of Phentermine    Taking Medication as prescribed: yes    Side Effects:  None    Medication Helping Symptoms:  yes     Patient admits to not taking her medications regularly.  She also admits not being able to take her Lexapro regularly.  She states this is because she is not in routine as she is not working right now.  We discussed strategies such as getting up at the same time every day perhaps to exercise and then she can take her medications regularly.  She does not change her diet as she feels she is eating healthy enough but the medication helps her with her portion size.  She denies the frustration that she has had in the past when she is taken phentermine.    Patient also complains of vaginal itching for the last week.  This started after she was placed on antibiotics for what sounds like conjunctivitis.  She denies any discharge.  She denies vaginal bleeding.  She denies any pelvic pain.  She prefers not to do an exam.    Review of Systems   CONSTITUTIONAL: NEGATIVE for fever, chills, change in weight  RESP: NEGATIVE for significant cough or shortness of breath  CV: NEGATIVE for chest pain, palpitations or  "peripheral edema      Objective    /74   Pulse 74   Temp 97  F (36.1  C) (Temporal)   Resp 14   Ht 1.664 m (5' 5.5\")   Wt 127.5 kg (281 lb)   SpO2 98%   BMI 46.05 kg/m    Body mass index is 46.05 kg/m .  Physical Exam   Gen: no apparent distress  Chest: clear to auscultation without wheeze, rale or rhonchi  Cor: regular rate and rhythm without murmur  Ext: warm and dry without edema  Psych: Alert and oriented times 3; coherent speech, normal   rate and volume, able to articulate logical thoughts, able   to abstract reason, no tangential thoughts, no hallucinations   or delusions  Her affect is neutral        Assessment & Plan     Morbid obesity (H)  Patient has lost 2 pounds since last visit however she is only taking her phentermine about every other day.  We discussed strategies to improve remembering to take this on a daily basis.  We discussed her exercise which will be walking.  She also tries to go to the gym when able.    - phentermine (ADIPEX-P) 37.5 MG tablet; Take 1 tablet (37.5 mg) by mouth every morning (before breakfast)    Generalized anxiety disorder  Follows with psychiatry.  She admits that she is not taking her medication regularly.  We discussed strategies to help with taking her medication regularly.    Vaginal itching  Began after antibiotics.  Suspect yeast infection.  Patient declines exam today.  We agreed to treat her with Diflucan.  If she does not improve she would then consent to an exam on her follow-up visit.          Return in about 4 weeks (around 11/20/2020) for Routine Visit--phentermine recheck.    Alanna Curiel MD  [facility]    "

## 2020-10-20 ENCOUNTER — TRANSFERRED RECORDS (OUTPATIENT)
Dept: HEALTH INFORMATION MANAGEMENT | Facility: CLINIC | Age: 35
End: 2020-10-20

## 2020-10-23 ENCOUNTER — OFFICE VISIT (OUTPATIENT)
Dept: FAMILY MEDICINE | Facility: OTHER | Age: 35
End: 2020-10-23
Payer: MEDICARE

## 2020-10-23 VITALS
WEIGHT: 281 LBS | BODY MASS INDEX: 45.16 KG/M2 | DIASTOLIC BLOOD PRESSURE: 74 MMHG | OXYGEN SATURATION: 98 % | TEMPERATURE: 97 F | SYSTOLIC BLOOD PRESSURE: 136 MMHG | HEIGHT: 66 IN | RESPIRATION RATE: 14 BRPM | HEART RATE: 74 BPM

## 2020-10-23 DIAGNOSIS — E66.01 MORBID OBESITY (H): Primary | ICD-10-CM

## 2020-10-23 DIAGNOSIS — F41.1 GENERALIZED ANXIETY DISORDER: ICD-10-CM

## 2020-10-23 DIAGNOSIS — N89.8 VAGINAL ITCHING: ICD-10-CM

## 2020-10-23 PROCEDURE — 99213 OFFICE O/P EST LOW 20 MIN: CPT | Performed by: FAMILY MEDICINE

## 2020-10-23 RX ORDER — PHENTERMINE HYDROCHLORIDE 37.5 MG/1
TABLET ORAL
COMMUNITY
Start: 2020-09-02 | End: 2020-10-23

## 2020-10-23 RX ORDER — PHENTERMINE HYDROCHLORIDE 37.5 MG/1
37.5 TABLET ORAL
Qty: 30 TABLET | Refills: 0 | Status: SHIPPED | OUTPATIENT
Start: 2020-10-23 | End: 2020-11-13

## 2020-10-23 RX ORDER — FLUCONAZOLE 150 MG/1
150 TABLET ORAL ONCE
Qty: 1 TABLET | Refills: 0 | Status: SHIPPED | OUTPATIENT
Start: 2020-10-23 | End: 2020-10-23

## 2020-10-23 ASSESSMENT — ANXIETY QUESTIONNAIRES
GAD7 TOTAL SCORE: 6
5. BEING SO RESTLESS THAT IT IS HARD TO SIT STILL: NOT AT ALL
2. NOT BEING ABLE TO STOP OR CONTROL WORRYING: SEVERAL DAYS
7. FEELING AFRAID AS IF SOMETHING AWFUL MIGHT HAPPEN: SEVERAL DAYS
GAD7 TOTAL SCORE: 6
7. FEELING AFRAID AS IF SOMETHING AWFUL MIGHT HAPPEN: SEVERAL DAYS
GAD7 TOTAL SCORE: 6
6. BECOMING EASILY ANNOYED OR IRRITABLE: SEVERAL DAYS
3. WORRYING TOO MUCH ABOUT DIFFERENT THINGS: SEVERAL DAYS
1. FEELING NERVOUS, ANXIOUS, OR ON EDGE: SEVERAL DAYS
4. TROUBLE RELAXING: SEVERAL DAYS

## 2020-10-23 ASSESSMENT — MIFFLIN-ST. JEOR: SCORE: 1978.42

## 2020-10-24 ASSESSMENT — PATIENT HEALTH QUESTIONNAIRE - PHQ9: SUM OF ALL RESPONSES TO PHQ QUESTIONS 1-9: 2

## 2020-10-24 ASSESSMENT — ANXIETY QUESTIONNAIRES: GAD7 TOTAL SCORE: 6

## 2020-11-12 NOTE — PROGRESS NOTES
"Subjective     Florina Marie is a 35 year old female who presents to clinic today for the following health issues:    History of Present Illness       She eats 2-3 servings of fruits and vegetables daily.She consumes 2 sweetened beverage(s) daily.She exercises with enough effort to increase her heart rate 30 to 60 minutes per day.  She exercises with enough effort to increase her heart rate 4 days per week.   She is taking medications regularly.           Medication Followup of Phentermine    Taking Medication as prescribed: yes    Side Effects:  None    Medication Helping Symptoms:  yes     Patient was seen about 3 weeks ago for weight loss.  Her weight is actually up 1 pound.  She tells me she is not eating out as much as she used to.  She does state her food could be better at home as she does more hamburger helpers and frozen pizza.  She does admit she needs to work on portion control.  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    She currently has her period and is craving chocolate.  She feels she retains water during this time and would like to not have future appointments during her cycle.      Review of Systems   CONSTITUTIONAL: NEGATIVE for fever, chills, change in weight  RESP: NEGATIVE for significant cough or shortness of breath  CV: NEGATIVE for chest pain, palpitations or peripheral edema  NEURO: NEGATIVE for headaches  Psych denies irritability:        Objective    /82   Pulse 92   Temp 97.7  F (36.5  C) (Temporal)   Resp 15   Ht 1.664 m (5' 5.5\")   Wt 127.9 kg (282 lb)   SpO2 97%   BMI 46.21 kg/m    Body mass index is 46.21 kg/m .  Physical Exam   Gen: no apparent distress  Chest: clear to auscultation without wheeze, rale or rhonchi  Cor: regular rate and rhythm without murmur  Ext: warm and dry without " edema  Psych: Alert and oriented times 3; coherent speech, normal   rate and volume, able to articulate logical thoughts, able   to abstract reason, no tangential thoughts, no hallucinations   or delusions  Her affect is neutral        Assessment & Plan     Morbid obesity (H)  We decided to continue with the phentermine.  She will work on portion control by putting her portion of the plate and then putting the breast away for leftovers to decrease the temptation of having seconds.  She will continue to be active.  We will follow-up in 5 weeks as patient feels that regular follow-up is helpful.    - phentermine (ADIPEX-P) 37.5 MG tablet; Take 1 tablet (37.5 mg) by mouth every morning (before breakfast)          Return in about 5 weeks (around 12/18/2020) for phentermine follow up.    Alanna Curiel MD  [facility]

## 2020-11-13 ENCOUNTER — OFFICE VISIT (OUTPATIENT)
Dept: FAMILY MEDICINE | Facility: OTHER | Age: 35
End: 2020-11-13
Payer: MEDICARE

## 2020-11-13 VITALS
HEIGHT: 66 IN | HEART RATE: 92 BPM | RESPIRATION RATE: 15 BRPM | OXYGEN SATURATION: 97 % | TEMPERATURE: 97.7 F | SYSTOLIC BLOOD PRESSURE: 132 MMHG | DIASTOLIC BLOOD PRESSURE: 82 MMHG | BODY MASS INDEX: 45.32 KG/M2 | WEIGHT: 282 LBS

## 2020-11-13 DIAGNOSIS — E66.01 MORBID OBESITY (H): ICD-10-CM

## 2020-11-13 PROCEDURE — 99213 OFFICE O/P EST LOW 20 MIN: CPT | Performed by: FAMILY MEDICINE

## 2020-11-13 RX ORDER — PHENTERMINE HYDROCHLORIDE 37.5 MG/1
37.5 TABLET ORAL
Qty: 30 TABLET | Refills: 0 | Status: SHIPPED | OUTPATIENT
Start: 2020-11-20 | End: 2020-12-18

## 2020-11-13 ASSESSMENT — MIFFLIN-ST. JEOR: SCORE: 1982.95

## 2020-11-19 ENCOUNTER — VIRTUAL VISIT (OUTPATIENT)
Dept: FAMILY MEDICINE | Facility: OTHER | Age: 35
End: 2020-11-19
Payer: MEDICARE

## 2020-11-19 DIAGNOSIS — J06.9 VIRAL URI: Primary | ICD-10-CM

## 2020-11-19 PROCEDURE — 99441 PR PHYSICIAN TELEPHONE EVALUATION 5-10 MIN: CPT | Mod: 95 | Performed by: PHYSICIAN ASSISTANT

## 2020-11-19 NOTE — PROGRESS NOTES
"Florina Marie is a 35 year old female who is being evaluated via a billable telephone visit.      The patient has been notified of following:     \"This telephone visit will be conducted via a call between you and your physician/provider. We have found that certain health care needs can be provided without the need for a physical exam.  This service lets us provide the care you need with a short phone conversation.  If a prescription is necessary we can send it directly to your pharmacy.  If lab work is needed we can place an order for that and you can then stop by our lab to have the test done at a later time.    Telephone visits are billed at different rates depending on your insurance coverage. During this emergency period, for some insurers they may be billed the same as an in-person visit.  Please reach out to your insurance provider with any questions.    If during the course of the call the physician/provider feels a telephone visit is not appropriate, you will not be charged for this service.\"    Patient has given verbal consent for Telephone visit?  Yes    What phone number would you like to be contacted at? [phone]    How would you like to obtain your AVS? Bernicet    Subjective     Florina Marie is a 35 year old female who presents via phone visit today for the following health issues:    HPI     Acute Illness  Acute illness concerns: Sinus infection   Onset/Duration: Sunday morning   Symptoms:  Fever: no  Chills/Sweats: YES  Headache (location?): YES  Sinus Pressure: YES  Conjunctivitis:  no  Ear Pain: no  Rhinorrhea: no  Congestion: YES  Sore Throat: no  Cough: YES  Wheeze: no  Decreased Appetite: no  Nausea: YES  Vomiting: no  Diarrhea: no  Dysuria/Freq.: no  Dysuria or Hematuria: no  Fatigue/Achiness: YES  Sick/Strep Exposure: YES- neighbor's granddaughter was sick   Therapies tried and outcome: OTC sinus medication, advil    Review of Systems   Constitutional, HEENT, cardiovascular, pulmonary, " GI, , musculoskeletal, neuro, skin, endocrine and psych systems are negative, except as otherwise noted.       Objective          Vitals:  No vitals were obtained today due to virtual visit.    healthy, alert and no distress  PSYCH: Alert and oriented times 3; coherent speech, normal   rate and volume, able to articulate logical thoughts, able   to abstract reason, no tangential thoughts, no hallucinations   or delusions  Her affect is normal though somewhat anxious today based on our discussion.    RESP: No cough, no audible wheezing, able to talk in full sentences  Remainder of exam unable to be completed due to telephone visits    No results found for this or any previous visit (from the past 24 hour(s)).        Assessment/Plan:    Assessment & Plan     Diagnoses and all orders for this visit:    Viral URI            Given her overall signs and symptoms and the current pandemic she may indeed have COVID-19.  She declines testing because she is afraid of having a nasal swab.  I advised her that the [facility] as well as other facilities have the ability to do saliva testing that is showing more more promised towards accuracy.  She is encouraged to have this done at her convenience.  Advised that if she became more short of breath or more ill feeling that she should be evaluated in an urgent care or emergency room for further evaluation and treatment options.  She declines anything further from us today and given the fact that her signs and symptoms have been going on for less than 1 week we will not be prescribing antibiotics for her at this time.    No follow-ups on file.    Lazaro Sanches PA-C  [facility]    Phone call duration:  8 minutes

## 2020-11-26 ENCOUNTER — NURSE TRIAGE (OUTPATIENT)
Dept: NURSING | Facility: CLINIC | Age: 35
End: 2020-11-26

## 2020-11-27 NOTE — TELEPHONE ENCOUNTER
"Patient's spouse calling - verbal consent by patient over the phone.    Says she is having some abdominal pain that starts above her belly button and radiates up into her chest and back.  Says it comes and goes over the last few days.  It usually lasts for about 5 minutes when it happens and is intense.  Occurs about twice an hour.  Is present now and has been there for about a half hour.  She feels pain in her chest that radiates to her back.  Says she has a strong family history of heart disease.   Also mentions she has had a \"head cold\" since last week and lost her sense of taste.    Triaged to disposition of Call  Now.    Savanna Dang RN  Triage Nurse Advisor    COVID 19 Nurse Triage Plan/Patient Instructions    Please be aware that novel coronavirus (COVID-19) may be circulating in the community. If you develop symptoms such as fever, cough, or SOB or if you have concerns about the presence of another infection including coronavirus (COVID-19), please contact your health care provider or visit [email or web address].     Disposition/Instructions    Call to EMS/911 recommended. Follow protocol based instructions.     Bring Your Own Device:  Please also bring your smart device(s) (smart phones, tablets, laptops) and their charging cables for your personal use and to communicate with your care team during your visit.    Thank you for taking steps to prevent the spread of this virus.  o Limit your contact with others.  o Wear a simple mask to cover your cough.  o Wash your hands well and often.    Resources    [facility]: About COVID-19: [email or web address]    CDC: What to Do If You're Sick: www.cdc.gov/coronavirus/2019-ncov/about/steps-when-sick.html    CDC: Ending Home Isolation: www.cdc.gov/coronavirus/2019-ncov/hcp/disposition-in-home-patients.html     CDC: Caring for Someone: www.cdc.gov/coronavirus/2019-ncov/if-you-are-sick/care-for-someone.html     [facility]: Interim Guidance for Hospital " Discharge to Home: www.health.[facility].[place]/diseases/coronavirus/hcp/hospdischarge.pdf    [facility] clinical trials (COVID-19 research studies): [email or web address]     Below are the COVID-19 hotlines at the [place] [facility] ([facility]). Interpreters are available.   o For health questions: Call 507-197-9343 or 1-564.812.9581 (7 a.m. to 7 p.m.)  o For questions about schools and childcare: Call 380-501-9973 or 1-356.653.3320 (7 a.m. to 7 p.m.)       Reason for Disposition    [1] Chest pain lasts > 5 minutes AND [2] age > 30 AND [3] at least one cardiac risk factor (i.e., hypertension, diabetes, obesity, smoker or strong family history of heart disease)    Additional Information    Negative: Severe difficulty breathing (e.g., struggling for each breath, speaks in single words)    Negative: Difficult to awaken or acting confused (e.g., disoriented, slurred speech)    Negative: Shock suspected (e.g., cold/pale/clammy skin, too weak to stand, low BP, rapid pulse)    Negative: [1] Chest pain lasts > 5 minutes AND [2] history of heart disease  (i.e., heart attack, bypass surgery, angina, angioplasty, CHF; not just a heart murmur)    Negative: [1] Chest pain lasts > 5 minutes AND [2] described as crushing, pressure-like, or heavy    Negative: [1] Chest pain lasts > 5 minutes AND [2] age > 50    Protocols used: CHEST PAIN-A-AH

## 2020-12-10 ENCOUNTER — MYC MEDICAL ADVICE (OUTPATIENT)
Dept: FAMILY MEDICINE | Facility: OTHER | Age: 35
End: 2020-12-10

## 2020-12-11 ENCOUNTER — VIRTUAL VISIT (OUTPATIENT)
Dept: FAMILY MEDICINE | Facility: OTHER | Age: 35
End: 2020-12-11
Payer: MEDICARE

## 2020-12-11 ENCOUNTER — MYC MEDICAL ADVICE (OUTPATIENT)
Dept: FAMILY MEDICINE | Facility: OTHER | Age: 35
End: 2020-12-11

## 2020-12-11 DIAGNOSIS — E78.5 HYPERLIPIDEMIA, UNSPECIFIED HYPERLIPIDEMIA TYPE: Primary | ICD-10-CM

## 2020-12-11 PROCEDURE — 99441 PR PHYSICIAN TELEPHONE EVALUATION 5-10 MIN: CPT | Mod: 95 | Performed by: FAMILY MEDICINE

## 2020-12-11 NOTE — PROGRESS NOTES
"Florina Marie is a 35 year old female who is being evaluated via a billable telephone visit.      The patient has been notified of following:     \"This telephone visit will be conducted via a call between you and your physician/provider. We have found that certain health care needs can be provided without the need for a physical exam.  This service lets us provide the care you need with a short phone conversation.  If a prescription is necessary we can send it directly to your pharmacy.  If lab work is needed we can place an order for that and you can then stop by our lab to have the test done at a later time.    Telephone visits are billed at different rates depending on your insurance coverage. During this emergency period, for some insurers they may be billed the same as an in-person visit.  Please reach out to your insurance provider with any questions.    If during the course of the call the physician/provider feels a telephone visit is not appropriate, you will not be charged for this service.\"    Patient has given verbal consent for Telephone visit?  Yes    What phone number would you like to be contacted at? [phone]    How would you like to obtain your AVS? Bernicet    Subjective     Florina Marie is a 35 year old female who presents via phone visit today for the following health issues:    HPI      Test Request     Pt would like to be referred for a stress test. Friend recently had a massive heart attack, 100% blocked at age 31 years.      Pt is very concerned about her own heart health as she is just a few years older. Pt states she doesn't eat the best and like to go out to eat often, which she will be trying to work on.  No chest pain or irregular heart rates that she notices currently.  With exercise sometimes she feels a little out of breath but she relates this to being overweight.      Family History   Problem Relation Age of Onset     Lipids Father         diet     Thyroid Disease Mother     "     unsure if hypo or hyper     Diabetes Paternal Grandfather         adult     Heart Disease Paternal Grandfather         bypass/open hear surgery     Lipids Maternal Aunt         medication     Lipids Maternal Aunt         diet     Alzheimer Disease Maternal Grandfather      Hypertension No family hx of      Coronary Artery Disease No family hx of      Hyperlipidemia No family hx of      Cancer - colorectal No family hx of      Ovarian Cancer No family hx of      Prostate Cancer No family hx of      Depression/Anxiety No family hx of      Cerebrovascular Disease No family hx of      Anesthesia Reaction No family hx of      Asthma No family hx of      Osteoporosis No family hx of      Chemical Addiction No family hx of      Obesity No family hx of       Social History     Tobacco Use     Smoking status: Never Smoker     Smokeless tobacco: Never Used     Tobacco comment: no smokers in the household   Substance Use Topics     Alcohol use: No     Frequency: Never            Review of Systems   CONSTITUTIONAL: NEGATIVE for fever, chills, change in weight  RESP: NEGATIVE for significant cough or shortness of breath  CV: NEGATIVE for chest pain, palpitations or peripheral edema       Objective          Vitals:  No vitals were obtained today due to virtual visit.    General: alert and no distress  PSYCH: Alert and oriented times 3; coherent speech, normal   rate and volume, able to articulate logical thoughts, able   to abstract reason, no tangential thoughts, no hallucinations   or delusions  Her affect is normal  RESP: No cough, no audible wheezing, able to talk in full sentences  Remainder of exam unable to be completed due to telephone visits        Assessment/Plan:    Assessment & Plan     Hyperlipidemia, unspecified hyperlipidemia type  Patient currently is not having any chest pain or dyspnea on exertion.  Discussed that stress testing is not indicated at this time.  We discussed risk factor reduction including  improving her cholesterol, regular exercise and weight loss.  We discussed decreasing fast foods and greasy foods and eating more fruits and vegetables.  We discussed exercise for 30 to 60 minutes every day.  Patient feels more committed to this now.          Alanna Curiel MD  [facility]    Phone call duration:  9 minutes

## 2020-12-16 NOTE — PROGRESS NOTES
"Subjective  Answers for HPI/ROS submitted by the patient on 12/18/2020   Chronic problems general questions HPI Form  If you checked off any problems, how difficult have these problems made it for you to do your work, take care of things at home, or get along with other people?: Somewhat difficult  PHQ9 TOTAL SCORE: 2  ZEENAT 7 TOTAL SCORE: 3      Florina Marie is a 35 year old female who presents to clinic today for the following health issues:    History of Present Illness       She eats 2-3 servings of fruits and vegetables daily.She consumes 2 sweetened beverage(s) daily.She exercises with enough effort to increase her heart rate 30 to 60 minutes per day.  She exercises with enough effort to increase her heart rate 4 days per week. She is missing 1 dose(s) of medications per week.           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  Goes outside to play with her dog.  Eating more fruit for snacks.      Review of Systems   CONSTITUTIONAL: NEGATIVE for fever, chills, change in weight  RESP: NEGATIVE for significant cough or shortness of breath   CV: NEGATIVE for chest pain, palpitations or peripheral edema      Objective    /80   Pulse 80   Temp 98  F (36.7  C) (Temporal)   Ht 1.664 m (5' 5.5\")   Wt 124.1 kg (273 lb 8 oz)   SpO2 99%   BMI 44.82 kg/m    Body mass index is 44.82 kg/m .  Physical Exam   Gen: no apparent distress  Chest: clear to auscultation without wheeze, rale or rhonchi  Cor: regular rate and rhythm without murmur  Ext: warm and dry without edema  Psych: Alert and oriented times 3; coherent speech, normal   rate and volume, able to articulate logical thoughts, able   to abstract reason, no tangential thoughts, no hallucinations   or delusions  Her affect is neutral        Assessment & Plan     Morbid obesity " (H)  Has lost 9 pounds over this last month.  Will continue to work on healthy eating and increasing exercise.  She feels that monthly appointments keep her more focused, will follow up in 1 month.            Return in about 4 weeks (around 1/15/2021) for phentermine follow up.    Alanna Curiel MD  [facility]

## 2020-12-18 ENCOUNTER — OFFICE VISIT (OUTPATIENT)
Dept: FAMILY MEDICINE | Facility: OTHER | Age: 35
End: 2020-12-18
Payer: MEDICARE

## 2020-12-18 VITALS
HEART RATE: 80 BPM | WEIGHT: 273.5 LBS | DIASTOLIC BLOOD PRESSURE: 80 MMHG | HEIGHT: 66 IN | SYSTOLIC BLOOD PRESSURE: 122 MMHG | TEMPERATURE: 98 F | OXYGEN SATURATION: 99 % | BODY MASS INDEX: 43.96 KG/M2

## 2020-12-18 DIAGNOSIS — E66.01 MORBID OBESITY (H): ICD-10-CM

## 2020-12-18 PROBLEM — Z98.890 S/P CARPAL TUNNEL RELEASE: Status: RESOLVED | Noted: 2019-04-02 | Resolved: 2020-12-18

## 2020-12-18 PROCEDURE — 99213 OFFICE O/P EST LOW 20 MIN: CPT | Performed by: FAMILY MEDICINE

## 2020-12-18 RX ORDER — PHENTERMINE HYDROCHLORIDE 37.5 MG/1
37.5 TABLET ORAL
Qty: 30 TABLET | Refills: 0 | Status: SHIPPED | OUTPATIENT
Start: 2020-12-18 | End: 2021-01-22

## 2020-12-18 ASSESSMENT — PATIENT HEALTH QUESTIONNAIRE - PHQ9
SUM OF ALL RESPONSES TO PHQ QUESTIONS 1-9: 2
SUM OF ALL RESPONSES TO PHQ QUESTIONS 1-9: 2
10. IF YOU CHECKED OFF ANY PROBLEMS, HOW DIFFICULT HAVE THESE PROBLEMS MADE IT FOR YOU TO DO YOUR WORK, TAKE CARE OF THINGS AT HOME, OR GET ALONG WITH OTHER PEOPLE: SOMEWHAT DIFFICULT

## 2020-12-18 ASSESSMENT — ANXIETY QUESTIONNAIRES
7. FEELING AFRAID AS IF SOMETHING AWFUL MIGHT HAPPEN: NOT AT ALL
4. TROUBLE RELAXING: NOT AT ALL
GAD7 TOTAL SCORE: 3
1. FEELING NERVOUS, ANXIOUS, OR ON EDGE: NOT AT ALL
5. BEING SO RESTLESS THAT IT IS HARD TO SIT STILL: SEVERAL DAYS
GAD7 TOTAL SCORE: 3
6. BECOMING EASILY ANNOYED OR IRRITABLE: NOT AT ALL
7. FEELING AFRAID AS IF SOMETHING AWFUL MIGHT HAPPEN: NOT AT ALL
3. WORRYING TOO MUCH ABOUT DIFFERENT THINGS: SEVERAL DAYS
GAD7 TOTAL SCORE: 3
2. NOT BEING ABLE TO STOP OR CONTROL WORRYING: SEVERAL DAYS

## 2020-12-18 ASSESSMENT — MIFFLIN-ST. JEOR: SCORE: 1944.4

## 2020-12-19 ASSESSMENT — ANXIETY QUESTIONNAIRES: GAD7 TOTAL SCORE: 3

## 2020-12-19 ASSESSMENT — PATIENT HEALTH QUESTIONNAIRE - PHQ9: SUM OF ALL RESPONSES TO PHQ QUESTIONS 1-9: 2

## 2020-12-21 ENCOUNTER — TRANSFERRED RECORDS (OUTPATIENT)
Dept: HEALTH INFORMATION MANAGEMENT | Facility: CLINIC | Age: 35
End: 2020-12-21

## 2021-01-12 ENCOUNTER — OFFICE VISIT (OUTPATIENT)
Dept: FAMILY MEDICINE | Facility: OTHER | Age: 36
End: 2021-01-12
Payer: MEDICARE

## 2021-01-12 ENCOUNTER — NURSE TRIAGE (OUTPATIENT)
Dept: NURSING | Facility: CLINIC | Age: 36
End: 2021-01-12

## 2021-01-12 VITALS
OXYGEN SATURATION: 96 % | TEMPERATURE: 98.3 F | SYSTOLIC BLOOD PRESSURE: 124 MMHG | HEART RATE: 103 BPM | RESPIRATION RATE: 18 BRPM | DIASTOLIC BLOOD PRESSURE: 82 MMHG

## 2021-01-12 DIAGNOSIS — L03.011 PARONYCHIA OF RIGHT MIDDLE FINGER: Primary | ICD-10-CM

## 2021-01-12 PROCEDURE — 99213 OFFICE O/P EST LOW 20 MIN: CPT | Performed by: STUDENT IN AN ORGANIZED HEALTH CARE EDUCATION/TRAINING PROGRAM

## 2021-01-12 NOTE — TELEPHONE ENCOUNTER
Pt seen at [facility] today, feels like she was misdiagnosed.  Pt put on an antibiotic for a finger infection.  Pt believes she has an ingrown nail but the provider told her she doesn't.  Symptoms are the same now when compared to  visit, redness, swelling, and pain.  First dose of abx taken this evening, no improvement yet.  No fever    Disposition:  See a provider within 24 hours, schedule an in-person clinic appt for 01/12/21.  She verbalized understanding and had no further questions.     COVID 19 Nurse Triage Plan/Patient Instructions    Please be aware that novel coronavirus (COVID-19) may be circulating in the community. If you develop symptoms such as fever, cough, or SOB or if you have concerns about the presence of another infection including coronavirus (COVID-19), please contact your health care provider or visit [email or web address].     Disposition/Instructions    In-Person Visit with provider recommended. Reference Visit Selection Guide.    Thank you for taking steps to prevent the spread of this virus.  o Limit your contact with others.  o Wear a simple mask to cover your cough.  o Wash your hands well and often.    Resources    [facility]: About COVID-19: [email or web address]    CDC: What to Do If You're Sick: www.cdc.gov/coronavirus/2019-ncov/about/steps-when-sick.html    CDC: Ending Home Isolation: www.cdc.gov/coronavirus/2019-ncov/hcp/disposition-in-home-patients.html     CDC: Caring for Someone: www.cdc.gov/coronavirus/2019-ncov/if-you-are-sick/care-for-someone.html     [facility]: Interim Guidance for Hospital Discharge to Home: www.health.[facility].[place]/diseases/coronavirus/hcp/hospdischarge.pdf    [facility] clinical trials (COVID-19 research studies): [email or web address]/umn-[email or web address]     Below are the COVID-19 hotlines at the [facility] ([facility]). Interpreters are available.   o For health questions: Call 543-176-5863 or 1-528.593.5006 (7  a.m. to 7 p.m.)  o For questions about schools and childcare: Call 277-167-2914 or 1-613.820.7113 (7 a.m. to 7 p.m.)                     Reason for Disposition    [1] Caller has NON-URGENT question AND [2] triager unable to answer question    Additional Information    Negative: Severe difficulty breathing (e.g., struggling for each breath, speaks in single words)    Negative: Sounds like a life-threatening emergency to the triager    Negative: MODERATE difficulty breathing (e.g., speaks in phrases, SOB even at rest, pulse 100-120)    Negative: Fever > 103 F (39.4 C)    Negative: Patient sounds very sick or weak to the triager    Negative: [1] Taking antibiotics > 24 hours AND [2] symptoms WORSE    Negative: [1] Recent hospitalization AND [2] symptoms WORSE    Negative: [1] Diabetes mellitus or weak immune system (e.g., HIV positive, cancer chemo, splenectomy, organ transplant, chronic steroids) AND [2] new onset of fever > 100.0 F (37.8 C)    Negative: [1] Caller has URGENT question AND [2] triager unable to answer question    Negative: [1] Taking antibiotic AND [2] new onset of fever    Negative: [1] Taking antibiotic > 48 hours (2 days) AND [2] fever still present (SAME)    Negative: [1] Taking antibiotic > 72 hours (3 days) AND [2] symptoms (other than fever) not improved    Protocols used: INFECTION ON ANTIBIOTIC FOLLOW-UP CALL-A-

## 2021-01-12 NOTE — PROGRESS NOTES
Assessment & Plan     Paronychia of right middle finger  Reassured patient that the treatment plan prescribed by the urgent care provider is appropriate.  I recommended she continue to take the Augmentin that was prescribed and complete the entire course.  I also recommended she continue to do finger soaks to loosen the skin and to help debride.  It appears that there is new growth of skin coming over the top of the nail.  She was requesting to have the skin trimmed but I explained that this would leave her with an open area of skin and with the infection around the nail it would not be wise to do so and would likely give no added relief and would not expedite healing.  I recommended she keep the nail covered with bacitracin and a bandage.  Discussed that it can take 1 to 2 weeks for complete healing.  I also recommended that she avoid pulling of her cuticles around the nail as this triggered her current symptoms.  I advised her to monitor for worsening of swelling and redness and to return to the clinic if she develops this or has purulent drainage.        No follow-ups on file.    ANGIE Espaañ [facility]     Florina Marie is a 35 year old female who presents to clinic today for the following health issues accompanied by her :    HPI     Was seen in ER at [facility] yesterday for concern about infection around nail of the right 3rd finger. This issue started Sunday into Monday morning. Has been soaking that finger.  She was prescribed Augmentin to treat the infection around her fingernail and is wondering if the nail is ingrown and needs to be trimmed.  The symptoms started after she pulled the cuticle on the edge of the nail.  She bites her fingernails.    Review of Systems   Constitutional, HEENT, cardiovascular, pulmonary, gi and gu systems are negative, except as otherwise noted.      Objective    /82   Pulse 103   Temp 98.3  F (36.8  C)  (Temporal)   Resp 18   LMP 01/02/2021 (Approximate)   SpO2 96%   There is no height or weight on file to calculate BMI.  Physical Exam   GENERAL APPEARANCE: healthy, alert and no distress  MS: extremities normal- no gross deformities noted  SKIN: Swelling, erythema and tenderness about the medial aspect of the right third fingernail, new skin growing up and over the nail, no purulent drainage  NEURO: Normal strength and tone, mentation intact and speech normal  PSYCH: mentation appears normal and affect normal/bright

## 2021-01-14 ENCOUNTER — NURSE TRIAGE (OUTPATIENT)
Dept: NURSING | Facility: CLINIC | Age: 36
End: 2021-01-14

## 2021-01-14 ENCOUNTER — OFFICE VISIT (OUTPATIENT)
Dept: FAMILY MEDICINE | Facility: OTHER | Age: 36
End: 2021-01-14
Payer: MEDICARE

## 2021-01-14 VITALS
DIASTOLIC BLOOD PRESSURE: 80 MMHG | TEMPERATURE: 97 F | HEART RATE: 95 BPM | OXYGEN SATURATION: 96 % | SYSTOLIC BLOOD PRESSURE: 120 MMHG

## 2021-01-14 DIAGNOSIS — L03.011 PARONYCHIA OF RIGHT MIDDLE FINGER: Primary | ICD-10-CM

## 2021-01-14 PROCEDURE — 99213 OFFICE O/P EST LOW 20 MIN: CPT | Performed by: PHYSICIAN ASSISTANT

## 2021-01-14 NOTE — TELEPHONE ENCOUNTER
"Pt calls again re finger infection; diagnosed at [facility] 1/11/2021.  Was prescribed Augmentin twice daily which pt reports complying with -> now day 4 of Augmentin.  Pt has also complied with advice to soak finger daily in warm water w/Epsom salts.    Pt reports difficult to determine if improved.  \"Pus has been building up under the nail and surrounding skin.\"  Painful pressure now.  No spreading redness.  No fevers.    Pt's symptoms appear to warrant in-person clinic eval.  No suspicion of covid symptoms per screening.  Transferred to a  for an appointment now.    Dolores [facility] Nurse Advisor     Additional Information    Negative: Severe difficulty breathing (e.g., struggling for each breath, speaks in single words)    Negative: Sounds like a life-threatening emergency to the triager    Negative: Sinus infection and taking an antibiotic    Negative: Wound infection and taking an antibiotic    Negative: MODERATE difficulty breathing (e.g., speaks in phrases, SOB even at rest, pulse 100-120)    Negative: Fever > 103 F (39.4 C)    Negative: Patient sounds very sick or weak to the triager    Negative: Finished taking antibiotics and symptoms are BETTER but are not completely gone    Negative: Patient wants to be seen    Negative: Taking antibiotic and new onset of fever    Negative: Taking antibiotic > 48 hours (2 days) and fever still present (SAME)    Taking antibiotic > 72 hours (3 days) and symptoms (other than fever) not improved    Protocols used: INFECTION ON ANTIBIOTIC FOLLOW-UP CALL-A-OH    _______________________    COVID 19 Nurse Triage Plan/Patient Instructions    Please be aware that novel coronavirus (COVID-19) may be circulating in the community. If you develop symptoms such as fever, cough, or SOB or if you have concerns about the presence of another infection including coronavirus (COVID-19), please contact your health care provider or visit [email or web address]. " "    Disposition/Instructions    Additional COVID19 information to add for patients.   How can I protect others?  If you have symptoms (fever, cough, body aches or trouble breathing): Stay home and away from others (self-isolate) until:    At least 10 days have passed since your symptoms started, And     You ve had no fever--and no medicine that reduces fever--for 1 full day (24 hours), And      Your other symptoms have resolved (gotten better).     If you don t have symptoms, but a test showed that you have COVID-19 (you tested positive):    Stay home and away from others (self-isolate). Follow the tips under \"How do I self-isolate?\" below for 10 days (20 days if you have a weak immune system).    You don't need to be retested for COVID-19 before going back to school or work. As long as you're fever-free and feeling better, you can go back to school, work and other activities after waiting the 10 or 20 days.     How do I self-isolate?    Stay in your own room, even for meals. Use your own bathroom if you can.     Stay away from others in your home. No hugging, kissing or shaking hands. No visitors.    Don t go to work, school or anywhere else.     Clean  high touch  surfaces often (doorknobs, counters, handles, etc.). Use a household cleaning spray or wipes. You ll find a full list on the EPA website:  www.epa.gov/pesticide-registration/list-n-disinfectants-use-against-sars-cov-2.    Cover your mouth and nose with a mask, tissue or washcloth to avoid spreading germs.    Wash your hands and face often. Use soap and water.    Caregivers in these groups are at risk for severe illness due to COVID-19:  o People 65 years and older  o People who live in a nursing home or long-term care facility  o People with chronic disease (lung, heart, cancer, diabetes, kidney, liver, immunologic)  o People who have a weakened immune system, including those who:  - Are in cancer treatment  - Take medicine that weakens the immune " system, such as corticosteroids  - Had a bone marrow or organ transplant  - Have an immune deficiency  - Have poorly controlled HIV or AIDS  - Are obese (body mass index of 40 or higher)  - Smoke regularly    Caregivers should wear gloves while washing dishes, handling laundry and cleaning bedrooms and bathrooms.    Use caution when washing and drying laundry: Don t shake dirty laundry, and use the warmest water setting that you can.    For more tips, go to www.cdc.gov/coronavirus/2019-ncov/downloads/10Things.pdf.    How can I take care of myself?  1. Get lots of rest. Drink extra fluids (unless a doctor has told you not to).     2. Take Tylenol (acetaminophen) for fever or pain. If you have liver or kidney problems, ask your family doctor if it s okay to take Tylenol.     Adults can take either:     650 mg (two 325 mg pills) every 4 to 6 hours, or     1,000 mg (two 500 mg pills) every 8 hours as needed.     Note: Don t take more than 3,000 mg in one day.   Acetaminophen is found in many medicines (both prescribed and over-the-counter medicines). Read all labels to be sure you don t take too much.     For children, check the Tylenol bottle for the right dose. The dose is based on the child s age or weight.    3. If you have other health problems (like cancer, heart failure, an organ transplant or severe kidney disease): Call your specialty clinic if you don t feel better in the next 2 days.    4. Know when to call 911: Emergency warning signs include:    Trouble breathing or shortness of breath    Pain or pressure in the chest that doesn t go away    Feeling confused like you haven t felt before, or not being able to wake up    Bluish-colored lips or face    What are the symptoms of COVID-19?     The most common symptoms are cough, fever and trouble breathing.     Less common symptoms include body aches, chills, diarrhea (loose, watery poops), fatigue (feeling very tired), headache, runny nose, sore throat and loss  of smell.    COVID-19 can cause severe coughing (bronchitis) and lung infection (pneumonia).    How does it spread?     The virus may spread when a person coughs or sneezes into the air. The virus can travel about 6 feet this way, and it can live on surfaces.      Common  (household disinfectants) will kill the virus.    Who is at risk?  Anyone can catch COVID-19 if they re around someone who has the virus.    How can others protect themselves?     Stay away from people who have COVID-19 (or symptoms of COVID-19).    Wash hands often with soap and water. Or, use hand  with at least 60% alcohol.    Avoid touching the eyes, nose or mouth.     Wear a face mask when you go out in public, when sick or when caring for a sick person.    Where can I get more information?    [facility]: About COVID-19: [email or web address]    CDC: What to Do If You re Sick: www.cdc.gov/coronavirus/2019-ncov/about/steps-when-sick.html    CDC: Ending Home Isolation: www.cdc.gov/coronavirus/2019-ncov/hcp/disposition-in-home-patients.html     CDC: Caring for Someone: www.cdc.gov/coronavirus/2019-ncov/if-you-are-sick/care-for-someone.html     [facility]: Interim Guidance for Hospital Discharge to Home: www.health.[facility].mn./diseases/coronavirus/hcp/hospdischarge.pdf    [facility] clinical trials (COVID-19 research studies): clinicalaffairs.[facility]-clinical-trials     Below are the COVID-19 hotlines at the [place] Department of [facility] ([facility]). Interpreters are available.   o For health questions: Call 352-379-7687 or 1-693.619.7033 (7 a.m. to 7 p.m.)  o For questions about schools and childcare: Call 831-975-0287 or 1-222.993.9146 (7 a.m. to 7 p.m.)          Thank you for taking steps to prevent the spread of this virus.  o Limit your contact with others.  o Wear a simple mask to cover your cough.  o Wash your hands well and often.    Resources    [facility]: About COVID-19:  [email or web address]    CDC: What to Do If You're Sick: www.cdc.gov/coronavirus/2019-ncov/about/steps-when-sick.html    CDC: Ending Home Isolation: www.cdc.gov/coronavirus/2019-ncov/hcp/disposition-in-home-patients.html     CDC: Caring for Someone: www.cdc.gov/coronavirus/2019-ncov/if-you-are-sick/care-for-someone.html     [facility]: Interim Guidance for Hospital Discharge to Home: www.[facility].mn./diseases/coronavirus/hcp/hospdischarge.pdf    [facility] clinical trials (COVID-19 research studies): clinicalaffairs.[facility]-clinical-trials     Below are the COVID-19 hotlines at the [place] [facility] ([facility]). Interpreters are available.   o For health questions: Call 576-115-7399 or 1-831.495.2331 (7 a.m. to 7 p.m.)  o For questions about schools and childcare: Call 734-010-7777 or 1-839.766.2488 (7 a.m. to 7 p.m.)

## 2021-01-14 NOTE — PROGRESS NOTES
Assessment & Plan     Paronychia of right middle finger  After discussing with the patient and her significant other would appear that this is improving on the medications that she is taking.  We discussed the fact that I am willing to do an incision and drainage to the area although there is not a obvious target abscess to address today.  She declines this today.  Advised that she continue to soak this on a regular basis and finish her medications as directed following up with her primary care provider in the near future as needed.    Return in about 5 days (around 1/19/2021).    Lazaro Sanches PA-C  [facility] CLIFF Heaton is a 35 year old who presents to clinic today for the following health issues  accompanied by her spouse:    HPI        Concern - Paronychia of right middle finger  Onset: about a week  Description: pain, swelling, redness, bleeding, pus of right middle finger  Intensity: moderate, severe  Progression of Symptoms:  same  Accompanying Signs & Symptoms: none  Previous history of similar problem: yes  Precipitating factors:        Worsened by: touching the area, using the hand  Alleviating factors:        Improved by: none  Therapies tried and outcome: Currently taking Augmentin     Review of Systems   Constitutional, HEENT, cardiovascular, pulmonary, GI, , musculoskeletal, neuro, skin, endocrine and psych systems are negative, except as otherwise noted.      Objective    /80   Pulse 95   Temp 97  F (36.1  C) (Temporal)   LMP 01/02/2021 (Approximate)   SpO2 96%   There is no height or weight on file to calculate BMI.  Physical Exam   GENERAL: healthy, alert and no distress  MS: no gross musculoskeletal defects noted, no edema  SKIN: no suspicious lesions or rashes with exception of a paronychia to the finger nailbed of the right third digit of the hand.  This is tender to the touch.  No obvious abscess is noted today.  By report from her significant  other who attends clinic with her today this is improved over the last 48 hours.  NEURO: Normal strength and tone, mentation intact and speech normal  PSYCH: anxious and fatigued

## 2021-01-20 NOTE — PROGRESS NOTES
Assessment & Plan     Morbid obesity (H)  Has had slight weight loss.  Encouraged her to be more active and work on her diet.    - phentermine (ADIPEX-P) 37.5 MG tablet; Take 1 tablet (37.5 mg) by mouth every morning (before breakfast)    Also evaluated patient's shoes and they are not giving her good support.  She admits that there old.  Encouraged her to get a new pair shoes to help with her feet aching at the end of her workday.    Return in about 4 weeks (around 2/19/2021) for phentermine recheck.    Alanna Curiel MD  [facility] CLIFF Heaton is a 35 year old who presents to clinic today for the following health issues     History of Present Illness       She eats 2-3 servings of fruits and vegetables daily.She consumes 1 sweetened beverage(s) daily.She exercises with enough effort to increase her heart rate 30 to 60 minutes per day.  She exercises with enough effort to increase her heart rate 4 days per week. She is missing 1 dose(s) of medications per week.         Medication Followup of Phentermine    Taking Medication as prescribed: yes    Side Effects:  None    Medication Helping Symptoms:  yes   She admits to not exercising regularly.  She plans to start walking again.  She states that her feet hurt at the end of the day at work.  She is wearing her inserts.    She feels she is eating better.  Her significant other and her are trying to decrease her portion sizes and get more fruits and vegetables and.    She continues to follow with psychiatry for her moods.  She feels her anxiety is well controlled.  She denies that the phentermine is giving her anger issues like it in the past.      Review of Systems   CONSTITUTIONAL: NEGATIVE for fever, chills  RESP: NEGATIVE for significant cough or shortness of breath  CV: NEGATIVE for chest pain, palpitations or peripheral edema      Objective    /76   Pulse 75   Temp 97.5  F (36.4  C) (Temporal)   Ht 1.664 m (5'  "5.5\")   Wt 123.4 kg (272 lb)   LMP 01/02/2021 (Approximate)   SpO2 97%   BMI 44.57 kg/m    Body mass index is 44.57 kg/m .  Physical Exam   Gen: no apparent distress  Chest: clear to auscultation without wheeze, rale or rhonchi  Cor: regular rate and rhythm without murmur  Ext: warm and dry without edema  Psych: Alert and oriented times 3; coherent speech, normal   rate and volume, able to articulate logical thoughts, able   to abstract reason, no tangential thoughts, no hallucinations   or delusions  Her affect is neutral        "

## 2021-01-22 ENCOUNTER — OFFICE VISIT (OUTPATIENT)
Dept: FAMILY MEDICINE | Facility: OTHER | Age: 36
End: 2021-01-22
Payer: MEDICARE

## 2021-01-22 VITALS
SYSTOLIC BLOOD PRESSURE: 122 MMHG | DIASTOLIC BLOOD PRESSURE: 76 MMHG | HEIGHT: 66 IN | OXYGEN SATURATION: 97 % | WEIGHT: 272 LBS | TEMPERATURE: 97.5 F | BODY MASS INDEX: 43.71 KG/M2 | HEART RATE: 75 BPM

## 2021-01-22 DIAGNOSIS — E66.01 MORBID OBESITY (H): ICD-10-CM

## 2021-01-22 PROBLEM — L03.011 PARONYCHIA OF RIGHT MIDDLE FINGER: Status: RESOLVED | Noted: 2021-01-14 | Resolved: 2021-01-22

## 2021-01-22 PROCEDURE — 99213 OFFICE O/P EST LOW 20 MIN: CPT | Performed by: FAMILY MEDICINE

## 2021-01-22 RX ORDER — PHENTERMINE HYDROCHLORIDE 37.5 MG/1
37.5 TABLET ORAL
Qty: 30 TABLET | Refills: 0 | Status: SHIPPED | OUTPATIENT
Start: 2021-01-22 | End: 2021-02-19

## 2021-01-22 ASSESSMENT — ANXIETY QUESTIONNAIRES
7. FEELING AFRAID AS IF SOMETHING AWFUL MIGHT HAPPEN: MORE THAN HALF THE DAYS
GAD7 TOTAL SCORE: 11
GAD7 TOTAL SCORE: 11
2. NOT BEING ABLE TO STOP OR CONTROL WORRYING: SEVERAL DAYS
5. BEING SO RESTLESS THAT IT IS HARD TO SIT STILL: MORE THAN HALF THE DAYS
6. BECOMING EASILY ANNOYED OR IRRITABLE: NOT AT ALL
1. FEELING NERVOUS, ANXIOUS, OR ON EDGE: MORE THAN HALF THE DAYS
4. TROUBLE RELAXING: MORE THAN HALF THE DAYS
GAD7 TOTAL SCORE: 11
7. FEELING AFRAID AS IF SOMETHING AWFUL MIGHT HAPPEN: MORE THAN HALF THE DAYS
3. WORRYING TOO MUCH ABOUT DIFFERENT THINGS: MORE THAN HALF THE DAYS

## 2021-01-22 ASSESSMENT — PATIENT HEALTH QUESTIONNAIRE - PHQ9
10. IF YOU CHECKED OFF ANY PROBLEMS, HOW DIFFICULT HAVE THESE PROBLEMS MADE IT FOR YOU TO DO YOUR WORK, TAKE CARE OF THINGS AT HOME, OR GET ALONG WITH OTHER PEOPLE: NOT DIFFICULT AT ALL
SUM OF ALL RESPONSES TO PHQ QUESTIONS 1-9: 9
SUM OF ALL RESPONSES TO PHQ QUESTIONS 1-9: 9

## 2021-01-22 ASSESSMENT — MIFFLIN-ST. JEOR: SCORE: 1937.59

## 2021-01-23 ASSESSMENT — ANXIETY QUESTIONNAIRES: GAD7 TOTAL SCORE: 11

## 2021-01-23 ASSESSMENT — PATIENT HEALTH QUESTIONNAIRE - PHQ9: SUM OF ALL RESPONSES TO PHQ QUESTIONS 1-9: 9

## 2021-02-15 NOTE — PROGRESS NOTES
Assessment & Plan     Morbid obesity (H)  She feels that without the phentermine she would be gaining instead of maintaining.  Will continue phentermine and she will continue to work on healthier diet and increasing her exercise.    - phentermine (ADIPEX-P) 37.5 MG tablet; Take 1 tablet (37.5 mg) by mouth every morning (before breakfast)      Return in about 4 weeks (around 3/19/2021) for phentermine follow up.    Alanna Curiel MD  [facility]    Remi Heaton is a 35 year old who presents for the following health issues     HPI       Florina is here for her monthly weight Follow-up and medications.    Making more attempts to go outside and walk. Trying to eat more fruits and vegetables.  Eating more salads.      Review of Systems   CONSTITUTIONAL: NEGATIVE for fever, chills, change in weight  RESP: NEGATIVE for significant cough or shortness of breath   CV: NEGATIVE for chest pain, palpitations or peripheral edema      Objective    /80   Pulse 88   Temp 97.8  F (36.6  C)   Resp 16   Wt 123.4 kg (272 lb)   SpO2 97%   BMI 44.57 kg/m    Body mass index is 44.57 kg/m .  Physical Exam   Gen: no apparent distress  NECK: no adenopathy, no asymmetry, no masses  Chest: clear to auscultation without wheeze, rale or rhonchi  Cor: regular rate and rhythm without murmur  ABDOMEN: soft, nontender, no masses and bowel sounds normal  Ext: warm and dry without edema  Psych: Alert and oriented times 3; coherent speech, normal   rate and volume, able to articulate logical thoughts, able   to abstract reason, no tangential thoughts, no hallucinations   or delusions  Her affect is neutral

## 2021-02-19 ENCOUNTER — OFFICE VISIT (OUTPATIENT)
Dept: FAMILY MEDICINE | Facility: OTHER | Age: 36
End: 2021-02-19
Payer: MEDICARE

## 2021-02-19 VITALS
RESPIRATION RATE: 16 BRPM | HEART RATE: 88 BPM | TEMPERATURE: 97.8 F | SYSTOLIC BLOOD PRESSURE: 120 MMHG | OXYGEN SATURATION: 97 % | WEIGHT: 272 LBS | DIASTOLIC BLOOD PRESSURE: 80 MMHG | BODY MASS INDEX: 44.57 KG/M2

## 2021-02-19 DIAGNOSIS — E66.01 MORBID OBESITY (H): Primary | ICD-10-CM

## 2021-02-19 PROBLEM — M25.522 PAIN OF BOTH ELBOWS: Status: RESOLVED | Noted: 2020-01-24 | Resolved: 2021-02-19

## 2021-02-19 PROBLEM — M25.521 PAIN OF BOTH ELBOWS: Status: RESOLVED | Noted: 2020-01-24 | Resolved: 2021-02-19

## 2021-02-19 PROCEDURE — 99213 OFFICE O/P EST LOW 20 MIN: CPT | Performed by: FAMILY MEDICINE

## 2021-02-19 RX ORDER — PHENTERMINE HYDROCHLORIDE 37.5 MG/1
37.5 TABLET ORAL
Qty: 30 TABLET | Refills: 0 | Status: SHIPPED | OUTPATIENT
Start: 2021-02-19 | End: 2021-03-19

## 2021-02-19 ASSESSMENT — PAIN SCALES - GENERAL: PAINLEVEL: NO PAIN (0)

## 2021-03-09 ENCOUNTER — TRANSFERRED RECORDS (OUTPATIENT)
Dept: HEALTH INFORMATION MANAGEMENT | Facility: CLINIC | Age: 36
End: 2021-03-09

## 2021-03-17 ENCOUNTER — MYC MEDICAL ADVICE (OUTPATIENT)
Dept: FAMILY MEDICINE | Facility: OTHER | Age: 36
End: 2021-03-17

## 2021-03-18 NOTE — PROGRESS NOTES
"Assessment & Plan     Morbid obesity (H)  Patient admits that she has not done well for diet and exercise last month.  She already has a plan to increase her activity as states that she likes being outside and is pleased with the improvement in the weather.  She is trying to eat healthier.  She is also trying to involve her partner in these lifestyle changes.  She still finds benefit in meeting monthly to discuss these issues as well as continuing her phentermine.    - phentermine (ADIPEX-P) 37.5 MG tablet; Take 1 tablet (37.5 mg) by mouth every morning (before breakfast)       BMI:   Estimated body mass index is 45.39 kg/m  as calculated from the following:    Height as of this encounter: 1.664 m (5' 5.5\").    Weight as of this encounter: 125.6 kg (277 lb).   Weight management plan: Discussed healthy diet and exercise guidelines      Alanna Curiel MD  [facility]     Florina Marie is a 35 year old female who presents to clinic today for the following health issues     History of Present Illness       She eats 2-3 servings of fruits and vegetables daily.She consumes 1 sweetened beverage(s) daily.She exercises with enough effort to increase her heart rate 30 to 60 minutes per day.  She exercises with enough effort to increase her heart rate 4 days per week. She is missing 1 dose(s) of medications per week.         Medication Followup of Phentermine    Taking Medication as prescribed: yes    Side Effects:  None    Medication Helping Symptoms:  yes     Patient went to [place] for vacation.  She admits she was not eating well.  Since she has returned it has been hard for her to get back into her usual habits.  She just started adding salads to her diet.  With the improving weather she is starting to go outside.  She likes being outside and plans to increase her walking with her dog.        Objective    /76   Pulse 84   Temp 98.2  F (36.8  C) (Temporal)   Ht 1.664 " "m (5' 5.5\")   Wt 125.6 kg (277 lb)   SpO2 98%   BMI 45.39 kg/m    Body mass index is 45.39 kg/m .  Physical Exam   Gen: no apparent distress         "

## 2021-03-19 ENCOUNTER — OFFICE VISIT (OUTPATIENT)
Dept: FAMILY MEDICINE | Facility: OTHER | Age: 36
End: 2021-03-19
Payer: MEDICARE

## 2021-03-19 VITALS
BODY MASS INDEX: 44.52 KG/M2 | WEIGHT: 277 LBS | HEIGHT: 66 IN | OXYGEN SATURATION: 98 % | SYSTOLIC BLOOD PRESSURE: 118 MMHG | DIASTOLIC BLOOD PRESSURE: 76 MMHG | TEMPERATURE: 98.2 F | HEART RATE: 84 BPM

## 2021-03-19 DIAGNOSIS — E66.01 MORBID OBESITY (H): ICD-10-CM

## 2021-03-19 PROCEDURE — 99213 OFFICE O/P EST LOW 20 MIN: CPT | Performed by: FAMILY MEDICINE

## 2021-03-19 RX ORDER — PHENTERMINE HYDROCHLORIDE 37.5 MG/1
37.5 TABLET ORAL
Qty: 30 TABLET | Refills: 0 | Status: SHIPPED | OUTPATIENT
Start: 2021-03-19 | End: 2021-04-16

## 2021-03-19 ASSESSMENT — ANXIETY QUESTIONNAIRES
GAD7 TOTAL SCORE: 5
6. BECOMING EASILY ANNOYED OR IRRITABLE: NOT AT ALL
GAD7 TOTAL SCORE: 5
5. BEING SO RESTLESS THAT IT IS HARD TO SIT STILL: NOT AT ALL
7. FEELING AFRAID AS IF SOMETHING AWFUL MIGHT HAPPEN: SEVERAL DAYS
4. TROUBLE RELAXING: SEVERAL DAYS
3. WORRYING TOO MUCH ABOUT DIFFERENT THINGS: SEVERAL DAYS
1. FEELING NERVOUS, ANXIOUS, OR ON EDGE: SEVERAL DAYS
2. NOT BEING ABLE TO STOP OR CONTROL WORRYING: SEVERAL DAYS
7. FEELING AFRAID AS IF SOMETHING AWFUL MIGHT HAPPEN: SEVERAL DAYS
GAD7 TOTAL SCORE: 5

## 2021-03-19 ASSESSMENT — PATIENT HEALTH QUESTIONNAIRE - PHQ9
SUM OF ALL RESPONSES TO PHQ QUESTIONS 1-9: 3
SUM OF ALL RESPONSES TO PHQ QUESTIONS 1-9: 3
10. IF YOU CHECKED OFF ANY PROBLEMS, HOW DIFFICULT HAVE THESE PROBLEMS MADE IT FOR YOU TO DO YOUR WORK, TAKE CARE OF THINGS AT HOME, OR GET ALONG WITH OTHER PEOPLE: SOMEWHAT DIFFICULT

## 2021-03-19 ASSESSMENT — MIFFLIN-ST. JEOR: SCORE: 1960.27

## 2021-03-20 ASSESSMENT — PATIENT HEALTH QUESTIONNAIRE - PHQ9: SUM OF ALL RESPONSES TO PHQ QUESTIONS 1-9: 3

## 2021-03-20 ASSESSMENT — ANXIETY QUESTIONNAIRES: GAD7 TOTAL SCORE: 5

## 2021-04-12 NOTE — PROGRESS NOTES
Assessment & Plan     Generalized anxiety disorder  Patient is connected with her therapist for which she talks to twice a week as well as emails.  She does have access to a psychiatrist but will be seen her for a couple of months.  She remains on Lexapro and does have Xanax as needed which are all prescribed by her psychiatrist.  We discussed stopping the phentermine at this time as it could be exacerbating some of her anxiety.  We could always return to that at a later date.  We discussed trying some other medications for anxiety such as BuSpar.  She had been on this in the past and had stopped it but she cannot remember why.  Documentation in the chart states that patient stopped it got remember why at that time either.  We then discussed hydroxyzine.  We did discuss that this could cause some fatigue but it may help out with some of her anxiety.  She would like to try this on an as-needed basis.  She will follow-up with me in 1 month.    - hydrOXYzine (ATARAX) 25 MG tablet; Take 1 tablet (25 mg) by mouth 3 times daily as needed for anxiety    Alanna Curiel MD  [facility] CLIFF Heaton is a 35 year old who presents for the following health issues     HPI     ED/UC Followup:    Facility:  [facility]  Date of visit: 4/15/21  Reason for visit: stress reaction/hypertension  Current Status: not feeling any better       Medication Followup of Phentermine    Taking Medication as prescribed: yes    Side Effects:  None    Medication Helping Symptoms:  yes     Patient was seen in the emergency department last night for elevated blood pressure and chest tingling.  She had a normal EKG, troponin, chest x-ray and other lab work aside from a minimally elevated white blood count which is thought to be a stress response.  Patient's blood pressure came down just with monitoring in the emergency department.    Patient states that she has been stressed lately.  Two weeks ago her  "39-year-old neighbor was found  by the neighbor's 14-year-old daughter and Bernarda saw the paramedics arrived and the body being brought out of the apartment.  Bernarda states she is having trouble relaxing since then.  She continues to live that experience.  She is at the point where she feels like she needs to move out of the apartment and is starting to put that into motion.  She does see a therapist that she has been talking to twice weekly as well as emailing quite a bit.  Bernarda also sees a psychiatrist but her next appointment is not until .  Bernarda is taking more Xanax lately to help calm down but she does not like to take that early in the morning before her job.  She also states she started a new job where she needs to get up earlier and she feels that she is more tired during the day.    She had been taking the phentermine but her weight has gone up over the last couple of weeks.      Objective    /84   Pulse 82   Temp 98  F (36.7  C) (Temporal)   Ht 1.664 m (5' 5.5\")   Wt 127 kg (280 lb)   SpO2 98%   BMI 45.89 kg/m    Body mass index is 45.89 kg/m .  Physical Exam   Gen: no apparent distress  Psych: Alert and oriented x3.  Affect is anxious and tearful        "

## 2021-04-14 ENCOUNTER — NURSE TRIAGE (OUTPATIENT)
Dept: NURSING | Facility: CLINIC | Age: 36
End: 2021-04-14

## 2021-04-15 ENCOUNTER — APPOINTMENT (OUTPATIENT)
Dept: GENERAL RADIOLOGY | Facility: CLINIC | Age: 36
End: 2021-04-15
Attending: PHYSICIAN ASSISTANT
Payer: MEDICARE

## 2021-04-15 ENCOUNTER — NURSE TRIAGE (OUTPATIENT)
Dept: FAMILY MEDICINE | Facility: OTHER | Age: 36
End: 2021-04-15

## 2021-04-15 ENCOUNTER — HOSPITAL ENCOUNTER (EMERGENCY)
Facility: CLINIC | Age: 36
Discharge: HOME | End: 2021-04-15
Attending: PHYSICIAN ASSISTANT | Admitting: PHYSICIAN ASSISTANT
Payer: MEDICARE

## 2021-04-15 VITALS
DIASTOLIC BLOOD PRESSURE: 68 MMHG | HEART RATE: 77 BPM | SYSTOLIC BLOOD PRESSURE: 108 MMHG | RESPIRATION RATE: 18 BRPM | TEMPERATURE: 97.3 F | OXYGEN SATURATION: 97 %

## 2021-04-15 DIAGNOSIS — F43.0 ACUTE REACTION TO STRESS: ICD-10-CM

## 2021-04-15 LAB
ALBUMIN SERPL-MCNC: 3.5 G/DL (ref 3.4–5)
ALP SERPL-CCNC: 90 U/L (ref 40–150)
ALT SERPL W P-5'-P-CCNC: 20 U/L (ref 0–50)
ANION GAP SERPL CALCULATED.3IONS-SCNC: 8 MMOL/L (ref 3–14)
AST SERPL W P-5'-P-CCNC: 8 U/L (ref 0–45)
BASOPHILS # BLD AUTO: 0.1 10E9/L (ref 0–0.2)
BASOPHILS NFR BLD AUTO: 0.5 %
BILIRUB SERPL-MCNC: 0.4 MG/DL (ref 0.2–1.3)
BUN SERPL-MCNC: 8 MG/DL (ref 7–30)
CALCIUM SERPL-MCNC: 8.7 MG/DL (ref 8.5–10.1)
CHLORIDE SERPL-SCNC: 106 MMOL/L (ref 94–109)
CO2 SERPL-SCNC: 26 MMOL/L (ref 20–32)
CREAT SERPL-MCNC: 0.85 MG/DL (ref 0.52–1.04)
DIFFERENTIAL METHOD BLD: ABNORMAL
EOSINOPHIL # BLD AUTO: 0.2 10E9/L (ref 0–0.7)
EOSINOPHIL NFR BLD AUTO: 1.6 %
ERYTHROCYTE [DISTWIDTH] IN BLOOD BY AUTOMATED COUNT: 14.1 % (ref 10–15)
GFR SERPL CREATININE-BSD FRML MDRD: 88 ML/MIN/{1.73_M2}
GLUCOSE SERPL-MCNC: 102 MG/DL (ref 70–99)
HCT VFR BLD AUTO: 40.2 % (ref 35–47)
HGB BLD-MCNC: 13.1 G/DL (ref 11.7–15.7)
IMM GRANULOCYTES # BLD: 0.1 10E9/L (ref 0–0.4)
IMM GRANULOCYTES NFR BLD: 0.4 %
LYMPHOCYTES # BLD AUTO: 3.5 10E9/L (ref 0.8–5.3)
LYMPHOCYTES NFR BLD AUTO: 26.3 %
MCH RBC QN AUTO: 27 PG (ref 26.5–33)
MCHC RBC AUTO-ENTMCNC: 32.6 G/DL (ref 31.5–36.5)
MCV RBC AUTO: 83 FL (ref 78–100)
MONOCYTES # BLD AUTO: 0.7 10E9/L (ref 0–1.3)
MONOCYTES NFR BLD AUTO: 5.5 %
NEUTROPHILS # BLD AUTO: 8.7 10E9/L (ref 1.6–8.3)
NEUTROPHILS NFR BLD AUTO: 65.7 %
NRBC # BLD AUTO: 0 10*3/UL
NRBC BLD AUTO-RTO: 0 /100
PLATELET # BLD AUTO: 324 10E9/L (ref 150–450)
POTASSIUM SERPL-SCNC: 3.7 MMOL/L (ref 3.4–5.3)
PROT SERPL-MCNC: 7.7 G/DL (ref 6.8–8.8)
RBC # BLD AUTO: 4.85 10E12/L (ref 3.8–5.2)
SODIUM SERPL-SCNC: 140 MMOL/L (ref 133–144)
TROPONIN I SERPL-MCNC: <0.015 UG/L (ref 0–0.04)
TSH SERPL DL<=0.005 MIU/L-ACNC: 1.62 MU/L (ref 0.4–4)
WBC # BLD AUTO: 13.1 10E9/L (ref 4–11)

## 2021-04-15 PROCEDURE — 99285 EMERGENCY DEPT VISIT HI MDM: CPT | Mod: 25 | Performed by: PHYSICIAN ASSISTANT

## 2021-04-15 PROCEDURE — 85025 COMPLETE CBC W/AUTO DIFF WBC: CPT | Performed by: PHYSICIAN ASSISTANT

## 2021-04-15 PROCEDURE — 93010 ELECTROCARDIOGRAM REPORT: CPT | Performed by: PHYSICIAN ASSISTANT

## 2021-04-15 PROCEDURE — 84484 ASSAY OF TROPONIN QUANT: CPT | Performed by: PHYSICIAN ASSISTANT

## 2021-04-15 PROCEDURE — 93005 ELECTROCARDIOGRAM TRACING: CPT | Performed by: PHYSICIAN ASSISTANT

## 2021-04-15 PROCEDURE — 71045 X-RAY EXAM CHEST 1 VIEW: CPT

## 2021-04-15 PROCEDURE — 80053 COMPREHEN METABOLIC PANEL: CPT | Performed by: PHYSICIAN ASSISTANT

## 2021-04-15 PROCEDURE — 84443 ASSAY THYROID STIM HORMONE: CPT | Performed by: PHYSICIAN ASSISTANT

## 2021-04-15 NOTE — ED TRIAGE NOTES
Under a lot of stress lately.  Checked BP yesterday and it was elevated.   Also having  headaches for at least a week.

## 2021-04-15 NOTE — TELEPHONE ENCOUNTER
S: B/P concerns.  B: Was at a [employer] licience check up.  While there has her B/P check.  She was told it was elevated.  Was not informed what the B/P readings were,  at the appointment.  When she got home she checked her B/P it was 120/90.  Calling  to find out what the normal range is for B/P.  A: Diastolic B/P elevated.  R: Has an appointment with PCP in 4/16.  Amy Tate RN MAN   Triage Nurse Advisor [facility]    Additional Information    [1] Systolic BP  >= 130 OR Diastolic >= 80 AND [2] pregnant    Protocols used: HIGH BLOOD PRESSURE-A-AH    COVID 19 Nurse Triage Plan/Patient Instructions    Please be aware that novel coronavirus (COVID-19) may be circulating in the community. If you develop symptoms such as fever, cough, or SOB or if you have concerns about the presence of another infection including coronavirus (COVID-19), please contact your health care provider or visit [email or web address].     Disposition/Instructions    In-Person Visit with provider recommended. Reference Visit Selection Guide.    Thank you for taking steps to prevent the spread of this virus.  o Limit your contact with others.  o Wear a simple mask to cover your cough.  o Wash your hands well and often.    Resources    [facility]: About COVID-19: [email or web address]    CDC: What to Do If You're Sick: www.cdc.gov/coronavirus/2019-ncov/about/steps-when-sick.html    CDC: Ending Home Isolation: www.cdc.gov/coronavirus/2019-ncov/hcp/disposition-in-home-patients.html     CDC: Caring for Someone: www.cdc.gov/coronavirus/2019-ncov/if-you-are-sick/care-for-someone.html     [facility]: Interim Guidance for Hospital Discharge to Home: www.health.[facility].mn.us/diseases/coronavirus/hcp/hospdischarge.pdf    [facility] clinical trials (COVID-19 research studies): clinicalaffairs.[facility]/n-clinical-trials     Below are the COVID-19 hotlines at the [place] Department of Health ([facility]). Interpreters are  available.   o For health questions: Call 473-034-3396 or 1-613.256.1880 (7 a.m. to 7 p.m.)  o For questions about schools and childcare: Call 159-618-4186 or 1-329.608.2879 (7 a.m. to 7 p.m.)

## 2021-04-15 NOTE — TELEPHONE ENCOUNTER
Spoke with patient. 141/80s at [facility] physical.  Rechecked and it was higher.  Took it now at home:  121/56 with 66 pulse    Does have high anxiety.  Feels that her pulse is racing at times. Worried about a lot of things.  Spoke with therapist before this call and is worried about her blood pressure.      She is not currently having any chest pain.  Breathing is normal.  Did offer her a BP check today declined due to bus route.  Does have appointment scheduled with TC tomorrow.  Will keep and call back if any symptoms develop.    Deandre Soni, RITAN, RN, PHN  [facility] ~ Lead Registered Nurse  Clinic Triage ~ [place] [facility] [place]  April 15, 2021            Additional Information    Negative: Sounds like a life-threatening emergency to the triager    Negative: Pregnant > 20 weeks or postpartum (< 6 weeks after delivery) and new hand or face swelling    Negative: Pregnant > 20 weeks and BP > 140/90    Negative: Systolic BP >= 160 OR Diastolic >= 100, and any cardiac or neurologic symptoms (e.g., chest pain, difficulty breathing, unsteady gait, blurred vision)    Negative: Patient sounds very sick or weak to the triager    Negative: BP Systolic BP >= 140 OR Diastolic >= 90 and postpartum (from 0 to 6 weeks after delivery)    Negative: Systolic BP >= 180 OR Diastolic >= 110, and missed most recent dose of blood pressure medication    Negative: Systolic BP >= 180 OR Diastolic >= 110    Negative: Patient wants to be seen    Negative: Ran out of BP medications    Negative: Taking BP medications and feels is having side effects (e.g., impotence, cough, dizziness)    Negative: Systolic BP >= 160 OR Diastolic >= 100    Negative: Systolic BP >= 130 OR Diastolic >= 80, and pregnant    Systolic BP >= 130 OR Diastolic >= 80, and is not taking BP medications    Negative: Systolic BP >= 130 OR Diastolic >= 80, and is taking BP medications    Protocols used: HIGH BLOOD PRESSURE-A-OH

## 2021-04-16 ENCOUNTER — OFFICE VISIT (OUTPATIENT)
Dept: FAMILY MEDICINE | Facility: OTHER | Age: 36
End: 2021-04-16
Payer: MEDICARE

## 2021-04-16 VITALS
TEMPERATURE: 98 F | WEIGHT: 280 LBS | BODY MASS INDEX: 45 KG/M2 | HEART RATE: 82 BPM | SYSTOLIC BLOOD PRESSURE: 132 MMHG | DIASTOLIC BLOOD PRESSURE: 84 MMHG | HEIGHT: 66 IN | OXYGEN SATURATION: 98 %

## 2021-04-16 DIAGNOSIS — F41.1 GENERALIZED ANXIETY DISORDER: Primary | ICD-10-CM

## 2021-04-16 PROCEDURE — 99213 OFFICE O/P EST LOW 20 MIN: CPT | Performed by: FAMILY MEDICINE

## 2021-04-16 RX ORDER — HYDROXYZINE HYDROCHLORIDE 25 MG/1
25 TABLET, FILM COATED ORAL 3 TIMES DAILY PRN
Qty: 90 TABLET | Refills: 0 | Status: SHIPPED | OUTPATIENT
Start: 2021-04-16 | End: 2021-05-14

## 2021-04-16 ASSESSMENT — MIFFLIN-ST. JEOR: SCORE: 1973.88

## 2021-04-16 NOTE — ED PROVIDER NOTES
"  History     Chief Complaint   Patient presents with     Hypertension     The history is provided by the patient.     Florina Marie is a 35 year old female with a history of anxiety who presents to the emergency department secondary to recent elevated heart rate and blood pressure. The patient reports that for the past week she has been feeling like her body has been under a lot of stress. She has a headache above her left eyebrow, and feels like her heart has been pounding more than normal. She took her blood pressure today when she was feeling this way and notes it being 141/99. She has intermittent chest pains and pain in her right rib area, but has nothing that persists. She \"feels hot\" when she is stressed. Going up and down stairs causes her to be SOB. She mentions weight loss to help with this and states that she sees Dr. Dre aEson monthly for this. No lower extremity swelling, calf pain. She denies having a stressful job. The patient has a family cardiac history. One of her friends recently had a heart attack and then her neighbor recently was found dead in her apartment next door. This has caused her to have increased anxiety. She has been doing counseling, coloring, playing with her beads and notes nothing has been working.     Allergies:  Allergies   Allergen Reactions     Macrobid [Nitrofurantoin] Headache and Nausea       Problem List:    Patient Active Problem List    Diagnosis Date Noted     Prolonged PTT 08/09/2018     Priority: Medium     Attention deficit disorder without hyperactivity 12/01/2011     Priority: Medium     Hyperlipidemia, unspecified 01/08/2010     Priority: Medium     Generalized anxiety disorder 10/24/2007     Priority: Medium     Morbid obesity (H) 01/01/2004     Priority: Medium     Problems with learning 12/31/2003     Priority: Medium        Past Medical History:    Past Medical History:   Diagnosis Date     Attention deficit disorder with hyperactivity(314.01)      Left " carpal tunnel syndrome 6/27/2019     Other kyphoscoliosis and scoliosis      Other specified delay in development      Right carpal tunnel syndrome 3/14/2019     Viral warts, unspecified        Past Surgical History:    Past Surgical History:   Procedure Laterality Date     EXAM UNDER ANESTHESIA PELVIC N/A 9/9/2016    Procedure: EXAM UNDER ANESTHESIA PELVIC;  Surgeon: Rhoda Alcazar MD;  Location: [facility] OR     HC TOOTH EXTRACTION W/FORCEP      wisdom     RELEASE CARPAL TUNNEL Right 3/22/2019    Procedure: RIGHT RELEASE CARPAL TUNNEL;  Surgeon: Anjum Wilburn DO;  Location: [facility] OR     RELEASE CARPAL TUNNEL Left 7/16/2019    Procedure: RELEASE, CARPAL TUNNEL-Left;  Surgeon: Anjum Wilburn DO;  Location: [facility] OR       Family History:    Family History   Problem Relation Age of Onset     Lipids Father         diet     Thyroid Disease Mother         unsure if hypo or hyper     Diabetes Paternal Grandfather         adult     Heart Disease Paternal Grandfather         bypass/open hear surgery     Lipids Maternal Aunt         medication     Lipids Maternal Aunt         diet     Alzheimer Disease Maternal Grandfather      Hypertension No family hx of      Coronary Artery Disease No family hx of      Hyperlipidemia No family hx of      Cancer - colorectal No family hx of      Ovarian Cancer No family hx of      Prostate Cancer No family hx of      Depression/Anxiety No family hx of      Cerebrovascular Disease No family hx of      Anesthesia Reaction No family hx of      Asthma No family hx of      Osteoporosis No family hx of      Chemical Addiction No family hx of      Obesity No family hx of        Social History:  Marital Status:  Single [1]  Social History     Tobacco Use     Smoking status: Never Smoker     Smokeless tobacco: Never Used     Tobacco comment: no smokers in the household   Substance Use Topics     Alcohol use: No     Frequency: Never     Drug use: No        Medications:    ALPRAZolam  (XANAX) 0.5 MG tablet  desogestrel-ethinyl estradiol (APRI) 0.15-30 MG-MCG tablet  escitalopram (LEXAPRO) 10 MG tablet  escitalopram (LEXAPRO) 20 MG tablet  phentermine (ADIPEX-P) 37.5 MG tablet          Review of Systems   All other systems reviewed and are negative.      Physical Exam   BP: (!) 142/97  Pulse: 85  Temp: 97.3  F (36.3  C)  Resp: 18  SpO2: 98 %      Physical Exam  Vitals signs and nursing note reviewed.   Constitutional:       General: She is not in acute distress.     Appearance: Normal appearance. She is well-developed. She is not diaphoretic.   HENT:      Head: Normocephalic and atraumatic.      Right Ear: External ear normal.      Left Ear: External ear normal.      Nose: Nose normal. No congestion or rhinorrhea.      Mouth/Throat:      Mouth: Mucous membranes are moist.      Pharynx: No oropharyngeal exudate or posterior oropharyngeal erythema.   Eyes:      General: No scleral icterus.        Right eye: No discharge.         Left eye: No discharge.      Extraocular Movements: Extraocular movements intact.      Conjunctiva/sclera: Conjunctivae normal.   Neck:      Musculoskeletal: Normal range of motion and neck supple.   Cardiovascular:      Rate and Rhythm: Normal rate and regular rhythm.      Heart sounds: Normal heart sounds. No murmur. No friction rub. No gallop.    Pulmonary:      Effort: Pulmonary effort is normal. No respiratory distress.      Breath sounds: Normal breath sounds. No stridor.   Abdominal:      General: There is no distension.      Palpations: Abdomen is soft.      Tenderness: There is no abdominal tenderness. There is no rebound.   Musculoskeletal: Normal range of motion.      Right lower leg: No edema.      Left lower leg: No edema.   Skin:     General: Skin is warm and dry.      Coloration: Skin is not pale.      Findings: No erythema or rash.   Neurological:      General: No focal deficit present.      Mental Status: She is alert. Mental status is at baseline.       Cranial Nerves: No cranial nerve deficit.      Coordination: Coordination normal.   Psychiatric:         Mood and Affect: Mood normal.         Behavior: Behavior normal.         Thought Content: Thought content normal.         ED Course        Procedures         EKG Interpretation:      Interpreted by Jesse Valentin PA-C   Time reviewed:2020   Symptoms at time of EKG: None   Rhythm: Normal sinus   Rate: Normal  Axis: Normal  Ectopy: None  Conduction: Normal  ST Segments/ T Waves: No ST-T wave changes and No acute ischemic changes  Q Waves: None  Comparison to prior: No old EKG available    Clinical Impression: normal EKG      Results for orders placed or performed during the hospital encounter of 04/15/21 (from the past 24 hour(s))   CBC with platelets differential   Result Value Ref Range    WBC 13.1 (H) 4.0 - 11.0 10e9/L    RBC Count 4.85 3.8 - 5.2 10e12/L    Hemoglobin 13.1 11.7 - 15.7 g/dL    Hematocrit 40.2 35.0 - 47.0 %    MCV 83 78 - 100 fl    MCH 27.0 26.5 - 33.0 pg    MCHC 32.6 31.5 - 36.5 g/dL    RDW 14.1 10.0 - 15.0 %    Platelet Count 324 150 - 450 10e9/L    Diff Method Automated Method     % Neutrophils 65.7 %    % Lymphocytes 26.3 %    % Monocytes 5.5 %    % Eosinophils 1.6 %    % Basophils 0.5 %    % Immature Granulocytes 0.4 %    Nucleated RBCs 0 0 /100    Absolute Neutrophil 8.7 (H) 1.6 - 8.3 10e9/L    Absolute Lymphocytes 3.5 0.8 - 5.3 10e9/L    Absolute Monocytes 0.7 0.0 - 1.3 10e9/L    Absolute Eosinophils 0.2 0.0 - 0.7 10e9/L    Absolute Basophils 0.1 0.0 - 0.2 10e9/L    Abs Immature Granulocytes 0.1 0 - 0.4 10e9/L    Absolute Nucleated RBC 0.0    Comprehensive metabolic panel   Result Value Ref Range    Sodium 140 133 - 144 mmol/L    Potassium 3.7 3.4 - 5.3 mmol/L    Chloride 106 94 - 109 mmol/L    Carbon Dioxide 26 20 - 32 mmol/L    Anion Gap 8 3 - 14 mmol/L    Glucose 102 (H) 70 - 99 mg/dL    Urea Nitrogen 8 7 - 30 mg/dL    Creatinine 0.85 0.52 - 1.04 mg/dL    GFR Estimate 88 >60  mL/min/[1.73_m2]    GFR Estimate If Black >90 >60 mL/min/[1.73_m2]    Calcium 8.7 8.5 - 10.1 mg/dL    Bilirubin Total 0.4 0.2 - 1.3 mg/dL    Albumin 3.5 3.4 - 5.0 g/dL    Protein Total 7.7 6.8 - 8.8 g/dL    Alkaline Phosphatase 90 40 - 150 U/L    ALT 20 0 - 50 U/L    AST 8 0 - 45 U/L   Troponin I   Result Value Ref Range    Troponin I ES <0.015 0.000 - 0.045 ug/L   TSH with free T4 reflex   Result Value Ref Range    TSH 1.62 0.40 - 4.00 mU/L   XR Chest Port 1 View    Narrative    XR CHEST PORT 1 VW  4/15/2021 8:28 PM       INDICATION: episodes of chest discomfort  COMPARISON: None       Impression    IMPRESSION: Negative chest.    TO GABRIEL MD       Medications - No data to display    Assessments & Plan (with Medical Decision Making)  Acute reaction to stress     Florina is a 35 year old female who presents to the emergency department for concerns of feeling her heart racing and feeling hypertensive for the past week. History of anxiety. This has been increased due to events in her personal life noted in the HPI. Blood pressure is 142/97 upon arrival, but has gone down to 133/90 while being in the ED. Vitals are otherwise stable and she is afebrile. Physical exam as noted above. EKG was obtained and was completely normal.  Laboratory levels with a reassuring CBC.  Mild elevation of the white blood cell count up to 13,100, but I think this is an acute phase reactant.  She has other leukocytosis labs in the past as well not related to infection.  She does not have fever or chills.  Chest x-ray is negative for infiltrate.  No dysuria, frequency, urgency, or flank pain.  Hemoglobin stable at 13.1.  Comprehensive metabolic panel all within normal limits as well as her TSH.  Troponin undetectable.  Patient did not experience any pain while in the ED.  She had mild elevation of her blood pressure initially, but blood pressure at the end of her visit was 100/79 when she was sleeping.  Therefore, I do not think she needs  any antihypertensive intervention at this point.  She is admitting to high levels of stress and anxiety, I think this is probably a major component to her overall symptoms.  Patient reassured that we did not find any emergent causes of her symptoms.  She has a follow-up appointment with her PCP tomorrow per her report, and she will keep that appointment.  ED return instructions reviewed.       I have reviewed the nursing notes.    I have reviewed the findings, diagnosis, plan and need for follow up with the patient.       Discharge Medication List as of 4/15/2021  9:29 PM          Final diagnoses:   Acute reaction to stress       This document serves as a record of services personally performed by Jesse Valentin PA*. It was created on their behalf by Anu Craft, a trained medical scribe. The creation of this record is based on the provider's personal observations and the statements of the patient. This document has been checked and approved by the attending provider.    Note: Chart documentation done in part with Dragon Voice Recognition software. Although reviewed after completion, some word and grammatical errors may remain.    4/15/2021   [facility] EMERGENCY DEPT     Jesse Valentin PA-C  04/15/21 [id number]

## 2021-04-16 NOTE — DISCHARGE INSTRUCTIONS
It was a pleasure working with you today!  I hope your condition improves rapidly!     Thankfully, all of your testing came back okay today.  I am concerned that stress may be causing a lot of your symptoms.  Please continue to work with your counselor.  Please also keep your follow-up appointment with Dr. Apple to recheck your condition.

## 2021-04-27 LAB — PHQ9 SCORE: 3

## 2021-05-07 NOTE — PROGRESS NOTES
Assessment & Plan     Seasonal allergies  I suspect she has seasonal allergies.  We will start Zyrtec.    - cetirizine (ZYRTEC) 10 MG tablet; Take 1 tablet (10 mg) by mouth daily    Itchy eyes  Will use Patanol for her itchy eyes.    - olopatadine (PATANOL) 0.1 % ophthalmic solution; Place 1 drop into both eyes 2 times daily    Generalized anxiety disorder  She will continue to see her therapist and will follow up with psychiatry as scheduled next month.  We will stop the hydroxyzine.  I asked her to continue using her Xanax as needed.  Patient would like to continue to follow-up with me on a regular basis.  We discussed seeing her at the end of June so we can discuss her psychiatry appointment.    Return in about 6 weeks (around 6/25/2021) for anxiety.    Alanna Curiel MD  [facility] CLIFF Heaton is a 35 year old who presents for the following health issues     History of Present Illness       Mental Health Follow-up:  Patient presents to follow-up on Depression & Anxiety.Patient's depression since last visit has been:  Medium  The patient is not having other symptoms associated with depression.  Patient's anxiety since last visit has been:  Bad  The patient is having other symptoms associated with anxiety.  Any significant life events: health concerns  Patient is feeling anxious or having panic attacks.  Patient has no concerns about alcohol or drug use.     Social History  Tobacco Use    Smoking status: Never Smoker    Smokeless tobacco: Never Used    Tobacco comment: no smokers in the household  Alcohol use: No    Frequency: Never  Drug use: No      Today's PHQ-9         PHQ-9 Total Score:     (P) 8   PHQ-9 Q9 Thoughts of better off dead/self-harm past 2 weeks :   (P) Not at all   Thoughts of suicide or self harm:      Self-harm Plan:        Self-harm Action:          Safety concerns for self or others:           She eats 2-3 servings of fruits and vegetables  daily.She consumes 1 sweetened beverage(s) daily.She exercises with enough effort to increase her heart rate 30 to 60 minutes per day.  She exercises with enough effort to increase her heart rate 5 days per week. She is missing 1 dose(s) of medications per week.   Answers for HPI/ROS submitted by the patient on 5/14/2021   Chronic problems general questions HPI Form  If you checked off any problems, how difficult have these problems made it for you to do your work, take care of things at home, or get along with other people?: Somewhat difficult  PHQ9 TOTAL SCORE: 8  ZEENAT 7 TOTAL SCORE: 11    Patient states that she still feels very anxious.  She is following with her therapist once a week but she wonders if she should go twice a week.  Her therapist is felt she is getting a little bit better.  She sees her psychiatrist next month.  At her last visit with me I had added hydroxyzine to use as needed as a had thought patient was already taking her Lexapro and Xanax.  However I came to find out that patient was confused on the Xanax and the hydroxyzine.  She only use the hydroxyzine once and now is unsure if she should be taking her Xanax or not.  She states she is used her Xanax a couple times a week.    She endorses feeling very tired.  She states she has headaches around her eyes and her eyes want to be closed.  She tells me she has itchy eyes.  She is using some type of drops that can help.  She does admit to sneezing and has postnasal drip.  She denies nasal congestion, sore throat, cough or wheezing.  She does not know if she has seasonal allergies.          Objective    /80   Pulse 79   Temp 97.7  F (36.5  C) (Temporal)   Wt 130.6 kg (288 lb)   SpO2 100%   BMI 47.20 kg/m    Body mass index is 47.2 kg/m .  Physical Exam   Gen: no apparent distress  HENT: Ears normal. Throat and pharynx normal. Neck supple. No adenopathy or masses in the neck or supraclavicular regions. Sinuses non tender.   Chest/CV: S1  and S2 normal, no murmurs, clicks, gallops or rubs. Regular rate and rhythm. Chest is clear; no wheezes or rales. No edema or JVD.   Psych: Patient initially has her eyes closed and is not responding to verbal stimulation on for about a minute.  She then is able to converse.  She has a very flat affect.  Both of these are normal for patient.

## 2021-05-14 ENCOUNTER — TELEPHONE (OUTPATIENT)
Dept: FAMILY MEDICINE | Facility: OTHER | Age: 36
End: 2021-05-14

## 2021-05-14 ENCOUNTER — OFFICE VISIT (OUTPATIENT)
Dept: FAMILY MEDICINE | Facility: OTHER | Age: 36
End: 2021-05-14
Payer: MEDICARE

## 2021-05-14 VITALS
TEMPERATURE: 97.7 F | WEIGHT: 288 LBS | HEART RATE: 79 BPM | BODY MASS INDEX: 47.2 KG/M2 | DIASTOLIC BLOOD PRESSURE: 80 MMHG | SYSTOLIC BLOOD PRESSURE: 128 MMHG | OXYGEN SATURATION: 100 %

## 2021-05-14 DIAGNOSIS — J30.2 SEASONAL ALLERGIES: Primary | ICD-10-CM

## 2021-05-14 DIAGNOSIS — F41.1 GENERALIZED ANXIETY DISORDER: ICD-10-CM

## 2021-05-14 DIAGNOSIS — H57.9 ITCHY EYES: ICD-10-CM

## 2021-05-14 DIAGNOSIS — H57.9 ITCHY EYES: Primary | ICD-10-CM

## 2021-05-14 PROCEDURE — 99214 OFFICE O/P EST MOD 30 MIN: CPT | Performed by: FAMILY MEDICINE

## 2021-05-14 RX ORDER — AZELASTINE HYDROCHLORIDE 0.5 MG/ML
1 SOLUTION/ DROPS OPHTHALMIC 2 TIMES DAILY
Qty: 6 ML | Refills: 3 | Status: SHIPPED | OUTPATIENT
Start: 2021-05-14 | End: 2023-05-02

## 2021-05-14 RX ORDER — OLOPATADINE HYDROCHLORIDE 1 MG/ML
1 SOLUTION/ DROPS OPHTHALMIC 2 TIMES DAILY
Qty: 5 ML | Refills: 1 | Status: SHIPPED | OUTPATIENT
Start: 2021-05-14 | End: 2021-05-14

## 2021-05-14 RX ORDER — CETIRIZINE HYDROCHLORIDE 10 MG/1
10 TABLET ORAL DAILY
Qty: 90 TABLET | Refills: 3 | Status: SHIPPED | OUTPATIENT
Start: 2021-05-14 | End: 2023-01-13

## 2021-05-14 ASSESSMENT — ANXIETY QUESTIONNAIRES
7. FEELING AFRAID AS IF SOMETHING AWFUL MIGHT HAPPEN: MORE THAN HALF THE DAYS
4. TROUBLE RELAXING: NOT AT ALL
7. FEELING AFRAID AS IF SOMETHING AWFUL MIGHT HAPPEN: MORE THAN HALF THE DAYS
GAD7 TOTAL SCORE: 11
1. FEELING NERVOUS, ANXIOUS, OR ON EDGE: NEARLY EVERY DAY
GAD7 TOTAL SCORE: 11
2. NOT BEING ABLE TO STOP OR CONTROL WORRYING: NEARLY EVERY DAY
5. BEING SO RESTLESS THAT IT IS HARD TO SIT STILL: NOT AT ALL
3. WORRYING TOO MUCH ABOUT DIFFERENT THINGS: NEARLY EVERY DAY
GAD7 TOTAL SCORE: 11
6. BECOMING EASILY ANNOYED OR IRRITABLE: NOT AT ALL

## 2021-05-14 ASSESSMENT — PATIENT HEALTH QUESTIONNAIRE - PHQ9
SUM OF ALL RESPONSES TO PHQ QUESTIONS 1-9: 8
SUM OF ALL RESPONSES TO PHQ QUESTIONS 1-9: 8
10. IF YOU CHECKED OFF ANY PROBLEMS, HOW DIFFICULT HAVE THESE PROBLEMS MADE IT FOR YOU TO DO YOUR WORK, TAKE CARE OF THINGS AT HOME, OR GET ALONG WITH OTHER PEOPLE: SOMEWHAT DIFFICULT

## 2021-05-15 ASSESSMENT — ANXIETY QUESTIONNAIRES: GAD7 TOTAL SCORE: 11

## 2021-05-15 ASSESSMENT — PATIENT HEALTH QUESTIONNAIRE - PHQ9: SUM OF ALL RESPONSES TO PHQ QUESTIONS 1-9: 8

## 2021-05-19 ENCOUNTER — IMMUNIZATION (OUTPATIENT)
Dept: LAB | Facility: OTHER | Age: 36
End: 2021-05-19
Payer: MEDICARE

## 2021-05-20 ENCOUNTER — OFFICE VISIT (OUTPATIENT)
Dept: FAMILY MEDICINE | Facility: CLINIC | Age: 36
End: 2021-05-20
Payer: MEDICARE

## 2021-05-20 ENCOUNTER — TELEPHONE (OUTPATIENT)
Dept: FAMILY MEDICINE | Facility: CLINIC | Age: 36
End: 2021-05-20

## 2021-05-20 VITALS
OXYGEN SATURATION: 97 % | RESPIRATION RATE: 18 BRPM | TEMPERATURE: 101 F | DIASTOLIC BLOOD PRESSURE: 82 MMHG | SYSTOLIC BLOOD PRESSURE: 134 MMHG | HEART RATE: 97 BPM

## 2021-05-20 DIAGNOSIS — J02.9 SORE THROAT: Primary | ICD-10-CM

## 2021-05-20 LAB
DEPRECATED S PYO AG THROAT QL EIA: NEGATIVE
SPECIMEN SOURCE: NORMAL
SPECIMEN SOURCE: NORMAL
STREP GROUP A PCR: NOT DETECTED

## 2021-05-20 PROCEDURE — 87651 STREP A DNA AMP PROBE: CPT | Performed by: FAMILY MEDICINE

## 2021-05-20 PROCEDURE — 99213 OFFICE O/P EST LOW 20 MIN: CPT | Performed by: FAMILY MEDICINE

## 2021-05-20 PROCEDURE — 99N1174 PR STATISTIC STREP A RAPID: Performed by: FAMILY MEDICINE

## 2021-05-20 ASSESSMENT — PAIN SCALES - GENERAL: PAINLEVEL: WORST PAIN (10)

## 2021-05-20 NOTE — TELEPHONE ENCOUNTER
Called patient with normal neg results.   No questions.   No concerns.     Ton Charlton MA on 5/20/2021 at 5:54 PM        ----- Message from Houston Fernandes MD sent at 5/20/2021  3:49 PM CDT -----  Please call with results.      Your follow-up strep lab results came back normal.    Please call the clinic with any questions you may have.     Have a great day,    Dr. Barbosa

## 2021-05-20 NOTE — PROGRESS NOTES
Assessment & Plan   1. Sore throat: Patient with sore throat, fever, and body aches 1 day after receiving Cecilio & Cecilio vaccine.  Will test for strep.  Could be vaccine side effects.  Could also contracted Covid prior to getting a shot.  Discussed symptomatic treatment.  Will inform of strep results when available.  Recommend symptomatic treatment with Tylenol and ibuprofen etc.  Patient agreeable plan.  - Streptococcus A Rapid Scr w Reflx to PCR     Return in about 1 week (around 5/27/2021), or if symptoms worsen or fail to improve.    Houston Fernandes MD  [facility]    Subjective   Florina is a 35 year old who presents for the following health issues    HPI   Patient reports receiving the Cecilio & Cecilio vaccine on her [facility] yesterday.  She denied feeling ill prior.  She developed a sore throat and tried to rest yesterday.  Today she woke up feeling miserable with a fever and body aches as well as continued sore throat.  She denies any cough, rhinorrhea, abdominal pain, rash, or other symptoms.  She denies any travel or sick contacts.    Review of Systems   Constitutional, HEENT, lymph, msk, derm, cardiovascular, pulmonary, gi and gu systems are negative, except as otherwise noted.      Objective    /82   Pulse 97   Temp 101  F (38.3  C) (Temporal)   Resp 18   LMP  (LMP Unknown)   SpO2 97%   Physical Exam   General: Appears well and in no acute distress.  HEENT: Eyes grossly normal to inspection. Extraocular movements intact. Pupils equal, round, and reactive to light. Mucous membranes moist. No ulcers or lesions noted in the oropharynx. TMs and ear canals normal.  Heme/Lymph: No supraclavicular, anterior, or posterior cervical lymphadenopathy.  Cardiovascular: Regular rate and rhythm, normal S1 and S2 without murmur. No extra heartsounds or friction rub. Radial pulses present and equal bilaterally.  Respiratory: Lungs clear to auscultation  bilaterally. No wheezing or crackles. No prolonged expiration. Symmetrical chest rise.  Musculoskeletal: No gross extremity deformities. No peripheral edema. Normal muscle bulk.        Labs: pending

## 2021-05-20 NOTE — PATIENT INSTRUCTIONS
Patient Education     Pharyngitis (Sore Throat), Report Pending     Pharyngitis (sore throat) is often due to a virus. It can also be caused by strep (streptococcus) bacteria. This is often called strep throat. Both viral and strep infections can cause throat pain that is worse when swallowing, aching all over, headache, and fever. Both types of infections are contagious. They may be spread by coughing, kissing, or touching others after touching your mouth or nose.   A test has been done to find out if you or your child have strep throat. Often a rapid strep test can be done which gives immediate results and treatment can begin right away. A throat culture may also be done. The culture results take longer. If you have a virus, the culture results will be negative. The facility will call with your culture results. Call this facility or your healthcare provider if you were not given your rapid test results for strep. If a test is positive for strep infection, you will need to take an antibiotic. An antibiotic is a medicine used to treat a bacterial infection. A prescription can be called into your pharmacy or a written copy will be given to you at that time. If both tests are negative, you likely have a virus, usually viral pharyngitis. This does not need to be treated with antibiotics. Until you receive the results of the rapid strep test or the throat culture, you should stay home from work. If your child is being tested, he or she should stay home from school.   Home care    Rest at home. Drink plenty of fluids so you won't get dehydrated.    If the test is positive for strep, you or your child should not go to work or school for the first 24 hours of taking the antibiotics or as directed by the healthcare provider. After this time, you or your child will not be contagious. You or your child can then return to work or school when feeling better or as directed by this facility or your healthcare provider.     Use  the antibiotic medicine (often penicillin or amoxicillin) for the full 10 days or as directed by the healthcare provider. Don't stop the medicine even if you or your child feel better. This is very important to make sure the infection is fully treated. It's also important to prevent medicine-resistant germs from growing. Treatment for 10 days is also the best way to prevent rheumatic fever which affects the heart and other parts of the body. If you or your child were given an antibiotic shot, the healthcare provider will tell you if additional antibiotics are needed.    Use throat lozenges or numbing throat sprays to help reduce pain. Gargling with warm salt water will also help reduce throat pain. Dissolve 1/2 teaspoon of salt in 1 glass of warm water. Discuss this treatment with your healthcare provider.    Children can sip on juice or ice pops. Children 5 years and older can also suck on a lollipop or hard candy.    Don't eat salty or spicy foods or give them to your child. These can irritate the throat.  Other medicine for a child:  You can give your child acetaminophen for fever, fussiness, or discomfort. In babies over 6 months of age, you may use ibuprofen instead of acetaminophen. If your child has chronic liver or kidney disease or ever had a stomach ulcer or gastrointestinal bleeding, talk with your child s healthcare provider before giving these medicines. Aspirin should never be used by any child under 18 years of age who has a fever. It may cause severe liver damage. Always contact your child's healthcare provider before giving him or her over-the-counter medicines for the first time.   Other medicine for an adult: You may use acetaminophen or ibuprofen to control pain or fever, unless another medicine was prescribed for this. If you have chronic liver or kidney disease or ever had a stomach ulcer or gastrointestinal bleeding, talk with your healthcare provider before using these medicines.   Follow-up  "care  Follow up with your healthcare provider or our staff if you or your child don't feel or get better within 72 hours or as directed.   When to get medical advice  Call your healthcare provider right away if any of these occur:     Your child has a fever (see \"Fever and children,\" below)    You have a fever of 100.4 F (38 C) or higher, or as directed    New or worsening ear pain, sinus pain, or headache    Painful lumps in the back of neck    Stiff or swollen neck    Lymph nodes are getting larger or swelling of the neck    Can t open mouth wide due to throat pain    Signs of dehydration, such as very dark urine or no urine or sunken eyes    Noisy breathing    Muffled voice    New rash    Symptoms are worse    New symptoms develop  Call 911  Call 911 if you have any of the following symptoms     Can't swallow, especially saliva, with a lot of drooling    Trouble or difficulty breathing or wheezing    Feeling faint or dizzy    Can't talk    Feeling of doom  Prevention  Here are steps you can take to help prevent an infection:     Keep good hand washing habits.    Don t have close contact with people who have sore throats, colds, or other upper respiratory infections.    Don t smoke, and stay away from secondhand smoke.    Stay up to date with of your vaccines.  Fever and children  Use a digital thermometer to check your child s temperature. Don t use a mercury thermometer. There are different kinds and uses of digital thermometers. They include:     Rectal. For children younger than 3 years, a rectal temperature is the most accurate.    Forehead (temporal). This works for children age 3 months and older. If a child under 3 months old has signs of illness, this can be used for a first pass. The provider may want to confirm with a rectal temperature.    Ear (tympanic). Ear temperatures are accurate after 6 months of age, but not before.    Armpit (axillary). This is the least reliable but may be used for a first " pass to check a child of any age with signs of illness. The provider may want to confirm with a rectal temperature.    Mouth (oral). Don t use a thermometer in your child s mouth until he or she is at least 4 years old.  Use the rectal thermometer with care. Follow the product maker s directions for correct use. Insert it gently. Label it and make sure it s not used in the mouth. It may pass on germs from the stool. If you don t feel OK using a rectal thermometer, ask the healthcare provider what type to use instead. When you talk with any healthcare provider about your child s fever, tell him or her which type you used.   Below are guidelines to know if your young child has a fever. Your child s healthcare provider may give you different numbers for your child. Follow your provider s specific instructions.   Fever readings for a baby under 3 months old:     First, ask your child s healthcare provider how you should take the temperature.    Rectal or forehead: 100.4 F (38 C) or higher    Armpit: 99 F (37.2 C) or higher  Fever readings for a child age 3 months to 36 months (3 years):     Rectal, forehead, or ear: 102 F (38.9 C) or higher    Armpit: 101 F (38.3 C) or higher  Call the healthcare provider in these cases:    Repeated temperature of 104 F (40 C) or higher in a child of any age    Fever of 100.4  (38 C) or higher in a baby younger than 3 months    Fever that lasts more than 24 hours in a child under age 2    Fever that lasts for 3 days in a child age 2 or older    [employer] last reviewed this educational content on 2/1/2020 2000-2021 [employer]. All rights reserved. This information is not intended as a substitute for professional medical care. Always follow your healthcare professional's instructions.

## 2021-06-07 ENCOUNTER — NURSE TRIAGE (OUTPATIENT)
Dept: FAMILY MEDICINE | Facility: OTHER | Age: 36
End: 2021-06-07

## 2021-06-07 ENCOUNTER — TRANSFERRED RECORDS (OUTPATIENT)
Dept: HEALTH INFORMATION MANAGEMENT | Facility: CLINIC | Age: 36
End: 2021-06-07

## 2021-06-07 NOTE — TELEPHONE ENCOUNTER
Patient fell in the shower last night.  Her right foot twisted when she fell.    Now swollen and bruised.  She has pain with walking.    She is able to move her toes, but this hurts.    Her pinky toe hurts too bad to touch.    Per protocol appointment made for tomorrow.  Patient agrees with the plan and will go to urgent care if her toe pain or swelling gets worse.    Ashlie Masterson RN on 6/7/2021 at 3:58 PM    Reason for Disposition    SEVERE pain (e.g., excruciating)    Additional Information    Negative: Major bleeding (actively dripping or spurting) that can't be stopped    Negative: Amputation or bone sticking through the skin    Negative: Looks like a dislocated joint (crooked or deformed)    Negative: Serious injury with multiple fractures (broken bones)    Negative: Sounds like a life-threatening emergency to the triager    Negative: Bullet, stabbed by knife or other serious penetrating wound    Negative: Can't stand (bear weight) or walk (e.g., 4 steps)    Negative: Skin is split open or gaping (length > 1/2 inch or 12 mm)    Negative: Bleeding won't stop after 10 minutes of direct pressure (using correct technique)    Negative: Dirt in the wound and not removed after 15 minutes of scrubbing    Negative: Numbness (new loss of sensation) of toe(s)    Negative: Looks infected (e.g., spreading redness, pus, red streak)    Negative: Sounds like a serious injury to the triager    Protocols used: ANKLE AND FOOT INJURY-A-[place]

## 2021-06-11 ENCOUNTER — VIRTUAL VISIT (OUTPATIENT)
Dept: FAMILY MEDICINE | Facility: OTHER | Age: 36
End: 2021-06-11
Payer: MEDICARE

## 2021-06-11 DIAGNOSIS — Z53.9 ERRONEOUS ENCOUNTER--DISREGARD: Primary | ICD-10-CM

## 2021-06-11 ASSESSMENT — PAIN SCALES - GENERAL: PAINLEVEL: NO PAIN (0)

## 2021-06-11 NOTE — PROGRESS NOTES
"Florina is a 35 year old who is being evaluated via a billable telephone visit.      What phone number would you like to be contacted at? [phone]  How would you like to obtain your AVS? Fridahart    {PROVIDER CHARTING PREFERENCE:706656}    Subjective   Florina is a 35 year old who presents for the following health issues     HPI     Patient would like to discuss restarting phentermine.        {additonal problems for provider to add (Optional):386919}    Review of Systems   {ROS COMP (Optional):079265}      Objective    Vitals - Patient Reported  Pain Score: No Pain (0)      Vitals:  No vitals were obtained today due to virtual visit.    Physical Exam   {GENERAL APPEARANCE:50::\"healthy\",\"alert\",\"no distress\"}  PSYCH: Alert and oriented times 3; coherent speech, normal   rate and volume, able to articulate logical thoughts, able   to abstract reason, no tangential thoughts, no hallucinations   or delusions  Her affect is { :6875756::\"normal\"}  RESP: No cough, no audible wheezing, able to talk in full sentences  Remainder of exam unable to be completed due to telephone visits    {Diagnostic Test Results (Optional):538971}    {AMBULATORY ATTESTATION (Optional):312290}        Phone call duration: *** minutes  "

## 2021-06-16 NOTE — PROGRESS NOTES
"    Assessment & Plan     Morbid obesity (H)  In 2019 when was able to get to 241# while on phentermine.  She is motivated to work on diet and exercise and states that her significant other is also going to work on it with her.  She would like to take 1/2 tab in the morning and at noon to see if it helps with control throughout the day.    - phentermine (ADIPEX-P) 37.5 MG tablet; Take 0.5 tablets (18.75 mg) by mouth 2 times daily      Return in about 4 weeks (around 7/16/2021) for phentermine recheck.    Alanna Curiel MD  [facility] CLIFF Heaton is a 35 year old who presents for the following health issues     History of Present Illness       She eats 2-3 servings of fruits and vegetables daily.She consumes 2 sweetened beverage(s) daily.She exercises with enough effort to increase her heart rate 30 to 60 minutes per day.  She exercises with enough effort to increase her heart rate 4 days per week. She is missing 1 dose(s) of medications per week.       Patient is her to discuss restarting phentermine. Admits she is not getting much exercise, plans to go walking or biking every day for at least 30 minutes at a time.  Trying to get in fruits and vegetables.  Hoping that phentermine will help her decrease her portions.      Feels her anxiety is improved, continues to follow with counseling and Psychiatry.  Will try using hydroxyzine first before trying Xanax.    Review of Systems   CONSTITUTIONAL: NEGATIVE for fever, chills  RESP: NEGATIVE for significant cough or shortness of breath   CV: NEGATIVE for chest pain, palpitations or peripheral edema      Objective    /78   Pulse 88   Temp 97.2  F (36.2  C) (Temporal)   Resp 20   Ht 1.664 m (5' 5.51\")   Wt 132.9 kg (293 lb)   LMP  (LMP Unknown)   SpO2 98%   BMI 48.00 kg/m    Body mass index is 48 kg/m .  Physical Exam   Gen: no apparent distress  Chest: clear to auscultation without wheeze, rale or rhonchi  Cor: " regular rate and rhythm without murmur  Ext: warm and dry without edema  Psych: Alert and oriented times 3; normal affect

## 2021-06-18 ENCOUNTER — OFFICE VISIT (OUTPATIENT)
Dept: FAMILY MEDICINE | Facility: OTHER | Age: 36
End: 2021-06-18
Payer: MEDICARE

## 2021-06-18 VITALS
DIASTOLIC BLOOD PRESSURE: 78 MMHG | TEMPERATURE: 97.2 F | SYSTOLIC BLOOD PRESSURE: 120 MMHG | BODY MASS INDEX: 47.09 KG/M2 | HEIGHT: 66 IN | RESPIRATION RATE: 20 BRPM | OXYGEN SATURATION: 98 % | HEART RATE: 88 BPM | WEIGHT: 293 LBS

## 2021-06-18 DIAGNOSIS — E66.01 MORBID OBESITY (H): Primary | ICD-10-CM

## 2021-06-18 PROCEDURE — 99213 OFFICE O/P EST LOW 20 MIN: CPT | Performed by: FAMILY MEDICINE

## 2021-06-18 RX ORDER — PHENTERMINE HYDROCHLORIDE 37.5 MG/1
18.75 TABLET ORAL 2 TIMES DAILY
Qty: 60 TABLET | Refills: 0 | Status: SHIPPED | OUTPATIENT
Start: 2021-06-18 | End: 2021-12-31

## 2021-06-18 ASSESSMENT — MIFFLIN-ST. JEOR: SCORE: 2033.04

## 2021-06-18 ASSESSMENT — PAIN SCALES - GENERAL: PAINLEVEL: NO PAIN (0)

## 2021-09-28 ENCOUNTER — TRANSFERRED RECORDS (OUTPATIENT)
Dept: HEALTH INFORMATION MANAGEMENT | Facility: CLINIC | Age: 36
End: 2021-09-28

## 2021-09-28 LAB — PHQ9 SCORE: 10

## 2021-10-04 ENCOUNTER — HOSPITAL ENCOUNTER (EMERGENCY)
Facility: CLINIC | Age: 36
Discharge: HOME | End: 2021-10-04
Attending: EMERGENCY MEDICINE | Admitting: EMERGENCY MEDICINE
Payer: MEDICARE

## 2021-10-04 VITALS
DIASTOLIC BLOOD PRESSURE: 90 MMHG | WEIGHT: 285 LBS | HEART RATE: 96 BPM | BODY MASS INDEX: 46.69 KG/M2 | SYSTOLIC BLOOD PRESSURE: 143 MMHG | OXYGEN SATURATION: 98 % | RESPIRATION RATE: 18 BRPM | TEMPERATURE: 99.6 F

## 2021-10-04 DIAGNOSIS — M62.830 BACK MUSCLE SPASM: ICD-10-CM

## 2021-10-04 DIAGNOSIS — Z20.822 SUSPECTED 2019 NOVEL CORONAVIRUS INFECTION: ICD-10-CM

## 2021-10-04 PROCEDURE — 99283 EMERGENCY DEPT VISIT LOW MDM: CPT

## 2021-10-04 PROCEDURE — 250N000013 HC RX MED GY IP 250 OP 250 PS 637: Performed by: EMERGENCY MEDICINE

## 2021-10-04 PROCEDURE — 99282 EMERGENCY DEPT VISIT SF MDM: CPT | Performed by: EMERGENCY MEDICINE

## 2021-10-04 RX ORDER — IBUPROFEN 800 MG/1
800 TABLET, FILM COATED ORAL ONCE
Status: COMPLETED | OUTPATIENT
Start: 2021-10-04 | End: 2021-10-04

## 2021-10-04 RX ORDER — HYDROXYZINE HYDROCHLORIDE 25 MG/1
25 TABLET, FILM COATED ORAL DAILY PRN
COMMUNITY

## 2021-10-04 RX ORDER — CYCLOBENZAPRINE HCL 10 MG
10 TABLET ORAL 3 TIMES DAILY PRN
Qty: 20 TABLET | Refills: 0 | Status: SHIPPED | OUTPATIENT
Start: 2021-10-04 | End: 2021-10-11

## 2021-10-04 RX ADMIN — IBUPROFEN 800 MG: 800 TABLET, FILM COATED ORAL at 19:54

## 2021-10-04 NOTE — ED TRIAGE NOTES
Patient was seen at [facility] urgent care and has a pending Covid test. She is concerned that she wasn't checked for a kidney infection and has back pain.

## 2021-10-05 ENCOUNTER — PATIENT OUTREACH (OUTPATIENT)
Dept: CARE COORDINATION | Facility: CLINIC | Age: 36
End: 2021-10-05

## 2021-10-05 DIAGNOSIS — Z71.89 OTHER SPECIFIED COUNSELING: ICD-10-CM

## 2021-10-05 NOTE — ED PROVIDER NOTES
History     Chief Complaint   Patient presents with     Back Pain     HPI  Florina Marie is a 36 year old female who presents after she was evaluated at a local urgent care with concerns for back pain.  She felt that they did not check her out for urinary tract infection but records show that she had a normal urinalysis there.  Covid test was obtained today pending at this time.  Patient is experienced 2 days of fever, chills, congestion, body aches, sinus pressure and fatigue.  Has not received Covid immunizations.  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    Allergies:  Allergies   Allergen Reactions     Macrobid [Nitrofurantoin] Headache and Nausea       Problem List:    Patient Active Problem List    Diagnosis Date Noted     Prolonged PTT 08/09/2018     Priority: Medium     Attention deficit disorder without hyperactivity 12/01/2011     Priority: Medium     Hyperlipidemia, unspecified 01/08/2010     Priority: Medium     Generalized anxiety disorder 10/24/2007     Priority: Medium     Morbid obesity (H) 01/01/2004     Priority: Medium     Problems with learning 12/31/2003     Priority: Medium        Past Medical History:    Past Medical History:   Diagnosis Date     Attention deficit disorder with hyperactivity(314.01)      Left carpal tunnel syndrome 6/27/2019     Other kyphoscoliosis and scoliosis      Other specified delay in development      Right carpal tunnel syndrome 3/14/2019     Viral warts, unspecified        Past Surgical History:    Past Surgical History:   Procedure Laterality Date     EXAM UNDER ANESTHESIA PELVIC N/A 9/9/2016    Procedure: EXAM UNDER ANESTHESIA PELVIC;  Surgeon: Rhoda Alcazar MD;  Location:  OR     HC TOOTH EXTRACTION W/FORCEP      wisdom     RELEASE CARPAL TUNNEL Right 3/22/2019    Procedure: RIGHT RELEASE CARPAL TUNNEL;  Surgeon: Anjum Wilburn DO;  Location: [facility] OR     RELEASE CARPAL TUNNEL Left 7/16/2019    Procedure: RELEASE,  CARPAL TUNNEL-Left;  Surgeon: Anjum Wilburn DO;  Location: [facility] OR       Family History:    Family History   Problem Relation Age of Onset     Lipids Father         diet     Thyroid Disease Mother         unsure if hypo or hyper     Diabetes Paternal Grandfather         adult     Heart Disease Paternal Grandfather         bypass/open hear surgery     Lipids Maternal Aunt         medication     Lipids Maternal Aunt         diet     Alzheimer Disease Maternal Grandfather      Hypertension No family hx of      Coronary Artery Disease No family hx of      Hyperlipidemia No family hx of      Cancer - colorectal No family hx of      Ovarian Cancer No family hx of      Prostate Cancer No family hx of      Depression/Anxiety No family hx of      Cerebrovascular Disease No family hx of      Anesthesia Reaction No family hx of      Asthma No family hx of      Osteoporosis No family hx of      Chemical Addiction No family hx of      Obesity No family hx of        Social History:  Marital Status:  Single [1]  Social History     Tobacco Use     Smoking status: Never Smoker     Smokeless tobacco: Never Used     Tobacco comment: no smokers in the household   Substance Use Topics     Alcohol use: No     Drug use: No        Medications:    ALPRAZolam (XANAX) 0.5 MG tablet  azelastine (OPTIVAR) 0.05 % ophthalmic solution  cyclobenzaprine (FLEXERIL) 10 MG tablet  desogestrel-ethinyl estradiol (APRI) 0.15-30 MG-MCG tablet  escitalopram (LEXAPRO) 10 MG tablet  escitalopram (LEXAPRO) 20 MG tablet  hydrOXYzine (ATARAX) 25 MG tablet  phentermine (ADIPEX-P) 37.5 MG tablet  cetirizine (ZYRTEC) 10 MG tablet          Review of Systems all other systems are reviewed and are negative.    Physical Exam   BP: (!) 154/91  Pulse: 104  Temp: 99.6  F (37.6  C)  Resp: 18  Weight: 129.3 kg (285 lb)  SpO2: 97 %      Physical Exam alert cooperative female does not look toxic or ill.  HEENT reveals ears to be clear bilaterally.  Eyes show no  injection.  Nasal mucosal swelling but no oral lesions.  Speech is clear.  Neck is supple.  Lungs are clear without adventitious sounds.  Normal cardiac auscultation.  Obese but benign abdomen.  She has muscular tenderness in the right mid thoracic paraspinous musculature in the left mid thoracic paraspinous musculature.  No radicular symptoms.    ED Course        Procedures              Critical Care time:  none               No results found for this or any previous visit (from the past 24 hour(s)).    Medications   ibuprofen (ADVIL/MOTRIN) tablet 800 mg (800 mg Oral Given 10/4/21 1954)       Assessments & Plan (with Medical Decision Making)   Florina Marie is a 36 year old female who presents after she was evaluated at a local urgent care with concerns for back pain.  She felt that they did not check her out for urinary tract infection but records show that she had a normal urinalysis there.  Covid test was obtained today pending at this time.  Patient is experienced 2 days of fever, chills, congestion, body aches, sinus pressure and fatigue.  Has not received Covid immunizations.  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  Examination was afebrile and vitally stable.  Not hypoxic.  She had some nasal mucosal swelling.  No adventitious lung sounds.  Muscular spasm in the back.  Covid test is pending.  If positive she will need to isolate as recommended.  She has some muscle spasm so was provided Flexeril for this.  Her urine was clear so doubt kidney stone or pyelonephritis.  I have reviewed the nursing notes.    I have reviewed the findings, diagnosis, plan and need for follow up with the patient.       New Prescriptions    CYCLOBENZAPRINE (FLEXERIL) 10 MG TABLET    Take 1 tablet (10 mg) by mouth 3 times daily as needed for muscle spasms       Final diagnoses:   Back muscle spasm   Suspected 2019 novel coronavirus infection       10/4/2021   [facility] EMERGENCY DEPT      Sherman Lugo MD  10/04/21 1958

## 2021-10-05 NOTE — DISCHARGE INSTRUCTIONS
I suspect you have COVID-19.  If your test comes back positive you need to isolate as recommended.  Your urine was clear so I doubt you have a kidney stone or kidney infection.  He did have some muscle spasm of the back muscles so try Flexeril to see if that will help.  You can get plenty of rest, drink plenty of fluids and take Tylenol or ibuprofen for pain or fever.

## 2021-10-05 NOTE — PROGRESS NOTES
Clinic Care Coordination Contact  [facility]: Post-Discharge Note  SITUATION                                                      Admission:    Admission Date: 10/04/21   Reason for Admission: Back Pain  Discharge:   Discharge Date: 10/04/21  Discharge Diagnosis: Back Pain    BACKGROUND                                                      Florina Marie is a 36 year old female who presents after she was evaluated at a local urgent care with concerns for back pain.  She felt that they did not check her out for urinary tract infection but records show that she had a normal urinalysis there.  Covid test was obtained today pending at this time.  Patient is experienced 2 days of fever, chills, congestion, body aches, sinus pressure and fatigue.  Has not received Covid immunizations.  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    ASSESSMENT      Enrollment  Primary Care Care Coordination Status: Declined    Discharge Assessment  How are you doing now that you are home?: feeling better, thinks it is related to her sinuses  How are your symptoms? (Red Flag symptoms escalate to triage hotline per guidelines): Improved  Do you feel your condition is stable enough to be safe at home until your provider visit?: Yes  Does the patient have their discharge instructions? : Yes  Does the patient have questions regarding their discharge instructions? : No  Were you started on any new medications or were there changes to any of your previous medications? : Yes  Does the patient have all of their medications?: Yes  Do you have questions regarding any of your medications? : No  Do you have all of your needed medical supplies or equipment (DME)?  (i.e. oxygen tank, CPAP, cane, etc.): Yes  Discharge follow-up appointment scheduled within 14 calendar days? : No (Patient was told to schedule a follow up if needed and does not have a follow up scheduled at this time.)    Post-op ([facility] Only)  If the patient  had a surgery or procedure, do they have any questions for a nurse?: No             PLAN                                                      Outpatient Plan: Follow up with Alanna Curiel MD as needed.    No future appointments.      For any urgent concerns, please contact our 24 hour nurse triage line: [phone] ([phone])         SAMIA Valadez  [phone]  [facility]

## 2021-10-06 ENCOUNTER — TELEPHONE (OUTPATIENT)
Dept: FAMILY MEDICINE | Facility: OTHER | Age: 36
End: 2021-10-06
Payer: MEDICARE

## 2021-10-06 NOTE — TELEPHONE ENCOUNTER
Patient Quality Outreach Summary      Summary:    Patient is due/failing the following:   Physical and PAP    Type of outreach:    Phone, spoke to patient/parent. patient will call back to schedule.     Questions for provider review:    None                                                                                                                    Kathi Pepe CMA       Chart routed to Care Team.

## 2021-10-23 ENCOUNTER — HEALTH MAINTENANCE LETTER (OUTPATIENT)
Age: 36
End: 2021-10-23

## 2021-11-03 LAB — PHQ9 SCORE: 2

## 2021-11-25 ENCOUNTER — MYC MEDICAL ADVICE (OUTPATIENT)
Dept: FAMILY MEDICINE | Facility: OTHER | Age: 36
End: 2021-11-25
Payer: MEDICARE

## 2021-12-29 ENCOUNTER — TRANSFERRED RECORDS (OUTPATIENT)
Dept: HEALTH INFORMATION MANAGEMENT | Facility: CLINIC | Age: 36
End: 2021-12-29
Payer: MEDICARE

## 2021-12-29 NOTE — PROGRESS NOTES
"SUBJECTIVE:   Florina Marie is a 36 year old female who presents for Preventive Visit.      Patient has been advised of split billing requirements and indicates understanding: Yes   Are you in the first 12 months of your Medicare coverage?  No    Healthy Habits:     In general, how would you rate your overall health?  Fair    Frequency of exercise:  2-3 days/week    Duration of exercise:  30-45 minutes    Do you usually eat at least 4 servings of fruit and vegetables a day, include whole grains    & fiber and avoid regularly eating high fat or \"junk\" foods?  No    Taking medications regularly:  Yes    Medication side effects:  None    Ability to successfully perform activities of daily living:  Money management requires assistance    Home Safety:  No safety concerns identified    Hearing Impairment:  No hearing concerns    In the past 6 months, have you been bothered by leaking of urine?  No    In general, how would you rate your overall mental or emotional health?  Good      PHQ-2 Total Score: 4    Additional concerns today:  No    Do you feel safe in your environment? Yes    Have you ever done Advance Care Planning? (For example, a Health Directive, POLST, or a discussion with a medical provider or your loved ones about your wishes): Yes, advance care planning is on file.       Fall risk  Fallen 2 or more times in the past year?: No  Any fall with injury in the past year?: No    Cognitive Screening   1) Repeat 3 items (Leader, Season, Table)    2) Clock draw: Patient refused   3) 3 item recall: Patient refused   Results: Patient refused     Mini-CogTM Copyright JAYDEN Parra. Licensed by the author for use in [facility]; reprinted with permission (javier@.[facility]). All rights reserved.      Do you have sleep apnea, excessive snoring or daytime drowsiness?: no    Reviewed and updated as needed this visit by clinical staff  Tobacco  Allergies  Meds  Problems  Med Hx  Surg Hx  Fam Hx  Soc Hx "         Reviewed and updated as needed this visit by Provider  Tobacco  Allergies  Meds  Problems  Med Hx  Surg Hx  Fam Hx        Social History     Tobacco Use     Smoking status: Never Smoker     Smokeless tobacco: Never Used     Tobacco comment: no smokers in the household   Substance Use Topics     Alcohol use: No         Alcohol Use 12/31/2021   Prescreen: >3 drinks/day or >7 drinks/week? Yes   Prescreen: >3 drinks/day or >7 drinks/week? -   AUDIT SCORE  3         Current providers sharing in care for this patient include:   Patient Care Team:  Alanna Curiel MD as PCP - General  Alanna Curiel MD as Assigned PCP  Andres Rincon DPM as Assigned Musculoskeletal Provider    The following health maintenance items are reviewed in Epic and correct as of today:  Health Maintenance Due   Topic Date Due     HEPATITIS C SCREENING  Never done     COVID-19 Vaccine (2 - Booster for Yuan series) 07/14/2021     HPV TEST  09/08/2021     PAP  09/08/2021       Any new diagnosis of family breast, ovarian, or bowel cancer? No    FHS-7:   Breast CA Risk Assessment (FHS-7) 12/31/2021   Did any of your first-degree relatives have breast or ovarian cancer? Yes   Did any of your relatives have bilateral breast cancer? Unknown   Did any man in your family have breast cancer? No   Did any woman in your family have breast and ovarian cancer? No   Did any woman in your family have breast cancer before age 50 y? Unknown   Do you have 2 or more relatives with breast and/or ovarian cancer? No   Do you have 2 or more relatives with breast and/or bowel cancer? No       Review of Systems   Constitutional: Negative for chills and fever.   HENT: Negative for congestion, ear pain, hearing loss and sore throat.    Eyes: Negative for pain and visual disturbance.   Respiratory: Negative for cough and shortness of breath.    Cardiovascular: Negative for chest pain, palpitations and peripheral edema.   Gastrointestinal:  "Negative for abdominal pain, constipation, diarrhea, heartburn, hematochezia and nausea.   Breasts:  Negative for tenderness, breast mass and discharge.   Genitourinary: Negative for dysuria, frequency, genital sores, hematuria, pelvic pain, urgency, vaginal bleeding and vaginal discharge.   Musculoskeletal: Negative for arthralgias, joint swelling and myalgias.   Skin: Negative for rash.   Neurological: Negative for dizziness, weakness, headaches and paresthesias.   Psychiatric/Behavioral: Negative for mood changes. The patient is nervous/anxious.          OBJECTIVE:   /84   Pulse 94   Temp 98.4  F (36.9  C) (Temporal)   Resp 20   Ht 1.685 m (5' 6.34\")   Wt 132.9 kg (293 lb)   LMP  (Within Months)   SpO2 98%   BMI 46.81 kg/m   Estimated body mass index is 46.81 kg/m  as calculated from the following:    Height as of this encounter: 1.685 m (5' 6.34\").    Weight as of this encounter: 132.9 kg (293 lb).  Physical Exam  Constitutional:       General: She is not in acute distress.     Appearance: She is well-developed.   HENT:      Right Ear: External ear normal.      Left Ear: External ear normal.      Nose: Nose normal.   Eyes:      General:         Right eye: No discharge.         Left eye: No discharge.      Conjunctiva/sclera: Conjunctivae normal.   Neck:      Thyroid: No thyromegaly.      Trachea: No tracheal deviation.   Cardiovascular:      Rate and Rhythm: Normal rate and regular rhythm.      Pulses: Normal pulses.      Heart sounds: Normal heart sounds, S1 normal and S2 normal. No murmur heard.      Pulmonary:      Effort: Pulmonary effort is normal. No respiratory distress.      Breath sounds: Normal breath sounds. No wheezing or rales.   Abdominal:      General: Bowel sounds are normal.      Palpations: Abdomen is soft. There is no mass.      Tenderness: There is no abdominal tenderness.   Musculoskeletal:         General: Normal range of motion.      Cervical back: Neck supple. " "  Lymphadenopathy:      Cervical: No cervical adenopathy.   Skin:     General: Skin is warm and dry.      Findings: No rash.   Neurological:      Mental Status: She is alert and oriented to person, place, and time.      Motor: No abnormal muscle tone.      Deep Tendon Reflexes: Reflexes are normal and symmetric.   Psychiatric:         Thought Content: Thought content normal.           ASSESSMENT / PLAN:   (Z00.00) Encounter for Medicare annual wellness exam  (primary encounter diagnosis)  Comment: She declines Pap smear today.    (Z30.41) Oral contraceptive pill surveillance  Comment: Patient states she has been taking these regularly although it looks like my last prescription was almost 2 years ago.  We will go ahead and refill.  Plan: desogestrel-ethinyl estradiol (APRI) 0.15-30         MG-MCG tablet            (Z11.59) Need for hepatitis C screening test  Plan: Hepatitis C Screen Reflex to HCV RNA Quant and         Genotype          (F41.1) Generalized anxiety disorder  Comment: Patient follows with psychiatry.  She endorses a lot of depressive symptoms.  Reviewed psychiatry's note from a couple of days ago.  They increased her Lexapro from 30 mg to 40 mg daily.  They have also ordered a lot of laboratory studies.  The note does endorse that patient's been having depression but I do not see that the diagnosis was listed as major depressive disorder but rather depression with anxiety.    Patient has been advised of split billing requirements and indicates understanding: No  COUNSELING:  Reviewed preventive health counseling, as reflected in patient instructions    Estimated body mass index is 46.81 kg/m  as calculated from the following:    Height as of this encounter: 1.685 m (5' 6.34\").    Weight as of this encounter: 132.9 kg (293 lb).    Weight management plan: Discussed healthy diet and exercise guidelines    She reports that she has never smoked. She has never used smokeless tobacco.      Appropriate " preventive services were discussed with this patient, including applicable screening as appropriate for cardiovascular disease, diabetes, osteopenia/osteoporosis, and glaucoma.  As appropriate for age/gender, discussed screening for colorectal cancer, prostate cancer, breast cancer, and cervical cancer. Checklist reviewing preventive services available has been given to the patient.    Reviewed patients plan of care and provided an AVS. The Basic Care Plan (routine screening as documented in Health Maintenance) for Florina meets the Care Plan requirement. This Care Plan has been established and reviewed with the Patient.    Counseling Resources:  ATP IV Guidelines  Pooled Cohorts Equation Calculator  Breast Cancer Risk Calculator  Breast Cancer: Medication to Reduce Risk  FRAX Risk Assessment  ICSI Preventive Guidelines  Dietary Guidelines for Americans, 2010  [employer]'s MyPlate  ASA Prophylaxis  Lung CA Screening    Alanna Curiel MD  [facility]    Identified Health Risks:

## 2021-12-29 NOTE — PATIENT INSTRUCTIONS
Preventive Health Recommendations  Female Ages 26 - 39  Yearly exam:   See your health care provider every year in order to    Review health changes.     Discuss preventive care.      Review your medicines if you your doctor has prescribed any.    Until age 30: Get a Pap test every three years (more often if you have had an abnormal result).    After age 30: Talk to your doctor about whether you should have a Pap test every 3 years or have a Pap test with HPV screening every 5 years.   You do not need a Pap test if your uterus was removed (hysterectomy) and you have not had cancer.  You should be tested each year for STDs (sexually transmitted diseases), if you're at risk.   Talk to your provider about how often to have your cholesterol checked.  If you are at risk for diabetes, you should have a diabetes test (fasting glucose).  Shots: Get a flu shot each year. Get a tetanus shot every 10 years.   Nutrition:     Eat at least 5 servings of fruits and vegetables each day.    Eat whole-grain bread, whole-wheat pasta and brown rice instead of white grains and rice.    Get adequate Calcium and Vitamin D.     Lifestyle    Exercise at least 150 minutes a week (30 minutes a day, 5 days of the week). This will help you control your weight and prevent disease.    Limit alcohol to one drink per day.    No smoking.     Wear sunscreen to prevent skin cancer.    See your dentist every six months for an exam and cleaning.    Patient Education   Personalized Prevention Plan  You are due for the preventive services outlined below.  Your care team is available to assist you in scheduling these services.  If you have already completed any of these items, please share that information with your care team to update in your medical record.  Health Maintenance Due   Topic Date Due     Hepatitis C Screening  Never done     COVID-19 Vaccine (2 - Booster for Yuan series) 07/14/2021     Flu Vaccine (1) 09/01/2021     HPV Screening   09/08/2021     PAP Smear  09/08/2021

## 2021-12-31 ENCOUNTER — OFFICE VISIT (OUTPATIENT)
Dept: FAMILY MEDICINE | Facility: OTHER | Age: 36
End: 2021-12-31
Payer: MEDICARE

## 2021-12-31 ENCOUNTER — LAB (OUTPATIENT)
Dept: LAB | Facility: OTHER | Age: 36
End: 2021-12-31
Payer: MEDICARE

## 2021-12-31 VITALS
WEIGHT: 293 LBS | RESPIRATION RATE: 20 BRPM | HEIGHT: 66 IN | TEMPERATURE: 98.4 F | SYSTOLIC BLOOD PRESSURE: 132 MMHG | DIASTOLIC BLOOD PRESSURE: 84 MMHG | BODY MASS INDEX: 47.09 KG/M2 | OXYGEN SATURATION: 98 % | HEART RATE: 94 BPM

## 2021-12-31 DIAGNOSIS — E53.8 BIOTIN DEFICIENCY DISEASE: ICD-10-CM

## 2021-12-31 DIAGNOSIS — Z11.59 NEED FOR HEPATITIS C SCREENING TEST: ICD-10-CM

## 2021-12-31 DIAGNOSIS — Z30.41 ORAL CONTRACEPTIVE PILL SURVEILLANCE: ICD-10-CM

## 2021-12-31 DIAGNOSIS — E53.8 FOLATE DEFICIENCY: ICD-10-CM

## 2021-12-31 DIAGNOSIS — E55.9 VITAMIN D DEFICIENCY: ICD-10-CM

## 2021-12-31 DIAGNOSIS — E53.8 BIOTIN-(PROPIONYL-COA-CARBOXYLASE) LIGASE DEFICIENCY: Primary | ICD-10-CM

## 2021-12-31 DIAGNOSIS — F41.1 GENERALIZED ANXIETY DISORDER: ICD-10-CM

## 2021-12-31 DIAGNOSIS — Z15.89 MTHFR MUTATION: ICD-10-CM

## 2021-12-31 DIAGNOSIS — Z00.00 ENCOUNTER FOR MEDICARE ANNUAL WELLNESS EXAM: Primary | ICD-10-CM

## 2021-12-31 LAB
ANION GAP SERPL CALCULATED.3IONS-SCNC: 5 MMOL/L (ref 3–14)
BUN SERPL-MCNC: 11 MG/DL (ref 7–30)
CALCIUM SERPL-MCNC: 8.6 MG/DL (ref 8.5–10.1)
CHLORIDE BLD-SCNC: 107 MMOL/L (ref 94–109)
CHOLEST SERPL-MCNC: 218 MG/DL
CO2 SERPL-SCNC: 26 MMOL/L (ref 20–32)
CREAT SERPL-MCNC: 0.74 MG/DL (ref 0.52–1.04)
DEPRECATED CALCIDIOL+CALCIFEROL SERPL-MC: 28 UG/L (ref 20–75)
FASTING STATUS PATIENT QL REPORTED: YES
FERRITIN SERPL-MCNC: 11 NG/ML (ref 12–150)
FOLATE SERPL-MCNC: 50.3 NG/ML
GFR SERPL CREATININE-BSD FRML MDRD: >90 ML/MIN/1.73M2
GLUCOSE BLD-MCNC: 116 MG/DL (ref 70–99)
HBA1C MFR BLD: 6 % (ref 0–5.6)
HCV AB SERPL QL IA: NONREACTIVE
HDLC SERPL-MCNC: 45 MG/DL
IRON SERPL-MCNC: 44 UG/DL (ref 35–180)
LDLC SERPL CALC-MCNC: 121 MG/DL
MAGNESIUM SERPL-MCNC: 2.2 MG/DL (ref 1.6–2.3)
NONHDLC SERPL-MCNC: 173 MG/DL
POTASSIUM BLD-SCNC: 3.7 MMOL/L (ref 3.4–5.3)
SODIUM SERPL-SCNC: 138 MMOL/L (ref 133–144)
T3 SERPL-MCNC: 109 NG/DL (ref 60–181)
T4 FREE SERPL-MCNC: 0.88 NG/DL (ref 0.76–1.46)
TRIGL SERPL-MCNC: 259 MG/DL
TSH SERPL DL<=0.005 MIU/L-ACNC: 1.42 MU/L (ref 0.4–4)
VIT B12 SERPL-MCNC: 827 PG/ML (ref 193–986)

## 2021-12-31 PROCEDURE — 84439 ASSAY OF FREE THYROXINE: CPT

## 2021-12-31 PROCEDURE — 80048 BASIC METABOLIC PNL TOTAL CA: CPT

## 2021-12-31 PROCEDURE — 82607 VITAMIN B-12: CPT

## 2021-12-31 PROCEDURE — G0008 ADMIN INFLUENZA VIRUS VAC: HCPCS | Performed by: FAMILY MEDICINE

## 2021-12-31 PROCEDURE — 83036 HEMOGLOBIN GLYCOSYLATED A1C: CPT

## 2021-12-31 PROCEDURE — 83735 ASSAY OF MAGNESIUM: CPT

## 2021-12-31 PROCEDURE — 80061 LIPID PANEL: CPT

## 2021-12-31 PROCEDURE — 36415 COLL VENOUS BLD VENIPUNCTURE: CPT | Performed by: FAMILY MEDICINE

## 2021-12-31 PROCEDURE — 84480 ASSAY TRIIODOTHYRONINE (T3): CPT

## 2021-12-31 PROCEDURE — 84443 ASSAY THYROID STIM HORMONE: CPT

## 2021-12-31 PROCEDURE — 82746 ASSAY OF FOLIC ACID SERUM: CPT

## 2021-12-31 PROCEDURE — 90686 IIV4 VACC NO PRSV 0.5 ML IM: CPT | Performed by: FAMILY MEDICINE

## 2021-12-31 PROCEDURE — 82306 VITAMIN D 25 HYDROXY: CPT

## 2021-12-31 PROCEDURE — G0439 PPPS, SUBSEQ VISIT: HCPCS | Performed by: FAMILY MEDICINE

## 2021-12-31 PROCEDURE — 83540 ASSAY OF IRON: CPT

## 2021-12-31 PROCEDURE — 82728 ASSAY OF FERRITIN: CPT

## 2021-12-31 PROCEDURE — 86803 HEPATITIS C AB TEST: CPT | Performed by: FAMILY MEDICINE

## 2021-12-31 RX ORDER — DESOGESTREL AND ETHINYL ESTRADIOL 0.15-0.03
1 KIT ORAL DAILY
Qty: 84 TABLET | Refills: 3 | Status: SHIPPED | OUTPATIENT
Start: 2021-12-31 | End: 2023-01-13

## 2021-12-31 RX ORDER — FOLIC ACID 1 MG/1
1000 TABLET ORAL 2 TIMES DAILY
COMMUNITY
Start: 2021-12-29 | End: 2023-05-02

## 2021-12-31 ASSESSMENT — ENCOUNTER SYMPTOMS
CHILLS: 0
ARTHRALGIAS: 0
HEMATURIA: 0
HEARTBURN: 0
SORE THROAT: 0
DYSURIA: 0
CONSTIPATION: 0
FREQUENCY: 0
EYE PAIN: 0
PARESTHESIAS: 0
MYALGIAS: 0
DIARRHEA: 0
ABDOMINAL PAIN: 0
HEMATOCHEZIA: 0
COUGH: 0
DIZZINESS: 0
WEAKNESS: 0
PALPITATIONS: 0
BREAST MASS: 0
HEADACHES: 0
NERVOUS/ANXIOUS: 1
NAUSEA: 0
SHORTNESS OF BREATH: 0
JOINT SWELLING: 0
FEVER: 0

## 2021-12-31 ASSESSMENT — PATIENT HEALTH QUESTIONNAIRE - PHQ9
SUM OF ALL RESPONSES TO PHQ QUESTIONS 1-9: 4
SUM OF ALL RESPONSES TO PHQ QUESTIONS 1-9: 4
10. IF YOU CHECKED OFF ANY PROBLEMS, HOW DIFFICULT HAVE THESE PROBLEMS MADE IT FOR YOU TO DO YOUR WORK, TAKE CARE OF THINGS AT HOME, OR GET ALONG WITH OTHER PEOPLE: SOMEWHAT DIFFICULT

## 2021-12-31 ASSESSMENT — PAIN SCALES - GENERAL: PAINLEVEL: NO PAIN (0)

## 2021-12-31 ASSESSMENT — MIFFLIN-ST. JEOR: SCORE: 2041.17

## 2021-12-31 ASSESSMENT — ACTIVITIES OF DAILY LIVING (ADL): CURRENT_FUNCTION: MONEY MANAGEMENT REQUIRES ASSISTANCE

## 2022-01-01 ASSESSMENT — PATIENT HEALTH QUESTIONNAIRE - PHQ9: SUM OF ALL RESPONSES TO PHQ QUESTIONS 1-9: 4

## 2022-02-03 ENCOUNTER — TRANSFERRED RECORDS (OUTPATIENT)
Dept: HEALTH INFORMATION MANAGEMENT | Facility: CLINIC | Age: 37
End: 2022-02-03
Payer: MEDICARE

## 2022-02-08 ENCOUNTER — NURSE TRIAGE (OUTPATIENT)
Dept: FAMILY MEDICINE | Facility: OTHER | Age: 37
End: 2022-02-08
Payer: MEDICARE

## 2022-02-08 NOTE — TELEPHONE ENCOUNTER
"Patient calling for home care advice for itching eyes. Also reports watery eyes. Symptoms started today.    Home care advice given per protocol. Patient will try recommendations and call back if not relieved in the next 2-3 days.    Meliza RUDD, RN        Reason for Disposition    Minor eye injury    Mild eye allergy    Additional Information    Negative: Blurred vision and new or worsening    Negative: Sacs of clear fluid (blisters) on whites of eyes or inner lids    Negative: Eyelids are very swollen (shut or almost)    Negative: Taking allergy medicine > 2 days and eyes still very itchy or still has pus on eyelids.    Negative: Contact lenses makes itching or redness worse    Negative: Patient wants to be seen    Negative: Eye allergy is a chronic symptom (recurrent or ongoing AND present > 4 weeks)    Negative: Diagnosis of eye allergy never confirmed    Negative: History of rheumatoid arthritis or systemic lupus (or other \"collagen vascular disorder\")    Negative: Symptoms began after use of a new facial cosmetic, hair care product or nail polish    Protocols used: EYE INJURY-A-OH, EYE - ALLERGY-A-OH      "

## 2022-05-26 LAB — PHQ9 SCORE: 2

## 2022-06-14 ENCOUNTER — HOSPITAL ENCOUNTER (EMERGENCY)
Facility: CLINIC | Age: 37
Discharge: HOME | End: 2022-06-14
Attending: NURSE PRACTITIONER | Admitting: NURSE PRACTITIONER
Payer: MEDICARE

## 2022-06-14 ENCOUNTER — TELEPHONE (OUTPATIENT)
Dept: FAMILY MEDICINE | Facility: OTHER | Age: 37
End: 2022-06-14
Payer: MEDICARE

## 2022-06-14 VITALS
DIASTOLIC BLOOD PRESSURE: 112 MMHG | WEIGHT: 290 LBS | BODY MASS INDEX: 46.33 KG/M2 | OXYGEN SATURATION: 96 % | HEART RATE: 104 BPM | RESPIRATION RATE: 18 BRPM | TEMPERATURE: 98.6 F | SYSTOLIC BLOOD PRESSURE: 151 MMHG

## 2022-06-14 DIAGNOSIS — F41.1 GENERALIZED ANXIETY DISORDER: ICD-10-CM

## 2022-06-14 PROCEDURE — 99284 EMERGENCY DEPT VISIT MOD MDM: CPT | Performed by: NURSE PRACTITIONER

## 2022-06-14 PROCEDURE — 99283 EMERGENCY DEPT VISIT LOW MDM: CPT | Performed by: NURSE PRACTITIONER

## 2022-06-14 RX ORDER — ALPRAZOLAM 0.5 MG
0.5 TABLET ORAL 2 TIMES DAILY PRN
Qty: 8 TABLET | Refills: 0 | Status: SHIPPED | OUTPATIENT
Start: 2022-06-14 | End: 2024-01-03

## 2022-06-14 NOTE — TELEPHONE ENCOUNTER
Patient is calling to see if Dr Curiel would be able to work her into her schedule in the next week for possible yeast infection & questions on medications.  Please advise.

## 2022-06-14 NOTE — TELEPHONE ENCOUNTER
Called patient and notified them of the message below. Appointment made for 7/1. Patient also has no further questions at this time.        Guera Rivera MA

## 2022-06-15 ENCOUNTER — PATIENT OUTREACH (OUTPATIENT)
Dept: CARE COORDINATION | Facility: CLINIC | Age: 37
End: 2022-06-15
Payer: MEDICARE

## 2022-06-15 DIAGNOSIS — Z71.89 OTHER SPECIFIED COUNSELING: ICD-10-CM

## 2022-06-15 NOTE — ED TRIAGE NOTES
Ran out of anxiety medication on Friday last week, able to take medications this morning but is still feeling more anxious than normal.      Triage Assessment     Row Name 06/14/22 2001       Triage Assessment (Adult)    Airway WDL WDL       Respiratory WDL    Respiratory WDL WDL       Skin Circulation/Temperature WDL    Skin Circulation/Temperature WDL WDL       Cardiac WDL    Cardiac WDL WDL       Peripheral/Neurovascular WDL    Peripheral Neurovascular WDL WDL       Cognitive/Neuro/Behavioral WDL    Cognitive/Neuro/Behavioral WDL WDL

## 2022-06-15 NOTE — DISCHARGE INSTRUCTIONS
Xanax 0.5 mg twice a day as needed. Use sparingly.  Continue you other medications as prescribed.  Follow-up with counselor tomorrow.  Call your psychiatrist if needed as discussed and for all refills.
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
## 2022-06-15 NOTE — PROGRESS NOTES
Clinic Care Coordination Contact    Background: Care Coordination referral placed from hospitals discharge report for reason of patient meeting criteria for a TCM outreach call by [facility] team.    Assessment: Upon chart review, [facility] Team member will cancel/close the referral for TCM outreach due to reason below:    Patient has a follow up appointment with an appropriate provider today for hospital discharge    Plan: Care Coordination referral for TCM outreach canceled.    Allyson Lake MA  [facility] [place], [facility]

## 2022-06-16 LAB — PHQ9 SCORE: 5

## 2022-07-06 NOTE — TELEPHONE ENCOUNTER
Patient has an appt.  Scheduled for her Dilated Eye Exam on 7/27/2022  with Dionna Fowler Reason for Call:  Other prescription    Detailed comments: patient states that in the last couple months she has been angry and she believes that it is because of the phentermine (ADIPEX-P) 30 MG capsule. She would like a phone call back to discuss this.    Phone Number Patient can be reached at: Cell number on file:    Telephone Information:   Mobile [phone]       Best Time: any    Can we leave a detailed message on this number? YES    Call taken on 5/24/2019 at 8:56 AM by Anisa Khoury

## 2022-09-02 ENCOUNTER — TELEPHONE (OUTPATIENT)
Dept: FAMILY MEDICINE | Facility: OTHER | Age: 37
End: 2022-09-02

## 2022-09-02 NOTE — TELEPHONE ENCOUNTER
Forms/Letter Request    Type of form/letter: [employer] Form    Have you been seen for this request: N/A    Do we have the form/letter: Yes: Team C box    When is form/letter needed by: as soon as possible    How would you like the form/letter returned:     Patient Notified form requests are processed in 3-5 business days:Yes    Could we send this information to you in [employer] or would you prefer to receive a phone call?:   Patient would prefer a phone call   Okay to leave a detailed message?: Yes at Cell number on file:    Telephone Information:   Mobile [phone]   Mobile [phone]

## 2022-09-06 NOTE — TELEPHONE ENCOUNTER
I filled out my page and attached a copy of her medications.  Patient or parent will need to fill out the first page.

## 2022-10-09 ENCOUNTER — HEALTH MAINTENANCE LETTER (OUTPATIENT)
Age: 37
End: 2022-10-09

## 2022-10-13 ENCOUNTER — HOSPITAL ENCOUNTER (EMERGENCY)
Facility: CLINIC | Age: 37
Discharge: HOME | End: 2022-10-13
Attending: FAMILY MEDICINE | Admitting: FAMILY MEDICINE
Payer: MEDICARE

## 2022-10-13 VITALS
TEMPERATURE: 98.3 F | HEART RATE: 78 BPM | WEIGHT: 293 LBS | SYSTOLIC BLOOD PRESSURE: 135 MMHG | DIASTOLIC BLOOD PRESSURE: 78 MMHG | OXYGEN SATURATION: 99 % | BODY MASS INDEX: 48.97 KG/M2 | RESPIRATION RATE: 18 BRPM

## 2022-10-13 DIAGNOSIS — M62.838 MUSCLE SPASM: ICD-10-CM

## 2022-10-13 DIAGNOSIS — R20.0 NUMBNESS OF RIGHT HAND: ICD-10-CM

## 2022-10-13 PROCEDURE — 250N000009 HC RX 250: Performed by: FAMILY MEDICINE

## 2022-10-13 PROCEDURE — 20552 NJX 1/MLT TRIGGER POINT 1/2: CPT | Mod: RT | Performed by: FAMILY MEDICINE

## 2022-10-13 PROCEDURE — 99284 EMERGENCY DEPT VISIT MOD MDM: CPT | Mod: 25 | Performed by: FAMILY MEDICINE

## 2022-10-13 RX ORDER — BUPIVACAINE HYDROCHLORIDE 5 MG/ML
10 INJECTION, SOLUTION EPIDURAL; INTRACAUDAL ONCE
Status: COMPLETED | OUTPATIENT
Start: 2022-10-13 | End: 2022-10-13

## 2022-10-13 RX ADMIN — BUPIVACAINE HYDROCHLORIDE 50 MG: 5 INJECTION, SOLUTION EPIDURAL; INTRACAUDAL at 19:39

## 2022-10-13 ASSESSMENT — ENCOUNTER SYMPTOMS: SHORTNESS OF BREATH: 0

## 2022-10-13 ASSESSMENT — ACTIVITIES OF DAILY LIVING (ADL): ADLS_ACUITY_SCORE: 35

## 2022-10-14 ENCOUNTER — TELEPHONE (OUTPATIENT)
Dept: FAMILY MEDICINE | Facility: OTHER | Age: 37
End: 2022-10-14

## 2022-10-14 DIAGNOSIS — M54.2 CERVICALGIA: Primary | ICD-10-CM

## 2022-10-14 DIAGNOSIS — M25.511 ACUTE PAIN OF RIGHT SHOULDER: ICD-10-CM

## 2022-10-14 NOTE — TELEPHONE ENCOUNTER
----- Message from Andi Sharp MD sent at 10/13/2022  7:30 PM CDT -----  Pleasant lady in with right trapezius pain and muscle spasm for the past week or so.  Has been under lots of stress and that may be contributing.  I did some trigger point injections which gave her good relief.  Physical therapy or massage therapy may be helpful.  Could you put an order in for that and then see her in follow-up?  Thanks    Toño

## 2022-10-14 NOTE — ED PROVIDER NOTES
History     Chief Complaint   Patient presents with     Arm Pain     HPI  Florina Marie is a 37 year old female who presents to the ED with right trapezius pain for the past week or so.  No known injury.  Came on gradually 1 day.  Does not recall doing anything out of the ordinary.  Pain is in the right trap goes up into the base of her neck over the top of her shoulder.  Intermittent numbness in her right hand seems to be positional when she bends her elbow.  Had carpal tunnel release years ago.  Denies any chest pain or shortness of breath.  No diaphoresis.  No weakness.  Her left leg goes numb when she sits in the bathtub too long.  This is nothing new.  Primary physician is Dr. Alanna Curiel in [place].      Allergies:  Allergies   Allergen Reactions     Macrobid [Nitrofurantoin] Headache and Nausea       Problem List:    Patient Active Problem List    Diagnosis Date Noted     Prolonged PTT 08/09/2018     Priority: Medium     Attention deficit disorder without hyperactivity 12/01/2011     Priority: Medium     Hyperlipidemia, unspecified 01/08/2010     Priority: Medium     Generalized anxiety disorder 10/24/2007     Priority: Medium     Morbid obesity (H) 01/01/2004     Priority: Medium     Problems with learning 12/31/2003     Priority: Medium        Past Medical History:    Past Medical History:   Diagnosis Date     Attention deficit disorder with hyperactivity(314.01)      Left carpal tunnel syndrome 6/27/2019     Other kyphoscoliosis and scoliosis      Other specified delay in development      Right carpal tunnel syndrome 3/14/2019     Viral warts, unspecified        Past Surgical History:    Past Surgical History:   Procedure Laterality Date     EXAM UNDER ANESTHESIA PELVIC N/A 9/9/2016    Procedure: EXAM UNDER ANESTHESIA PELVIC;  Surgeon: Rhoda Alcazar MD;  Location:  OR      TOOTH EXTRACTION W/FORCEP      wisdom     RELEASE CARPAL TUNNEL Right 3/22/2019    Procedure: RIGHT RELEASE  CARPAL TUNNEL;  Surgeon: Anjum Wilburn DO;  Location: [facility] OR     RELEASE CARPAL TUNNEL Left 7/16/2019    Procedure: RELEASE, CARPAL TUNNEL-Left;  Surgeon: Anujm Wilburn DO;  Location: [facility] OR       Family History:    Family History   Problem Relation Age of Onset     Lipids Father         diet     Thyroid Disease Mother         unsure if hypo or hyper     Diabetes Paternal Grandfather         adult     Heart Disease Paternal Grandfather         bypass/open hear surgery     Lipids Maternal Aunt         medication     Lipids Maternal Aunt         diet     Alzheimer Disease Maternal Grandfather      Hypertension No family hx of      Coronary Artery Disease No family hx of      Hyperlipidemia No family hx of      Cancer - colorectal No family hx of      Ovarian Cancer No family hx of      Prostate Cancer No family hx of      Depression/Anxiety No family hx of      Cerebrovascular Disease No family hx of      Anesthesia Reaction No family hx of      Asthma No family hx of      Osteoporosis No family hx of      Chemical Addiction No family hx of      Obesity No family hx of        Social History:  Marital Status:  Single [1]  Social History     Tobacco Use     Smoking status: Never     Smokeless tobacco: Never     Tobacco comments:     no smokers in the household   Vaping Use     Vaping Use: Never used   Substance Use Topics     Alcohol use: No     Drug use: No        Medications:    ALPRAZolam (XANAX) 0.5 MG tablet  azelastine (OPTIVAR) 0.05 % ophthalmic solution  cetirizine (ZYRTEC) 10 MG tablet  desogestrel-ethinyl estradiol (APRI) 0.15-30 MG-MCG tablet  escitalopram (LEXAPRO) 20 MG tablet  folic acid (FOLVITE) 1 MG tablet  hydrOXYzine (ATARAX) 25 MG tablet          Review of Systems   Respiratory: Negative for shortness of breath.    Cardiovascular: Negative for chest pain.   All other systems reviewed and are negative.      Physical Exam   BP: (!) 135/98  Pulse: 78  Temp: 98.3  F (36.8  C)  Resp:  18  Weight: 139 kg (306 lb 8 oz)  SpO2: 99 %      Physical Exam  Constitutional:       General: She is not in acute distress.     Appearance: Normal appearance. She is obese.   Cardiovascular:      Pulses:           Radial pulses are 2+ on the right side.   Pulmonary:      Effort: Pulmonary effort is normal. No respiratory distress.   Musculoskeletal:      Right shoulder: No tenderness. Normal range of motion.        Arms:       Cervical back: Tenderness (right mid trap tender/spasm) present.      Comments: Good  strength, no hand numbness at this time.   Skin:     General: Skin is warm and dry.   Neurological:      General: No focal deficit present.      Mental Status: She is alert and oriented to person, place, and time.   Psychiatric:         Mood and Affect: Mood normal.         ED Course                 Procedures              Critical Care time:  none               No results found for this or any previous visit (from the past 24 hour(s)).    Medications   bupivacaine (MARCAINE) 0.5% preservative free injection (50 mg Intradermal Given by Other 10/13/22 1939)       Assessments & Plan (with Medical Decision Making)  37-year-old with right trap pain and spasm for the past week or so.  Has been under lots of stress and is wondering if that is contributing.  No associated chest pain or shortness of breath.  Intermittent right hand numbness seems to be positional.  On exam she has significant tenderness and spasm in the right trap especially in the mid body.  She has good range of motion of the shoulder and no weakness or numbness of the extremity.  We discussed trigger point injections and she would like to proceed.  After alcohol prep, 3 trigger point injections in the right trap were performed with bupivacaine 0.5% plain.  She realized excellent relief.  Just before she left she asked if she could get an injection in her superior deltoid.  Its just inferior to the acromion.  I did give her another injection  of 2 cc in the point of maximal tenderness and she reported some relief.  Physical therapy or massage therapy may be helpful.  I sent a note to her primary physician asking if an order could be placed and she can be seen in clinic in follow-up.  Ice/Ice massage.  Verbal and written discharge instructions given.  She is comfortable with this plan.       I have reviewed the nursing notes.    I have reviewed the findings, diagnosis, plan and need for follow up with the patient.       New Prescriptions    No medications on file       Final diagnoses:   Muscle spasm - right trap   Numbness of right hand - intermittent - positional       10/13/2022   [facility] EMERGENCY DEPT     Andi Sharp MD  10/13/22 1944

## 2022-10-14 NOTE — DISCHARGE INSTRUCTIONS
Ice 20 minutes per hour, (20 minutes on, 40 minutes off) at least 4 times a day.  Ice massage as demonstrated may also be helpful.  If you wish, you can add heat after a couple of days.  I sent a Dr. Curiel a note asking her if she could order physical therapy or massage therapy for you.  It is certainly possible that the stress in your life could be causing this.  It was nice visiting with you this evening.  I am glad you are feeling at least a little bit better and hope you continue to improve.    Thank you for choosing [facility]. We appreciate the opportunity to meet your urgent medical needs. Please let us know if we could have done anything to make your stay more satisfying.    After discharge, please closely monitor for any new or worsening symptoms. Return to the Emergency Department if you develop any acute worsening signs or symptoms.    If you had lab work, cultures or imaging studies done during your stay, the final results may still be pending. We will call you if your plan of care needs to change. However, if you are not improving as expected, please follow up with your primary care provider or clinic.     Start any prescription medications that were prescribed to you and take them as directed.     Please see additional handouts that may be pertinent to your condition.

## 2022-10-20 ENCOUNTER — OFFICE VISIT (OUTPATIENT)
Dept: FAMILY MEDICINE | Facility: OTHER | Age: 37
End: 2022-10-20
Payer: MEDICARE

## 2022-10-20 ENCOUNTER — NURSE TRIAGE (OUTPATIENT)
Dept: NURSING | Facility: CLINIC | Age: 37
End: 2022-10-20

## 2022-10-20 VITALS
WEIGHT: 293 LBS | HEART RATE: 87 BPM | OXYGEN SATURATION: 96 % | BODY MASS INDEX: 48.73 KG/M2 | DIASTOLIC BLOOD PRESSURE: 83 MMHG | SYSTOLIC BLOOD PRESSURE: 126 MMHG | TEMPERATURE: 97.5 F

## 2022-10-20 DIAGNOSIS — E66.01 MORBID OBESITY (H): ICD-10-CM

## 2022-10-20 DIAGNOSIS — M75.41 IMPINGEMENT SYNDROME, SHOULDER, RIGHT: Primary | ICD-10-CM

## 2022-10-20 PROCEDURE — 99214 OFFICE O/P EST MOD 30 MIN: CPT | Performed by: PHYSICIAN ASSISTANT

## 2022-10-20 RX ORDER — TIZANIDINE 2 MG/1
2 TABLET ORAL 3 TIMES DAILY PRN
Qty: 30 TABLET | Refills: 0 | Status: SHIPPED | OUTPATIENT
Start: 2022-10-20 | End: 2023-05-02

## 2022-10-20 RX ORDER — PREDNISONE 20 MG/1
20 TABLET ORAL 2 TIMES DAILY
Qty: 10 TABLET | Refills: 0 | Status: SHIPPED | OUTPATIENT
Start: 2022-10-20 | End: 2023-01-13

## 2022-10-20 ASSESSMENT — PAIN SCALES - GENERAL: PAINLEVEL: WORST PAIN (10)

## 2022-10-20 NOTE — PROGRESS NOTES
Assessment & Plan     Impingement syndrome, shoulder, right  Morbid obesity (H)  Patient's shoulder pain flared about 1 week ago. She cannot recall triggering event or activity. She says that the pain is present at night and worse if she lays on the shoulder. Had previously been seen at the ED around the time of onset. Review of this note shows that she was given trigger point injections. This was beneficial at first, but quickly wore off and pain returned. She has not begun any PT at this time and I have advised that we get her scheduled for this. I also will have her complete a short burst of prednisone for suspected inflammation. Reviewed possible adverse effects and advised she take it with food. Patient will have low dose muscle relaxant to use as needed. Cautioned about the sedative effects and advised against use while operating machinery.   - predniSONE (DELTASONE) 20 MG tablet; Take 1 tablet (20 mg) by mouth 2 times daily (Take with food)  - tiZANidine (ZANAFLEX) 2 MG tablet; Take 1 tablet (2 mg) by mouth 3 times daily as needed for muscle spasms  - Physical Therapy Referral; Future      Return in about 2 months (around 12/20/2022) for Return for scheduled annual checkup with PCP.    Fco Jaime PA-C  [facility] CLIFF Heaton is a 37 year old, presenting for the following health issues:  No chief complaint on file.      History of Present Illness       Reason for visit:  Shoulder pain    She eats 2-3 servings of fruits and vegetables daily.She consumes 2 sweetened beverage(s) daily.She exercises with enough effort to increase her heart rate 9 or less minutes per day.  She exercises with enough effort to increase her heart rate 3 or less days per week. She is missing 1 dose(s) of medications per week.       Pain History:  When did you first notice your pain? - Acute Pain   Have you seen anyone else for your pain? Yes - [facility] ED   Where in your body do you have  pain? Musculoskeletal problem/pain  Onset/Duration: about 1 week  Description  Location: shoulder - right  Joint Swelling: No  Redness: No  Pain: YES- sharp, burning  Warmth: Unknown  Intensity:  10/10  Progression of Symptoms:  worsening  Accompanying signs and symptoms:   Fevers: No  Numbness/tingling/weakness: No  History  Trauma to the area: No  Recent illness:  No  Previous similar problem: No  Previous evaluation:  No  Precipitating or alleviating factors:  Aggravating factors include: lifting  Therapies tried and outcome: rest/inactivity, ice and massage    Review of Systems   Constitutional, HEENT, cardiovascular, pulmonary, gi and gu systems are negative, except as otherwise noted.      Objective    /83 (Cuff Size: Adult Large)   Pulse 87   Temp 97.5  F (36.4  C) (Oral)   Wt 138.3 kg (305 lb)   LMP 10/01/2022 (Within Days)   SpO2 96%   BMI 48.73 kg/m    Body mass index is 48.73 kg/m .  Physical Exam   GENERAL: healthy, alert and no distress  NECK: no adenopathy, no asymmetry, masses, or scars and thyroid normal to palpation  RESP: lungs clear to auscultation - no rales, rhonchi or wheezes  CV: regular rate and rhythm, normal S1 S2, no S3 or S4, no murmur, click or rub, no peripheral edema and peripheral pulses strong  MS: Normal AROM of neck/shoulders/elbows. Pain noted with full flexion and abduction of right shoulder. Positive empty can. +/- Hansen, Neg crossover. Tenderness to palpation over the LH of the biceps and AC joint. Pain limited strength during resisted flex/abd, int/ext rotation.   PSYCH: mentation appears normal, affect normal/bright

## 2022-10-20 NOTE — TELEPHONE ENCOUNTER
One week of excessive right shoulder pain. It woke her at night. She has normal range of motion, can use arm.  Massaging arm helps, not using over the counter med for pain. Seen in ER on the 13th. ER sent message to clinic for follow up. I saw a note regarding a PT referal and the need for follow up. I connected her with scheduling to set up a follow up appointment with anyone so they can get the PT referral going. She has no other questions at this time. I encouraged her to try Tylenol or ibuprofen.  Kate Gale RN  [facility]    Reason for Disposition    Requesting regular office appointment    Additional Information    Negative: [1] Caller is not with the adult (patient) AND [2] reporting urgent symptoms    Negative: Lab result questions    Negative: Medication questions    Negative: Caller can't be reached by phone    Negative: Caller has already spoken to PCP or another triager    Negative: RN needs further essential information from caller in order to complete triage    Protocols used: INFORMATION ONLY CALL - NO TRIAGE-A-

## 2022-11-25 ENCOUNTER — TELEPHONE (OUTPATIENT)
Dept: FAMILY MEDICINE | Facility: OTHER | Age: 37
End: 2022-11-25

## 2022-11-25 ENCOUNTER — MYC MEDICAL ADVICE (OUTPATIENT)
Dept: FAMILY MEDICINE | Facility: OTHER | Age: 37
End: 2022-11-25

## 2022-11-25 DIAGNOSIS — M75.41 IMPINGEMENT SYNDROME, SHOULDER, RIGHT: Primary | ICD-10-CM

## 2022-11-25 NOTE — TELEPHONE ENCOUNTER
Patient was seen on 10/20 by .  Patient is requesting a MRI of right shoulder.  She is still having pain.  Can provider put in orders for MRI.  She prefers [facility].

## 2022-11-28 ENCOUNTER — TELEPHONE (OUTPATIENT)
Dept: FAMILY MEDICINE | Facility: OTHER | Age: 37
End: 2022-11-28

## 2022-11-28 NOTE — TELEPHONE ENCOUNTER
Danica Heaton,     The referral was placed, if you have not received a call yet to schedule please call [phone] to schedule.     Take Care,  RITA PittsN, RN  [facility]  November 28, 2022

## 2022-11-28 NOTE — TELEPHONE ENCOUNTER
Order/Referral Request    Who is requesting: Patient    Orders being requested: MRI Order    Reason service is needed/diagnosis: Talked about at last appt    When are orders needed by: ASAP, she would like to schedule this now    Has this been discussed with Provider: Yes    Does patient have a preference on a Group/Provider/Facility? Prefers [facility] [place] 2]    Does patient have an appointment scheduled?: No    Where to send orders: N/A    Could we send this information to you in [facility] or would you prefer to receive a phone call?:   Patient would prefer a phone call   Okay to leave a detailed message?: Yes at Home number on file [phone] (home)

## 2022-11-29 ENCOUNTER — HOSPITAL ENCOUNTER (EMERGENCY)
Facility: CLINIC | Age: 37
Discharge: HOME | End: 2022-11-29
Attending: PHYSICIAN ASSISTANT | Admitting: PHYSICIAN ASSISTANT
Payer: MEDICARE

## 2022-11-29 ENCOUNTER — NURSE TRIAGE (OUTPATIENT)
Dept: NURSING | Facility: CLINIC | Age: 37
End: 2022-11-29

## 2022-11-29 VITALS
OXYGEN SATURATION: 98 % | BODY MASS INDEX: 47.93 KG/M2 | DIASTOLIC BLOOD PRESSURE: 82 MMHG | SYSTOLIC BLOOD PRESSURE: 116 MMHG | WEIGHT: 293 LBS | TEMPERATURE: 97.1 F | HEART RATE: 84 BPM | RESPIRATION RATE: 18 BRPM

## 2022-11-29 DIAGNOSIS — S46.811S TRAPEZIUS MUSCLE STRAIN, RIGHT, SEQUELA: ICD-10-CM

## 2022-11-29 DIAGNOSIS — R20.0 RIGHT ARM NUMBNESS: ICD-10-CM

## 2022-11-29 PROCEDURE — 99282 EMERGENCY DEPT VISIT SF MDM: CPT | Performed by: PHYSICIAN ASSISTANT

## 2022-11-29 ASSESSMENT — ACTIVITIES OF DAILY LIVING (ADL): ADLS_ACUITY_SCORE: 33

## 2022-11-29 NOTE — TELEPHONE ENCOUNTER
"Nurse Triage SBAR    Is this a 2nd Level Triage? YES, LICENSED PRACTITIONER REVIEW IS REQUIRED    Situation: She is starting to have headaches due to pain going up towards her neck.    Background: Patient was seen 2 months ago for her shoulder.    Assessment: She also has tingling in her shoulder area. She states the pain is increasing.    She states the prednisone and the muscle relaxer did not help her pain at all.    Pain in her shoulder is 10 out of 10.  Patient has tried ibuprofen, tylenol, heat, and ice- this is not helping her pain.    No fever.  Some weakness. This has improved since her visit.    Protocol Recommended Disposition:   Go to ED Now (Or PCP Triage)    Recommendation: Page on call provider     Paged to provider    5:44 Scott Valverde MD  Provider's advice: patient needs a follow up appointment. If her headache is worse or new neurological symptoms then ER. If pain is uncontrolled then ER.    She states her headache is a 10 out of 10.     Patient notified providers advice.    Ashlie Masterson RN on 11/29/2022 at 5:53 PM      Reason for Disposition    [1] SEVERE pain AND [2] not improved 2 hours after pain medicine    Additional Information    Negative: Passed out (i.e., lost consciousness, collapsed and was not responding)    Negative: Shock suspected (e.g., cold/pale/clammy skin, too weak to stand, low BP, rapid pulse)    Negative: [1] Similar pain previously AND [2] it was from \"heart attack\"    Negative: [1] Similar pain previously AND [2] it was from \"angina\" AND [3] not relieved by nitroglycerin    Negative: Sounds like a life-threatening emergency to the triager    Negative: Followed a shoulder injury    Negative: Chest pain    Negative: Difficulty breathing or unusual sweating (e.g., sweating without exertion)    Negative: [1] Pain lasting > 5 minutes AND [2] pain also present in chest  (Exception: pain is clearly made worse by movement)    Negative: [1] Age > 40 AND [2] no obvious cause " AND [3] pain even when not moving the arm  (Exception: pain is clearly made worse by moving arm or bending neck)    Protocols used: SHOULDER PAIN-A-AH

## 2022-11-30 NOTE — DISCHARGE INSTRUCTIONS
Your exam findings are consistent with a muscle spasm and tightness.  I think this is what is causing most of your symptoms.  Continue with ibuprofen to help with inflammation.  Use your muscle relaxer you were already prescribed at bedtime to help with spasm relief.  I would like you to get into physical therapy for further management as well.    Because you complained of right arm numbness I did order an MRI of the cervical spine to rule out any nerve impingement.  You can schedule this at your convenience.    If you have any worsening symptoms please do not hesitate to return to the emergency department.    Thank you for choosing [facility]'s Emergency Department. It was a pleasure taking care of you today. If you have any questions, please call [phone].    Jenni Goel PA-C    
No

## 2022-11-30 NOTE — ED TRIAGE NOTES
Pt c/o right shoulder pain x 2 months.  States she feels numbness into her right hand, and pain up into her neck.  Ice/heat not helping.       Triage Assessment     Row Name 11/29/22 2003       Triage Assessment (Adult)    Airway WDL WDL       Respiratory WDL    Respiratory WDL WDL       Cardiac WDL    Cardiac WDL WDL

## 2022-11-30 NOTE — ED PROVIDER NOTES
History     Chief Complaint   Patient presents with     Shoulder Pain       HPI  Floirna Marie is a 37 year old female who presents to the emergency department complaining of right shoulder pain. The patient reports she has had pain in her right shoulder for over 2 months now.  She reports a tight tingly sensation at times in her right neck region and shoulder.  She has had occasional numbness into her right hand as well.  Denies any persistent weakness in the arms.  She denies any neck pain.  No pain with movement of the arm.  No trauma to the area.  She saw her clinic provider in October for this concern and was prescribed prednisone and a muscle relaxer.  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  She has been using ibuprofen as needed along with ice and heat to the neck.  She has had some headaches on the right side from the pain radiating from her neck and shoulder.  She has an appointment on Thursday next week with orthopedics for further evaluation and management of this concern.  Came in today because she would like some images taken to see what is going on.        Allergies:  Allergies   Allergen Reactions     Macrobid [Nitrofurantoin] Headache and Nausea       Problem List:    Patient Active Problem List    Diagnosis Date Noted     Prolonged PTT 08/09/2018     Priority: Medium     Attention deficit disorder without hyperactivity 12/01/2011     Priority: Medium     Hyperlipidemia, unspecified 01/08/2010     Priority: Medium     Generalized anxiety disorder 10/24/2007     Priority: Medium     Morbid obesity (H) 01/01/2004     Priority: Medium     Problems with learning 12/31/2003     Priority: Medium        Past Medical History:    Past Medical History:   Diagnosis Date     Attention deficit disorder with hyperactivity(314.01)      Left carpal tunnel syndrome 6/27/2019     Other kyphoscoliosis and scoliosis      Other specified delay in development      Right carpal tunnel  syndrome 3/14/2019     Viral warts, unspecified        Past Surgical History:    Past Surgical History:   Procedure Laterality Date     EXAM UNDER ANESTHESIA PELVIC N/A 9/9/2016    Procedure: EXAM UNDER ANESTHESIA PELVIC;  Surgeon: hRoda Alcazar MD;  Location: [facility] OR     HC TOOTH EXTRACTION W/FORCEP      wisdom     RELEASE CARPAL TUNNEL Right 3/22/2019    Procedure: RIGHT RELEASE CARPAL TUNNEL;  Surgeon: Anjum Wilburn DO;  Location: [facility] OR     RELEASE CARPAL TUNNEL Left 7/16/2019    Procedure: RELEASE, CARPAL TUNNEL-Left;  Surgeon: Anjum Wilburn DO;  Location: [facility] OR       Family History:    Family History   Problem Relation Age of Onset     Lipids Father         diet     Thyroid Disease Mother         unsure if hypo or hyper     Diabetes Paternal Grandfather         adult     Heart Disease Paternal Grandfather         bypass/open hear surgery     Lipids Maternal Aunt         medication     Lipids Maternal Aunt         diet     Alzheimer Disease Maternal Grandfather      Hypertension No family hx of      Coronary Artery Disease No family hx of      Hyperlipidemia No family hx of      Cancer - colorectal No family hx of      Ovarian Cancer No family hx of      Prostate Cancer No family hx of      Depression/Anxiety No family hx of      Cerebrovascular Disease No family hx of      Anesthesia Reaction No family hx of      Asthma No family hx of      Osteoporosis No family hx of      Chemical Addiction No family hx of      Obesity No family hx of        Social History:  Marital Status:  Single [1]  Social History     Tobacco Use     Smoking status: Never     Smokeless tobacco: Never     Tobacco comments:     no smokers in the household   Vaping Use     Vaping Use: Never used   Substance Use Topics     Alcohol use: No     Drug use: No        Medications:    ALPRAZolam (XANAX) 0.5 MG tablet  azelastine (OPTIVAR) 0.05 % ophthalmic solution  cetirizine (ZYRTEC) 10 MG tablet  desogestrel-ethinyl  estradiol (APRI) 0.15-30 MG-MCG tablet  escitalopram (LEXAPRO) 20 MG tablet  folic acid (FOLVITE) 1 MG tablet  hydrOXYzine (ATARAX) 25 MG tablet  predniSONE (DELTASONE) 20 MG tablet  tiZANidine (ZANAFLEX) 2 MG tablet          Review of Systems   All other systems reviewed and are negative.        Physical Exam   BP: 119/89  Pulse: 86  Temp: 97.1  F (36.2  C)  Resp: 18  Weight: 136.1 kg (300 lb)  SpO2: 97 %      Physical Exam  Vitals and nursing note reviewed.   Constitutional:       General: She is not in acute distress.     Appearance: Normal appearance. She is not ill-appearing, toxic-appearing or diaphoretic.   HENT:      Head: Normocephalic and atraumatic.   Eyes:      Extraocular Movements: Extraocular movements intact.      Conjunctiva/sclera: Conjunctivae normal.   Pulmonary:      Effort: Pulmonary effort is normal. No respiratory distress.   Musculoskeletal:      Cervical back: Neck supple.      Comments: Cervical neck: No midline bony tenderness.  Normal range of motion without significant pain.  Right trapezius musculature is tight and tender throughout.  Right shoulder: Absolutely no bony tenderness throughout the shoulder.  Normal range of motion of the shoulder without significant pain.  Normal  strength.  Normal radial pulse.   Skin:     General: Skin is warm and dry.   Neurological:      General: No focal deficit present.      Mental Status: She is alert and oriented to person, place, and time. Mental status is at baseline.   Psychiatric:         Mood and Affect: Mood normal.         Behavior: Behavior normal.           ED Course          Procedures      No results found for this or any previous visit (from the past 24 hour(s)).    Medications - No data to display      Assessments & Plan (with Medical Decision Making)  Florina Marie is a 37 year old female who presents to the ED complaining of 2-month history of right shoulder pain.  No injury noted.  Occasional numbness in the right hand.  Saw  her clinic provider who recommended physical therapy which she did not go to.  She has an appointment next week with sports medicine for follow-up as well.  She had normal vital signs on arrival.  Exam today demonstrated tenderness with tightness in the right trapezius musculature but otherwise completely normal shoulder exam and cervical spine exam.  No strength deficits in right hand, normal pulse.  Because she has no bony tenderness I do not think x-rays are warranted despite patient's request for imaging.  I did explain this rationale to her.  Clinically I suspect her symptoms are muscular in nature.  Physical therapy would certainly be beneficial as well as using the muscle relaxer she was already prescribed.  Because she is complaining of right arm numbness, there could be some impingement of the nerve in the cervical spine so I offered to order an MRI of the cervical spine to be done in outpatient which she agreed to so this was ordered and she can schedule at her convenience.  Otherwise, I advised that she actually get into physical therapy.  She can apply heat to the shoulder, continue with NSAIDs, and take her muscle relaxers at night.  She was given instructions on when to return to the ED.  All questions answered and patient discharged home in suitable condition.     I have reviewed the nursing notes.    I have reviewed the findings, diagnosis, plan and need for follow up with the patient.    New Prescriptions    No medications on file       Final diagnoses:   Trapezius muscle strain, right, sequela   Right arm numbness     Note: Chart documentation done in part with Dragon Voice Recognition software. Although reviewed after completion, some word and grammatical errors may remain.     11/29/2022   [facility] EMERGENCY DEPT     Jenni Goel PA-C  11/29/22 [id number]

## 2022-12-08 ENCOUNTER — HOSPITAL ENCOUNTER (OUTPATIENT)
Dept: MRI IMAGING | Facility: CLINIC | Age: 37
Discharge: HOME | End: 2022-12-08
Attending: PHYSICIAN ASSISTANT | Admitting: PHYSICIAN ASSISTANT
Payer: MEDICARE

## 2022-12-08 DIAGNOSIS — R20.0 RIGHT ARM NUMBNESS: ICD-10-CM

## 2022-12-08 PROCEDURE — 72141 MRI NECK SPINE W/O DYE: CPT | Mod: MF

## 2022-12-09 ENCOUNTER — TELEPHONE (OUTPATIENT)
Dept: FAMILY MEDICINE | Facility: OTHER | Age: 37
End: 2022-12-09

## 2022-12-09 ENCOUNTER — TELEPHONE (OUTPATIENT)
Dept: NEUROSURGERY | Facility: OTHER | Age: 37
End: 2022-12-09

## 2022-12-09 DIAGNOSIS — R20.0 NUMBNESS: Primary | ICD-10-CM

## 2022-12-09 DIAGNOSIS — M25.78 DISC-OSTEOPHYTE COMPLEX: ICD-10-CM

## 2022-12-09 DIAGNOSIS — M50.30 DISC-OSTEOPHYTE COMPLEX: ICD-10-CM

## 2022-12-09 NOTE — TELEPHONE ENCOUNTER
Patient calling regarding MRI of c spine she had done yesterday 12/8/22.    MRI was ordered by ED provider.     Preliminary results in chart at this time - results not given to patient at this time as they are not finalized.     Encouraged patient to watch my chart for notification when results are released to my chart and call back with any questions.     Meliza RUDD, RN  [facility]

## 2022-12-09 NOTE — TELEPHONE ENCOUNTER
Patient has received MRI results via my chart. She has questions regarding results and what follow up is needed at this time.     Routing to last seen provider in clinic. MRI ordered by ED provider.     Meliza SALINASN, RN  [facility]

## 2022-12-09 NOTE — TELEPHONE ENCOUNTER
The MRI returned with findings of wear and tear throughout the cervical spine and some small disc protrusions. However, there was not any evidence of narrowing or nerve involvement. At this time she can consult with the spine specialists to discuss whether injections or alternative treatment in the neck is advised. She can also begin working with physical therapy. Let me know if she wants to see the spine folks.     Fco Jaime PA-C on 12/9/2022 at 12:54 PM

## 2022-12-09 NOTE — TELEPHONE ENCOUNTER
SPINE PATIENTS - NEW PROTOCOL PREVISIT    RECORDS RECEIVED FROM: Internal   REASON FOR VISIT: Numbness [R20.0]  Disc-osteophyte complex [M50.30, M25.78]  Recent MRI noted broad-based disc-osteophyte complexes throughout multiple levels of cervical spine, radicular numbness in right arm   Date of Appt: 12/15/2022   NOTES (FOR ALL VISITS) STATUS DETAILS   OFFICE NOTE from referring provider N/A    OFFICE NOTE from other specialist Internal 10/20/2022 Dr Jaime [facility]    DISCHARGE SUMMARY from hospital N/A    DISCHARGE REPORT from ER Internal 11/29/2022 [facility] ED   EMG REPORT N/A    MEDICATION LIST N/A    IMAGING  (FOR ALL VISITS)     MRI (HEAD, NECK, SPINE) Internal 12/08/2022 cervical spine   XRAY (SPINE) *NEUROSURGERY* N/A    CT (HEAD, NECK, SPINE) N/A

## 2022-12-09 NOTE — TELEPHONE ENCOUNTER
Patient calling in regards to MRI results.     Gave her message from below and patient is looking to see why and how this would happen?     Explained she will need to follow up with spine and they can go through further with her.     Patient would like referral to spine specialist.     RITA PittsN, RN  [facility]  December 9, 2022

## 2022-12-12 DIAGNOSIS — R20.0 RIGHT ARM NUMBNESS: Primary | ICD-10-CM

## 2022-12-12 NOTE — Clinical Note
Please arrange appts for xrays and let me know what time you would like the pt to arrive on Thursday. Thanks!

## 2022-12-13 ENCOUNTER — TELEPHONE (OUTPATIENT)
Dept: NEUROSURGERY | Facility: CLINIC | Age: 37
End: 2022-12-13

## 2022-12-13 NOTE — PROGRESS NOTES
RN called and left a detailed message on her VM letting her know that we need her to arrive at 11am this Thursday 12/15. She was encouraged to call with concerns.    Lissette Roland RN Care Coordinator   Neurology/Neurosurgery/PM&R/ Pain Management

## 2022-12-13 NOTE — TELEPHONE ENCOUNTER
Th pt was informed that she does need the xrays prior to her appt with Dr Rodrigues. she confirmed understanding and will arrive at 11am on 12/15.    Lissette Roland, RN Care Coordinator   Neurology/Neurosurgery/PM&R/ Pain Management

## 2022-12-13 NOTE — TELEPHONE ENCOUNTER
Hello,  I'm with ortho con.  Pt is asking if she needs to still come in early for Xrs before Dr. Rodrigues appjessica.  She has already has an MRI.  Please let her know.  Thank you.

## 2022-12-15 ENCOUNTER — ANCILLARY PROCEDURE (OUTPATIENT)
Dept: GENERAL RADIOLOGY | Facility: CLINIC | Age: 37
End: 2022-12-15
Attending: STUDENT IN AN ORGANIZED HEALTH CARE EDUCATION/TRAINING PROGRAM
Payer: MEDICARE

## 2022-12-15 ENCOUNTER — OFFICE VISIT (OUTPATIENT)
Dept: NEUROSURGERY | Facility: CLINIC | Age: 37
End: 2022-12-15
Attending: PHYSICIAN ASSISTANT
Payer: MEDICARE

## 2022-12-15 ENCOUNTER — PRE VISIT (OUTPATIENT)
Dept: NEUROSURGERY | Facility: CLINIC | Age: 37
End: 2022-12-15

## 2022-12-15 VITALS
HEART RATE: 81 BPM | BODY MASS INDEX: 47.13 KG/M2 | WEIGHT: 293 LBS | SYSTOLIC BLOOD PRESSURE: 128 MMHG | DIASTOLIC BLOOD PRESSURE: 79 MMHG

## 2022-12-15 DIAGNOSIS — M50.30 DISC-OSTEOPHYTE COMPLEX: ICD-10-CM

## 2022-12-15 DIAGNOSIS — M25.78 DISC-OSTEOPHYTE COMPLEX: ICD-10-CM

## 2022-12-15 DIAGNOSIS — R20.0 NUMBNESS: ICD-10-CM

## 2022-12-15 DIAGNOSIS — R20.0 RIGHT ARM NUMBNESS: Primary | ICD-10-CM

## 2022-12-15 DIAGNOSIS — R20.0 RIGHT ARM NUMBNESS: ICD-10-CM

## 2022-12-15 PROCEDURE — 72082 X-RAY EXAM ENTIRE SPI 2/3 VW: CPT | Performed by: RADIOLOGY

## 2022-12-15 PROCEDURE — 99203 OFFICE O/P NEW LOW 30 MIN: CPT | Performed by: STUDENT IN AN ORGANIZED HEALTH CARE EDUCATION/TRAINING PROGRAM

## 2022-12-15 RX ORDER — GABAPENTIN 300 MG/1
300 CAPSULE ORAL 3 TIMES DAILY
Qty: 90 CAPSULE | Refills: 1 | Status: SHIPPED | OUTPATIENT
Start: 2022-12-15 | End: 2023-05-02

## 2022-12-15 NOTE — LETTER
"    12/15/2022         RE: Florina Marie  [address] [place] [id number]        Dear Colleague,    Thank you for referring your patient, Florina Marie, to the [facility] [place]. Please see a copy of my visit note below.    HPI:  37-year-old female with right shoulder pain.  She gets numbness and subjective weakness is associated with the numbness as well.  The symptoms come and go over time.  She has been trying heat and ice but this is not helping.  She has not done any physical therapy.  She did do a trial of prednisone but did not notice any improvement with this as well.  There is no significant left shoulder pain no left arm pain.  The pain does not travel down to her hand at all.    Current Outpatient Medications   Medication     ALPRAZolam (XANAX) 0.5 MG tablet     azelastine (OPTIVAR) 0.05 % ophthalmic solution     cetirizine (ZYRTEC) 10 MG tablet     desogestrel-ethinyl estradiol (APRI) 0.15-30 MG-MCG tablet     escitalopram (LEXAPRO) 20 MG tablet     folic acid (FOLVITE) 1 MG tablet     hydrOXYzine (ATARAX) 25 MG tablet     predniSONE (DELTASONE) 20 MG tablet     tiZANidine (ZANAFLEX) 2 MG tablet     No current facility-administered medications for this visit.      Physical Exam:  Vital signs:                         Estimated body mass index is 47.93 kg/m  as calculated from the following:    Height as of 12/31/21: 1.685 m (5' 6.34\").    Weight as of 11/29/22: 136.1 kg (300 lb).   She is full-strength in her bilateral upper extremities.  Sensations intact light touch throughout.  No Inder sign noted.  Biceps and patellar reflexes are 2+ bilaterally.  Results Reviewed:  I personally viewed the images of an MRI of the cervical spine which shows multilevel spondylosis with a small disc herniation at C4-5 paracentral to the right possibly causing right foraminal stenosis.  No central canal stenosis present.  Loss of normal cervical lordosis.  " Flexion-extension films do not show any evidence of dynamic instability however there is loss of cervical lordosis on scoliosis films once again which has corrected extension.  Assessment:  37-year-old female with right shoulder pain possibly due to a C5 radiculopathy.  There is some foraminal stenosis on the right side at C4-5 which could contribute to her pain.  Plan:  Given she has not done any other conservative management we will start with a referral to pain management for an injection.  If this helps the pain significantly this would be an appropriate step for treatment.  I will also start her on gabapentin to see if this will help.  Should she not get any significant relief from the injection or other conservative management options we will consider further evaluation including an EMG versus surgery to decompress the nerve.  She can follow-up with me if conservative management options do not improve her symptoms going forward.    Héctor Rodrigues MD      Again, thank you for allowing me to participate in the care of your patient.        Sincerely,        Héctor Rodrigues MD

## 2022-12-15 NOTE — PROGRESS NOTES
"HPI:  37-year-old female with right shoulder pain.  She gets numbness and subjective weakness is associated with the numbness as well.  The symptoms come and go over time.  She has been trying heat and ice but this is not helping.  She has not done any physical therapy.  She did do a trial of prednisone but did not notice any improvement with this as well.  There is no significant left shoulder pain no left arm pain.  The pain does not travel down to her hand at all.    Current Outpatient Medications   Medication     ALPRAZolam (XANAX) 0.5 MG tablet     azelastine (OPTIVAR) 0.05 % ophthalmic solution     cetirizine (ZYRTEC) 10 MG tablet     desogestrel-ethinyl estradiol (APRI) 0.15-30 MG-MCG tablet     escitalopram (LEXAPRO) 20 MG tablet     folic acid (FOLVITE) 1 MG tablet     hydrOXYzine (ATARAX) 25 MG tablet     predniSONE (DELTASONE) 20 MG tablet     tiZANidine (ZANAFLEX) 2 MG tablet     No current facility-administered medications for this visit.      Physical Exam:  Vital signs:                         Estimated body mass index is 47.93 kg/m  as calculated from the following:    Height as of 12/31/21: 1.685 m (5' 6.34\").    Weight as of 11/29/22: 136.1 kg (300 lb).   She is full-strength in her bilateral upper extremities.  Sensations intact light touch throughout.  No Inder sign noted.  Biceps and patellar reflexes are 2+ bilaterally.  Results Reviewed:  I personally viewed the images of an MRI of the cervical spine which shows multilevel spondylosis with a small disc herniation at C4-5 paracentral to the right possibly causing right foraminal stenosis.  No central canal stenosis present.  Loss of normal cervical lordosis.  Flexion-extension films do not show any evidence of dynamic instability however there is loss of cervical lordosis on scoliosis films once again which has corrected extension.  Assessment:  37-year-old female with right shoulder pain possibly due to a C5 radiculopathy.  There is some " foraminal stenosis on the right side at C4-5 which could contribute to her pain.  Plan:  Given she has not done any other conservative management we will start with a referral to pain management for an injection.  If this helps the pain significantly this would be an appropriate step for treatment.  I will also start her on gabapentin to see if this will help.  Should she not get any significant relief from the injection or other conservative management options we will consider further evaluation including an EMG versus surgery to decompress the nerve.  She can follow-up with me if conservative management options do not improve her symptoms going forward.    Héctor Rodrigues MD

## 2022-12-16 DIAGNOSIS — M54.12 CERVICAL RADICULOPATHY: Primary | ICD-10-CM

## 2022-12-28 NOTE — PROGRESS NOTES
Florina Marie is a 31 year old who presents as a walk in to the clinic for evaluation of vaginal changes      Vaginal Symptoms      Onset 2 days  number of episodes of vaginitis in the past year 0    Description  vaginal discharge - none and itching    Intensity:  moderate    Accompanying signs and symptoms (fever/dysuria/abdominal or back pain): None    History  Sexually active: not at present  Currently pregnant: no  Possibility of pregnancy: No  Recent antibiotic use: YES  Diabetes: no  Precipitating or alleviating factors: None    Therapies tried and outcome: none   Outcome: NA             Histories reviewed and updated  Past Medical History   Diagnosis Date     Attention deficit disorder with hyperactivity(314.01)      Other kyphoscoliosis and scoliosis      Other specified delay in development      Microcephaly     Viral warts, unspecified      plantar warts     Past Surgical History   Procedure Laterality Date     Hc tooth extraction w/forcep       wisdom     Exam under anesthesia pelvic N/A 9/9/2016     Procedure: EXAM UNDER ANESTHESIA PELVIC;  Surgeon: Rhoda Alcazar MD;  Location:  OR     Social History     Social History     Marital status: Single     Spouse name: N/A     Number of children: 0     Years of education: N/A     Occupational History     student      [employer]     Social History Main Topics     Smoking status: Never Smoker     Smokeless tobacco: Never Used      Comment: no smokers in the household     Alcohol use No     Drug use: No     Sexual activity: No     Other Topics Concern     Caffeine Concern Yes     1 can QD     Exercise Yes     4 days a week 1/2 hour     Bike Helmet No     Seat Belt Yes     Parent/Sibling W/ Cabg, Mi Or Angioplasty Before 65f 55m? No     Social History Narrative     Family History   Problem Relation Age of Onset     Lipids Father      diet     Thyroid Disease Mother      unsure if hypo or hyper     DIABETES Paternal Grandfather      adult      HEART DISEASE Paternal Grandfather      bypass     Lipids Maternal Aunt      medication     Lipids Maternal Aunt      diet     Alzheimer Disease Maternal Grandfather      Hypertension No family hx of      Coronary Artery Disease No family hx of      Hyperlipidemia No family hx of      Cancer - colorectal No family hx of      Ovarian Cancer No family hx of      Prostate Cancer No family hx of      Depression/Anxiety No family hx of      CEREBROVASCULAR DISEASE No family hx of      Anesthesia Reaction No family hx of      Asthma No family hx of      OSTEOPOROSIS No family hx of      Chemical Addiction No family hx of      Obesity No family hx of            CONSTITUTIONAL: Healthy, alert, in no apparent distress.  GI: NEGATIVE  : see above.  Uses VENESSA for cycle control without problems    EXAM:  /76 (BP Location: Left arm, Patient Position: Chair, Cuff Size: Adult Large)  Pulse 72  LMP 03/07/2017 (Approximate)  Patient appears well, vital signs normal.     WET PREP: Not done       ASSESSMENT:   yeast.      PLAN:     Birth control method reviewed.  Abstain from intercourse for duration of treatment.  Return if symptoms do not resolve as anticipated.      Visit length 20 min with >50% spent in counseling regarding yeast acquisition, treatment and prevention.         Home

## 2023-01-09 ENCOUNTER — ANESTHESIA (OUTPATIENT)
Dept: SURGERY | Facility: CLINIC | Age: 38
End: 2023-01-09
Payer: MEDICARE

## 2023-01-09 ENCOUNTER — NURSE TRIAGE (OUTPATIENT)
Dept: NURSING | Facility: CLINIC | Age: 38
End: 2023-01-09

## 2023-01-09 ENCOUNTER — APPOINTMENT (OUTPATIENT)
Dept: CT IMAGING | Facility: CLINIC | Age: 38
End: 2023-01-09
Attending: FAMILY MEDICINE
Payer: MEDICARE

## 2023-01-09 ENCOUNTER — APPOINTMENT (OUTPATIENT)
Dept: ULTRASOUND IMAGING | Facility: CLINIC | Age: 38
End: 2023-01-09
Attending: FAMILY MEDICINE
Payer: MEDICARE

## 2023-01-09 ENCOUNTER — ANESTHESIA EVENT (OUTPATIENT)
Dept: SURGERY | Facility: CLINIC | Age: 38
End: 2023-01-09
Payer: MEDICARE

## 2023-01-09 ENCOUNTER — HOSPITAL ENCOUNTER (OUTPATIENT)
Facility: CLINIC | Age: 38
Discharge: HOME | End: 2023-01-09
Attending: FAMILY MEDICINE | Admitting: FAMILY MEDICINE
Payer: MEDICARE

## 2023-01-09 VITALS
BODY MASS INDEX: 48.89 KG/M2 | WEIGHT: 293 LBS | RESPIRATION RATE: 16 BRPM | OXYGEN SATURATION: 98 % | DIASTOLIC BLOOD PRESSURE: 85 MMHG | HEART RATE: 86 BPM | SYSTOLIC BLOOD PRESSURE: 127 MMHG | TEMPERATURE: 98.2 F

## 2023-01-09 DIAGNOSIS — G89.18 POST-OP PAIN: ICD-10-CM

## 2023-01-09 DIAGNOSIS — R10.33 ABDOMINAL PAIN, PERIUMBILICAL: ICD-10-CM

## 2023-01-09 DIAGNOSIS — K35.30 ACUTE APPENDICITIS WITH LOCALIZED PERITONITIS, WITHOUT PERFORATION, ABSCESS, OR GANGRENE: Primary | ICD-10-CM

## 2023-01-09 LAB
ALBUMIN SERPL-MCNC: 3.3 G/DL (ref 3.4–5)
ALBUMIN UR-MCNC: NEGATIVE MG/DL
ALP SERPL-CCNC: 79 U/L (ref 40–150)
ALT SERPL W P-5'-P-CCNC: 19 U/L (ref 0–50)
ANION GAP SERPL CALCULATED.3IONS-SCNC: 4 MMOL/L (ref 3–14)
APPEARANCE UR: CLEAR
AST SERPL W P-5'-P-CCNC: 19 U/L (ref 0–45)
BASOPHILS # BLD AUTO: 0 10E3/UL (ref 0–0.2)
BASOPHILS NFR BLD AUTO: 0 %
BILIRUB SERPL-MCNC: 0.4 MG/DL (ref 0.2–1.3)
BILIRUB UR QL STRIP: NEGATIVE
BUN SERPL-MCNC: 17 MG/DL (ref 7–30)
CALCIUM SERPL-MCNC: 8.4 MG/DL (ref 8.5–10.1)
CHLORIDE BLD-SCNC: 106 MMOL/L (ref 94–109)
CO2 SERPL-SCNC: 26 MMOL/L (ref 20–32)
COLOR UR AUTO: YELLOW
CREAT SERPL-MCNC: 0.78 MG/DL (ref 0.52–1.04)
EOSINOPHIL # BLD AUTO: 0.1 10E3/UL (ref 0–0.7)
EOSINOPHIL NFR BLD AUTO: 1 %
ERYTHROCYTE [DISTWIDTH] IN BLOOD BY AUTOMATED COUNT: 13.9 % (ref 10–15)
GFR SERPL CREATININE-BSD FRML MDRD: >90 ML/MIN/1.73M2
GLUCOSE BLD-MCNC: 121 MG/DL (ref 70–99)
GLUCOSE UR STRIP-MCNC: NEGATIVE MG/DL
HCG UR QL: NEGATIVE
HCT VFR BLD AUTO: 39.8 % (ref 35–47)
HGB BLD-MCNC: 12.5 G/DL (ref 11.7–15.7)
HGB UR QL STRIP: NEGATIVE
IMM GRANULOCYTES # BLD: 0 10E3/UL
IMM GRANULOCYTES NFR BLD: 0 %
KETONES UR STRIP-MCNC: NEGATIVE MG/DL
LEUKOCYTE ESTERASE UR QL STRIP: NEGATIVE
LYMPHOCYTES # BLD AUTO: 1.8 10E3/UL (ref 0.8–5.3)
LYMPHOCYTES NFR BLD AUTO: 18 %
MCH RBC QN AUTO: 26.2 PG (ref 26.5–33)
MCHC RBC AUTO-ENTMCNC: 31.4 G/DL (ref 31.5–36.5)
MCV RBC AUTO: 83 FL (ref 78–100)
MONOCYTES # BLD AUTO: 0.4 10E3/UL (ref 0–1.3)
MONOCYTES NFR BLD AUTO: 4 %
NEUTROPHILS # BLD AUTO: 7.5 10E3/UL (ref 1.6–8.3)
NEUTROPHILS NFR BLD AUTO: 77 %
NITRATE UR QL: NEGATIVE
NRBC # BLD AUTO: 0 10E3/UL
NRBC BLD AUTO-RTO: 0 /100
PH UR STRIP: 5 [PH] (ref 5–7)
PLATELET # BLD AUTO: 294 10E3/UL (ref 150–450)
POTASSIUM BLD-SCNC: 4.5 MMOL/L (ref 3.4–5.3)
PROT SERPL-MCNC: 7.4 G/DL (ref 6.8–8.8)
RBC # BLD AUTO: 4.77 10E6/UL (ref 3.8–5.2)
SODIUM SERPL-SCNC: 136 MMOL/L (ref 133–144)
SP GR UR STRIP: 1.02 (ref 1–1.03)
UROBILINOGEN UR STRIP-MCNC: NORMAL MG/DL
WBC # BLD AUTO: 9.9 10E3/UL (ref 4–11)

## 2023-01-09 PROCEDURE — 271N000001 HC OR GENERAL SUPPLY NON-STERILE: Performed by: SPECIALIST

## 2023-01-09 PROCEDURE — 710N000010 HC RECOVERY PHASE 1, LEVEL 2, PER MIN: Performed by: SPECIALIST

## 2023-01-09 PROCEDURE — 258N000003 HC RX IP 258 OP 636: Performed by: NURSE ANESTHETIST, CERTIFIED REGISTERED

## 2023-01-09 PROCEDURE — 250N000009 HC RX 250: Performed by: NURSE ANESTHETIST, CERTIFIED REGISTERED

## 2023-01-09 PROCEDURE — 44970 LAPAROSCOPY APPENDECTOMY: CPT | Performed by: SPECIALIST

## 2023-01-09 PROCEDURE — 250N000009 HC RX 250: Performed by: SPECIALIST

## 2023-01-09 PROCEDURE — 96365 THER/PROPH/DIAG IV INF INIT: CPT | Performed by: FAMILY MEDICINE

## 2023-01-09 PROCEDURE — 250N000011 HC RX IP 250 OP 636: Performed by: NURSE ANESTHETIST, CERTIFIED REGISTERED

## 2023-01-09 PROCEDURE — 82435 ASSAY OF BLOOD CHLORIDE: CPT | Performed by: FAMILY MEDICINE

## 2023-01-09 PROCEDURE — 370N000017 HC ANESTHESIA TECHNICAL FEE, PER MIN: Performed by: SPECIALIST

## 2023-01-09 PROCEDURE — 250N000011 HC RX IP 250 OP 636: Performed by: FAMILY MEDICINE

## 2023-01-09 PROCEDURE — 710N000012 HC RECOVERY PHASE 2, PER MINUTE: Performed by: SPECIALIST

## 2023-01-09 PROCEDURE — 36415 COLL VENOUS BLD VENIPUNCTURE: CPT | Performed by: FAMILY MEDICINE

## 2023-01-09 PROCEDURE — 99285 EMERGENCY DEPT VISIT HI MDM: CPT | Mod: 25 | Performed by: FAMILY MEDICINE

## 2023-01-09 PROCEDURE — 85025 COMPLETE CBC W/AUTO DIFF WBC: CPT | Performed by: FAMILY MEDICINE

## 2023-01-09 PROCEDURE — 360N000076 HC SURGERY LEVEL 3, PER MIN: Performed by: SPECIALIST

## 2023-01-09 PROCEDURE — 250N000026 HC DESFLURANE, PER MIN: Performed by: SPECIALIST

## 2023-01-09 PROCEDURE — 88304 TISSUE EXAM BY PATHOLOGIST: CPT | Mod: TC | Performed by: SPECIALIST

## 2023-01-09 PROCEDURE — 99285 EMERGENCY DEPT VISIT HI MDM: CPT | Performed by: FAMILY MEDICINE

## 2023-01-09 PROCEDURE — G1010 CDSM STANSON: HCPCS

## 2023-01-09 PROCEDURE — 81003 URINALYSIS AUTO W/O SCOPE: CPT | Performed by: FAMILY MEDICINE

## 2023-01-09 PROCEDURE — 272N000001 HC OR GENERAL SUPPLY STERILE: Performed by: SPECIALIST

## 2023-01-09 PROCEDURE — 96375 TX/PRO/DX INJ NEW DRUG ADDON: CPT | Performed by: FAMILY MEDICINE

## 2023-01-09 PROCEDURE — 99204 OFFICE O/P NEW MOD 45 MIN: CPT | Mod: 57 | Performed by: SPECIALIST

## 2023-01-09 PROCEDURE — 81025 URINE PREGNANCY TEST: CPT | Performed by: FAMILY MEDICINE

## 2023-01-09 PROCEDURE — 258N000003 HC RX IP 258 OP 636: Performed by: FAMILY MEDICINE

## 2023-01-09 PROCEDURE — 250N000013 HC RX MED GY IP 250 OP 250 PS 637: Performed by: NURSE ANESTHETIST, CERTIFIED REGISTERED

## 2023-01-09 PROCEDURE — 96361 HYDRATE IV INFUSION ADD-ON: CPT | Performed by: FAMILY MEDICINE

## 2023-01-09 PROCEDURE — 76856 US EXAM PELVIC COMPLETE: CPT

## 2023-01-09 RX ORDER — OXYCODONE HYDROCHLORIDE 5 MG/1
5 TABLET ORAL EVERY 4 HOURS PRN
Status: DISCONTINUED | OUTPATIENT
Start: 2023-01-09 | End: 2023-01-09 | Stop reason: HOSPADM

## 2023-01-09 RX ORDER — DEXAMETHASONE SODIUM PHOSPHATE 10 MG/ML
INJECTION, SOLUTION INTRAMUSCULAR; INTRAVENOUS PRN
Status: DISCONTINUED | OUTPATIENT
Start: 2023-01-09 | End: 2023-01-09

## 2023-01-09 RX ORDER — KETOROLAC TROMETHAMINE 30 MG/ML
30 INJECTION, SOLUTION INTRAMUSCULAR; INTRAVENOUS ONCE
Status: COMPLETED | OUTPATIENT
Start: 2023-01-09 | End: 2023-01-09

## 2023-01-09 RX ORDER — FENTANYL CITRATE 50 UG/ML
25 INJECTION, SOLUTION INTRAMUSCULAR; INTRAVENOUS EVERY 5 MIN PRN
Status: DISCONTINUED | OUTPATIENT
Start: 2023-01-09 | End: 2023-01-09 | Stop reason: HOSPADM

## 2023-01-09 RX ORDER — LIDOCAINE 40 MG/G
CREAM TOPICAL
Status: CANCELLED | OUTPATIENT
Start: 2023-01-09

## 2023-01-09 RX ORDER — BUPIVACAINE HYDROCHLORIDE AND EPINEPHRINE 2.5; 5 MG/ML; UG/ML
INJECTION, SOLUTION INFILTRATION; PERINEURAL PRN
Status: DISCONTINUED | OUTPATIENT
Start: 2023-01-09 | End: 2023-01-09 | Stop reason: HOSPADM

## 2023-01-09 RX ORDER — OXYCODONE HYDROCHLORIDE 5 MG/1
10 TABLET ORAL EVERY 4 HOURS PRN
Status: DISCONTINUED | OUTPATIENT
Start: 2023-01-09 | End: 2023-01-09 | Stop reason: HOSPADM

## 2023-01-09 RX ORDER — OXYCODONE HYDROCHLORIDE 5 MG/1
5-10 TABLET ORAL EVERY 6 HOURS PRN
Qty: 10 TABLET | Refills: 0 | Status: SHIPPED | OUTPATIENT
Start: 2023-01-09 | End: 2023-01-13

## 2023-01-09 RX ORDER — DIMENHYDRINATE 50 MG/ML
25 INJECTION, SOLUTION INTRAMUSCULAR; INTRAVENOUS
Status: DISCONTINUED | OUTPATIENT
Start: 2023-01-09 | End: 2023-01-09 | Stop reason: HOSPADM

## 2023-01-09 RX ORDER — HALOPERIDOL 5 MG/ML
1 INJECTION INTRAMUSCULAR
Status: DISCONTINUED | OUTPATIENT
Start: 2023-01-09 | End: 2023-01-09 | Stop reason: HOSPADM

## 2023-01-09 RX ORDER — PROPOFOL 10 MG/ML
INJECTION, EMULSION INTRAVENOUS PRN
Status: DISCONTINUED | OUTPATIENT
Start: 2023-01-09 | End: 2023-01-09

## 2023-01-09 RX ORDER — AMPICILLIN AND SULBACTAM 2; 1 G/1; G/1
3 INJECTION, POWDER, FOR SOLUTION INTRAMUSCULAR; INTRAVENOUS ONCE
Status: COMPLETED | OUTPATIENT
Start: 2023-01-09 | End: 2023-01-09

## 2023-01-09 RX ORDER — KETOROLAC TROMETHAMINE 30 MG/ML
INJECTION, SOLUTION INTRAMUSCULAR; INTRAVENOUS PRN
Status: DISCONTINUED | OUTPATIENT
Start: 2023-01-09 | End: 2023-01-09

## 2023-01-09 RX ORDER — LIDOCAINE HYDROCHLORIDE 10 MG/ML
INJECTION, SOLUTION INFILTRATION; PERINEURAL PRN
Status: DISCONTINUED | OUTPATIENT
Start: 2023-01-09 | End: 2023-01-09

## 2023-01-09 RX ORDER — ONDANSETRON 4 MG/1
4 TABLET, ORALLY DISINTEGRATING ORAL EVERY 30 MIN PRN
Status: DISCONTINUED | OUTPATIENT
Start: 2023-01-09 | End: 2023-01-09 | Stop reason: HOSPADM

## 2023-01-09 RX ORDER — OXYCODONE AND ACETAMINOPHEN 5; 325 MG/1; MG/1
2 TABLET ORAL
Status: DISCONTINUED | OUTPATIENT
Start: 2023-01-09 | End: 2023-01-09 | Stop reason: HOSPADM

## 2023-01-09 RX ORDER — FENTANYL CITRATE 50 UG/ML
INJECTION, SOLUTION INTRAMUSCULAR; INTRAVENOUS PRN
Status: DISCONTINUED | OUTPATIENT
Start: 2023-01-09 | End: 2023-01-09

## 2023-01-09 RX ORDER — SODIUM CHLORIDE, SODIUM LACTATE, POTASSIUM CHLORIDE, CALCIUM CHLORIDE 600; 310; 30; 20 MG/100ML; MG/100ML; MG/100ML; MG/100ML
INJECTION, SOLUTION INTRAVENOUS CONTINUOUS
Status: DISCONTINUED | OUTPATIENT
Start: 2023-01-09 | End: 2023-01-09 | Stop reason: HOSPADM

## 2023-01-09 RX ORDER — CEFAZOLIN SODIUM/WATER 3 G/30 ML
3 SYRINGE (ML) INTRAVENOUS SEE ADMIN INSTRUCTIONS
Status: CANCELLED | OUTPATIENT
Start: 2023-01-09

## 2023-01-09 RX ORDER — ONDANSETRON 2 MG/ML
INJECTION INTRAMUSCULAR; INTRAVENOUS PRN
Status: DISCONTINUED | OUTPATIENT
Start: 2023-01-09 | End: 2023-01-09

## 2023-01-09 RX ORDER — PROPOFOL 10 MG/ML
INJECTION, EMULSION INTRAVENOUS CONTINUOUS PRN
Status: DISCONTINUED | OUTPATIENT
Start: 2023-01-09 | End: 2023-01-09

## 2023-01-09 RX ORDER — FENTANYL CITRATE 50 UG/ML
50 INJECTION, SOLUTION INTRAMUSCULAR; INTRAVENOUS EVERY 5 MIN PRN
Status: DISCONTINUED | OUTPATIENT
Start: 2023-01-09 | End: 2023-01-09 | Stop reason: HOSPADM

## 2023-01-09 RX ORDER — ONDANSETRON 2 MG/ML
4 INJECTION INTRAMUSCULAR; INTRAVENOUS ONCE
Status: COMPLETED | OUTPATIENT
Start: 2023-01-09 | End: 2023-01-09

## 2023-01-09 RX ORDER — HYDROMORPHONE HYDROCHLORIDE 1 MG/ML
0.2 INJECTION, SOLUTION INTRAMUSCULAR; INTRAVENOUS; SUBCUTANEOUS EVERY 5 MIN PRN
Status: DISCONTINUED | OUTPATIENT
Start: 2023-01-09 | End: 2023-01-09 | Stop reason: HOSPADM

## 2023-01-09 RX ORDER — LORAZEPAM 2 MG/ML
0.5 INJECTION INTRAMUSCULAR
Status: DISCONTINUED | OUTPATIENT
Start: 2023-01-09 | End: 2023-01-09 | Stop reason: HOSPADM

## 2023-01-09 RX ORDER — FENTANYL CITRATE 50 UG/ML
25 INJECTION, SOLUTION INTRAMUSCULAR; INTRAVENOUS
Status: CANCELLED | OUTPATIENT
Start: 2023-01-09

## 2023-01-09 RX ORDER — ONDANSETRON 2 MG/ML
4 INJECTION INTRAMUSCULAR; INTRAVENOUS EVERY 30 MIN PRN
Status: DISCONTINUED | OUTPATIENT
Start: 2023-01-09 | End: 2023-01-09 | Stop reason: HOSPADM

## 2023-01-09 RX ORDER — CEFAZOLIN SODIUM/WATER 3 G/30 ML
3 SYRINGE (ML) INTRAVENOUS
Status: CANCELLED | OUTPATIENT
Start: 2023-01-09

## 2023-01-09 RX ORDER — HYDROMORPHONE HYDROCHLORIDE 1 MG/ML
0.4 INJECTION, SOLUTION INTRAMUSCULAR; INTRAVENOUS; SUBCUTANEOUS EVERY 5 MIN PRN
Status: DISCONTINUED | OUTPATIENT
Start: 2023-01-09 | End: 2023-01-09 | Stop reason: HOSPADM

## 2023-01-09 RX ORDER — SODIUM CHLORIDE, SODIUM LACTATE, POTASSIUM CHLORIDE, CALCIUM CHLORIDE 600; 310; 30; 20 MG/100ML; MG/100ML; MG/100ML; MG/100ML
INJECTION, SOLUTION INTRAVENOUS CONTINUOUS
Status: CANCELLED | OUTPATIENT
Start: 2023-01-09

## 2023-01-09 RX ORDER — ACETAMINOPHEN 325 MG/1
975 TABLET ORAL
Status: DISCONTINUED | OUTPATIENT
Start: 2023-01-09 | End: 2023-01-09 | Stop reason: HOSPADM

## 2023-01-09 RX ADMIN — FENTANYL CITRATE 50 MCG: 50 INJECTION, SOLUTION INTRAMUSCULAR; INTRAVENOUS at 16:40

## 2023-01-09 RX ADMIN — MIDAZOLAM 1 MG: 1 INJECTION INTRAMUSCULAR; INTRAVENOUS at 16:31

## 2023-01-09 RX ADMIN — DEXAMETHASONE SODIUM PHOSPHATE 4 MG: 10 INJECTION, SOLUTION INTRAMUSCULAR; INTRAVENOUS at 17:09

## 2023-01-09 RX ADMIN — LORAZEPAM 0.5 MG: 2 INJECTION INTRAMUSCULAR; INTRAVENOUS at 11:41

## 2023-01-09 RX ADMIN — LIDOCAINE HYDROCHLORIDE 50 MG: 10 INJECTION, SOLUTION INFILTRATION; PERINEURAL at 16:40

## 2023-01-09 RX ADMIN — ONDANSETRON 4 MG: 2 INJECTION INTRAMUSCULAR; INTRAVENOUS at 17:09

## 2023-01-09 RX ADMIN — DEXMEDETOMIDINE HYDROCHLORIDE 10 MCG: 100 INJECTION, SOLUTION INTRAVENOUS at 16:46

## 2023-01-09 RX ADMIN — ONDANSETRON HYDROCHLORIDE 4 MG: 2 SOLUTION INTRAMUSCULAR; INTRAVENOUS at 11:41

## 2023-01-09 RX ADMIN — PROPOFOL 150 MCG/KG/MIN: 10 INJECTION, EMULSION INTRAVENOUS at 16:40

## 2023-01-09 RX ADMIN — KETOROLAC TROMETHAMINE 30 MG: 30 INJECTION, SOLUTION INTRAMUSCULAR at 11:40

## 2023-01-09 RX ADMIN — OXYCODONE HYDROCHLORIDE 5 MG: 5 TABLET ORAL at 18:06

## 2023-01-09 RX ADMIN — KETOROLAC TROMETHAMINE 30 MG: 30 INJECTION, SOLUTION INTRAMUSCULAR at 17:20

## 2023-01-09 RX ADMIN — SUGAMMADEX 200 MG: 100 INJECTION, SOLUTION INTRAVENOUS at 17:20

## 2023-01-09 RX ADMIN — PROPOFOL 200 MG: 10 INJECTION, EMULSION INTRAVENOUS at 16:40

## 2023-01-09 RX ADMIN — ROCURONIUM BROMIDE 50 MG: 50 INJECTION, SOLUTION INTRAVENOUS at 16:40

## 2023-01-09 RX ADMIN — FENTANYL CITRATE 50 MCG: 50 INJECTION, SOLUTION INTRAMUSCULAR; INTRAVENOUS at 16:57

## 2023-01-09 RX ADMIN — AMPICILLIN SODIUM AND SULBACTAM SODIUM 3 G: 2; 1 INJECTION, POWDER, FOR SOLUTION INTRAMUSCULAR; INTRAVENOUS at 13:36

## 2023-01-09 RX ADMIN — SODIUM CHLORIDE, POTASSIUM CHLORIDE, SODIUM LACTATE AND CALCIUM CHLORIDE: 600; 310; 30; 20 INJECTION, SOLUTION INTRAVENOUS at 14:23

## 2023-01-09 ASSESSMENT — ACTIVITIES OF DAILY LIVING (ADL)
ADLS_ACUITY_SCORE: 35

## 2023-01-09 NOTE — CONSULTS
[facility] Emergency Department Surgery Consult    Florina Marie MRN# [id number]   Age: 37 year old YOB: 1985     Date of Admission:  1/9/2023    Reason for consult: Acute appendicitis with localized peritonitis, without perforation, abscess, or gangrene [K35.30]       Requesting physician: Dr. Mullen       Level of consult: Consult, follow and place orders           Impression and Plan:   Impression:   Acute appendicitis with localized peritonitis, without perforation, abscess, or gangrene [K35.30]        Plan:   Laparoscopic appendectomy.   The procedure, risks, benefits, and alternatives were discussed and the patient agrees to proceed             Chief Complaint:   Acute appendicitis with localized peritonitis, without perforation, abscess, or gangrene [K35.30]     History is obtained from the patient         History of Present Illness:   This 37 year old female who yesterday evening developed severe periumbilical pain.  It occurred after eating hamburger helper there is associated nausea.  Her significant other had similar symptoms but those resolved after about an hour.  Hers did not progressively worsened overnight.  She then came to the ER today.  Subsequent imaging in the ER revealed a dilated appendix with some periappendiceal standing suggestive of an early acute appendicitis.  Currently she is resting comfortably in bed.  I am now see her for possible acute appendicitis.  Past Medical History:   Diagnosis Date     Attention deficit disorder with hyperactivity(314.01)      Left carpal tunnel syndrome 6/27/2019     Other kyphoscoliosis and scoliosis      Other specified delay in development     Microcephaly     Right carpal tunnel syndrome 3/14/2019     Viral warts, unspecified     plantar warts             Past Surgical History:     Past Surgical History:   Procedure Laterality Date     EXAM UNDER ANESTHESIA PELVIC N/A 9/9/2016    Procedure: EXAM UNDER ANESTHESIA PELVIC;  Surgeon:  Rhoda Alcazar MD;  Location: [facility]     HC TOOTH EXTRACTION W/FORCEP      wisdom     RELEASE CARPAL TUNNEL Right 3/22/2019    Procedure: RIGHT RELEASE CARPAL TUNNEL;  Surgeon: Anjum Wilburn DO;  Location: [facility] OR     RELEASE CARPAL TUNNEL Left 7/16/2019    Procedure: RELEASE, CARPAL TUNNEL-Left;  Surgeon: Anjum Wilburn DO;  Location: [facility]             Social History:     Social History     Tobacco Use     Smoking status: Never     Smokeless tobacco: Never     Tobacco comments:     no smokers in the household   Substance Use Topics     Alcohol use: No             Family History:     Family History   Problem Relation Age of Onset     Lipids Father         diet     Thyroid Disease Mother         unsure if hypo or hyper     Diabetes Paternal Grandfather         adult     Heart Disease Paternal Grandfather         bypass/open hear surgery     Lipids Maternal Aunt         medication     Lipids Maternal Aunt         diet     Alzheimer Disease Maternal Grandfather      Hypertension No family hx of      Coronary Artery Disease No family hx of      Hyperlipidemia No family hx of      Cancer - colorectal No family hx of      Ovarian Cancer No family hx of      Prostate Cancer No family hx of      Depression/Anxiety No family hx of      Cerebrovascular Disease No family hx of      Anesthesia Reaction No family hx of      Asthma No family hx of      Osteoporosis No family hx of      Chemical Addiction No family hx of      Obesity No family hx of               Allergies:     Allergies   Allergen Reactions     Macrobid [Nitrofurantoin] Headache and Nausea             Medications:     Current Facility-Administered Medications   Medication     lactated ringers infusion     LORazepam (ATIVAN) injection 0.5 mg     Current Outpatient Medications   Medication Sig     ALPRAZolam (XANAX) 0.5 MG tablet Take 1 tablet (0.5 mg) by mouth 2 times daily as needed for anxiety     azelastine (OPTIVAR) 0.05 % ophthalmic  solution Apply 1 drop to eye 2 times daily     desogestrel-ethinyl estradiol (APRI) 0.15-30 MG-MCG tablet Take 1 tablet by mouth daily Take one tablet daily.     escitalopram (LEXAPRO) 20 MG tablet Take 40 mg by mouth daily     folic acid (FOLVITE) 1 MG tablet Take 1,000 mcg by mouth 2 times daily Take 1,000 mcg by mouth 2 times daily     gabapentin (NEURONTIN) 300 MG capsule Take 1 capsule (300 mg) by mouth 3 times daily     hydrOXYzine (ATARAX) 25 MG tablet Take 25 mg by mouth daily as needed for anxiety     predniSONE (DELTASONE) 20 MG tablet Take 1 tablet (20 mg) by mouth 2 times daily (Take with food)     tiZANidine (ZANAFLEX) 2 MG tablet Take 1 tablet (2 mg) by mouth 3 times daily as needed for muscle spasms     cetirizine (ZYRTEC) 10 MG tablet Take 1 tablet (10 mg) by mouth daily (Patient not taking: Reported on 1/9/2023)             Review of Systems:   The review of systems was positive for the following findings.  None.  The remainder of the review of systems was unremarkable.          Physical Exam:   PE:  B/P: 124/87, T: 98, P: 67, R: 20  General: well developed, well nourished obese WF who appears their stated age  HEENT: NC/AT, EOMI, (-)icterus, (-)injection  Neck: Supple, No JVD  Chest: CTA  Heart: S1, S2, (-)m/r/g  Abd: Soft, periumbilical and right-sided abdominal tenderness to deep palpation.  Non distended, , no masses  Ext; Warm, no edema  Psych: AAOx3  Neuro: No focal deficits            Data:   All laboratory data reviewed  Results for orders placed or performed during the hospital encounter of 01/09/23 (from the past 24 hour(s))   UA reflex to Microscopic   Result Value Ref Range    Color Urine Yellow Colorless, Straw, Light Yellow, Yellow    Appearance Urine Clear Clear    Glucose Urine Negative Negative mg/dL    Bilirubin Urine Negative Negative    Ketones Urine Negative Negative mg/dL    Specific Gravity Urine 1.017 1.003 - 1.035    Blood Urine Negative Negative    pH Urine 5.0 5.0 - 7.0     Protein Albumin Urine Negative Negative mg/dL    Urobilinogen Urine Normal Normal, 2.0 mg/dL    Nitrite Urine Negative Negative    Leukocyte Esterase Urine Negative Negative    Narrative    Microscopic not indicated   HCG qualitative urine (UPT)   Result Value Ref Range    hCG Urine Qualitative Negative Negative   CBC with platelets differential    Narrative    The following orders were created for panel order CBC with platelets differential.  Procedure                               Abnormality         Status                     ---------                               -----------         ------                     CBC with platelets and d...[[id number]]  Abnormal            Final result                 Please view results for these tests on the individual orders.   Comprehensive metabolic panel   Result Value Ref Range    Sodium 136 133 - 144 mmol/L    Potassium 4.5 3.4 - 5.3 mmol/L    Chloride 106 94 - 109 mmol/L    Carbon Dioxide (CO2) 26 20 - 32 mmol/L    Anion Gap 4 3 - 14 mmol/L    Urea Nitrogen 17 7 - 30 mg/dL    Creatinine 0.78 0.52 - 1.04 mg/dL    Calcium 8.4 (L) 8.5 - 10.1 mg/dL    Glucose 121 (H) 70 - 99 mg/dL    Alkaline Phosphatase 79 40 - 150 U/L    AST 19 0 - 45 U/L    ALT 19 0 - 50 U/L    Protein Total 7.4 6.8 - 8.8 g/dL    Albumin 3.3 (L) 3.4 - 5.0 g/dL    Bilirubin Total 0.4 0.2 - 1.3 mg/dL    GFR Estimate >90 >60 mL/min/1.73m2   CBC with platelets and differential   Result Value Ref Range    WBC Count 9.9 4.0 - 11.0 10e3/uL    RBC Count 4.77 3.80 - 5.20 10e6/uL    Hemoglobin 12.5 11.7 - 15.7 g/dL    Hematocrit 39.8 35.0 - 47.0 %    MCV 83 78 - 100 fL    MCH 26.2 (L) 26.5 - 33.0 pg    MCHC 31.4 (L) 31.5 - 36.5 g/dL    RDW 13.9 10.0 - 15.0 %    Platelet Count 294 150 - 450 10e3/uL    % Neutrophils 77 %    % Lymphocytes 18 %    % Monocytes 4 %    % Eosinophils 1 %    % Basophils 0 %    % Immature Granulocytes 0 %    NRBCs per 100 WBC 0 <1 /100    Absolute Neutrophils 7.5 1.6 - 8.3 10e3/uL    Absolute  Lymphocytes 1.8 0.8 - 5.3 10e3/uL    Absolute Monocytes 0.4 0.0 - 1.3 10e3/uL    Absolute Eosinophils 0.1 0.0 - 0.7 10e3/uL    Absolute Basophils 0.0 0.0 - 0.2 10e3/uL    Absolute Immature Granulocytes 0.0 <=0.4 10e3/uL    Absolute NRBCs 0.0 10e3/uL   Abd/pelvis CT - no contrast - Stone Protocol    Narrative    CT ABDOMEN PELVIS WITHOUT CONTRAST 1/9/2023 12:05 PM    CLINICAL HISTORY: Generalized abdominal pain.  TECHNIQUE: CT scan of the abdomen and pelvis was performed without IV  contrast. Multiplanar reformats were obtained. Dose reduction  techniques were used.  CONTRAST: None.  COMPARISON: None.    FINDINGS:   LOWER CHEST: The visualized lung bases are clear.    HEPATOBILIARY: Hepatic steatosis. No hepatic masses. No calcified  gallstones.    PANCREAS: Normal.    SPLEEN: Normal.    ADRENAL GLANDS: Normal.    KIDNEYS/BLADDER: Unremarkable. No hydronephrosis.    BOWEL: No bowel obstruction. No convincing evidence for colitis or  diverticulitis. The appendix is mildly dilated at 1 cm. A small  appendicolith is noted within the distal appendix. There is only  minimal associated periappendiceal inflammatory stranding. No  periappendiceal fluid collections.    PELVIC ORGANS: A right ovarian cystic lesion measures 3.5 cm. No free  fluid in the pelvis..    LYMPH NODES: No enlarged lymph nodes are identified in the abdomen or  pelvis.    VASCULATURE: Unremarkable.    ADDITIONAL FINDINGS: None.    MUSCULOSKELETAL: Degenerative changes in the visualized thoracolumbar  spine. Thoracolumbar curve, convex left.      Impression    IMPRESSION:   1.  The appendix is mildly dilated at 1 cm, however there is only  minimal periappendiceal inflammatory stranding. Findings are  considered equivocal for an early or mild appendicitis. Clinical  correlation and close follow-up are recommended.  2.  A right ovarian cystic lesion measures 3.5 cm. Pelvic ultrasound  may be helpful for further characterization.  3.  Hepatic steatosis.   US  Pelvis Complete without Transvaginal    Narrative    US PELVIC TRANSABDOMINAL 1/9/2023 1:35 PM    CLINICAL HISTORY: Abdominal pain. Right ovarian cystic lesion noted on  CT.  TECHNIQUE: Transabdominal scans were performed. Transvaginal imaging  was deferred at the request of the patient.  COMPARISON: CT of the abdomen and pelvis performed on 1/9/2023.    FINDINGS:    UTERUS: 8.6 x 5.1 x 3.3 cm. Normal in size and position with no  masses.    ENDOMETRIUM: 2 mm. Normal smooth endometrium.    RIGHT OVARY: 5.4 x 3.5 x 3.4 cm. A right ovarian simple cyst measures  3.2 cm. Otherwise unremarkable, with flow demonstrated.    LEFT OVARY: 3 x 1.8 x 1.8 cm. Normal with flow demonstrated.    No significant free fluid.      Impression    IMPRESSION:  1.  A right ovarian simple cyst measures 3.2 cm, and could represent a  dominant follicle.  2.  Otherwise unremarkable pelvic ultrasound examination.      All imaging studies reviewed by me. -My review the images there appears to be an appendicolith inside the appendix as well.     Jhony Perez MD, FACS

## 2023-01-09 NOTE — BRIEF OP NOTE
[facility]    Brief Operative Note    Pre-operative diagnosis: Acute appendicitis with localized peritonitis, without perforation, abscess, or gangrene [K35.30]  Post-operative diagnosis Same as pre-operative diagnosis    Procedure: Procedure(s):  APPENDECTOMY, LAPAROSCOPIC  Surgeon: Surgeon(s) and Role:     * Jhony Perez MD - Primary  Anesthesia: General   Estimated Blood Loss: Less than 10 ml    Drains: None  Specimens:   ID Type Source Tests Collected by Time Destination   1 : appendix Tissue Appendix SURGICAL PATHOLOGY EXAM Jhony Perez MD 1/9/2023  5:12 PM      Findings:   Dilated distal appendix with appendicolith.  Complications: None.  Implants: * No implants in log *    Jhony Perez MD, FACS    #[id number]

## 2023-01-09 NOTE — ANESTHESIA PREPROCEDURE EVALUATION
Anesthesia Pre-Procedure Evaluation    Patient: Florina Marie   MRN: [id number] : 1985        Procedure : Procedure(s):  APPENDECTOMY, LAPAROSCOPIC          Past Medical History:   Diagnosis Date     Attention deficit disorder with hyperactivity(314.01)      Left carpal tunnel syndrome 2019     Other kyphoscoliosis and scoliosis      Other specified delay in development     Microcephaly     Right carpal tunnel syndrome 3/14/2019     Viral warts, unspecified     plantar warts      Past Surgical History:   Procedure Laterality Date     EXAM UNDER ANESTHESIA PELVIC N/A 2016    Procedure: EXAM UNDER ANESTHESIA PELVIC;  Surgeon: Rhoda Alcazar MD;  [facility]     HC TOOTH EXTRACTION W/FORCEP      wisdom     RELEASE CARPAL TUNNEL Right 3/22/2019    Procedure: RIGHT RELEASE CARPAL TUNNEL;  Surgeon: Anjum Wilburn DO;  Location: [facility]     RELEASE CARPAL TUNNEL Left 2019    Procedure: RELEASE, CARPAL TUNNEL-Left;  Surgeon: Anjum Wilburn DO;  Location: [facility] OR      Allergies   Allergen Reactions     Macrobid [Nitrofurantoin] Headache and Nausea      Social History     Tobacco Use     Smoking status: Never     Smokeless tobacco: Never     Tobacco comments:     no smokers in the household   Substance Use Topics     Alcohol use: No      Wt Readings from Last 1 Encounters:   23 138.8 kg (306 lb)        Anesthesia Evaluation   Pt has had prior anesthetic. Type: MAC and General.    No history of anesthetic complications       ROS/MED HX  ENT/Pulmonary:  - neg pulmonary ROS     Neurologic:  - neg neurologic ROS     Cardiovascular:     (+) Dyslipidemia -----    METS/Exercise Tolerance: >4 METS    Hematologic:  - neg hematologic  ROS     Musculoskeletal: Comment: Cervical radiculopathy      GI/Hepatic:  - neg GI/hepatic ROS     Renal/Genitourinary:  - neg Renal ROS     Endo:     (+) Obesity,     Psychiatric/Substance Use:     (+) psychiatric history other (comment) and  anxiety     Infectious Disease:  - neg infectious disease ROS     Malignancy:  - neg malignancy ROS     Other:  - neg other ROS          Physical Exam    Airway  airway exam normal      Mallampati: II   TM distance: > 3 FB   Neck ROM: full   Mouth opening: > 3 cm    Respiratory Devices and Support         Dental  no notable dental history         Cardiovascular   cardiovascular exam normal       Rhythm and rate: regular and normal     Pulmonary   pulmonary exam normal        breath sounds clear to auscultation           OUTSIDE LABS:  CBC:   Lab Results   Component Value Date    WBC 9.9 01/09/2023    WBC 13.1 (H) 04/15/2021    HGB 12.5 01/09/2023    HGB 13.1 04/15/2021    HCT 39.8 01/09/2023    HCT 40.2 04/15/2021     01/09/2023     04/15/2021     BMP:   Lab Results   Component Value Date     01/09/2023     12/31/2021    POTASSIUM 4.5 01/09/2023    POTASSIUM 3.7 12/31/2021    CHLORIDE 106 01/09/2023    CHLORIDE 107 12/31/2021    CO2 26 01/09/2023    CO2 26 12/31/2021    BUN 17 01/09/2023    BUN 11 12/31/2021    CR 0.78 01/09/2023    CR 0.74 12/31/2021     (H) 01/09/2023     (H) 12/31/2021     COAGS:   Lab Results   Component Value Date    PTT 31 08/09/2018    INR 1.10 07/09/2018     POC:   Lab Results   Component Value Date    HCG Negative 01/09/2023     HEPATIC:   Lab Results   Component Value Date    ALBUMIN 3.3 (L) 01/09/2023    PROTTOTAL 7.4 01/09/2023    ALT 19 01/09/2023    AST 19 01/09/2023    ALKPHOS 79 01/09/2023    BILITOTAL 0.4 01/09/2023     OTHER:   Lab Results   Component Value Date    PH 6 07/18/2014    A1C 6.0 (H) 12/31/2021    SY 8.4 (L) 01/09/2023    MAG 2.2 12/31/2021    LIPASE 100 11/15/2015    TSH 1.42 12/31/2021    T4 0.88 12/31/2021    T3 109 12/31/2021    CRP 11.0 (H) 07/10/2018    SED 13 07/10/2018       Anesthesia Plan    ASA Status:  2   NPO Status:  NPO Appropriate    Anesthesia Type: General.     - Airway: ETT   Induction: Intravenous.    Maintenance: Balanced.        Consents    Anesthesia Plan(s) and associated risks, benefits, and realistic alternatives discussed. Questions answered and patient/representative(s) expressed understanding.     - Discussed: Risks, Benefits and Alternatives for BOTH SEDATION and the PROCEDURE were discussed     - Discussed with:  Patient      - Extended Intubation/Ventilatory Support Discussed: No.      - Patient is DNR/DNI Status: No    Use of blood products discussed: No .     Postoperative Care    Pain management: IV analgesics, Multi-modal analgesia.   PONV prophylaxis: Ondansetron (or other 5HT-3), Dexamethasone or Solumedrol, Background Propofol Infusion     Comments:    Other Comments: The risks and benefits of anesthesia, and the alternatives where applicable, have been discussed with the patient, and they wish to proceed.            Stefani Sheppard, APRN CRNA

## 2023-01-09 NOTE — ED TRIAGE NOTES
Patient c/o low abdominal pain since 0530. C/o nausea. Spouse states he had pain last night after eating.      Triage Assessment     Row Name 01/09/23 1042       Triage Assessment (Adult)    Airway WDL WDL       Respiratory WDL    Respiratory WDL WDL       Skin Circulation/Temperature WDL    Skin Circulation/Temperature WDL WDL       Cardiac WDL    Cardiac WDL WDL

## 2023-01-09 NOTE — TELEPHONE ENCOUNTER
"This morning patient went to the bathroom and had a bowel movement and now is having abdominal pain/cramps.  Patient also had menstruation.  Pain is currently a \"10\".  Pain is lasting greater than one hour.  Denies too weak to stand.  Denies confusion and disorientation and denies fainting.  Patient states that she will go to ER.      Reason for Disposition    [1] SEVERE pain (e.g., excruciating) AND [2] present > 1 hour    Additional Information    Negative: Shock suspected (e.g., cold/pale/clammy skin, too weak to stand, low BP, rapid pulse)    Negative: Difficult to awaken or acting confused (e.g., disoriented, slurred speech)    Negative: Passed out (i.e., lost consciousness, collapsed and was not responding)    Negative: Sounds like a life-threatening emergency to the triager    Protocols used: ABDOMINAL PAIN - FEMALE-A-      "

## 2023-01-09 NOTE — ED PROVIDER NOTES
[facility] ED Provider Note   Patient: Florina Marie  MRN #:  [id number]  Date of Visit: January 9, 2023    CC:     Chief Complaint   Patient presents with     Abdominal Pain     HPI:  Florina Marie is a 37 year old female who presented to the emergency department with onset of abdominal pain that started around 5:30 AM.  Patient had gotten up to get ready for the day.  She urinated and had a bowel movement, and went back to bed when the pain started.  Pain is generalized but midline and is just above the bellybutton.  There is no radiation of the pain.  She describes a crampy pain but there is no vaginal bleeding, dysuria, or abnormal bowel movements.  She does not have fever, chills, cough, shortness of breath.  She feels generally ill and her anxiety is extremely high.  She has no previous abdominal surgeries, and has no past history of ulcers, gallbladder problems, colitis, diverticulitis, or kidney stones.  Patient is currently on Lexapro and occasionally Xanax and hydroxyzine for her anxiety.    Problem List:  Patient Active Problem List    Diagnosis Date Noted     Cervical radiculopathy 12/16/2022     Priority: Medium     Added automatically from request for surgery [id number]       Prolonged PTT 08/09/2018     Priority: Medium     Attention deficit disorder without hyperactivity 12/01/2011     Priority: Medium     Hyperlipidemia, unspecified 01/08/2010     Priority: Medium     Generalized anxiety disorder 10/24/2007     Priority: Medium     Morbid obesity (H) 01/01/2004     Priority: Medium     Problems with learning 12/31/2003     Priority: Medium       Past Medical History:   Diagnosis Date     Attention deficit disorder with hyperactivity(314.01)      Left carpal tunnel syndrome 6/27/2019     Other kyphoscoliosis and scoliosis      Other specified delay in development      Right carpal tunnel syndrome 3/14/2019     Viral warts,  unspecified        MEDS: ALPRAZolam (XANAX) 0.5 MG tablet  azelastine (OPTIVAR) 0.05 % ophthalmic solution  desogestrel-ethinyl estradiol (APRI) 0.15-30 MG-MCG tablet  escitalopram (LEXAPRO) 20 MG tablet  folic acid (FOLVITE) 1 MG tablet  gabapentin (NEURONTIN) 300 MG capsule  hydrOXYzine (ATARAX) 25 MG tablet  predniSONE (DELTASONE) 20 MG tablet  tiZANidine (ZANAFLEX) 2 MG tablet  cetirizine (ZYRTEC) 10 MG tablet        ALLERGIES:    Allergies   Allergen Reactions     Macrobid [Nitrofurantoin] Headache and Nausea       Past Surgical History:   Procedure Laterality Date     EXAM UNDER ANESTHESIA PELVIC N/A 9/9/2016    Procedure: EXAM UNDER ANESTHESIA PELVIC;  Surgeon: Rhoda Alcazar MD;  Location: [facility] OR     HC TOOTH EXTRACTION W/FORCEP      wisdom     RELEASE CARPAL TUNNEL Right 3/22/2019    Procedure: RIGHT RELEASE CARPAL TUNNEL;  Surgeon: Anjum Wilburn DO;  Location: [facility] OR     RELEASE CARPAL TUNNEL Left 7/16/2019    Procedure: RELEASE, CARPAL TUNNEL-Left;  Surgeon: Anjum Wilburn DO;  Location: [facility] OR       Social History     Tobacco Use     Smoking status: Never     Smokeless tobacco: Never     Tobacco comments:     no smokers in the household   Vaping Use     Vaping Use: Never used   Substance Use Topics     Alcohol use: No     Drug use: No         Review of Systems   Except as noted in HPI, all other systems were reviewed and are negative    Physical Exam     Vitals were reviewed  Patient Vitals for the past 12 hrs:   BP Temp Temp src Pulse Resp SpO2 Weight   01/09/23 1445 113/73 -- -- 74 -- 95 % --   01/09/23 1145 109/55 -- -- 72 -- 98 % --   01/09/23 1045 124/87 98  F (36.7  C) Oral 67 20 97 % 138.8 kg (306 lb)     GENERAL APPEARANCE: Alert and oriented x3, moderate distress due to abdominal pain and anxiety  FACE: normal facies  EYES: Pupils are equal; sclerae is nonicteric  HENT: normal external exam  NECK: no adenopathy or asymmetry  RESP: normal respiratory effort; clear breath  sounds bilaterally  CV: regular rate and rhythm; no significant murmurs, gallops or rubs  ABD: soft, obese, generalized mid abdominal tenderness; no localization of pain to the right lower quadrant.  No rebound or guarding; bowel sounds are normal  MS: no gross deformities noted; normal muscle tone.  EXT: No calf tenderness or pitting edema  SKIN: no worrisome rash  NEURO: no facial droop; no focal deficits, speech is normal  PSYCH: Patient endorses feeling anxious yes      Available Lab/Imaging Results     Results for orders placed or performed during the hospital encounter of 01/09/23 (from the past 24 hour(s))   UA reflex to Microscopic   Result Value Ref Range    Color Urine Yellow Colorless, Straw, Light Yellow, Yellow    Appearance Urine Clear Clear    Glucose Urine Negative Negative mg/dL    Bilirubin Urine Negative Negative    Ketones Urine Negative Negative mg/dL    Specific Gravity Urine 1.017 1.003 - 1.035    Blood Urine Negative Negative    pH Urine 5.0 5.0 - 7.0    Protein Albumin Urine Negative Negative mg/dL    Urobilinogen Urine Normal Normal, 2.0 mg/dL    Nitrite Urine Negative Negative    Leukocyte Esterase Urine Negative Negative    Narrative    Microscopic not indicated   HCG qualitative urine (UPT)   Result Value Ref Range    hCG Urine Qualitative Negative Negative   CBC with platelets differential    Narrative    The following orders were created for panel order CBC with platelets differential.  Procedure                               Abnormality         Status                     ---------                               -----------         ------                     CBC with platelets and d...[[id number]]  Abnormal            Final result                 Please view results for these tests on the individual orders.   Comprehensive metabolic panel   Result Value Ref Range    Sodium 136 133 - 144 mmol/L    Potassium 4.5 3.4 - 5.3 mmol/L    Chloride 106 94 - 109 mmol/L    Carbon Dioxide (CO2) 26 20  - 32 mmol/L    Anion Gap 4 3 - 14 mmol/L    Urea Nitrogen 17 7 - 30 mg/dL    Creatinine 0.78 0.52 - 1.04 mg/dL    Calcium 8.4 (L) 8.5 - 10.1 mg/dL    Glucose 121 (H) 70 - 99 mg/dL    Alkaline Phosphatase 79 40 - 150 U/L    AST 19 0 - 45 U/L    ALT 19 0 - 50 U/L    Protein Total 7.4 6.8 - 8.8 g/dL    Albumin 3.3 (L) 3.4 - 5.0 g/dL    Bilirubin Total 0.4 0.2 - 1.3 mg/dL    GFR Estimate >90 >60 mL/min/1.73m2   CBC with platelets and differential   Result Value Ref Range    WBC Count 9.9 4.0 - 11.0 10e3/uL    RBC Count 4.77 3.80 - 5.20 10e6/uL    Hemoglobin 12.5 11.7 - 15.7 g/dL    Hematocrit 39.8 35.0 - 47.0 %    MCV 83 78 - 100 fL    MCH 26.2 (L) 26.5 - 33.0 pg    MCHC 31.4 (L) 31.5 - 36.5 g/dL    RDW 13.9 10.0 - 15.0 %    Platelet Count 294 150 - 450 10e3/uL    % Neutrophils 77 %    % Lymphocytes 18 %    % Monocytes 4 %    % Eosinophils 1 %    % Basophils 0 %    % Immature Granulocytes 0 %    NRBCs per 100 WBC 0 <1 /100    Absolute Neutrophils 7.5 1.6 - 8.3 10e3/uL    Absolute Lymphocytes 1.8 0.8 - 5.3 10e3/uL    Absolute Monocytes 0.4 0.0 - 1.3 10e3/uL    Absolute Eosinophils 0.1 0.0 - 0.7 10e3/uL    Absolute Basophils 0.0 0.0 - 0.2 10e3/uL    Absolute Immature Granulocytes 0.0 <=0.4 10e3/uL    Absolute NRBCs 0.0 10e3/uL   Abd/pelvis CT - no contrast - Stone Protocol    Narrative    CT ABDOMEN PELVIS WITHOUT CONTRAST 1/9/2023 12:05 PM    CLINICAL HISTORY: Generalized abdominal pain.  TECHNIQUE: CT scan of the abdomen and pelvis was performed without IV  contrast. Multiplanar reformats were obtained. Dose reduction  techniques were used.  CONTRAST: None.  COMPARISON: None.    FINDINGS:   LOWER CHEST: The visualized lung bases are clear.    HEPATOBILIARY: Hepatic steatosis. No hepatic masses. No calcified  gallstones.    PANCREAS: Normal.    SPLEEN: Normal.    ADRENAL GLANDS: Normal.    KIDNEYS/BLADDER: Unremarkable. No hydronephrosis.    BOWEL: No bowel obstruction. No convincing evidence for colitis  or  diverticulitis. The appendix is mildly dilated at 1 cm. A small  appendicolith is noted within the distal appendix. There is only  minimal associated periappendiceal inflammatory stranding. No  periappendiceal fluid collections.    PELVIC ORGANS: A right ovarian cystic lesion measures 3.5 cm. No free  fluid in the pelvis..    LYMPH NODES: No enlarged lymph nodes are identified in the abdomen or  pelvis.    VASCULATURE: Unremarkable.    ADDITIONAL FINDINGS: None.    MUSCULOSKELETAL: Degenerative changes in the visualized thoracolumbar  spine. Thoracolumbar curve, convex left.      Impression    IMPRESSION:   1.  The appendix is mildly dilated at 1 cm, however there is only  minimal periappendiceal inflammatory stranding. Findings are  considered equivocal for an early or mild appendicitis. Clinical  correlation and close follow-up are recommended.  2.  A right ovarian cystic lesion measures 3.5 cm. Pelvic ultrasound  may be helpful for further characterization.  3.  Hepatic steatosis.   US Pelvis Complete without Transvaginal    Narrative    US PELVIC TRANSABDOMINAL 1/9/2023 1:35 PM    CLINICAL HISTORY: Abdominal pain. Right ovarian cystic lesion noted on  CT.  TECHNIQUE: Transabdominal scans were performed. Transvaginal imaging  was deferred at the request of the patient.  COMPARISON: CT of the abdomen and pelvis performed on 1/9/2023.    FINDINGS:    UTERUS: 8.6 x 5.1 x 3.3 cm. Normal in size and position with no  masses.    ENDOMETRIUM: 2 mm. Normal smooth endometrium.    RIGHT OVARY: 5.4 x 3.5 x 3.4 cm. A right ovarian simple cyst measures  3.2 cm. Otherwise unremarkable, with flow demonstrated.    LEFT OVARY: 3 x 1.8 x 1.8 cm. Normal with flow demonstrated.    No significant free fluid.      Impression    IMPRESSION:  1.  A right ovarian simple cyst measures 3.2 cm, and could represent a  dominant follicle.  2.  Otherwise unremarkable pelvic ultrasound examination.                 Impression     Final  diagnoses:   Abdominal pain, periumbilical   Acute appendicitis with localized peritonitis         ED Course & Medical Decision Making   Florina Marie is a 37 year old female who presented to the emergency department with cute onset of abdominal pain that started at 530 this morning.  Pain steadily got worse and has been constant since then.  Symptoms started after she had a bowel movement and urinated.  Pain is just above the bellybutton.  Patient does not have any vaginal discharge or bleeding.  Temperature is 98 degrees, blood pressure 124/87, heart rate of 67, respiratory rate of 20 with 97% oxygen saturation.  Exam reveals some diffuse mid abdominal tenderness.  No definite tenderness in the right lower quadrant.  CBC reveals a white blood count of 9.9, hemoglobin of 12.5, platelet count of 294 with 77% neutrophils.  Comprehensive metabolic panel is normal except for glucose of 121.  Urine hCG is negative.  Urinalysis was unremarkable.    Medications   LORazepam (ATIVAN) injection 0.5 mg (0.5 mg Intravenous Given 1/9/23 1141)   lactated ringers infusion ( Intravenous New Bag 1/9/23 1423)   ketorolac (TORADOL) injection 30 mg (30 mg Intravenous Given 1/9/23 1140)   ondansetron (ZOFRAN) injection 4 mg (4 mg Intravenous Given 1/9/23 1141)   ampicillin-sulbactam (UNASYN) 3 g vial to attach to  mL bag (0 g Intravenous Stopped 1/9/23 1423)        1:03 PM: I discussed the work-up thus far including CT scan of the abdomen showing an appendix that is mildly dilated at 1 cm.  There is only minimal periappendiceal inflammatory stranding.  Findings are considered equivocal for an early or mild appendicitis.  There is a right ovarian cystic lesion measuring 3.5 cm.  We decided to proceed with pelvic ultrasound to further characterize the right ovarian cystic structure.  Patient's pain and anxiety has improved with a dose of Toradol, Zofran, and lorazepam.    1:31 PM: I spoke with Dr. Jhony Perez, general  surgery, and he would like to proceed with getting the patient on the OR schedule for later this afternoon for laparoscopic appendectomy.  He asked us to initiate a dose of Unasyn.  Patient has not had anything to eat all day.  Last intake was last night.  We will keep the patient NPO.  Pain is still present in the right lower abdomen.    Patient's pelvic ultrasound reveals a right ovarian simple cyst, measuring 3.2 cm.  This could represent a dominant follicle.  Otherwise unremarkable pelvic ultrasound.      Medical Decision Making  The patient presented with a problem that is an acute illness with systemic symptoms.    The patient's evaluation involved:  ordering and review of 3+ test(s) (see separate area of note for details)  review of 1 test result(s) ordered prior to this encounter (Prior imaging)  discussion of management or test interpretation with another health professional (Discussion with Dr. Perez)    The patient's management involved a decision regarding emergency major surgery.      Patient is cleared for general anesthesia and surgery.       Disclaimer: This note consists of words and symbols derived from keyboarding and dictation using voice recognition software.  As a result, there may be errors that have gone undetected.  Please consider this when interpreting information found in this note.       Jian Mullen MD  01/09/23 0701

## 2023-01-09 NOTE — ANESTHESIA PROCEDURE NOTES
Airway       Patient location during procedure: OR       Procedure Start/Stop Times: 1/9/2023 4:42 PM  Staff -        CRNA: Stefani Sheppard APRN CRNA       Performed By: CRNA  Consent for Airway        Urgency: elective  Indications and Patient Condition       Indications for airway management: maria e-procedural       Induction type:intravenous       Mask difficulty assessment: 1 - vent by mask    Final Airway Details       Final airway type: endotracheal airway       Successful airway: ETT - single and Oral  Endotracheal Airway Details        ETT size (mm): 7.0       Cuffed: yes       Cuff volume (mL): 8       Successful intubation technique: video laryngoscopy       VL Blade Size: Glidescope 4       Grade View of Cords: 1       Adjucts: stylet       Position: Right       Measured from: lips       Secured at (cm): 22       Bite block used: None    Post intubation assessment        Placement verified by: capnometry, equal breath sounds and chest rise        Number of attempts at approach: 1       Number of other approaches attempted: 0       Secured with: silk tape       Ease of procedure: easy       Dentition: Intact and Unchanged    Medication(s) Administered   Medication Administration Time: 1/9/2023 4:42 PM    Additional Comments       Atraumatic. No apparent complications.

## 2023-01-09 NOTE — OP NOTE
Procedure Date: 01/09/2023    PREOPERATIVE DIAGNOSIS:  Acute appendicitis.    POSTOPERATIVE DIAGNOSIS:  Acute appendicitis, nonperforated.    PROCEDURE PERFORMED:  Laparoscopic appendectomy.    SURGEON:  Jhony Perez MD, [facility]    ANESTHESIA:  General endotracheal.    INDICATIONS FOR PROCEDURE:  A 37-year-old lady who presented with lower abdominal pain.  Subsequent CT revealed a dilated appendix with some periappendiceal stranding.  I also reviewed the films myself.  She was noted to also have an appendicolith in the appendix.  For this reason, we elected to take her to the operating room for a laparoscopic appendectomy.    OPERATIVE FINDINGS:  Included a dilated distal appendix with appendicolith.    DESCRIPTION OF PROCEDURE:  The patient was taken to the operating room and placed on the table in supine position.  After induction of anesthesia, the abdomen was then prepped and draped in sterile fashion.  Timeout was performed confirming the day and the patient as well as the procedure to be performed.  A supraumbilical incision was then made.  An 11 mm Visiport was inserted in the abdomen under direct visualization and the abdomen was then insufflated to 15 mmHg pressure.  Generalized inspection of the abdomen was then carried out with no evidence of injury from insertion of the Visiport.  A left lower quadrant 5 mm trocar and a 5 mm suprapubic trocar were placed.  The cecum was grasped, rotated medially.  The proximal appendix was seen.  This was pulled up.  The distal appendix was noted to be dilated and thickened.  This corresponded to the area of the appendicolith on CT.  The appendiceal mesentery was taken down using cautery.  The appendiceal artery was also cauterized.  We then dissected all the way back to the base of the appendix.  An Endo-SHUKRI stapler was fired across the base and the appendix was removed via an EndoCatch bag.  The appendix was palpated and found to contain an appendicolith.  The area  was then copiously irrigated.  All fluid was suctioned out.  The staple line as well as the mesentery were inspected without evidence of any bleeding.  The supraumbilical trocar was removed.  The fascia was closed using a cone PMI needle and #1 PDS.  The two 5 mm trocars were removed without evidence of any bleeding.  All skin incisions were closed using 3-0 Vicryl and Exofin glue.  Sterile dressings were applied.  The patient was taken from the operating room to the recovery room in stable condition to be sent home.    Jhony Perez MD, FACS        D: 2023   T: 2023   MT: GAEL    Name:     MELODY CASTELLON  MRN:      [id number]        Account:        [id number]   :      1985           Procedure Date: 2023     Document: [id number]

## 2023-01-09 NOTE — ANESTHESIA CARE TRANSFER NOTE
Patient: Florina Marie    Procedure: Procedure(s):  APPENDECTOMY, LAPAROSCOPIC       Diagnosis: Acute appendicitis with localized peritonitis, without perforation, abscess, or gangrene [K35.30]  Diagnosis Additional Information: No value filed.    Anesthesia Type:   General     Note:    Oropharynx: oropharynx clear of all foreign objects and spontaneously breathing  Level of Consciousness: drowsy  Oxygen Supplementation: face mask  Level of Supplemental Oxygen (L/min / FiO2): 6  Independent Airway: airway patency satisfactory and stable  Dentition: dentition unchanged  Vital Signs Stable: post-procedure vital signs reviewed and stable  Report to RN Given: handoff report given  Patient transferred to: PACU    Handoff Report: Identifed the Patient, Identified the Reponsible Provider, Reviewed the pertinent medical history, Discussed the surgical course, Reviewed Intra-OP anesthesia mangement and issues during anesthesia, Set expectations for post-procedure period and Allowed opportunity for questions and acknowledgement of understanding      Vitals:  Vitals Value Taken Time   /60 01/09/23 1745   Temp 98.06  F (36.7  C) 01/09/23 1750   Pulse 79 01/09/23 1750   Resp 15 01/09/23 1750   SpO2 95 % 01/09/23 1750   Vitals shown include unvalidated device data.    Electronically Signed By: ANGIE Laguna CRNA  January 9, 2023  5:50 PM

## 2023-01-10 ENCOUNTER — TELEPHONE (OUTPATIENT)
Dept: SURGERY | Facility: CLINIC | Age: 38
End: 2023-01-10

## 2023-01-10 NOTE — DISCHARGE INSTRUCTIONS
Home Care Following Laparoscopic Surgery  Dr. Perez        Wound Healing and Care of the Incision    Your recovery will be much quicker since you don t have a large abdominal incision.  During laparoscopic surgery, gas is put into your abdomen to lift the abdominal wall up and away from the operative site.  Before the end of your surgery, the doctor rinses the area with a sterile liquid.  Most, but not all of this gas is removed before your surgery ends.  Your body will absorb the rest.  You may experience pain in the shoulder areas or a bloated abdomen because of the gas.      You may shower after 48 hours.  Pat your incisions dry after showering.  If there is any drainage from the incisions or if it is more comfortable for you, put a new band-aid/gauze over each incision.    Activity    For 2 weeks avoid heavy lifting and strenuous activities. NO lifting more than 20 pounds for 2 weeks.     Listen to what your body is telling you.  If any activity hurts: STOP and try it again in a few days.    Many patients report feeling more tired the second week after surgery.  This is most likely related to the healing process.    Returning to work will depend on how you feel and the type of work that you do and should be discussed with your doctor.  Most people return to work within 1-2 weeks of surgery.    Drainage    You may experience drainage from one or more of your incisions.  If drainage is present, wash the area with mild soap and water twice a day.  If drainage is staining your clothing, cover with a light bandage (gauze or band-aid).  If you are concerned about the amount or the color of drainage, call your doctor.    Diet and Appetite    You may return to the diet you were on before surgery.    Remember - most prescription pain pills can and do cause constipation.  Walking, extra fluids and increased fiber (fresh fruits and vegetables, etc.) are natural remedies for constipation.  Ask your doctor  about a stool softener such as Colace or Metamucil.    Driving    Most people can drive within 2-3 days of surgery.  You should have stopped taking prescription pain medication and be pain free enough to make an emergency stop before beginning to drive.    Call you physician if you have:    Redness, or concerns about drainage from your incisions.    A temperature of more than 100.5 degrees F.    Pain not relieved by your prescription pain pills and or a short rest.    Any questions and concerns about your recovery.    Follow-up Care    Make a clinic appointment for 1 week after surgery.  Call to schedule ([phone]).    NEXT IBUPROFEN DUE: 11:20 PM    [facility] patients please call the Specialty Clinic at [phone] with any questions or concerns during clinic hours; otherwise call the Nurse Advice Line at [phone]

## 2023-01-10 NOTE — ANESTHESIA POSTPROCEDURE EVALUATION
Patient: Florina Marie    Procedure: Procedure(s):  APPENDECTOMY, LAPAROSCOPIC       Anesthesia Type:  General    Note:  Disposition: Outpatient   Postop Pain Control: Uneventful            Sign Out: Well controlled pain   PONV: No   Neuro/Psych: Uneventful            Sign Out: Acceptable/Baseline neuro status   Airway/Respiratory: Uneventful            Sign Out: Acceptable/Baseline resp. status   CV/Hemodynamics: Uneventful            Sign Out: Acceptable CV status   Other NRE: NONE   DID A NON-ROUTINE EVENT OCCUR? No    Event details/Postop Comments:  Pt was happy with anesthesia care.  No complications.  I will follow up with the pt if needed.           Last vitals:  Vitals Value Taken Time   /61 01/09/23 1805   Temp 98.24  F (36.8  C) 01/09/23 1807   Pulse 79 01/09/23 1807   Resp 13 01/09/23 1807   SpO2 99 % 01/09/23 1807       Electronically Signed By: ANGIE Laguna CRNA  January 9, 2023  10:26 PM

## 2023-01-10 NOTE — TELEPHONE ENCOUNTER
Patient calling, states that she has a rash near her incision that consists of 5 raised, red spots; 2 spots are about the size of a dime size, 1 is about the size of a quarter, and two are larger than the size of a quarter. Spots are located on either side of her belly button. Patient's partner states that he noticed the rash yesterday, but patient states she didn't notice it until this morning. Denies itching, pain, fever, and chills. Has not taken any medication for the rash at this time.     Spoke with Dr. Perez who advised that patient should continue to monitor and keep the area clean and dry.     Patient notified of provider's message as written. Patient verbalized understanding and has no further questions at this time.     Anitra García RN   [facility]

## 2023-01-11 ENCOUNTER — TELEPHONE (OUTPATIENT)
Dept: SURGERY | Facility: CLINIC | Age: 38
End: 2023-01-11

## 2023-01-11 LAB
PATH REPORT.COMMENTS IMP SPEC: NORMAL
PATH REPORT.COMMENTS IMP SPEC: NORMAL
PATH REPORT.FINAL DX SPEC: NORMAL
PATH REPORT.GROSS SPEC: NORMAL
PATH REPORT.MICROSCOPIC SPEC OTHER STN: NORMAL
PATH REPORT.RELEVANT HX SPEC: NORMAL
PHOTO IMAGE: NORMAL

## 2023-01-11 PROCEDURE — 88304 TISSUE EXAM BY PATHOLOGIST: CPT | Mod: 26 | Performed by: PATHOLOGY

## 2023-01-11 NOTE — TELEPHONE ENCOUNTER
Reason for Call:  Other call back    Detailed comments: pt had recent procedure with Dr. Perez and is needing back to work release form sent to her email of [email or web address]    Phone Number Patient can be reached at: Cell number on file:    Telephone Information:   Mobile [phone]           Best Time: any    Can we leave a detailed message on this number? YES    Call taken on 1/11/2023 at 9:44 AM by Anisa Esquivel

## 2023-01-11 NOTE — TELEPHONE ENCOUNTER
Patient would like clearance to go back to work starting today. She stated she would be the passenger in a vehicle running the GPS system.    Patient had a laprascopic appendectomy on 1/9 with Dr. Perez. She declined a post-op appointment with Dr. Perez. She is seeing her PCP this coming Friday (1/13/23).     Routing to Dr. Perez to advise.    So Reynoso RN on 1/11/2023 at 10:50 AM

## 2023-01-11 NOTE — LETTER
January 12, 2023      Florina Marie  [id number] [address] [address]        To Whom It May Concern,     Florina Marie may return to work on 1/11/2023 with a 20 weight restriction til 2/6/23. Any questions feel free to call.       Sincerely,        Jhony Perez MD

## 2023-01-12 NOTE — TELEPHONE ENCOUNTER
Per Dr. Perez if she feels she can go back to work safely he is ok with giving her a note to return yesterday. I emailed her the letter to the email given below.  Valorie Lennon MA

## 2023-01-13 ENCOUNTER — NURSE TRIAGE (OUTPATIENT)
Dept: NURSING | Facility: CLINIC | Age: 38
End: 2023-01-13

## 2023-01-13 ENCOUNTER — OFFICE VISIT (OUTPATIENT)
Dept: FAMILY MEDICINE | Facility: OTHER | Age: 38
End: 2023-01-13
Payer: MEDICARE

## 2023-01-13 VITALS
SYSTOLIC BLOOD PRESSURE: 122 MMHG | HEART RATE: 68 BPM | OXYGEN SATURATION: 100 % | WEIGHT: 293 LBS | DIASTOLIC BLOOD PRESSURE: 72 MMHG | BODY MASS INDEX: 47.09 KG/M2 | RESPIRATION RATE: 20 BRPM | TEMPERATURE: 97.9 F | HEIGHT: 66 IN

## 2023-01-13 DIAGNOSIS — Z00.00 ENCOUNTER FOR MEDICARE ANNUAL WELLNESS EXAM: Primary | ICD-10-CM

## 2023-01-13 DIAGNOSIS — Z30.41 ORAL CONTRACEPTIVE PILL SURVEILLANCE: ICD-10-CM

## 2023-01-13 DIAGNOSIS — E66.01 MORBID OBESITY (H): ICD-10-CM

## 2023-01-13 DIAGNOSIS — Z90.49 S/P LAPAROSCOPIC APPENDECTOMY: ICD-10-CM

## 2023-01-13 PROCEDURE — G0439 PPPS, SUBSEQ VISIT: HCPCS | Performed by: FAMILY MEDICINE

## 2023-01-13 RX ORDER — DESOGESTREL AND ETHINYL ESTRADIOL 0.15-0.03
1 KIT ORAL DAILY
Qty: 84 TABLET | Refills: 3 | Status: SHIPPED | OUTPATIENT
Start: 2023-01-13 | End: 2024-08-08

## 2023-01-13 ASSESSMENT — ENCOUNTER SYMPTOMS
WEAKNESS: 0
FREQUENCY: 0
PALPITATIONS: 0
MYALGIAS: 0
DIZZINESS: 0
HEADACHES: 0
DIARRHEA: 0
PARESTHESIAS: 0
DYSURIA: 0
NAUSEA: 0
NERVOUS/ANXIOUS: 0
JOINT SWELLING: 0
CHILLS: 0
SORE THROAT: 0
COUGH: 0
BREAST MASS: 0
CONSTIPATION: 0
FEVER: 0
HEARTBURN: 0
SHORTNESS OF BREATH: 0
ARTHRALGIAS: 0
EYE PAIN: 0
ABDOMINAL PAIN: 0
HEMATOCHEZIA: 0
HEMATURIA: 0

## 2023-01-13 ASSESSMENT — PAIN SCALES - GENERAL: PAINLEVEL: MILD PAIN (3)

## 2023-01-13 ASSESSMENT — ACTIVITIES OF DAILY LIVING (ADL): CURRENT_FUNCTION: NO ASSISTANCE NEEDED

## 2023-01-13 NOTE — PROGRESS NOTES
"SUBJECTIVE:   Florina is a 37 year old who presents for Preventive Visit.  Patient has been advised of split billing requirements and indicates understanding: Yes  Are you in the first 12 months of your Medicare coverage?  No    Healthy Habits:     In general, how would you rate your overall health?  Good    Frequency of exercise:  2-3 days/week    Duration of exercise:  15-30 minutes    Do you usually eat at least 4 servings of fruit and vegetables a day, include whole grains    & fiber and avoid regularly eating high fat or \"junk\" foods?  Yes    Taking medications regularly:  Yes    Medication side effects:  None    Ability to successfully perform activities of daily living:  No assistance needed    Home Safety:  No safety concerns identified    Hearing Impairment:  No hearing concerns    In the past 6 months, have you been bothered by leaking of urine?  No    In general, how would you rate your overall mental or emotional health?  Good      PHQ-2 Total Score: 0    Additional concerns today:  No      Have you ever done Advance Care Planning? (For example, a Health Directive, POLST, or a discussion with a medical provider or your loved ones about your wishes): Yes, advance care planning is on file.       Fall risk  Fallen 2 or more times in the past year?: No  Any fall with injury in the past year?: No    Cognitive Screening Not appropriate due to mental handicap  Patient declines screening      Reviewed and updated as needed this visit by clinical staff   Tobacco  Allergies  Meds  Problems  Med Hx  Surg Hx  Fam Hx          Reviewed and updated as needed this visit by Provider   Tobacco  Allergies  Meds  Problems  Med Hx  Surg Hx  Fam Hx         Social History     Tobacco Use     Smoking status: Never     Smokeless tobacco: Never     Tobacco comments:     no smokers in the household   Substance Use Topics     Alcohol use: No         Alcohol Use 1/13/2023   Prescreen: >3 drinks/day or >7 drinks/week? No "   Prescreen: >3 drinks/day or >7 drinks/week? -   AUDIT SCORE  -       Current providers sharing in care for this patient include:   Patient Care Team:  Alanna Curiel MD as PCP - General  Alanna Curiel MD as Assigned PCP    The following health maintenance items are reviewed in Epic and correct as of today:  Health Maintenance   Topic Date Due     COVID-19 Vaccine (2 - Yuan risk series) 06/16/2021     HPV TEST  09/08/2021     PAP  09/08/2021     INFLUENZA VACCINE (1) 09/01/2022     ANNUAL REVIEW OF HM ORDERS  12/31/2022     MEDICARE ANNUAL WELLNESS VISIT  01/13/2024     ADVANCE CARE PLANNING  01/13/2028     DTAP/TDAP/TD IMMUNIZATION (4 - Td or Tdap) 11/20/2028     HEPATITIS C SCREENING  Completed     HIV SCREENING  Completed     PHQ-2 (once per calendar year)  Completed     HEPATITIS B IMMUNIZATION  Completed     Pneumococcal Vaccine: Pediatrics (0 to 5 Years) and At-Risk Patients (6 to 64 Years)  Aged Out     IPV IMMUNIZATION  Aged Out     MENINGITIS IMMUNIZATION  Aged Out       Breast CA Risk Assessment (FHS-7) 12/31/2021 1/13/2023   Do you have a family history of breast, colon, or ovarian cancer? Yes No / Unknown       Review of Systems   Constitutional: Negative for chills and fever.   HENT: Negative for congestion, ear pain, hearing loss and sore throat.    Eyes: Negative for pain and visual disturbance.   Respiratory: Negative for cough and shortness of breath.    Cardiovascular: Negative for chest pain, palpitations and peripheral edema.   Gastrointestinal: Negative for abdominal pain, constipation, diarrhea, heartburn, hematochezia and nausea.   Breasts:  Negative for tenderness, breast mass and discharge.   Genitourinary: Negative for dysuria, frequency, genital sores, hematuria, pelvic pain, urgency, vaginal bleeding and vaginal discharge.   Musculoskeletal: Negative for arthralgias, joint swelling and myalgias.   Skin: Negative for rash.   Neurological: Negative for dizziness,  "weakness, headaches and paresthesias.   Psychiatric/Behavioral: Negative for mood changes. The patient is not nervous/anxious.        OBJECTIVE:   /72   Pulse 68   Temp 97.9  F (36.6  C) (Temporal)   Resp 20   Ht 1.673 m (5' 5.87\")   Wt 140.2 kg (309 lb)   LMP 01/02/2023 (Approximate)   SpO2 100%   BMI 50.08 kg/m   Estimated body mass index is 50.08 kg/m  as calculated from the following:    Height as of this encounter: 1.673 m (5' 5.87\").    Weight as of this encounter: 140.2 kg (309 lb).  Physical Exam  Constitutional:       General: She is not in acute distress.     Appearance: She is well-developed.   HENT:      Right Ear: Tympanic membrane and external ear normal.      Left Ear: Tympanic membrane and external ear normal.      Nose: Nose normal.   Eyes:      General:         Right eye: No discharge.         Left eye: No discharge.      Conjunctiva/sclera: Conjunctivae normal.      Pupils: Pupils are equal, round, and reactive to light.   Neck:      Thyroid: No thyroid mass.   Cardiovascular:      Rate and Rhythm: Normal rate and regular rhythm.      Heart sounds: Normal heart sounds, S1 normal and S2 normal. No murmur heard.  Pulmonary:      Effort: Pulmonary effort is normal. No respiratory distress.      Breath sounds: Normal breath sounds. No wheezing or rales.   Abdominal:      General: Bowel sounds are normal.      Palpations: Abdomen is soft. There is no mass.      Tenderness: There is no abdominal tenderness.      Comments: Incisions clean, dry, intact.  No discharge.  No redness.  No warmth.   Musculoskeletal:         General: Normal range of motion.      Cervical back: Neck supple.   Lymphadenopathy:      Cervical: No cervical adenopathy.   Skin:     General: Skin is warm and dry.      Findings: No rash.   Neurological:      Mental Status: She is alert and oriented to person, place, and time.   Psychiatric:         Mood and Affect: Mood normal.         Thought Content: Thought content " normal.         Judgment: Judgment normal.         ASSESSMENT / PLAN:   (Z00.00) Encounter for Medicare annual wellness exam  (primary encounter diagnosis)  Comment: Patient is due for Pap smear but she just had appendectomy done 4 days ago.  We will not do Pap smear at this time.  We will delay this until next year.      (Z90.49) S/P laparoscopic appendectomy  Comment: She did not like the pain medications and is not taking them.  She states she feels more sore than pain.  She denies nausea and states she is eating well.  Denies constipation or diarrhea.  She states her bowel movements are regular.  No evidence of infection on exam.    (E66.01) Morbid obesity (H)  Comment: She is trying to use a treadmill more regularly.  She is trying to get more fruits and vegetables in her diet.  Offered medical weight loss consult but she declines.    (Z30.41) Oral contraceptive pill surveillance  Comment: She denies problems with her birth control pill.  Refills given.  Plan: desogestrel-ethinyl estradiol (APRI) 0.15-30         MG-MCG tablet           COUNSELING:  Reviewed preventive health counseling, as reflected in patient instructions        She reports that she has never smoked. She has never used smokeless tobacco.      Appropriate preventive services were discussed with this patient, including applicable screening as appropriate for cardiovascular disease, diabetes, osteopenia/osteoporosis, and glaucoma.  As appropriate for age/gender, discussed screening for colorectal cancer, prostate cancer, breast cancer, and cervical cancer. Checklist reviewing preventive services available has been given to the patient.    Reviewed patients plan of care and provided an AVS. The Basic Care Plan (routine screening as documented in Health Maintenance) for Florina meets the Care Plan requirement. This Care Plan has been established and reviewed with the Patient.      Alanna Curiel MD  [facility]  [place]    Identified Health Risks:

## 2023-01-13 NOTE — TELEPHONE ENCOUNTER
Triage Call:    Patient calling to report body aches, chills, and headache.  She is POD 3 of appendectomy.  Patient had an annual exam in clinic today and was advised that the incision looks good.  She reports she did have hives at incision, but is now gone.  She reports mild surgical pain managed with pain medication.  She reports mild headache, denies spinal anesthesia.  Her current oral temperature is 97.7, she denies having antipyretic recently.  Patient denies chest pain, difficulty breathing, rash, burning with urination, confusion, IV site symptoms, dehydration, or vomiting.    According to the protocol, patient should continue with home care.  Care advice given to rest, stay well hydrated, ambulate while awake, and monitor incision and temperature.     CALL BACK IF:   * Severe pain  * Fever over 100.4 F (38.0 C)  * You become worse    Patient verbalizes understanding and agrees with plan of care.     Leah Roberson RN  01/13/23 6:07 PM  [facility] Nurse Advisor     Reason for Disposition    Other post-op symptom or question    Additional Information    Negative: Sounds like a life-threatening emergency to the triager    Negative: Chest pain    Negative: Difficulty breathing    Negative: Acting confused (e.g., disoriented, slurred speech) or excessively sleepy    Negative: Post-Op tonsil and adenoid surgery, symptoms or questions about    Negative: Surgical incision symptoms and questions    Negative: [1] Pain or burning with passing urine (urination) AND [2] male    Negative: [1] Pain or burning with passing urine (urination) AND [2] female    Negative: Constipation    Negative: New or worsening leg (calf, thigh) pain    Negative: New or worsening leg swelling    Negative: Dizziness is severe, or persists > 24 hours after surgery    Negative: Pain, redness, swelling, or pus at IV Site    Negative: Symptoms arising from use of a urinary catheter (e.g., coude, Nichols)    Negative: Cast problems or  questions    Negative: Medication question    Negative: [1] Widespread rash AND [2] bright red, sunburn-like    Negative: [1] SEVERE headache AND [2] after spinal (epidural) anesthesia    Negative: [1] Vomiting AND [2] persists > 4 hours    Negative: [1] Vomiting AND [2] abdomen looks much more swollen than usual    Negative: [1] Drinking very little AND [2] dehydration suspected (e.g., no urine > 12 hours, very dry mouth, very lightheaded)    Negative: Patient sounds very sick or weak to the triager    Negative: Sounds like a serious complication to the triager    Negative: Fever > 100.4 F (38.0 C)    Negative: [1] SEVERE post-op pain (e.g., excruciating, pain scale 8-10) AND [2] not controlled with pain medications    Negative: [1] Caller has URGENT question AND [2] triager unable to answer question    Negative: [1] Headache AND [2] after spinal (epidural) anesthesia AND [3] not severe    Negative: Fever present > 3 days (72 hours)    Negative: [1] MILD-MODERATE post-op pain (e.g., pain scale 1-7) AND [2] not controlled with pain medications    Negative: [1] Caller has NON-URGENT question AND [2] triager unable to answer question    Negative: General activity, questions about    Negative: Resuming driving, questions about    Negative: Resuming sexual relations, questions about    Negative: Getting the incision wet, questions about    Negative: Throat pain after surgery, questions about    Negative: [1] Vomiting AND [2] present < 4 hours    Protocols used: POST-OP SYMPTOMS AND [id number]

## 2023-01-13 NOTE — PATIENT INSTRUCTIONS
Patient Education   Personalized Prevention Plan  You are due for the preventive services outlined below.  Your care team is available to assist you in scheduling these services.  If you have already completed any of these items, please share that information with your care team to update in your medical record.  Health Maintenance Due   Topic Date Due     COVID-19 Vaccine (2 - Yuan risk series) 06/16/2021     HPV Screening  09/08/2021     PAP Smear  09/08/2021     Flu Vaccine (1) 09/01/2022     ANNUAL REVIEW OF HM ORDERS  12/31/2022        
present

## 2023-01-14 ENCOUNTER — NURSE TRIAGE (OUTPATIENT)
Dept: NURSING | Facility: CLINIC | Age: 38
End: 2023-01-14

## 2023-01-14 NOTE — TELEPHONE ENCOUNTER
"Triage call:    Patient calling in to report soreness of L breast.  The patient rates her discomfort a 5/10.    The patient denies that her L breast is red or hot to the touch.  She denies having a fever.  She states that her breast \"looks normal.\"    Per protocol, since this breast soreness is new for the patient, writer advised patient to schedule an appt w/ PCP in the next two weeks.  Patient is refusing at this time, writer encouraged patient to monitor her breast & to call back w/ any new questions or concerns.    Kathleen Meneses RN on 1/14/2023 at 1:53 PM    Reason for Disposition    [1] Breast pain AND [2] cause is not known    Additional Information    Negative: [1] SEVERE breast pain AND [2] fever > 103 F (39.4 C)    Negative: Patient sounds very sick or weak to the triager    Negative: [1] Breast looks infected (spreading redness, feels hot or painful to touch) AND [2] fever    Negative: [1] Breast looks infected (spreading redness, feels hot or painful to touch) AND [2] no fever    Negative: [1] Painful rash AND [2] multiple small blisters grouped together (i.e., dermatomal distribution or \"band\" or \"stripe\")    Negative: [1] Cuts, burns, or bruises of breasts AND [2] suspicious history for the injury    Negative: Breast lump    Negative: [1] Nipple discharge AND [2] bloody    Negative: Nipple is inverted (i.e., points inward)  (Exception: long-term physical characteristic, present for many years)    Negative: Dry flaking-peeling skin of nipple    Negative: Change in shape or appearance of breast    Negative: Dimpling of skin of breast (i.e., skin looks like the outside of an orange peel)    Negative: [1] Breast tenderness and fullness AND [2] pregnant    Negative: [1] Nipple discharge AND [2] not bloody (e.g., clear, white, yellow, brown, green)    Negative: [1] Breast pain or tenderness AND [2] occurs monthly before menstrual period AND [3] has NOT been evaluated by a doctor (or NP/PA)    Protocols " used: BREAST SYMPTOMS-A-AH

## 2023-03-01 ENCOUNTER — E-VISIT (OUTPATIENT)
Dept: FAMILY MEDICINE | Facility: OTHER | Age: 38
End: 2023-03-01
Payer: MEDICARE

## 2023-03-01 DIAGNOSIS — K52.9 GASTROENTERITIS: Primary | ICD-10-CM

## 2023-03-01 PROCEDURE — 99207 PR NON-BILLABLE SERV PER CHARTING: CPT | Performed by: FAMILY MEDICINE

## 2023-03-03 ENCOUNTER — E-VISIT (OUTPATIENT)
Dept: URGENT CARE | Facility: CLINIC | Age: 38
End: 2023-03-03
Payer: MEDICARE

## 2023-03-03 DIAGNOSIS — N39.0 ACUTE UTI (URINARY TRACT INFECTION): Primary | ICD-10-CM

## 2023-03-03 PROCEDURE — 99207 PR NO BILLABLE SERVICE THIS VISIT: CPT | Performed by: NURSE PRACTITIONER

## 2023-03-03 RX ORDER — SULFAMETHOXAZOLE/TRIMETHOPRIM 800-160 MG
1 TABLET ORAL 2 TIMES DAILY
Qty: 6 TABLET | Refills: 0 | Status: SHIPPED | OUTPATIENT
Start: 2023-03-03 | End: 2023-03-06

## 2023-03-03 NOTE — PATIENT INSTRUCTIONS
Deariky Marie    After reviewing your responses, I've been able to diagnose you with a urinary tract infection, which is a common infection of the bladder with bacteria.  This is not a sexually transmitted infection, though urinating immediately after intercourse can help prevent infections.  Drinking lots of fluids is also helpful to clear your current infection and prevent the next one.      I have sent a prescription for antibiotics to your pharmacy to treat this infection.    It is important that you take all of your prescribed medication even if your symptoms are improving after a few doses.  Taking all of your medicine helps prevent the symptoms from returning.     If your symptoms worsen, you develop pain in your back or stomach, develop fevers, or are not improving in 5 days, please contact your primary care provider for an appointment or visit any of our convenient Walk-in or Urgent Care Centers to be seen, which can be found on our website here.    Thanks again for choosing us as your health care partner,    ANGIE Melissa CNP    Urinary Tract Infections in Women  Urinary tract infections (UTIs) are most often caused by bacteria. These bacteria enter the urinary tract. The bacteria may come from inside the body. Or they may travel from the skin outside the rectum or vagina into the urethra. Female anatomy makes it easy for bacteria from the bowel to enter a woman s urinary tract, which is the most common source of UTI. This means women develop UTIs more often than men. Pain in or around the urinary tract is a common UTI symptom. But the only way to know for sure if you have a UTI for the healthcare provider to test your urine. The two tests that may be done are the urinalysis and urine culture.     Types of UTIs    Cystitis. A bladder infection (cystitis) is the most common UTI in women. You may have urgent or frequent need to pee. You may also have pain, burning when you pee, and bloody  urine.    Urethritis. This is an inflamed urethra, which is the tube that carries urine from the bladder to outside the body. You may have lower stomach or back pain. You may also have urgent or frequent need to pee.    Pyelonephritis. This is a kidney infection. If not treated, it can be serious and damage your kidneys. In severe cases, you may need to stay in the hospital. You may have a fever and lower back pain.    Medicines to treat a UTI  Most UTIs are treated with antibiotics. These kill the bacteria. The length of time you need to take them depends on the type of infection. It may be as short as 3 days. If you have repeated UTIs, you may need a low-dose antibiotic for several months. Take antibiotics exactly as directed. Don t stop taking them until all of the medicine is gone. If you stop taking the antibiotic too soon, the infection may not go away. You may also develop a resistance to the antibiotic. This can make it much harder to treat.   Lifestyle changes to treat and prevent UTIs   The lifestyle changes below will help get rid of your UTI. They may also help prevent future UTIs.     Drink plenty of fluids. This includes water, juice, or other caffeine-free drinks. Fluids help flush bacteria out of your body.    Empty your bladder. Always empty your bladder when you feel the urge to pee. And always pee before going to sleep. Urine that stays in your bladder can lead to infection. Try to pee before and after sex as well.    Practice good personal hygiene. Wipe yourself from front to back after using the toilet. This helps keep bacteria from getting into the urethra.    Use condoms during sex. These help prevent UTIs caused by sexually transmitted bacteria. Also don't use spermicides during sex. These can increase the risk for UTIs. Choose other forms of birth control instead. For women who tend to get UTIs after sex, a low-dose of a preventive antibiotic may be used. Be sure to discuss this option with  your healthcare provider.    Follow up with your healthcare provider as directed. He or she may test to make sure the infection has cleared. If needed, more treatment may be started.  Valeriano last reviewed this educational content on 7/1/2019 2000-2021 [employer]. All rights reserved. This information is not intended as a substitute for professional medical care. Always follow your healthcare professional's instructions.

## 2023-04-05 ENCOUNTER — NURSE TRIAGE (OUTPATIENT)
Dept: NURSING | Facility: CLINIC | Age: 38
End: 2023-04-05

## 2023-04-05 ENCOUNTER — OFFICE VISIT (OUTPATIENT)
Dept: FAMILY MEDICINE | Facility: CLINIC | Age: 38
End: 2023-04-05
Payer: MEDICARE

## 2023-04-05 VITALS
HEART RATE: 83 BPM | RESPIRATION RATE: 20 BRPM | TEMPERATURE: 98.1 F | SYSTOLIC BLOOD PRESSURE: 124 MMHG | WEIGHT: 293 LBS | BODY MASS INDEX: 48.62 KG/M2 | DIASTOLIC BLOOD PRESSURE: 80 MMHG | OXYGEN SATURATION: 97 %

## 2023-04-05 DIAGNOSIS — H65.92 OME (OTITIS MEDIA WITH EFFUSION), LEFT: Primary | ICD-10-CM

## 2023-04-05 PROCEDURE — 99213 OFFICE O/P EST LOW 20 MIN: CPT | Performed by: FAMILY MEDICINE

## 2023-04-05 RX ORDER — AMOXICILLIN 875 MG
875 TABLET ORAL 2 TIMES DAILY
Qty: 20 TABLET | Refills: 0 | Status: SHIPPED | OUTPATIENT
Start: 2023-04-05 | End: 2023-05-02

## 2023-04-05 RX ORDER — AMOXICILLIN 875 MG
875 TABLET ORAL 2 TIMES DAILY
Qty: 20 TABLET | Refills: 0 | Status: SHIPPED | OUTPATIENT
Start: 2023-04-05 | End: 2023-04-05

## 2023-04-05 ASSESSMENT — PAIN SCALES - GENERAL: PAINLEVEL: SEVERE PAIN (7)

## 2023-04-05 NOTE — PROGRESS NOTES
"  Assessment & Plan     OME (otitis media with effusion), left  Patient reported ear pain and high wax burden. After ear canal rinse, erythematous TM was visible on left side consistent with otitis media. Will treat with amoxicillin course. Follow up if not improving in next several days.   - amoxicillin (AMOXIL) 875 MG tablet; Take 1 tablet (875 mg) by mouth 2 times daily             BMI:   Estimated body mass index is 48.62 kg/m  as calculated from the following:    Height as of 1/13/23: 1.673 m (5' 5.87\").    Weight as of this encounter: 136.1 kg (300 lb).   Weight management plan: Discussed healthy diet and exercise guidelines    Sussy Hernandez, MS3  [employer]    I was present with the medical student who participated in the service and in the documentation of the note. I have verified the history and personally performed the physical exam and medical decision making. I agree with the assessment and plan of care as documented in the note.    Bud Smith MD, MD  [facility] MAIA Heaton is a 37 year old, presenting for the following health issues:  Ear Problem  Patient reports new onset ear pain on the left side starting yesterday. Pain radiates down jaw. No drainage or blood from ear. No recent trauma. Does have history of excess ear wax. States last time her ear felt like this it was an ear infection. No fever or chills. No other concerns today.     History of Present Illness       Reason for visit:  Ear pain  Symptom onset:  1-3 days ago  Symptom intensity:  Moderate  Symptom progression:  Worsening  Had these symptoms before:  Yes  Has tried/received treatment for these symptoms:  No  What makes it worse:  No  What makes it better:  No    She eats 2-3 servings of fruits and vegetables daily.She consumes 2 sweetened beverage(s) daily.She exercises with enough effort to increase her heart rate 30 to 60 minutes per day.  She exercises with enough " effort to increase her heart rate 5 days per week.   She is taking medications regularly.         Review of Systems   Constitutional, HEENT, cardiovascular, pulmonary, gi and gu systems are negative, except as otherwise noted.      Objective    /80   Pulse 83   Temp 98.1  F (36.7  C) (Temporal)   Resp 20   Wt 136.1 kg (300 lb)   SpO2 97%   BMI 48.62 kg/m    Body mass index is 48.62 kg/m .  Physical Exam   GENERAL: healthy, alert and no distress  HENT: Normal ear canal and TM on right. Significant wax build up in left ear canal. Erythematous TM with effusion after wax removal. No drainage or bleeding from canal. Nose and mouth without ulcers or lesions. Dentition appears normal.   NECK: no adenopathy, no asymmetry, masses, or scars and thyroid normal to palpation

## 2023-04-06 ENCOUNTER — NURSE TRIAGE (OUTPATIENT)
Dept: FAMILY MEDICINE | Facility: CLINIC | Age: 38
End: 2023-04-06
Payer: MEDICARE

## 2023-04-06 ENCOUNTER — HOSPITAL ENCOUNTER (EMERGENCY)
Facility: CLINIC | Age: 38
Discharge: HOME | End: 2023-04-06
Attending: FAMILY MEDICINE | Admitting: FAMILY MEDICINE
Payer: MEDICARE

## 2023-04-06 VITALS
BODY MASS INDEX: 48.78 KG/M2 | SYSTOLIC BLOOD PRESSURE: 145 MMHG | RESPIRATION RATE: 18 BRPM | OXYGEN SATURATION: 97 % | DIASTOLIC BLOOD PRESSURE: 91 MMHG | WEIGHT: 293 LBS | TEMPERATURE: 97 F | HEART RATE: 74 BPM

## 2023-04-06 DIAGNOSIS — H60.502 ACUTE OTITIS EXTERNA OF LEFT EAR, UNSPECIFIED TYPE: ICD-10-CM

## 2023-04-06 PROCEDURE — 99283 EMERGENCY DEPT VISIT LOW MDM: CPT | Performed by: FAMILY MEDICINE

## 2023-04-06 RX ORDER — CIPROFLOXACIN AND DEXAMETHASONE 3; 1 MG/ML; MG/ML
4 SUSPENSION/ DROPS AURICULAR (OTIC) 2 TIMES DAILY
Qty: 7.5 ML | Refills: 0 | Status: SHIPPED | OUTPATIENT
Start: 2023-04-06 | End: 2023-05-02

## 2023-04-06 ASSESSMENT — ENCOUNTER SYMPTOMS: FEVER: 0

## 2023-04-06 NOTE — TELEPHONE ENCOUNTER
Is this a 2nd Level Triage? YES, LICENSED PRACTITIONER REVIEW IS REQUIRED    SITUATION:   Patient was seen by  on 04/05/23. She was treated of Otitis media with effusion. Treated with Amoxicillin 875 mg tablet.     BACKGROUND:   Symptoms are not improving.   Patient is unable to hear out of left ear. She states there is pain on her earlobe extending upward to her jaw. Pain is rated at a 10/10. Patient is able to open her mouth and there is no decrease in ROM but it is painful when opening her mouth, chew, or eat anything.   No redness, discharge, fever, stiff neck, sore throat, swelling, or headaches.   Area is painful to the touch.     Patient has had 2 doses and 1 dose of the Amoxicillin today.     Patient had her left ear flushed yesterday. She could hear much better. Now today she can't hear.     HOME TREATMENTS:  Ice  IBU    NURSE RECOMMENDATION:       PLAN:       Routed to provider    Does the patient meet one of the following criteria for ADS visit consideration? No     RECOMMENDED DISPOSITION:  See in 24 hours - huddle with PCP.   Will comply with recommendation: yes   If further questions/concerns or if Sx do not improve, worsen or new Sx develop, call your PCP or [facility] as soon as possible.    NOTES:  Disposition was determined by the first positive assessment question, therefore all previous assessment questions were negative.       RITA EastN, RN, N  [place]  April 6, 2023    Reason for Disposition    [1] Taking antibiotic (ear drops or pills) > 72 hours (3 days) AND [2] ear pain not improved or recurs    Additional Information    Negative: [1] Swimmer's ear suspected BUT [2] not taking antibiotics (ear drops or pills)    Negative: [1] Recently diagnosed with otitis media AND [2] currently taking oral antibiotics (pills)    Negative: [1] Stiff neck (can't touch chin to chest) AND [2] fever    Negative: [1] Stiff neck (can't touch chin to  chest) AND [2] headache    Negative: Bony area of skull behind the ear is pink or swollen    Negative: Patient sounds very sick or weak to the triager    Negative: [1] SEVERE pain AND [2] not improved 2 hours after pain medicine    Negative: Fever > 100.4 F (38.0 C)    Negative: Redness or swelling of outer ear (ear lobe)    Negative: [1] Stiff neck (can't touch chin to chest) AND [2] no fever or headache    Protocols used: EAR - OTITIS EXTERNA FOLLOW-UP CALL-A-AH

## 2023-04-06 NOTE — TELEPHONE ENCOUNTER
Would recommend message be sent to Dr. Smith since he saw patient yesterday. She may need to give the amoxicillin more time but if worsening, could offer visit today or tomorrow if anyone has availability.    Savage Steward PA-C

## 2023-04-06 NOTE — TELEPHONE ENCOUNTER
is not available in clinic. RN can route message on to 's team. Will call patient back with update from below.   Per patient she will see if symptoms improve. Will go to ER if symptoms worsen.     RITA EastN, RN, PHN  [place]  April 6, 2023

## 2023-04-06 NOTE — TELEPHONE ENCOUNTER
"S: Otitis media L ear.    B: Saw Dr. LOLI Smith today and was DX with otitis median with effusion L ear.  Same ear was flushed to get the wax out  Was sent home on Amoxicillin.  Has taken two does today.    Symptoms are    hurst to chew    Pain in Jaw    Pain just below ear lob    Rates pain a \"10\"  (Motrin taken)      No drainage.     A: Can care for self at home Advised patient to take Tylenol as well for pain.   If ear pain not better by Friday to contact PCP.  Eat soft foods that easy to chew to try and help decrease jaw pain.    R: Protocol and care advice reviewed. Patient verbalized understanding, in agreement with plan, and voiced no further questions. Call back with any new or worsening symptoms, concerns, or questions.    Amy Tate RN, [facility] Triage Nurse Advisor    Reason for Disposition    [1] Taking antibiotic < 72 hours (3 days) and [2] pain persists    Additional Information    Negative: [1] Stiff neck (can't touch chin to chest) AND [2] fever    Negative: [1] Bony area of skull behind the ear is pink or swollen AND [2] fever    Negative: Fever > 104 F (40 C)    Negative: Patient sounds very sick or weak to the triager    Negative: [1] SEVERE pain and [2] not improved 2 hours after analgesic medication (e.g., ibuprofen or acetaminophen)    Negative: Walking is very unsteady or feels very dizzy    Negative: [1] Vomited AND [2] 3 or more times    Negative: [1] Taking antibiotic > 72 hours (3 days) and [2] pain persists or recurs    Negative: [1] Taking antibiotic > 48 hours (2 days) and [2] fever persists or recurs    Negative: Yellow or cloudy discharge from ear canal    Protocols used: EAR - OTITIS MEDIA FOLLOW-UP CALL-A-      "

## 2023-04-07 NOTE — ED PROVIDER NOTES
History     Chief Complaint   Patient presents with     Otalgia     HPI  Florina Marie is a 37 year old female who presents to the ED today with left ear pain.  Symptoms started on Tuesday, 2 days ago.  She went into the clinic yesterday and saw that her left TM was injected but she had a lot of wax in her ear.  They flush that out and started her on amoxicillin.  Still having pain in that left ear especially in front of the ear canal in the area of the tragus.  No fevers or drainage.    Allergies:  Allergies   Allergen Reactions     Macrobid [Nitrofurantoin] Headache and Nausea       Problem List:    Patient Active Problem List    Diagnosis Date Noted     Cervical radiculopathy 12/16/2022     Priority: Medium     Added automatically from request for surgery [id number]       Prolonged PTT 08/09/2018     Priority: Medium     Attention deficit disorder without hyperactivity 12/01/2011     Priority: Medium     Hyperlipidemia, unspecified 01/08/2010     Priority: Medium     Generalized anxiety disorder 10/24/2007     Priority: Medium     Morbid obesity (H) 01/01/2004     Priority: Medium     Problems with learning 12/31/2003     Priority: Medium        Past Medical History:    Past Medical History:   Diagnosis Date     Attention deficit disorder with hyperactivity(314.01)      Left carpal tunnel syndrome 6/27/2019     Other kyphoscoliosis and scoliosis      Other specified delay in development      Right carpal tunnel syndrome 3/14/2019     Viral warts, unspecified        Past Surgical History:    Past Surgical History:   Procedure Laterality Date     EXAM UNDER ANESTHESIA PELVIC N/A 9/9/2016    Procedure: EXAM UNDER ANESTHESIA PELVIC;  Surgeon: Rhoda Alcazar MD;  [facility]: [facility]     HC TOOTH EXTRACTION W/FORCEP      wisdom     LAPAROSCOPIC APPENDECTOMY N/A 1/9/2023    Procedure: APPENDECTOMY, LAPAROSCOPIC;  Surgeon: Jhony Perez MD;  Location: [facility] OR     RELEASE CARPAL TUNNEL Right 3/22/2019    Procedure:  RIGHT RELEASE CARPAL TUNNEL;  Surgeon: Anjum Wilburn DO;  Location: [facility] OR     RELEASE CARPAL TUNNEL Left 7/16/2019    Procedure: RELEASE, CARPAL TUNNEL-Left;  Surgeon: Anjum Wilburn DO;  Location: [facility] OR       Family History:    Family History   Problem Relation Age of Onset     Lipids Father         diet     Thyroid Disease Mother         unsure if hypo or hyper     Diabetes Paternal Grandfather         adult     Heart Disease Paternal Grandfather         bypass/open hear surgery     Lipids Maternal Aunt         medication     Lipids Maternal Aunt         diet     Alzheimer Disease Maternal Grandfather      Hypertension No family hx of      Coronary Artery Disease No family hx of      Hyperlipidemia No family hx of      Cancer - colorectal No family hx of      Ovarian Cancer No family hx of      Prostate Cancer No family hx of      Depression/Anxiety No family hx of      Cerebrovascular Disease No family hx of      Anesthesia Reaction No family hx of      Asthma No family hx of      Osteoporosis No family hx of      Chemical Addiction No family hx of      Obesity No family hx of        Social History:  Marital Status:  Single [1]  Social History     Tobacco Use     Smoking status: Never     Smokeless tobacco: Never     Tobacco comments:     no smokers in the household   Vaping Use     Vaping status: Never Used     Passive vaping exposure: Yes   Substance Use Topics     Alcohol use: No     Drug use: No        Medications:    ciprofloxacin-dexamethasone (CIPRODEX) 0.3-0.1 % otic suspension  ALPRAZolam (XANAX) 0.5 MG tablet  amoxicillin (AMOXIL) 875 MG tablet  azelastine (OPTIVAR) 0.05 % ophthalmic solution  desogestrel-ethinyl estradiol (APRI) 0.15-30 MG-MCG tablet  escitalopram (LEXAPRO) 20 MG tablet  folic acid (FOLVITE) 1 MG tablet  gabapentin (NEURONTIN) 300 MG capsule  hydrOXYzine (ATARAX) 25 MG tablet  tiZANidine (ZANAFLEX) 2 MG tablet          Review of Systems   Constitutional: Negative  for fever.   HENT: Positive for ear pain.        Physical Exam   BP: (!) 145/91  Pulse: 74  Temp: 97  F (36.1  C)  Resp: 18  Weight: 136.5 kg (301 lb)  SpO2: 97 %      Physical Exam  Constitutional:       General: She is not in acute distress.     Appearance: Normal appearance. She is obese.   HENT:      Right Ear: Tympanic membrane normal.      Ears:      Comments: 6 left TM is just mildly injected.  The canal is moderately swollen.  Scant amount of residual cerumen and debris for the most part.  Clean.  Moderate tenderness with palpation of the tragus.  No mastoid tenderness.  Pulmonary:      Effort: Pulmonary effort is normal. No respiratory distress.   Neurological:      Mental Status: She is alert.         ED Course                 Procedures              Critical Care time:  none               No results found for this or any previous visit (from the past 24 hour(s)).    Medications - No data to display    Assessments & Plan (with Medical Decision Making)  37-year-old with left ear pain being treated with amoxicillin for left otitis media.  Now has left otitis externa likely from cerumen removal yesterday.  Wick was placed and saturated with Ciprodex otic suspension.  She will continue this twice a day for at least the next week.  She should follow-up next week in the clinic for ear wick removal and reevaluation.       I have reviewed the nursing notes.    I have reviewed the findings, diagnosis, plan and need for follow up with the patient.           Medical Decision Making  The patient's presentation was of low complexity (an acute and uncomplicated illness or injury).    The patient's evaluation involved:  review of external note(s) from 1 sources (clinic note from yesterday)    The patient's management necessitated moderate risk (prescription drug management including medications given in the ED).        New Prescriptions    CIPROFLOXACIN-DEXAMETHASONE (CIPRODEX) 0.3-0.1 % OTIC SUSPENSION    Place 4 drops  Into the left ear 2 times daily       Final diagnoses:   Acute otitis externa of left ear, unspecified type       4/6/2023   [facility] EMERGENCY DEPT     Andi Sharp MD  04/06/23 2014

## 2023-04-07 NOTE — DISCHARGE INSTRUCTIONS
Use the Ciprodex eardrops twice a day.  Recheck in clinic with Dr. Smith early next week for ear wick removal and reevaluation.  Tylenol/ibuprofen as needed for discomfort.  Continue with the amoxicillin as prescribed.  It was nice visiting with both of you tonight.  I hope you feel better soon.     Thank you for choosing [facility]. We appreciate the opportunity to meet your urgent medical needs. Please let us know if we could have done anything to make your stay more satisfying.    After discharge, please closely monitor for any new or worsening symptoms. Return to the Emergency Department if you develop any acute worsening signs or symptoms.    If you had lab work, cultures or imaging studies done during your stay, the final results may still be pending. We will call you if your plan of care needs to change. However, if you are not improving as expected, please follow up with your primary care provider or clinic.     Start any prescription medications that were prescribed to you and take them as directed.     Please see additional handouts that may be pertinent to your condition.

## 2023-04-08 ENCOUNTER — NURSE TRIAGE (OUTPATIENT)
Dept: NURSING | Facility: CLINIC | Age: 38
End: 2023-04-08
Payer: MEDICARE

## 2023-04-08 ENCOUNTER — HOSPITAL ENCOUNTER (EMERGENCY)
Facility: CLINIC | Age: 38
Discharge: HOME | End: 2023-04-08
Attending: FAMILY MEDICINE | Admitting: FAMILY MEDICINE
Payer: MEDICARE

## 2023-04-08 VITALS
SYSTOLIC BLOOD PRESSURE: 142 MMHG | HEART RATE: 85 BPM | HEIGHT: 65 IN | DIASTOLIC BLOOD PRESSURE: 107 MMHG | TEMPERATURE: 98.5 F | RESPIRATION RATE: 20 BRPM | WEIGHT: 293 LBS | BODY MASS INDEX: 48.82 KG/M2 | OXYGEN SATURATION: 97 %

## 2023-04-08 DIAGNOSIS — H66.90 ACUTE OTITIS MEDIA, UNSPECIFIED OTITIS MEDIA TYPE: ICD-10-CM

## 2023-04-08 DIAGNOSIS — H60.502 ACUTE OTITIS EXTERNA OF LEFT EAR, UNSPECIFIED TYPE: ICD-10-CM

## 2023-04-08 PROCEDURE — 250N000013 HC RX MED GY IP 250 OP 250 PS 637: Performed by: FAMILY MEDICINE

## 2023-04-08 PROCEDURE — 99284 EMERGENCY DEPT VISIT MOD MDM: CPT | Performed by: FAMILY MEDICINE

## 2023-04-08 PROCEDURE — 99283 EMERGENCY DEPT VISIT LOW MDM: CPT | Performed by: FAMILY MEDICINE

## 2023-04-08 RX ORDER — CIPROFLOXACIN 500 MG/1
500 TABLET, FILM COATED ORAL 2 TIMES DAILY
Qty: 20 TABLET | Refills: 0 | Status: SHIPPED | OUTPATIENT
Start: 2023-04-08 | End: 2023-04-18

## 2023-04-08 RX ORDER — OXYCODONE HYDROCHLORIDE 5 MG/1
5-10 TABLET ORAL EVERY 4 HOURS PRN
Qty: 16 TABLET | Refills: 0 | Status: SHIPPED | OUTPATIENT
Start: 2023-04-08 | End: 2023-05-02

## 2023-04-08 RX ORDER — OXYCODONE HYDROCHLORIDE 5 MG/1
10 TABLET ORAL ONCE
Status: COMPLETED | OUTPATIENT
Start: 2023-04-08 | End: 2023-04-08

## 2023-04-08 RX ORDER — CIPROFLOXACIN AND DEXAMETHASONE 3; 1 MG/ML; MG/ML
4 SUSPENSION/ DROPS AURICULAR (OTIC) ONCE
Status: COMPLETED | OUTPATIENT
Start: 2023-04-08 | End: 2023-04-08

## 2023-04-08 RX ORDER — CIPROFLOXACIN 500 MG/1
500 TABLET, FILM COATED ORAL ONCE
Status: COMPLETED | OUTPATIENT
Start: 2023-04-08 | End: 2023-04-08

## 2023-04-08 RX ADMIN — OXYCODONE HYDROCHLORIDE 10 MG: 5 TABLET ORAL at 02:47

## 2023-04-08 RX ADMIN — CIPROFLOXACIN AND DEXAMETHASONE 4 DROP: 3; 1 SUSPENSION/ DROPS AURICULAR (OTIC) at 02:47

## 2023-04-08 RX ADMIN — CIPROFLOXACIN HYDROCHLORIDE 500 MG: 500 TABLET, FILM COATED ORAL at 02:47

## 2023-04-08 NOTE — TELEPHONE ENCOUNTER
Patient is calling to report severe ear pain.    She was seen in [facility] ED 2 days ago for an ear infection.  Wick was placed with abx.  She is also on oral abx.  She has been taking otc pain meds and using cold packs to help with the pain but there's been no improvement.   She states her pain is so severe she feels like her ear drum is about to explode.  It hurts to talk and swallow.  She also mentioned at the end of the call that her hearing is going away.    Disposition:  See HCP within 4 hours.  Pt declined pcp triage as she doesn't think she can wait to be seen in the AM due to her pain.  She is also concerned about her hearing loss.  Care advice given.  Pt verbalized understanding.    Janna Liu, RN, BSN Nurse Triage Advisor 4/8/2023 1:15 AM       Reason for Disposition    [1] SEVERE pain AND [2] not improved 2 hours after pain medicine    Additional Information    Negative: [1] Stiff neck (can't touch chin to chest) AND [2] fever    Negative: [1] Stiff neck (can't touch chin to chest) AND [2] headache    Negative: Bony area of skull behind the ear is pink or swollen    Negative: Patient sounds very sick or weak to the triager    Protocols used: EAR - OTITIS EXTERNA FOLLOW-UP CALL-A-

## 2023-04-08 NOTE — ED PROVIDER NOTES
History     Chief Complaint   Patient presents with     Otalgia     HPI  Florina Marie is a 37 year old female who presents back to the ED early this morning with worsening left ear pain.  Symptoms started on 4/2/2023 with left ear pain.  She was seen in the clinic on 4/5/2023 and started on amoxicillin.  Her left TM is injected and she had a lot of wax that was flushed out.  I saw her in the ED on 4/6/2023 and she had moderate swelling of the canal.  I placed an ear wick and started her on Ciprodex.  The ear wick fell out shortly after getting back home.  She has continued to use the eardrops and amoxicillin but comes in early this morning because of severe pain and inability to sleep.  No fevers.  Hearing is decreased in the left ear.    Allergies:  Allergies   Allergen Reactions     Macrobid [Nitrofurantoin] Headache and Nausea       Problem List:    Patient Active Problem List    Diagnosis Date Noted     Cervical radiculopathy 12/16/2022     Priority: Medium     Added automatically from request for surgery [id number]       Prolonged PTT 08/09/2018     Priority: Medium     Attention deficit disorder without hyperactivity 12/01/2011     Priority: Medium     Hyperlipidemia, unspecified 01/08/2010     Priority: Medium     Generalized anxiety disorder 10/24/2007     Priority: Medium     Morbid obesity (H) 01/01/2004     Priority: Medium     Problems with learning 12/31/2003     Priority: Medium        Past Medical History:    Past Medical History:   Diagnosis Date     Attention deficit disorder with hyperactivity(314.01)      Left carpal tunnel syndrome 6/27/2019     Other kyphoscoliosis and scoliosis      Other specified delay in development      Right carpal tunnel syndrome 3/14/2019     Viral warts, unspecified        Past Surgical History:    Past Surgical History:   Procedure Laterality Date     EXAM UNDER ANESTHESIA PELVIC N/A 9/9/2016    Procedure: EXAM UNDER ANESTHESIA PELVIC;  Surgeon: Rhoda Alcazar,  MD;  [facility]     HC TOOTH EXTRACTION W/FORCEP      wisdom     LAPAROSCOPIC APPENDECTOMY N/A 1/9/2023    Procedure: APPENDECTOMY, LAPAROSCOPIC;  Surgeon: Jhony Perez MD;  [facility]     RELEASE CARPAL TUNNEL Right 3/22/2019    Procedure: RIGHT RELEASE CARPAL TUNNEL;  Surgeon: Anjum Wilburn DO;  [facility]: [facility] OR     RELEASE CARPAL TUNNEL Left 7/16/2019    Procedure: RELEASE, CARPAL TUNNEL-Left;  Surgeon: Anjum Wilburn DO;  Location: [facility] OR       Family History:    Family History   Problem Relation Age of Onset     Lipids Father         diet     Thyroid Disease Mother         unsure if hypo or hyper     Diabetes Paternal Grandfather         adult     Heart Disease Paternal Grandfather         bypass/open hear surgery     Lipids Maternal Aunt         medication     Lipids Maternal Aunt         diet     Alzheimer Disease Maternal Grandfather      Hypertension No family hx of      Coronary Artery Disease No family hx of      Hyperlipidemia No family hx of      Cancer - colorectal No family hx of      Ovarian Cancer No family hx of      Prostate Cancer No family hx of      Depression/Anxiety No family hx of      Cerebrovascular Disease No family hx of      Anesthesia Reaction No family hx of      Asthma No family hx of      Osteoporosis No family hx of      Chemical Addiction No family hx of      Obesity No family hx of        Social History:  Marital Status:  Single [1]  Social History     Tobacco Use     Smoking status: Never     Smokeless tobacco: Never     Tobacco comments:     no smokers in the household   Vaping Use     Vaping status: Never Used     Passive vaping exposure: Yes   Substance Use Topics     Alcohol use: No     Drug use: No        Medications:    ciprofloxacin (CIPRO) 500 MG tablet  oxyCODONE (ROXICODONE) 5 MG tablet  ALPRAZolam (XANAX) 0.5 MG tablet  amoxicillin (AMOXIL) 875 MG tablet  azelastine (OPTIVAR) 0.05 % ophthalmic solution  ciprofloxacin-dexamethasone  "(CIPRODEX) 0.3-0.1 % otic suspension  desogestrel-ethinyl estradiol (APRI) 0.15-30 MG-MCG tablet  escitalopram (LEXAPRO) 20 MG tablet  folic acid (FOLVITE) 1 MG tablet  gabapentin (NEURONTIN) 300 MG capsule  hydrOXYzine (ATARAX) 25 MG tablet  tiZANidine (ZANAFLEX) 2 MG tablet          Review of Systems   All other systems reviewed and are negative.      Physical Exam   BP: (!) 142/107  Pulse: 85  Temp: 98.5  F (36.9  C)  Resp: 20  Height: 165.1 cm (5' 5\")  Weight: 138.9 kg (306 lb 3.2 oz)  SpO2: 97 %      Physical Exam  Constitutional:       General: She is in acute distress (mild).      Appearance: Normal appearance. She is obese.   HENT:      Right Ear: Tympanic membrane normal.      Ears:      Comments: Left TM is looking better.  Minimal erythema.  Still moderate swelling of the left EAC with tenderness over the tragus.  Maybe some mild tenderness behind the ear but no postauricular swelling.  Neurological:      Mental Status: She is alert.         ED Course                 Procedures              Critical Care time:  none               No results found for this or any previous visit (from the past 24 hour(s)).    Medications   ciprofloxacin-dexamethasone (CIPRODEX) 0.3-0.1 % otic suspension 4 drop (4 drops Left Ear $Given 4/8/23 0247)   ciprofloxacin (CIPRO) tablet 500 mg (500 mg Oral $Given 4/8/23 0247)   oxyCODONE (ROXICODONE) tablet 10 mg (10 mg Oral $Given 4/8/23 0247)       Assessments & Plan (with Medical Decision Making)  37-year-old with left otitis media that seems to be improving and persistent left otitis externa.  The wick that of been placed yesterday fell out shortly after she got back home.  Hearing is decreased.    She still has some swelling in the canal and tenderness over the tragus.  I replaced the wick and placed it a bit deeper and saturated again with Ciprodex otic suspension.  She will continue amoxicillin, start her on oral Cipro as well.  I think the otitis media is improving I want a " make sure that we get good Pseudomonas coverage for the external otitis.  Oxycodone for pain.  I placed an ENT referral and sent Dr. Stephenson a note asking if he could squeeze her in and see her early this coming week to make sure things are getting better.  If not, she may end up needing a CT of her temporal bone.  We discussed reportable signs and when to return.  Verbal and written discharge instructions given.  She is not a big fan of having something in her ear but is comfortable with this plan.       I have reviewed the nursing notes.    I have reviewed the findings, diagnosis, plan and need for follow up with the patient.           Medical Decision Making  The patient's presentation was of low complexity (an acute and uncomplicated illness or injury).    The patient's evaluation involved:  review of external note(s) from 1 sources (ED note from yesterday) 4/6/2023    The patient's management necessitated moderate risk (prescription drug management including medications given in the ED).        New Prescriptions    CIPROFLOXACIN (CIPRO) 500 MG TABLET    Take 1 tablet (500 mg) by mouth 2 times daily for 10 days    OXYCODONE (ROXICODONE) 5 MG TABLET    Take 1-2 tablets (5-10 mg) by mouth every 4 hours as needed for severe pain       Final diagnoses:   Acute otitis externa of left ear, unspecified type   Acute otitis media, unspecified otitis media type - improving       4/8/2023   [facility] EMERGENCY DEPT     Andi Sharp MD  04/08/23 0252

## 2023-04-08 NOTE — DISCHARGE INSTRUCTIONS
Continue with the amoxicillin and start taking Cipro 500 mg twice a day as well.    Continue with the Ciprodex eardrops twice a day.  Leave the wick in as long as you can.    Expect a call from the schedulers to set up an ENT appointment for you.  Hold the Zanaflex (tizanidine) while you are taking the Cipro as they may interact.  Ibuprofen 600 mg and Tylenol 1000 mg every 6 hours as needed for pain.  You can take them at the same time.  Take with food so the Ibuprofen doesn't upset your stomach.  You can use the oxycodone sparingly for more severe pain.  I really hope this settles down quickly for you.    Thank you for choosing [facility]. We appreciate the opportunity to meet your urgent medical needs. Please let us know if we could have done anything to make your stay more satisfying.    After discharge, please closely monitor for any new or worsening symptoms. Return to the Emergency Department if you develop any acute worsening signs or symptoms.    If you received an opiate pain medication or sedative during your visit, please do not drive for at least 8 hours.     If you had lab work, cultures or imaging studies done during your stay, the final results may still be pending. We will call you if your plan of care needs to change. However, if you are not improving as expected, please follow up with your primary care provider or clinic.     Start any prescription medications that were prescribed to you and take them as directed.     Please see additional handouts that may be pertinent to your condition.

## 2023-04-10 ENCOUNTER — TELEPHONE (OUTPATIENT)
Dept: OTOLARYNGOLOGY | Facility: OTHER | Age: 38
End: 2023-04-10
Payer: MEDICARE

## 2023-04-10 NOTE — TELEPHONE ENCOUNTER
[facility] Call Center    Phone Message    May a detailed message be left on voicemail: yes     Reason for Call: Appointment Intake    Referring Provider Name: Andi Sharp MD  Diagnosis and/or Symptoms: H60.502 (ICD-10-CM) - Acute otitis externa of left ear, unspecified type  H66.90 (ICD-10-CM) - Acute otitis media, unspecified otitis media type  Left otitis media and otitis externa, seen in the ED x2 this week    URGENT referral. Scheduled 1st opening in [place]. Added to wait list as a high priority. Open to going to [place], [place] or [place]    Action Taken: Message routed to:  Other: [place] [facility]    Travel Screening: Not Applicable

## 2023-04-11 ENCOUNTER — TELEPHONE (OUTPATIENT)
Dept: AUDIOLOGY | Facility: CLINIC | Age: 38
End: 2023-04-11

## 2023-04-11 ENCOUNTER — TELEPHONE (OUTPATIENT)
Dept: FAMILY MEDICINE | Facility: OTHER | Age: 38
End: 2023-04-11

## 2023-04-11 NOTE — TELEPHONE ENCOUNTER
Patient was called to further investigate reason for appointment today. To clarify, patient was scheduled for a hearing evaluation today and with ENT on Thursday for possible otitis externa/media. Within her chart it was noted that she has a ear-wick in place and has had recent drainage, aural fullness and pain. Today she reports all of these symptoms along with having a head cold.      Patient feels a hearing evaluation would not be appropriate at this time due to her symptoms, but felt a hearing test was mandatory. She would like a hearing evaluation following medical management.     Patient's hearing evaluation was canceled today, but it was recommended she keep her ENT appointment.     Jillian Stockton, CCC-A  Licensed Audiologist  [place] #[id number]      04/11/23

## 2023-04-11 NOTE — TELEPHONE ENCOUNTER
Florina calling asking if she should still go to her ENT to have her ears looked at.  She currently has a head cold and she states her ear feels plugged up.    Did advise she keep her appt with the ENT.    Patient agreed with plan    Raquel Engle RN  [facility] ~ Registered Nurse  Clinic Triage ~ [place] [facility] [place]  April 11, 2023

## 2023-04-21 ENCOUNTER — NURSE TRIAGE (OUTPATIENT)
Dept: NURSING | Facility: CLINIC | Age: 38
End: 2023-04-21
Payer: MEDICARE

## 2023-04-21 NOTE — TELEPHONE ENCOUNTER
Patient is having pain in both legs and legs are stiff and it hurts to get up from a sitting position.  Both knee caps are painful.  This has been happening for a while.  Hard to walk and climb up stairs.  Patient denies fever.  Denies injury or fall.  Patient will go in to be seen.      Reason for Disposition    MODERATE pain (e.g., symptoms interfere with work or school, limping) and present > 3 days    Additional Information    Negative: Sounds like a life-threatening emergency to the triager    Negative: Swollen knee joint and fever    Negative: Thigh or calf pain and only 1 side and present > 1 hour    Negative: Thigh or calf swelling and only 1 side    Negative: Patient sounds very sick or weak to the triager    Negative: Can't move swollen joint at all    Negative: SEVERE pain (e.g., excruciating, unable to walk)    Negative: Very swollen knee joint    Negative: Painful rash with multiple small blisters grouped together (i.e., dermatomal distribution or 'band' or 'stripe')    Negative: Looks like a boil, infected sore, deep ulcer, or other infected rash (spreading redness, pus)    Protocols used: KNEE PAIN-A-OH

## 2023-04-22 ENCOUNTER — HOSPITAL ENCOUNTER (EMERGENCY)
Facility: CLINIC | Age: 38
Discharge: HOME | End: 2023-04-22
Attending: EMERGENCY MEDICINE | Admitting: EMERGENCY MEDICINE
Payer: MEDICARE

## 2023-04-22 ENCOUNTER — APPOINTMENT (OUTPATIENT)
Dept: GENERAL RADIOLOGY | Facility: CLINIC | Age: 38
End: 2023-04-22
Attending: EMERGENCY MEDICINE
Payer: MEDICARE

## 2023-04-22 VITALS
SYSTOLIC BLOOD PRESSURE: 139 MMHG | HEART RATE: 80 BPM | OXYGEN SATURATION: 97 % | DIASTOLIC BLOOD PRESSURE: 104 MMHG | TEMPERATURE: 98.2 F | RESPIRATION RATE: 16 BRPM

## 2023-04-22 DIAGNOSIS — M79.652 BILATERAL THIGH PAIN: ICD-10-CM

## 2023-04-22 DIAGNOSIS — M25.562 ACUTE PAIN OF BOTH KNEES: ICD-10-CM

## 2023-04-22 DIAGNOSIS — M79.651 BILATERAL THIGH PAIN: ICD-10-CM

## 2023-04-22 DIAGNOSIS — M25.561 ACUTE PAIN OF BOTH KNEES: ICD-10-CM

## 2023-04-22 LAB
ANION GAP SERPL CALCULATED.3IONS-SCNC: 10 MMOL/L (ref 7–15)
BASOPHILS # BLD AUTO: 0.1 10E3/UL (ref 0–0.2)
BASOPHILS NFR BLD AUTO: 0 %
BUN SERPL-MCNC: 16 MG/DL (ref 6–20)
CALCIUM SERPL-MCNC: 9.5 MG/DL (ref 8.6–10)
CHLORIDE SERPL-SCNC: 99 MMOL/L (ref 98–107)
CK SERPL-CCNC: 84 U/L (ref 26–192)
CREAT SERPL-MCNC: 0.91 MG/DL (ref 0.51–0.95)
CRP SERPL-MCNC: 16.83 MG/L
DEPRECATED HCO3 PLAS-SCNC: 27 MMOL/L (ref 22–29)
EOSINOPHIL # BLD AUTO: 0.3 10E3/UL (ref 0–0.7)
EOSINOPHIL NFR BLD AUTO: 2 %
ERYTHROCYTE [DISTWIDTH] IN BLOOD BY AUTOMATED COUNT: 14.1 % (ref 10–15)
ERYTHROCYTE [SEDIMENTATION RATE] IN BLOOD BY WESTERGREN METHOD: 19 MM/HR (ref 0–20)
GFR SERPL CREATININE-BSD FRML MDRD: 83 ML/MIN/1.73M2
GLUCOSE SERPL-MCNC: 114 MG/DL (ref 70–99)
HCT VFR BLD AUTO: 40.4 % (ref 35–47)
HGB BLD-MCNC: 12.5 G/DL (ref 11.7–15.7)
IMM GRANULOCYTES # BLD: 0.1 10E3/UL
IMM GRANULOCYTES NFR BLD: 1 %
LYMPHOCYTES # BLD AUTO: 3.7 10E3/UL (ref 0.8–5.3)
LYMPHOCYTES NFR BLD AUTO: 31 %
MCH RBC QN AUTO: 26.2 PG (ref 26.5–33)
MCHC RBC AUTO-ENTMCNC: 30.9 G/DL (ref 31.5–36.5)
MCV RBC AUTO: 85 FL (ref 78–100)
MONOCYTES # BLD AUTO: 0.7 10E3/UL (ref 0–1.3)
MONOCYTES NFR BLD AUTO: 6 %
NEUTROPHILS # BLD AUTO: 7.1 10E3/UL (ref 1.6–8.3)
NEUTROPHILS NFR BLD AUTO: 60 %
NRBC # BLD AUTO: 0 10E3/UL
NRBC BLD AUTO-RTO: 0 /100
PLATELET # BLD AUTO: 312 10E3/UL (ref 150–450)
POTASSIUM SERPL-SCNC: 4 MMOL/L (ref 3.4–5.3)
RBC # BLD AUTO: 4.77 10E6/UL (ref 3.8–5.2)
SODIUM SERPL-SCNC: 136 MMOL/L (ref 136–145)
WBC # BLD AUTO: 11.8 10E3/UL (ref 4–11)

## 2023-04-22 PROCEDURE — 80048 BASIC METABOLIC PNL TOTAL CA: CPT | Performed by: EMERGENCY MEDICINE

## 2023-04-22 PROCEDURE — 99284 EMERGENCY DEPT VISIT MOD MDM: CPT | Performed by: EMERGENCY MEDICINE

## 2023-04-22 PROCEDURE — 82550 ASSAY OF CK (CPK): CPT | Performed by: EMERGENCY MEDICINE

## 2023-04-22 PROCEDURE — 73560 X-RAY EXAM OF KNEE 1 OR 2: CPT | Mod: 50

## 2023-04-22 PROCEDURE — 36415 COLL VENOUS BLD VENIPUNCTURE: CPT | Performed by: EMERGENCY MEDICINE

## 2023-04-22 PROCEDURE — 85025 COMPLETE CBC W/AUTO DIFF WBC: CPT | Performed by: EMERGENCY MEDICINE

## 2023-04-22 PROCEDURE — 86140 C-REACTIVE PROTEIN: CPT | Performed by: EMERGENCY MEDICINE

## 2023-04-22 PROCEDURE — 86038 ANTINUCLEAR ANTIBODIES: CPT | Performed by: EMERGENCY MEDICINE

## 2023-04-22 PROCEDURE — 85652 RBC SED RATE AUTOMATED: CPT | Performed by: EMERGENCY MEDICINE

## 2023-04-22 RX ORDER — METHYLPREDNISOLONE 4 MG
TABLET, DOSE PACK ORAL
Qty: 21 TABLET | Refills: 0 | Status: SHIPPED | OUTPATIENT
Start: 2023-04-22 | End: 2023-05-02

## 2023-04-22 ASSESSMENT — ACTIVITIES OF DAILY LIVING (ADL): ADLS_ACUITY_SCORE: 35

## 2023-04-23 NOTE — DISCHARGE INSTRUCTIONS
Please return to the ER if new or worsening symptoms for re-evaluation. I hope you get better quickly.   Your xrays were pretty normal  Your labs showed an elevated CRP which is an inflammatory marker  Your screening test for auto immune disease - ROBLES will take a couple days to come back.  If abnormal you may need further testing.   If symptoms persist, I would consider getting an mri of your lumbar spine  Try the medrol dose pack, ibuprofen, ice  Make an appointment with your doctor for follow up

## 2023-04-23 NOTE — ED PROVIDER NOTES
History     Chief Complaint   Patient presents with     Leg Pain     HPI  Florina Marie is a 37 year old female who presents to the ER secondary to leg pain for a couple of  weeks.  The pain is located in the knees and thighs.  It does not radiate down the legs.  There is no numbness or tingling.  There is no swelling in the joints.  No pain or swelling in the wrists elbows shoulders or hips.  The pain is not made worse by bending at the waist or twisting and turning.  She does not have any radicular symptoms.  The pain is symmetric and bilateral.  No strong family history of autoimmune conditions.  She does have an uncle with rheumatoid arthritis.  She played basketball a couple of weeks ago which made things worse.  She was having some symptoms prior to that.  She does have aching pain in her thighs that are making it difficult for her to climb stairs.  She has tried going in the bath which has not really helped much.    Allergies:  Allergies   Allergen Reactions     Macrobid [Nitrofurantoin] Headache and Nausea       Problem List:    Patient Active Problem List    Diagnosis Date Noted     Cervical radiculopathy 12/16/2022     Priority: Medium     Added automatically from request for surgery [id number]       Prolonged PTT 08/09/2018     Priority: Medium     Attention deficit disorder without hyperactivity 12/01/2011     Priority: Medium     Hyperlipidemia, unspecified 01/08/2010     Priority: Medium     Generalized anxiety disorder 10/24/2007     Priority: Medium     Morbid obesity (H) 01/01/2004     Priority: Medium     Problems with learning 12/31/2003     Priority: Medium        Past Medical History:    Past Medical History:   Diagnosis Date     Attention deficit disorder with hyperactivity(314.01)      Left carpal tunnel syndrome 6/27/2019     Other kyphoscoliosis and scoliosis      Other specified delay in development      Right carpal tunnel syndrome 3/14/2019     Viral warts, unspecified        Past  Surgical History:    Past Surgical History:   Procedure Laterality Date     EXAM UNDER ANESTHESIA PELVIC N/A 9/9/2016    Procedure: EXAM UNDER ANESTHESIA PELVIC;  Surgeon: Rhoda Alcazar MD;  Location: [facility] OR     HC TOOTH EXTRACTION W/FORCEP      wisdom     LAPAROSCOPIC APPENDECTOMY N/A 1/9/2023    Procedure: APPENDECTOMY, LAPAROSCOPIC;  Surgeon: Jhony Perez MD;  Location: [facility] OR     RELEASE CARPAL TUNNEL Right 3/22/2019    Procedure: RIGHT RELEASE CARPAL TUNNEL;  Surgeon: Anjum Wilburn DO;  Location: [facility] OR     RELEASE CARPAL TUNNEL Left 7/16/2019    Procedure: RELEASE, CARPAL TUNNEL-Left;  Surgeon: Anjum Wilburn DO;  Location: [facility] OR       Family History:    Family History   Problem Relation Age of Onset     Lipids Father         diet     Thyroid Disease Mother         unsure if hypo or hyper     Diabetes Paternal Grandfather         adult     Heart Disease Paternal Grandfather         bypass/open hear surgery     Lipids Maternal Aunt         medication     Lipids Maternal Aunt         diet     Alzheimer Disease Maternal Grandfather      Hypertension No family hx of      Coronary Artery Disease No family hx of      Hyperlipidemia No family hx of      Cancer - colorectal No family hx of      Ovarian Cancer No family hx of      Prostate Cancer No family hx of      Depression/Anxiety No family hx of      Cerebrovascular Disease No family hx of      Anesthesia Reaction No family hx of      Asthma No family hx of      Osteoporosis No family hx of      Chemical Addiction No family hx of      Obesity No family hx of        Social History:  Marital Status:  Single [1]  Social History     Tobacco Use     Smoking status: Never     Smokeless tobacco: Never     Tobacco comments:     no smokers in the household   Vaping Use     Vaping status: Never Used     Passive vaping exposure: Yes   Substance Use Topics     Alcohol use: No     Drug use: No        Medications:    methylPREDNISolone (MEDROL  DOSEPAK) 4 MG tablet therapy pack  ALPRAZolam (XANAX) 0.5 MG tablet  amoxicillin (AMOXIL) 875 MG tablet  azelastine (OPTIVAR) 0.05 % ophthalmic solution  ciprofloxacin-dexamethasone (CIPRODEX) 0.3-0.1 % otic suspension  desogestrel-ethinyl estradiol (APRI) 0.15-30 MG-MCG tablet  escitalopram (LEXAPRO) 20 MG tablet  folic acid (FOLVITE) 1 MG tablet  gabapentin (NEURONTIN) 300 MG capsule  hydrOXYzine (ATARAX) 25 MG tablet  oxyCODONE (ROXICODONE) 5 MG tablet  tiZANidine (ZANAFLEX) 2 MG tablet          Review of Systems   All other systems reviewed and are negative.      Physical Exam   BP: (!) 149/117  Pulse: 82  Temp: 98.2  F (36.8  C)  Resp: 18  SpO2: 98 %      Physical Exam  Vitals and nursing note reviewed.   Constitutional:       General: She is not in acute distress.     Appearance: Normal appearance. She is well-developed. She is obese.   HENT:      Head: Normocephalic and atraumatic.      Nose: Nose normal. No congestion or rhinorrhea.   Eyes:      General: No scleral icterus.     Conjunctiva/sclera: Conjunctivae normal.   Cardiovascular:      Rate and Rhythm: Normal rate.   Pulmonary:      Effort: Pulmonary effort is normal. No respiratory distress.   Abdominal:      General: Abdomen is flat.   Musculoskeletal:         General: Normal range of motion.      Cervical back: Normal range of motion and neck supple.      Comments: Tenderness to palpation over the bilateral quadriceps muscles.  There is no swelling of the knees.  She does have full range of motion of the knees but it causes discomfort.  No redness to the skin.  No pain in the low back.  No radicular symptoms with straight leg raise.  No pretibial edema or discoloration of the skin.   Skin:     General: Skin is warm and dry.      Findings: No rash.   Neurological:      Mental Status: She is alert and oriented to person, place, and time.         ED Course                 Procedures                  Results for orders placed or performed during the  hospital encounter of 04/22/23 (from the past 24 hour(s))   CK total   Result Value Ref Range    CK 84 26 - 192 U/L   Erythrocyte sedimentation rate auto   Result Value Ref Range    Erythrocyte Sedimentation Rate 19 0 - 20 mm/hr   CRP inflammation   Result Value Ref Range    CRP Inflammation 16.83 (H) <5.00 mg/L   CBC with platelets differential    Narrative    The following orders were created for panel order CBC with platelets differential.  Procedure                               Abnormality         Status                     ---------                               -----------         ------                     CBC with platelets and d...[[id number]]  Abnormal            Final result                 Please view results for these tests on the individual orders.   Basic metabolic panel   Result Value Ref Range    Sodium 136 136 - 145 mmol/L    Potassium 4.0 3.4 - 5.3 mmol/L    Chloride 99 98 - 107 mmol/L    Carbon Dioxide (CO2) 27 22 - 29 mmol/L    Anion Gap 10 7 - 15 mmol/L    Urea Nitrogen 16.0 6.0 - 20.0 mg/dL    Creatinine 0.91 0.51 - 0.95 mg/dL    Calcium 9.5 8.6 - 10.0 mg/dL    Glucose 114 (H) 70 - 99 mg/dL    GFR Estimate 83 >60 mL/min/1.73m2   CBC with platelets and differential   Result Value Ref Range    WBC Count 11.8 (H) 4.0 - 11.0 10e3/uL    RBC Count 4.77 3.80 - 5.20 10e6/uL    Hemoglobin 12.5 11.7 - 15.7 g/dL    Hematocrit 40.4 35.0 - 47.0 %    MCV 85 78 - 100 fL    MCH 26.2 (L) 26.5 - 33.0 pg    MCHC 30.9 (L) 31.5 - 36.5 g/dL    RDW 14.1 10.0 - 15.0 %    Platelet Count 312 150 - 450 10e3/uL    % Neutrophils 60 %    % Lymphocytes 31 %    % Monocytes 6 %    % Eosinophils 2 %    % Basophils 0 %    % Immature Granulocytes 1 %    NRBCs per 100 WBC 0 <1 /100    Absolute Neutrophils 7.1 1.6 - 8.3 10e3/uL    Absolute Lymphocytes 3.7 0.8 - 5.3 10e3/uL    Absolute Monocytes 0.7 0.0 - 1.3 10e3/uL    Absolute Eosinophils 0.3 0.0 - 0.7 10e3/uL    Absolute Basophils 0.1 0.0 - 0.2 10e3/uL    Absolute Immature  Granulocytes 0.1 <=0.4 10e3/uL    Absolute NRBCs 0.0 10e3/uL   XR Knee Bilateral 1/2 Views    Narrative    EXAM: XR KNEE BILATERAL 1/2 VIEWS  LOCATION: [facility]  DATE/TIME: 4/22/2023 9:09 PM CDT    INDICATION: bilateral knee pain  COMPARISON: None.      Impression    IMPRESSION:   RIGHT KNEE: No acute fracture or malalignment. There is normal joint spacing. Small knee joint effusion.    LEFT KNEE: No acute fracture or malalignment. There is normal joint spacing. No significant knee joint effusion although evaluation is limited due to obliquity on lateral view.       Medications - No data to display    Assessments & Plan (with Medical Decision Making)  Morbidly obese 37-year-old female with bilateral knee pain.  This started before playing basketball couple of weeks ago but deafly got worse afterwards.  She has bilateral thigh pain and knee pain.  There is no physical findings on exam that are concerning.  No swelling of the legs or discoloration.  This does not appear to be radicular coming from the back as bending at the waist and twisting and turning straight leg raise did not make it worse.  Low makes it worse is bending the knees.  We will get a CK to rule out rhabdomyolysis although its been a couple of weeks since she played basketball.  We will get x-rays of bilateral knees, ROBLES, sed rate, CRP.  CBC and basic metabolic panel also ordered.  She has no deformity or swelling of her joints.  There is no evidence for rheumatoid arthritis.  There is no rash or history of tick bite.  There is no fever or infectious symptoms.  crp is elevated but sed rate is normal.  Mild leukocytosis.  No fever or tachycardia.  She is hypertensive.  ROBLES was sent as well.  I offered for her to try a medrol dose pack, ibuprofen and ice and follow up with primary care.  She agrees with this plan.  Difficult to say whether this is related to obesity and playing basketball, an autoimmune condition,  spinal stenosis.  We would be unable to get an mri today but would consider that if symptoms persist.  I think it is unlikely to be MS or GBS given timeline, symmetric presentation and distribution of the discomfort but those would also be a consideration.  If symptoms persist I would consider brain and lumbar mri, neurology referral, lumbar puncture as possible next steps.  Patient in agreement with the plan.      I have reviewed the nursing notes.    I have reviewed the findings, diagnosis, plan and need for follow up with the patient.          Medical Decision Making  The patient's presentation was of moderate complexity (an acute illness with systemic symptoms).    The patient's evaluation involved:  ordering and/or review of 3+ test(s) in this encounter (see separate area of note for details)    The patient's management necessitated moderate risk (prescription drug management including medications given in the ED).        New Prescriptions    METHYLPREDNISOLONE (MEDROL DOSEPAK) 4 MG TABLET THERAPY PACK    Follow Package Directions       Final diagnoses:   Acute pain of both knees   Bilateral thigh pain       4/22/2023   [facility] EMERGENCY DEPT     Vladislav Cuello MD  04/22/23 [id number]

## 2023-04-24 LAB — ANA SER QL IF: NEGATIVE

## 2023-04-28 ENCOUNTER — TELEPHONE (OUTPATIENT)
Dept: FAMILY MEDICINE | Facility: OTHER | Age: 38
End: 2023-04-28
Payer: MEDICARE

## 2023-04-28 NOTE — TELEPHONE ENCOUNTER
Patient is calling and stated she was transferred to triage RN to see about getting seen today for knee pain - following an ED visit on 4/22/2023.     Will forward to PCP for review.    Amada St RN

## 2023-04-28 NOTE — TELEPHONE ENCOUNTER
Spoke with Florina and scheduled visit 5/2/23.    Closing encounter.    Raquel Engle RN  [facility] ~ Registered Nurse  Clinic Triage ~ [place] [facility]  April 28, 2023

## 2023-05-02 ENCOUNTER — TELEPHONE (OUTPATIENT)
Dept: FAMILY MEDICINE | Facility: OTHER | Age: 38
End: 2023-05-02

## 2023-05-02 ENCOUNTER — OFFICE VISIT (OUTPATIENT)
Dept: FAMILY MEDICINE | Facility: OTHER | Age: 38
End: 2023-05-02
Payer: MEDICARE

## 2023-05-02 VITALS
SYSTOLIC BLOOD PRESSURE: 128 MMHG | HEART RATE: 82 BPM | WEIGHT: 293 LBS | HEIGHT: 66 IN | RESPIRATION RATE: 18 BRPM | BODY MASS INDEX: 47.09 KG/M2 | DIASTOLIC BLOOD PRESSURE: 74 MMHG | TEMPERATURE: 97.9 F | OXYGEN SATURATION: 97 %

## 2023-05-02 DIAGNOSIS — M25.561 RIGHT KNEE PAIN, UNSPECIFIED CHRONICITY: Primary | ICD-10-CM

## 2023-05-02 DIAGNOSIS — E66.01 MORBID OBESITY (H): ICD-10-CM

## 2023-05-02 LAB
CRP SERPL-MCNC: 22.69 MG/L
ERYTHROCYTE [DISTWIDTH] IN BLOOD BY AUTOMATED COUNT: 14.9 % (ref 10–15)
ERYTHROCYTE [SEDIMENTATION RATE] IN BLOOD BY WESTERGREN METHOD: 19 MM/HR (ref 0–20)
HCT VFR BLD AUTO: 39.8 % (ref 35–47)
HGB BLD-MCNC: 12.6 G/DL (ref 11.7–15.7)
MCH RBC QN AUTO: 26.7 PG (ref 26.5–33)
MCHC RBC AUTO-ENTMCNC: 31.7 G/DL (ref 31.5–36.5)
MCV RBC AUTO: 84 FL (ref 78–100)
PLATELET # BLD AUTO: 327 10E3/UL (ref 150–450)
RBC # BLD AUTO: 4.72 10E6/UL (ref 3.8–5.2)
WBC # BLD AUTO: 10.4 10E3/UL (ref 4–11)

## 2023-05-02 PROCEDURE — 86618 LYME DISEASE ANTIBODY: CPT | Performed by: FAMILY MEDICINE

## 2023-05-02 PROCEDURE — 36415 COLL VENOUS BLD VENIPUNCTURE: CPT | Performed by: FAMILY MEDICINE

## 2023-05-02 PROCEDURE — 99214 OFFICE O/P EST MOD 30 MIN: CPT | Performed by: FAMILY MEDICINE

## 2023-05-02 PROCEDURE — 86200 CCP ANTIBODY: CPT | Performed by: FAMILY MEDICINE

## 2023-05-02 PROCEDURE — 86140 C-REACTIVE PROTEIN: CPT | Performed by: FAMILY MEDICINE

## 2023-05-02 PROCEDURE — 85027 COMPLETE CBC AUTOMATED: CPT | Performed by: FAMILY MEDICINE

## 2023-05-02 PROCEDURE — 86431 RHEUMATOID FACTOR QUANT: CPT | Performed by: FAMILY MEDICINE

## 2023-05-02 PROCEDURE — 85652 RBC SED RATE AUTOMATED: CPT | Performed by: FAMILY MEDICINE

## 2023-05-02 ASSESSMENT — PATIENT HEALTH QUESTIONNAIRE - PHQ9
10. IF YOU CHECKED OFF ANY PROBLEMS, HOW DIFFICULT HAVE THESE PROBLEMS MADE IT FOR YOU TO DO YOUR WORK, TAKE CARE OF THINGS AT HOME, OR GET ALONG WITH OTHER PEOPLE: NOT DIFFICULT AT ALL
SUM OF ALL RESPONSES TO PHQ QUESTIONS 1-9: 4
SUM OF ALL RESPONSES TO PHQ QUESTIONS 1-9: 4

## 2023-05-02 ASSESSMENT — ANXIETY QUESTIONNAIRES
3. WORRYING TOO MUCH ABOUT DIFFERENT THINGS: NOT AT ALL
GAD7 TOTAL SCORE: 0
GAD7 TOTAL SCORE: 0
5. BEING SO RESTLESS THAT IT IS HARD TO SIT STILL: NOT AT ALL
IF YOU CHECKED OFF ANY PROBLEMS ON THIS QUESTIONNAIRE, HOW DIFFICULT HAVE THESE PROBLEMS MADE IT FOR YOU TO DO YOUR WORK, TAKE CARE OF THINGS AT HOME, OR GET ALONG WITH OTHER PEOPLE: NOT DIFFICULT AT ALL
6. BECOMING EASILY ANNOYED OR IRRITABLE: NOT AT ALL
GAD7 TOTAL SCORE: 0
4. TROUBLE RELAXING: NOT AT ALL
8. IF YOU CHECKED OFF ANY PROBLEMS, HOW DIFFICULT HAVE THESE MADE IT FOR YOU TO DO YOUR WORK, TAKE CARE OF THINGS AT HOME, OR GET ALONG WITH OTHER PEOPLE?: NOT DIFFICULT AT ALL
7. FEELING AFRAID AS IF SOMETHING AWFUL MIGHT HAPPEN: NOT AT ALL
7. FEELING AFRAID AS IF SOMETHING AWFUL MIGHT HAPPEN: NOT AT ALL
2. NOT BEING ABLE TO STOP OR CONTROL WORRYING: NOT AT ALL
1. FEELING NERVOUS, ANXIOUS, OR ON EDGE: NOT AT ALL

## 2023-05-02 ASSESSMENT — PAIN SCALES - GENERAL: PAINLEVEL: EXTREME PAIN (8)

## 2023-05-02 NOTE — PROGRESS NOTES
Assessment & Plan     Right knee pain, unspecified chronicity  Pain initially was symmetric in her quadriceps and knees with mild inflammation labs positive.  Pain is now localized only to the right knee.  We will recheck inflammation labs.  We will check Lyme's disease.  However with her locking and giving out and her concern for falling do need to consider internal derangement of the knee.  Will obtain MRI.    - MR Knee Right w/o Contrast; Future  - Rheumatoid factor  - Cyclic Citrullinated Peptide Antibody IgG  - ESR: Erythrocyte sedimentation rate  - CRP, inflammation  - CBC with platelets  - Lyme Disease Total Abs Bld with Reflex to Confirm CLIA    Morbid obesity (H)  At the end of the visit patient indicated that she would like to consider medications for weight loss.  She has tried phentermine in the past with mixed results.  She is interested in oral semaglutide.  She does not want to give herself injections.  Discussed possible side effects and the possibility that weight will return after stopping medication.  She would like to try this.  She also would like to have monthly follow-up.  We will start 3 mg daily, discussed that this is to help decrease her side effects but would not expect much for weight loss at this dose.  She will follow-up in 1 month and at that time consider increasing the dose.    - Semaglutide (RYBELSUS) 3 MG tablet; Take 3 mg by mouth daily  - PRIMARY CARE FOLLOW-UP SCHEDULING; Future    [facility]CE [facility] is a 37 year old, presenting for the following health issues:  Knee Pain        5/2/2023    10:09 AM   Additional Questions   Roomed by Missy MEAD     Knee Pain    History of Present Illness       Reason for visit:  Knee pain    She eats 2-3 servings of fruits and vegetables daily.She consumes 0 sweetened beverage(s) daily.She exercises with enough effort to increase her heart rate 30 to 60 minutes per day.  She  "exercises with enough effort to increase her heart rate 4 days per week. She is missing 1 dose(s) of medications per week.    Today's PHQ-9         PHQ-9 Total Score: 4    PHQ-9 Q9 Thoughts of better off dead/self-harm past 2 weeks :   Not at all    How difficult have these problems made it for you to do your work, take care of things at home, or get along with other people: Not difficult at all  Today's ZEENAT-7 Score: 0       ED/UC Followup:    Facility:  [facility] ED  Date of visit: 4/22/23  Reason for visit: Acute Knee Pain of Both Knees  Current Status: Pain is now just in the right knee sitting at about an 8/10     Patient was seen in the emergency department a week and a half ago for bilateral knee and thigh pain.  At that time she had an elevated CRP but normal ESR.  Slightly elevated white blood count.  X-ray was negative.  It was thought that perhaps her pain was related to basketball.  She was given Medrol Dosepak.  She states the Medrol Dosepak did not help her pain alone at all.  However now she only has right knee pain.  She points to the pain being right behind her kneecap.  She denies swelling or erythema.  She denies joint line pain.  She admits to locking and the thought that it will give out.  She is worried when walking the dog that somebody will bump into her and she will lose her balance and fall.  She feels that the knee pain is worsening instead of improving.  She denies having any pain in her thighs or hips.        Objective    /74 (Cuff Size: Adult Large)   Pulse 82   Temp 97.9  F (36.6  C) (Oral)   Resp 18   Ht 1.675 m (5' 5.95\")   Wt 136.8 kg (301 lb 8 oz)   LMP 04/03/2023 (Within Days)   SpO2 97%   BMI 48.74 kg/m    Body mass index is 48.74 kg/m .  Physical Exam   Gen: no apparent distress  Right knee: No tenderness of quadriceps.  No joint line tenderness, no patellar apprehension, Belen's is negative.  She does indicate pain with extension and flexion.  No swelling " appreciated.  No erythema or rash.  Ipsilateral hip with full range of motion and no pain.

## 2023-05-02 NOTE — TELEPHONE ENCOUNTER
Patient was seen in clinic and had labs completed on 05/02/23. The patient is calling to discuss her elevated CRP level. Per RN informed her that is related to inflammation in the body somewhere. Informed her we are still pending other lab results to get more information.     Patient was advised that message would be sent to PCP to advise on further.     RITA EastN, RN, PHN  [place] [place]  May 2, 2023

## 2023-05-03 LAB
B BURGDOR IGG+IGM SER QL: 0.15
RHEUMATOID FACT SER NEPH-ACNC: <7 IU/ML

## 2023-05-04 LAB — CCP AB SER IA-ACNC: 1.2 U/ML

## 2023-05-05 NOTE — TELEPHONE ENCOUNTER
All results are back now.  One of you inflammation results (CRP) is elevated, but everything else is normal.  This is showing that you have inflammation in your knee, but you do NOT have Lyme's disease or Rheumatoid arthritis or infection.  At this time, will wait and see what the MRI reveals.

## 2023-05-05 NOTE — TELEPHONE ENCOUNTER
Spoke with Florina and read back verbatim the message from Dr Curiel.    Patient had no further questions or concerns.    Closing encounter.    Raquel Engle RN  [facility] ~ Registered Nurse  Clinic Triage ~ [place] [facility]  May 5, 2023

## 2023-05-09 ENCOUNTER — HOSPITAL ENCOUNTER (OUTPATIENT)
Dept: MRI IMAGING | Facility: CLINIC | Age: 38
Discharge: HOME | End: 2023-05-09
Attending: FAMILY MEDICINE | Admitting: FAMILY MEDICINE
Payer: MEDICARE

## 2023-05-09 ENCOUNTER — TELEPHONE (OUTPATIENT)
Dept: FAMILY MEDICINE | Facility: OTHER | Age: 38
End: 2023-05-09

## 2023-05-09 DIAGNOSIS — M25.561 RIGHT KNEE PAIN, UNSPECIFIED CHRONICITY: Primary | ICD-10-CM

## 2023-05-09 DIAGNOSIS — M25.561 RIGHT KNEE PAIN, UNSPECIFIED CHRONICITY: ICD-10-CM

## 2023-05-09 PROCEDURE — 73721 MRI JNT OF LWR EXTRE W/O DYE: CPT | Mod: RT,MG

## 2023-05-09 PROCEDURE — G1010 CDSM STANSON: HCPCS | Performed by: RADIOLOGY

## 2023-05-09 PROCEDURE — 73721 MRI JNT OF LWR EXTRE W/O DYE: CPT | Mod: 26 | Performed by: RADIOLOGY

## 2023-05-09 PROCEDURE — G1010 CDSM STANSON: HCPCS

## 2023-05-22 ENCOUNTER — OFFICE VISIT (OUTPATIENT)
Dept: ORTHOPEDICS | Facility: CLINIC | Age: 38
End: 2023-05-22
Attending: PHYSICIAN ASSISTANT
Payer: MEDICARE

## 2023-05-22 VITALS
SYSTOLIC BLOOD PRESSURE: 145 MMHG | DIASTOLIC BLOOD PRESSURE: 91 MMHG | HEART RATE: 75 BPM | BODY MASS INDEX: 48.66 KG/M2 | WEIGHT: 293 LBS

## 2023-05-22 DIAGNOSIS — M94.261 CHONDROMALACIA OF RIGHT KNEE: Primary | ICD-10-CM

## 2023-05-22 DIAGNOSIS — M25.561 RIGHT KNEE PAIN, UNSPECIFIED CHRONICITY: ICD-10-CM

## 2023-05-22 DIAGNOSIS — Z87.828 HISTORY OF MENISCAL TEAR: ICD-10-CM

## 2023-05-22 PROCEDURE — 99203 OFFICE O/P NEW LOW 30 MIN: CPT | Performed by: PEDIATRICS

## 2023-05-22 NOTE — PROGRESS NOTES
ASSESSMENT & PLAN    Florina was seen today for pain.    Diagnoses and all orders for this visit:    Chondromalacia of right knee  -     Physical Therapy Referral; Future    Right knee pain, unspecified chronicity  -     Orthopedic  Referral  -     Physical Therapy Referral; Future    History of meniscal tear  -     Physical Therapy Referral; Future      This issue is acute and Unchanged.    We discussed these other possible diagnosis: Right knee pain, most symptoms likely from effusion (swelling) and chondromalacia. Meniscal tear seem less symptomatic today.    Plan:  - Today's Plan of Care:  Rehab: Physical Therapy: [facility] - [phone]  Discussed activity considerations and other supportive care including Ice/Heat, OTC and other topical medications as needed.    -We also discussed other future treatment options:  Referral to Orthopedic Surgery ([facility] preferred)  US guided right knee injection ([facility] or [facility] preferred)    Follow Up: 6 - 8 weeks and as needed    Concerning signs and symptoms were reviewed and all questions were answered at this time.    Thanks for the opportunity to participate in the care of this patient, I will keep you updated on their progress.    CC: Marcy Garcia MD [facility]  Sports Medicine Physician  [facility]    -----  Chief Complaint   Patient presents with     Right Knee - Pain       SUBJECTIVE  Florina Marie is a/an 37 year old female who is seen in consultation at the request of Marcy VALLEJO for evaluation of right knee pain.    The patient is seen with their .    Onset: few month(s) ago. Reports insidious onset without acute precipitating event.  Location of Pain: underneath the knee cap  Worsened by: bending, walking, sit to stand    Better with: ice  Treatments tried: ice, Tylenol, ibuprofen and casting/splinting/bracing- knee brace   Associated symptoms: when she walks, she feels like its  going to lock up.     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  CV: distal perfusion intact  Resp: normal respiratory effort without conversational dyspnea   Psych: normal mood and affect  Gait: NORMAL  Neuro: Normal light sensory exam of lower extremity    Bilateral Knee exam  Inspection:      mild effusion right    Patella:      Crepitus noted in the patellofemoral joint bilateral    Tender:      medial joint line right       lateral joint line right    Non Tender:      remainder of knee area bilateral    Knee ROM:      ROM slightly limited in full flexion and extension right    Hip ROM:     Full active and passive ROM bilateral    Strength:      5-/5 with knee extension right    Special Tests:     neg (-) Belen right       neg (-) anterior drawer right       neg (-) posterior drawer right       neg (-) varus at 0 deg and 30 deg right       neg (-) valgus at 0 deg and 30 deg right    Gait:      normal    Neurovascular:      2+ peripheral pulses bilaterally and brisk capillary refill       sensation grossly intact    RADIOLOGY:  Final results and radiologist's interpretation, available in the [facility] health record.  Images were reviewed with the patient in the office today.  My personal interpretation of the performed imaging:    Reviewed XR right knee 4/22/2023 - no acute abnormality, no degenerative chagnes    Reviewed MRI right knee 5/9/2023 - medial meniscal partial root tear, chondromalacia, moderate joint effusion with synovitis      Review of prior external note(s) from - ER and PCP notes  Review of the result(s) of each unique test - XR and MRI

## 2023-05-22 NOTE — LETTER
5/22/2023         RE: Florina Marie  [id number] [address] [address]        Dear Colleague,    Thank you for referring your patient, Florina Marie, to the [facility]. Please see a copy of my visit note below.    ASSESSMENT & PLAN    Florina was seen today for pain.    Diagnoses and all orders for this visit:    Chondromalacia of right knee  -     Physical Therapy Referral; Future    Right knee pain, unspecified chronicity  -     Orthopedic  Referral  -     Physical Therapy Referral; Future    History of meniscal tear  -     Physical Therapy Referral; Future      This issue is acute and Unchanged.    We discussed these other possible diagnosis: Right knee pain, most symptoms likely from effusion (swelling) and chondromalacia. Meniscal tear seem less symptomatic today.    Plan:  - Today's Plan of Care:  Rehab: Physical Therapy: [facility] - [phone]  Discussed activity considerations and other supportive care including Ice/Heat, OTC and other topical medications as needed.    -We also discussed other future treatment options:  Referral to Orthopedic Surgery ([facility] preferred)  US guided right knee injection ([facility] or [facility] preferred)    Follow Up: 6 - 8 weeks and as needed    Concerning signs and symptoms were reviewed and all questions were answered at this time.    Thanks for the opportunity to participate in the care of this patient, I will keep you updated on their progress.    CC: Marcy Garcia MD [facility]  Sports Medicine Physician  [facility]    -----  Chief Complaint   Patient presents with     Right Knee - Pain       SUBJECTIVE  Florina Marie is a/an 37 year old female who is seen in consultation at the request of Marcy VALLEJO for evaluation of right knee pain.    The patient is seen with their .    Onset: few month(s) ago. Reports insidious onset without acute  precipitating event.  Location of Pain: underneath the knee cap  Worsened by: bending, walking, sit to stand    Better with: ice  Treatments tried: ice, Tylenol, ibuprofen and casting/splinting/bracing- knee brace   Associated symptoms: when she walks, she feels like its going to lock up.     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  CV: distal perfusion intact  Resp: normal respiratory effort without conversational dyspnea   Psych: normal mood and affect  Gait: NORMAL  Neuro: Normal light sensory exam of lower extremity    Bilateral Knee exam  Inspection:      mild effusion right    Patella:      Crepitus noted in the patellofemoral joint bilateral    Tender:      medial joint line right       lateral joint line right    Non Tender:      remainder of knee area bilateral    Knee ROM:      ROM slightly limited in full flexion and extension right    Hip ROM:     Full active and passive ROM bilateral    Strength:      5-/5 with knee extension right    Special Tests:     neg (-) Belen right       neg (-) anterior drawer right       neg (-) posterior drawer right       neg (-) varus at 0 deg and 30 deg right       neg (-) valgus at 0 deg and 30 deg right    Gait:      normal    Neurovascular:      2+ peripheral pulses bilaterally and brisk capillary refill       sensation grossly intact    RADIOLOGY:  Final results and radiologist's interpretation, available in the [facility] health record.  Images were reviewed with the patient in the office today.  My personal interpretation of the performed imaging:    Reviewed XR right knee 4/22/2023 - no acute abnormality, no degenerative chagnes    Reviewed MRI right knee 5/9/2023 -  medial meniscal partial root tear, chondromalacia, moderate joint effusion with synovitis      Review of prior external note(s) from - ER and PCP notes  Review of the result(s) of each unique test - XR and MRI             Again, thank you for allowing me to participate in the care of your patient.        Sincerely,        Karen Garcia MD

## 2023-05-22 NOTE — PATIENT INSTRUCTIONS
We discussed these other possible diagnosis: Right knee pain, most symptoms likely from effusion (swelling) and chondromalacia. Meniscal tear seem less symptomatic today.    Plan:  - Today's Plan of Care:  Rehab: [facility]: [facility] - [phone]  Discussed activity considerations and other supportive care including Ice/Heat, OTC and other topical medications as needed.    -We also discussed other future treatment options:  Referral to Orthopedic Surgery ([facility] preferred)  US guided right knee injection ([facility] or [facility] preferred)    Follow Up: 6 - 8 weeks and as needed    If you have any further questions for your physician or physician s care team you can call [phone] and use option 3 to leave a voice message.

## 2023-06-02 ENCOUNTER — OFFICE VISIT (OUTPATIENT)
Dept: FAMILY MEDICINE | Facility: OTHER | Age: 38
End: 2023-06-02
Attending: FAMILY MEDICINE
Payer: MEDICARE

## 2023-06-02 VITALS
SYSTOLIC BLOOD PRESSURE: 128 MMHG | RESPIRATION RATE: 18 BRPM | HEIGHT: 66 IN | DIASTOLIC BLOOD PRESSURE: 84 MMHG | TEMPERATURE: 97 F | HEART RATE: 96 BPM | WEIGHT: 293 LBS | BODY MASS INDEX: 47.09 KG/M2 | OXYGEN SATURATION: 99 %

## 2023-06-02 DIAGNOSIS — E66.01 MORBID OBESITY (H): Primary | ICD-10-CM

## 2023-06-02 PROCEDURE — 99213 OFFICE O/P EST LOW 20 MIN: CPT | Performed by: FAMILY MEDICINE

## 2023-06-02 ASSESSMENT — PAIN SCALES - GENERAL: PAINLEVEL: NO PAIN (0)

## 2023-06-02 NOTE — PROGRESS NOTES
"  Assessment & Plan     Morbid obesity (H)  We will increase oral semaglutide from 3 mg to 7 mg daily.  Patient prefers to continue monthly follow-up.    - PRIMARY CARE FOLLOW-UP SCHEDULING  - Semaglutide (RYBELSUS) 7 MG tablet; Take 1 tablet (7 mg) by mouth daily    Alanna Curiel MD  [facility] CLIFF Heaton is a 37 year old, presenting for the following health issues:  Recheck Medication        6/2/2023     7:00 AM   Additional Questions   Roomed by Daily HORTON   Accompanied by Self     History of Present Illness       Reason for visit:  Monthly weigh in    She eats 2-3 servings of fruits and vegetables daily.She consumes 2 sweetened beverage(s) daily.She exercises with enough effort to increase her heart rate 30 to 60 minutes per day.  She exercises with enough effort to increase her heart rate 5 days per week.   She is taking medications regularly.     She is walking her dogs every day.  She feels the dogs are pulling her when she is walking them.  She is working on healthier diet.  She denies any nausea, diarrhea or abdominal discomfort.      Objective    /84   Pulse 96   Temp 97  F (36.1  C) (Temporal)   Resp 18   Ht 1.682 m (5' 6.22\")   Wt 134.5 kg (296 lb 8 oz)   LMP 04/03/2023 (Within Days)   SpO2 99%   BMI 47.54 kg/m    Body mass index is 47.54 kg/m .  Physical Exam   Gen: no apparent distress  Chest/CV: S1 and S2 normal, no murmurs, clicks, gallops or rubs. Regular rate and rhythm. Chest is clear; no wheezes or rales.  Abd: The abdomen is soft without tenderness, guarding, mass, rebound or organomegaly. Bowel sounds are normal. No CVA tenderness or inguinal adenopathy noted.             "

## 2023-06-13 LAB — PHQ9 SCORE: 9

## 2023-07-07 ENCOUNTER — OFFICE VISIT (OUTPATIENT)
Dept: FAMILY MEDICINE | Facility: OTHER | Age: 38
End: 2023-07-07
Payer: MEDICARE

## 2023-07-07 VITALS
DIASTOLIC BLOOD PRESSURE: 70 MMHG | OXYGEN SATURATION: 95 % | TEMPERATURE: 97.4 F | HEIGHT: 66 IN | BODY MASS INDEX: 47.09 KG/M2 | RESPIRATION RATE: 18 BRPM | SYSTOLIC BLOOD PRESSURE: 112 MMHG | HEART RATE: 73 BPM | WEIGHT: 293 LBS

## 2023-07-07 DIAGNOSIS — E66.01 MORBID OBESITY (H): Primary | ICD-10-CM

## 2023-07-07 PROCEDURE — 99213 OFFICE O/P EST LOW 20 MIN: CPT | Performed by: FAMILY MEDICINE

## 2023-07-07 ASSESSMENT — ANXIETY QUESTIONNAIRES
6. BECOMING EASILY ANNOYED OR IRRITABLE: NOT AT ALL
4. TROUBLE RELAXING: NOT AT ALL
7. FEELING AFRAID AS IF SOMETHING AWFUL MIGHT HAPPEN: NOT AT ALL
5. BEING SO RESTLESS THAT IT IS HARD TO SIT STILL: NOT AT ALL
IF YOU CHECKED OFF ANY PROBLEMS ON THIS QUESTIONNAIRE, HOW DIFFICULT HAVE THESE PROBLEMS MADE IT FOR YOU TO DO YOUR WORK, TAKE CARE OF THINGS AT HOME, OR GET ALONG WITH OTHER PEOPLE: NOT DIFFICULT AT ALL
1. FEELING NERVOUS, ANXIOUS, OR ON EDGE: NOT AT ALL
GAD7 TOTAL SCORE: 1
GAD7 TOTAL SCORE: 1
2. NOT BEING ABLE TO STOP OR CONTROL WORRYING: NOT AT ALL
3. WORRYING TOO MUCH ABOUT DIFFERENT THINGS: SEVERAL DAYS

## 2023-07-07 ASSESSMENT — PATIENT HEALTH QUESTIONNAIRE - PHQ9
10. IF YOU CHECKED OFF ANY PROBLEMS, HOW DIFFICULT HAVE THESE PROBLEMS MADE IT FOR YOU TO DO YOUR WORK, TAKE CARE OF THINGS AT HOME, OR GET ALONG WITH OTHER PEOPLE: NOT DIFFICULT AT ALL
SUM OF ALL RESPONSES TO PHQ QUESTIONS 1-9: 2
SUM OF ALL RESPONSES TO PHQ QUESTIONS 1-9: 2

## 2023-07-07 ASSESSMENT — PAIN SCALES - GENERAL: PAINLEVEL: NO PAIN (0)

## 2023-07-07 NOTE — PROGRESS NOTES
"  Assessment & Plan     Morbid obesity (H)  We discussed trying weekly injectable but patient does not feel she is able to give herself an injection.  She would like to stay with the medication.  We will increase from 7 mg to 14 mg daily and she will follow-up in 1 month.  We did discuss other coping mechanisms for stress and she will consider this.    - Semaglutide (RYBELSUS) 14 MG tablet; Take 14 mg by mouth daily    Alanna Curiel MD  [facility] CLIFF Heaton is a 37 year old, presenting for the following health issues:  Weight Check        7/7/2023     9:14 AM   Additional Questions   Roomed by radha cantu     History of Present Illness       Reason for visit:  Weight check    She eats 2-3 servings of fruits and vegetables daily.She consumes 1 sweetened beverage(s) daily.She exercises with enough effort to increase her heart rate 30 to 60 minutes per day.  She exercises with enough effort to increase her heart rate 5 days per week. She is missing 1 dose(s) of medications per week.     Patient's weight has increased.  She states this is related to stress.  She states that both she and her significant other are looking for other jobs.  She states there are some financial concerns.  She states that when she is stressed she likes to eat sugar.  She is trying to walk for exercise.  She denies having any nausea, vomiting, diarrhea or abdominal pain.      Objective    /70 (BP Location: Left arm, Patient Position: Sitting, Cuff Size: Adult Large)   Pulse 73   Temp 97.4  F (36.3  C) (Temporal)   Resp 18   Ht 1.682 m (5' 6.22\")   Wt 136.1 kg (300 lb)   LMP 06/29/2023   SpO2 95%   BMI 48.10 kg/m    Body mass index is 48.1 kg/m .  Physical Exam   Gen: no apparent distress  Chest/CV: S1 and S2 normal, no murmurs, clicks, gallops or rubs. Regular rate and rhythm. Chest is clear; no wheezes or rales.   Abd: The abdomen is soft without tenderness, guarding, mass, rebound or " organomegaly. Bowel sounds are normal.   Psych: Alert and oriented times 3; coherent speech, normal   rate and volume, able to articulate logical thoughts, able   to abstract reason, no tangential thoughts, no hallucinations   or delusions  Her affect is neutral

## 2023-08-11 ENCOUNTER — OFFICE VISIT (OUTPATIENT)
Dept: FAMILY MEDICINE | Facility: OTHER | Age: 38
End: 2023-08-11
Attending: FAMILY MEDICINE
Payer: MEDICARE

## 2023-08-11 VITALS
BODY MASS INDEX: 47.09 KG/M2 | HEART RATE: 71 BPM | DIASTOLIC BLOOD PRESSURE: 74 MMHG | OXYGEN SATURATION: 97 % | WEIGHT: 293 LBS | SYSTOLIC BLOOD PRESSURE: 108 MMHG | TEMPERATURE: 97.9 F | RESPIRATION RATE: 18 BRPM | HEIGHT: 66 IN

## 2023-08-11 DIAGNOSIS — E66.01 MORBID OBESITY (H): ICD-10-CM

## 2023-08-11 PROCEDURE — 99213 OFFICE O/P EST LOW 20 MIN: CPT | Performed by: FAMILY MEDICINE

## 2023-08-11 ASSESSMENT — PAIN SCALES - GENERAL: PAINLEVEL: NO PAIN (0)

## 2023-08-11 NOTE — PROGRESS NOTES
Assessment & Plan     Morbid obesity (H)  Discussed that she is at the maximum dose.  We could always consider subcutaneous injections but she does not want to do this.  She is going to continue to work on improving her diet and portion control as well as increasing her exercise.  We discussed decreasing her visits to every 3 months but she prefers to come in monthly to check on her progress and continue to discuss nutrition and exercise.    - Semaglutide (RYBELSUS) 14 MG tablet; Take 14 mg by mouth daily    Alanna Curiel MD  [facility] CLIFF Heaton is a 38 year old, presenting for the following health issues:  Follow Up (1 month weight check)        8/11/2023     9:16 AM   Additional Questions   Roomed by Aisha   Accompanied by Self         8/11/2023     9:16 AM   Patient Reported Additional Medications   Patient reports taking the following new medications none       History of Present Illness       Reason for visit:  Weight check    She eats 0-1 servings of fruits and vegetables daily.She consumes 1 sweetened beverage(s) daily.She exercises with enough effort to increase her heart rate 30 to 60 minutes per day.  She exercises with enough effort to increase her heart rate 4 days per week. She is missing 1 dose(s) of medications per week.     RECHECK WEIGHT/MEDICATION: SEMAGLUTIDE 14 MG DAILY    She is down 3 pounds from last month.  She states she knows she needs to decrease her portions but is difficult as her boyfriend keeps asking her to eat more.  She is also trying to get exercise and.  She has tried walking her dog.  She has also frustrated that her boyfriend does not like to do exercise.  She denies nausea, vomiting, abdominal pain, constipation or diarrhea.  She feels the medication is helping her not to want to eat as much.  She would like to stay on the medication.      Objective    /74   Pulse 71   Temp 97.9  F (36.6  C) (Temporal)   Resp 18   Ht  "1.681 m (5' 6.18\")   Wt 134.9 kg (297 lb 8 oz)   LMP 07/20/2023 (Exact Date)   SpO2 97%   BMI 47.76 kg/m    Body mass index is 47.76 kg/m .  Physical Exam   Gen: no apparent distress  Chest: clear to auscultation without wheeze, rale or rhonchi  Cor: regular rate and rhythm without murmur  ABDOMEN: soft, nontender, no masses and bowel sounds normal  Psych: Alert and oriented times 3; coherent speech, normal   rate and volume, able to articulate logical thoughts, able   to abstract reason, no tangential thoughts, no hallucinations   or delusions  Her affect is neutral              "

## 2023-08-18 ENCOUNTER — NURSE TRIAGE (OUTPATIENT)
Dept: NURSING | Facility: CLINIC | Age: 38
End: 2023-08-18
Payer: MEDICARE

## 2023-08-19 ENCOUNTER — NURSE TRIAGE (OUTPATIENT)
Dept: NURSING | Facility: CLINIC | Age: 38
End: 2023-08-19
Payer: MEDICARE

## 2023-08-19 NOTE — TELEPHONE ENCOUNTER
Nurse Triage SBAR    Is this a 2nd Level Triage? YES, LICENSED PRACTITIONER REVIEW IS REQUIRED    Situation: Severe rectal pain.    Background: Patient reports that she began experiencing burning and itching in her rectal area about a week ago. It has become progressively worse.     Assessment: Rates rectal pain 10/10, sharp and constant. States she can't lie down or sit down. Has tried an itching cream tonight but unsure if it helped. Reports she has not been constipated but has not had a formed stool since yesterday, only watery diarrhea. Denies any fever. Reports she feel like there is a sore inside her rectum.     Protocol Recommended Disposition:   See HCP Within 4 Hours (Or PCP Triage)    Recommendation: Recommendation to page on-call provider. On-call provider gave recommendation to go to ED.  Offered to assist in finding ER close to home. She states she knows where she will go. Gave care advice and call back instructions. Patient plans to follow advice.    Paged to provider  Provider Recommendation Follow Up:   Reached patient/caregiver. Informed of provider's recommendations. Patient verbalized understanding and agrees with the plan.       Does the patient meet one of the following criteria for ADS visit consideration? 16+ years old, with an [facility] PCP     TIP  Providers, please consider if this condition is appropriate for management at one of our Acute and Diagnostic Services sites.     If patient is a good candidate, please use dotphrase <dot>triageresponse and select Refer to ADS to document.      Reason for Disposition   SEVERE rectal pain (e.g., excruciating, unable to have a bowel movement)    Additional Information   Negative: Foreign body in rectum   Negative: Injury to rectum   Negative: Diarrhea is main symptom   Negative: Constipation is main symptom (e.g., pain or discomfort caused by passage of hard BMs)   Negative: Blood in or on bowel movement is main symptom   Negative: Pregnant    Negative: Pinworms are suspected (rectal itching; (white thread-like worm about size of a staple, moves)   Negative: Sexual assault or rape (sexual intercourse or activity occurs without freely given consent), known or suspected   Negative: Large mass protruding out of rectum   Negative: Patient sounds very sick or weak to the triager    Protocols used: Rectal Symptoms-A-AH

## 2023-08-20 ENCOUNTER — HOSPITAL ENCOUNTER (EMERGENCY)
Facility: CLINIC | Age: 38
Discharge: HOME | End: 2023-08-20
Attending: FAMILY MEDICINE | Admitting: FAMILY MEDICINE
Payer: MEDICARE

## 2023-08-20 VITALS
DIASTOLIC BLOOD PRESSURE: 94 MMHG | RESPIRATION RATE: 18 BRPM | OXYGEN SATURATION: 96 % | SYSTOLIC BLOOD PRESSURE: 122 MMHG | HEART RATE: 105 BPM | TEMPERATURE: 97.8 F | WEIGHT: 293 LBS | BODY MASS INDEX: 47.68 KG/M2

## 2023-08-20 DIAGNOSIS — K62.89 RECTAL PAIN: ICD-10-CM

## 2023-08-20 PROCEDURE — 250N000009 HC RX 250: Performed by: FAMILY MEDICINE

## 2023-08-20 PROCEDURE — 99283 EMERGENCY DEPT VISIT LOW MDM: CPT | Performed by: FAMILY MEDICINE

## 2023-08-20 RX ORDER — LIDOCAINE HYDROCHLORIDE 20 MG/ML
JELLY TOPICAL EVERY 4 HOURS PRN
Status: DISCONTINUED | OUTPATIENT
Start: 2023-08-20 | End: 2023-08-20 | Stop reason: HOSPADM

## 2023-08-20 RX ADMIN — LIDOCAINE HYDROCHLORIDE: 20 JELLY TOPICAL at 21:14

## 2023-08-20 ASSESSMENT — ACTIVITIES OF DAILY LIVING (ADL): ADLS_ACUITY_SCORE: 35

## 2023-08-20 NOTE — TELEPHONE ENCOUNTER
Call received from Viktor HOLLOWAY.  Verbal Consent from pt to speak with S.O.    They want to know what is the best time of day to due a Home Pregnancy test.    Advised that the best time to do a Home Pregnancy test is with the First urine of the day/morning.    Radha Poe RN  [facility]      Reason for Disposition   Health Information question, no triage required and triager able to answer question    Protocols used: Information Only Call - No Triage-A-

## 2023-08-21 NOTE — ED TRIAGE NOTES
Rectal pain and itching ongoing for a few days.      Triage Assessment       Row Name 08/20/23 1941       Triage Assessment (Adult)    Airway WDL WDL       Respiratory WDL    Respiratory WDL WDL       Cardiac WDL    Cardiac WDL WDL

## 2023-08-21 NOTE — ED PROVIDER NOTES
ED Provider Note     Florina Marie  [id number]  August 20, 2023      CC:     Chief Complaint   Patient presents with    Hemorrhoids          History is obtained from the patient.  She is accompanied by her  and therapy dog.    HPI: Florina Marie is a 38 year old female presenting with rectal/anal pain for the past several days.  She has been using Preparation H, but is endorsing some ongoing pain in the rectal area especially with bowel movement but also pain even without.  She has not noticed any rectal bleeding.  She has never had hemorrhoids before.  Her  looked in the area a few hours ago and did not see anything abnormal.  Patient is having external rectal pain.  She does not have any other symptoms or concerns.  She has not started any stool softeners.    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     Right carpal tunnel syndrome 3/14/2019    Viral warts, unspecified        PSH:   Past Surgical History:   Procedure Laterality Date    EXAM UNDER ANESTHESIA PELVIC N/A 9/9/2016    Procedure: EXAM UNDER ANESTHESIA PELVIC;  Surgeon: Rhoda Alcazar MD;  Location: [facility] OR    HC TOOTH EXTRACTION W/FORCEP      wisdom    LAPAROSCOPIC APPENDECTOMY N/A 1/9/2023    Procedure: APPENDECTOMY, LAPAROSCOPIC;  Surgeon: Jhony Perez MD;  Location: [facility] OR    RELEASE CARPAL TUNNEL Right 3/22/2019    Procedure: RIGHT RELEASE CARPAL TUNNEL;  Surgeon: Anjum Wilburn DO;  Location: [facility] OR    RELEASE CARPAL TUNNEL Left 7/16/2019    Procedure: RELEASE, CARPAL TUNNEL-Left;  Surgeon: Anjum Wilburn DO;  Location: [facility] OR       MEDS: No current facility-administered medications on file prior to encounter.  ALPRAZolam (XANAX) 0.5 MG tablet, Take 1  tablet (0.5 mg) by mouth 2 times daily as needed for anxiety  desogestrel-ethinyl estradiol (APRI) 0.15-30 MG-MCG tablet, Take 1 tablet by mouth daily Take one tablet daily.  escitalopram (LEXAPRO) 20 MG tablet, Take 40 mg by mouth daily  hydrOXYzine (ATARAX) 25 MG tablet, Take 25 mg by mouth daily as needed for anxiety  Semaglutide (RYBELSUS) 14 MG tablet, Take 14 mg by mouth daily        Allergies: Macrobid [nitrofurantoin]    Triage and nursing notes were reviewed.    ROS: All other systems were reviewed and are negative    Physical Exam:  Vitals:    08/20/23 1942   BP: (!) 148/101   Pulse: 100   Resp: 18   Temp: 97.8  F (36.6  C)   TempSrc: Temporal   SpO2: 99%   Weight: 134.7 kg (297 lb)     GENERAL APPEARANCE: Alert, no acute distress  HEAD: atraumatic  EYES: PER  HENT: Normal external exam  RESP: Normal respiratory effort  ABDOMEN: soft, obese, nontender, no masses with normal bowel sounds  RECTAL: Normal appearance to the anal region.  Tenderness with palpation of the anus.  No visible fissure.  Rectal digital exam reveals a tight anal sphincter.  Small palpable skin tag size hemorrhoid.  No rectal masses.  Firm stool at the tip.  No gross bleeding  SKIN: no rash; as above      Labs/Imaging Results:  No results found for this or any previous visit (from the past 24 hour(s)).        Impression:  Final diagnoses:   Rectal pain         ED Course & Medical Decision Making (Plan):  Florina Marie is a 38 year old female with rectal/anal pain for the past few days.  She denies any prior history of hemorrhoids.  Her stools have been somewhat hard lately.  Patient had discomfort that worsened tonight.  Vital signs revealed blood pressure 148/101, temp of 97.8, heart rate of 100, respiratory rate of 18.  On rectal exam, patient has tenderness externally.  No obvious external hemorrhoids.  No bleeding per rectum.  Patient has firm stool with marked tenderness on digital rectal exam.  She has tight anal sphincter.   Patient has suspected early or mild anal fissure.  Start stool softeners, sitz bath's, and try topical anesthetic gel for tonight.  Recheck in the clinic in 3-5 days if not improving.    Written after-visit summary and instructions were given at the time of discharge.          Follow Up Plan:  Alanna Curiel MD  [address]  [facility] [address]  [phone]    In 5 days  if not improving        Discharge Instructions:  Your symptoms may be related to an early anal fissure, or hemorrhoid.  Please see the attached handout on rectal/anal pain.  Use Uro-Jet numbing gel intermittently as needed tonight.  Begin a stool softener twice a day.  Sit in a sitz bath a couple of times a day to help.  Follow-up with your primary care provider in 5-7 days if not improving.        Disclaimer: This note consists of words and symbols derived from keyboarding and dictation using voice recognition software.  As a result, there may be errors that have gone undetected.  Please consider this when interpreting information found in this note.       Jian Mullen MD  08/20/23 [id number]

## 2023-08-21 NOTE — DISCHARGE INSTRUCTIONS
Your symptoms may be related to an early anal fissure, or hemorrhoid.  Please see the attached handout on rectal/anal pain.  Use Uro-Jet numbing gel intermittently as needed tonight.  Begin a stool softener twice a day.  Sit in a sitz bath a couple of times a day to help.  Follow-up with your primary care provider in 5-7 days if not improving.

## 2023-09-08 ENCOUNTER — OFFICE VISIT (OUTPATIENT)
Dept: FAMILY MEDICINE | Facility: OTHER | Age: 38
End: 2023-09-08
Payer: MEDICARE

## 2023-09-08 VITALS
HEIGHT: 66 IN | TEMPERATURE: 97 F | BODY MASS INDEX: 47.09 KG/M2 | HEART RATE: 90 BPM | DIASTOLIC BLOOD PRESSURE: 80 MMHG | WEIGHT: 293 LBS | SYSTOLIC BLOOD PRESSURE: 120 MMHG | OXYGEN SATURATION: 96 % | RESPIRATION RATE: 18 BRPM

## 2023-09-08 DIAGNOSIS — N89.8 VAGINAL ODOR: ICD-10-CM

## 2023-09-08 DIAGNOSIS — E66.01 MORBID OBESITY (H): Primary | ICD-10-CM

## 2023-09-08 DIAGNOSIS — B00.1 COLD SORE: ICD-10-CM

## 2023-09-08 PROCEDURE — 99214 OFFICE O/P EST MOD 30 MIN: CPT | Performed by: FAMILY MEDICINE

## 2023-09-08 RX ORDER — VALACYCLOVIR HYDROCHLORIDE 1 G/1
2000 TABLET, FILM COATED ORAL 2 TIMES DAILY
Qty: 4 TABLET | Refills: 3 | Status: SHIPPED | OUTPATIENT
Start: 2023-09-08

## 2023-09-08 ASSESSMENT — PAIN SCALES - GENERAL: PAINLEVEL: NO PAIN (0)

## 2023-09-08 NOTE — PROGRESS NOTES
Assessment & Plan     Morbid obesity (H)  She has lost 4 pounds.  She feels that she is not as hungry and gets filled up more easily.  She is working with her significant other to not ask her about having pop or sweets as she does not want to say no to him.  If he did not ask you then would not ask for these.  She would like to continue the oral semaglutide.  She finds benefit in following up monthly.  She will continue to try to increase her exercise.  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    Vaginal odor  She noticed this within the last week.  She declines having an exam today.  She denies any vaginal discharge.  If this worsens she will then return for exam.    Cold sore  She is interested in medication for her cold sore.  We will start Valtrex.  - valACYclovir (VALTREX) 1000 mg tablet; Take 2 tablets (2,000 mg) by mouth 2 times daily For 1 day      Alanna Curiel MD  [facility] CLIFF Heaton is a 38 year old, presenting for the following health issues:  Weight Check      History of Present Illness       Reason for visit:  Weigh in check up    She eats 0-1 servings of fruits and vegetables daily.She consumes 1 sweetened beverage(s) daily.She exercises with enough effort to increase her heart rate 30 to 60 minutes per day.  She exercises with enough effort to increase her heart rate 4 days per week. She is missing 1 dose(s) of medications per week.       Genitourinary - Female  Onset/Duration: couple days  Description:   Painful urination (Dysuria): No           Frequency: No  Blood in urine (Hematuria): No  Delay in urine (Hesitency): No  Intensity: mild  Progression of Symptoms:  same  Accompanying Signs & Symptoms:  Fever/chills: No  Flank pain: No  Nausea and vomiting: YES  Vaginal symptoms: odor and itching  Abdominal/Pelvic Pain: No  History:   History of frequent UTI s: YES  History of kidney stones: No  Sexually Active: No  Possibility  "of pregnancy: No  Precipitating or alleviating factors: None  Therapies tried and outcome:       Objective    /80 (BP Location: Right arm, Patient Position: Sitting, Cuff Size: Adult Large)   Pulse 90   Temp 97  F (36.1  C) (Temporal)   Resp 18   Ht 1.681 m (5' 6.18\")   Wt 132.9 kg (293 lb)   LMP 07/20/2023 (Exact Date)   SpO2 96%   BMI 47.03 kg/m    Body mass index is 47.03 kg/m .  Physical Exam   Gen: no apparent distress  Lips: Cold sore left upper lip  Chest: clear to auscultation without wheeze, rale or rhonchi  Cor: regular rate and rhythm without murmur  Ext: warm and dry without edema  Psych: Alert and oriented times 3; coherent speech, normal   rate and volume, normal affect              "

## 2023-10-06 ENCOUNTER — OFFICE VISIT (OUTPATIENT)
Dept: FAMILY MEDICINE | Facility: OTHER | Age: 38
End: 2023-10-06
Payer: MEDICARE

## 2023-10-06 VITALS
WEIGHT: 289.5 LBS | TEMPERATURE: 97.5 F | OXYGEN SATURATION: 99 % | DIASTOLIC BLOOD PRESSURE: 82 MMHG | SYSTOLIC BLOOD PRESSURE: 124 MMHG | HEART RATE: 65 BPM | BODY MASS INDEX: 46.47 KG/M2 | RESPIRATION RATE: 18 BRPM

## 2023-10-06 DIAGNOSIS — H61.21 IMPACTED CERUMEN OF RIGHT EAR: ICD-10-CM

## 2023-10-06 DIAGNOSIS — E66.01 MORBID OBESITY (H): Primary | ICD-10-CM

## 2023-10-06 PROBLEM — M54.12 CERVICAL RADICULOPATHY: Status: ACTIVE | Noted: 2022-12-16

## 2023-10-06 PROCEDURE — 99213 OFFICE O/P EST LOW 20 MIN: CPT | Performed by: FAMILY MEDICINE

## 2023-10-06 ASSESSMENT — ANXIETY QUESTIONNAIRES
6. BECOMING EASILY ANNOYED OR IRRITABLE: NOT AT ALL
5. BEING SO RESTLESS THAT IT IS HARD TO SIT STILL: NOT AT ALL
IF YOU CHECKED OFF ANY PROBLEMS ON THIS QUESTIONNAIRE, HOW DIFFICULT HAVE THESE PROBLEMS MADE IT FOR YOU TO DO YOUR WORK, TAKE CARE OF THINGS AT HOME, OR GET ALONG WITH OTHER PEOPLE: SOMEWHAT DIFFICULT
7. FEELING AFRAID AS IF SOMETHING AWFUL MIGHT HAPPEN: SEVERAL DAYS
2. NOT BEING ABLE TO STOP OR CONTROL WORRYING: SEVERAL DAYS
GAD7 TOTAL SCORE: 4
3. WORRYING TOO MUCH ABOUT DIFFERENT THINGS: SEVERAL DAYS
4. TROUBLE RELAXING: NOT AT ALL
1. FEELING NERVOUS, ANXIOUS, OR ON EDGE: SEVERAL DAYS
GAD7 TOTAL SCORE: 4

## 2023-10-06 ASSESSMENT — PAIN SCALES - GENERAL: PAINLEVEL: NO PAIN (0)

## 2023-10-06 NOTE — PROGRESS NOTES
Post Ear Irrigation exam completed and cerumen plug removed, tympanic membrane intact, and no bleeding noted.      Meliza Bond BSN, RN  [facility]

## 2023-10-06 NOTE — PROGRESS NOTES
"  Assessment & Plan     Morbid obesity (H)  Congratulated her on her weight loss.  She will continue with this oral semaglutide as she declines injection.  She will continue to try to eat healthier and eat less portions.  She will also try to increase her biking.  She will follow-up on a monthly basis secondary to her preference    Impacted cerumen of right ear  This was removed by irrigation       BMI:   Estimated body mass index is 46.47 kg/m  as calculated from the following:    Height as of 9/8/23: 1.681 m (5' 6.18\").    Weight as of this encounter: 131.3 kg (289 lb 8 oz).   Weight management plan: Discussed healthy diet and exercise guidelines        Alanna Curiel MD  [facility] CLIFF Heaton is a 38 year old, presenting for the following health issues:  Weight Check        10/6/2023    10:24 AM   Additional Questions   Roomed by Diana SINGER   Accompanied by none         10/6/2023    10:24 AM   Patient Reported Additional Medications   Patient reports taking the following new medications none       History of Present Illness       Reason for visit:  Weight check    She eats 2-3 servings of fruits and vegetables daily.She consumes 1 sweetened beverage(s) daily.She exercises with enough effort to increase her heart rate 30 to 60 minutes per day.  She exercises with enough effort to increase her heart rate 5 days per week.   She is taking medications regularly.     Patient prefers to have monthly in person visits to follow her weight.  She is pleased that her weight is down 4 pounds compared to last month.  She is trying to eat more fruit and is trying to eat a regular breakfast.  She is also trying to get more exercise and states that she likes riding her bike.    She also tells me that she feels that her right ear has fluid in it or something.      Objective    /82 (Cuff Size: Adult Large)   Pulse 65   Temp 97.5  F (36.4  C) (Temporal)   Resp 18   Wt 289 lb 8 oz " (131.3 kg)   LMP 09/18/2023 (Approximate)   SpO2 99%   BMI 46.47 kg/m    Body mass index is 46.47 kg/m .  Physical Exam   Gen: no apparent distress  Right ear: Cerumen impaction at the tympanic membrane area

## 2023-10-08 ENCOUNTER — MYC MEDICAL ADVICE (OUTPATIENT)
Dept: OBGYN | Facility: OTHER | Age: 38
End: 2023-10-08
Payer: MEDICARE

## 2023-11-20 ENCOUNTER — TRANSFERRED RECORDS (OUTPATIENT)
Dept: HEALTH INFORMATION MANAGEMENT | Facility: CLINIC | Age: 38
End: 2023-11-20
Payer: MEDICARE

## 2023-11-22 ENCOUNTER — NURSE TRIAGE (OUTPATIENT)
Dept: NURSING | Facility: CLINIC | Age: 38
End: 2023-11-22
Payer: MEDICARE

## 2023-11-22 ENCOUNTER — VIRTUAL VISIT (OUTPATIENT)
Dept: FAMILY MEDICINE | Facility: CLINIC | Age: 38
End: 2023-11-22
Payer: MEDICARE

## 2023-11-22 DIAGNOSIS — H01.005 BLEPHARITIS OF LEFT LOWER EYELID, UNSPECIFIED TYPE: Primary | ICD-10-CM

## 2023-11-22 PROCEDURE — 99213 OFFICE O/P EST LOW 20 MIN: CPT | Mod: VID | Performed by: INTERNAL MEDICINE

## 2023-11-22 RX ORDER — DOXYCYCLINE 100 MG/1
100 TABLET ORAL 2 TIMES DAILY
Qty: 14 TABLET | Refills: 0 | Status: SHIPPED | OUTPATIENT
Start: 2023-11-22 | End: 2023-12-05

## 2023-11-22 RX ORDER — ERYTHROMYCIN 5 MG/G
0.5 OINTMENT OPHTHALMIC 3 TIMES DAILY
Qty: 3.5 G | Refills: 0 | Status: SHIPPED | OUTPATIENT
Start: 2023-11-22 | End: 2023-12-05

## 2023-11-22 NOTE — PROGRESS NOTES
Florina is a 38 year old who is being evaluated via a billable video visit.      How would you like to obtain your AVS? MyChart  If the video visit is dropped, the invitation should be resent by: Text to cell phone: [phone]  Will anyone else be joining your video visit? No          Assessment & Plan     Blepharitis of left lower eyelid, unspecified type  Discussed treatment options for blepharitis in great detail.  I informed her that oftentimes, this condition can be resolved completely without the use of prescription medications.  Applying warm compresses to the eye and carefully washing the eyelids frequently throughout the day using a no tear baby shampoo can often resolve the problem completely.  However, if those conservative interventions do not work after a few days, she could use systemic antibiotics i.e. doxycycline as below.  We did have a very careful conversation about the potential risks and downsides of doxycycline including the risk of diarrhea and colitis and vaginal yeast infections.  At this point, she does not have much pain or irritation or itchiness of the surface of the eye, but this can often come along with blepharitis and therefore the topical erythromycin was prescribed.  She was informed that if her symptoms do not resolve after the course of antibiotics, that she should follow-up.  Follow-up sooner if symptoms worsen or new symptoms develop despite the treatment plan.  - doxycycline monohydrate (ADOXA) 100 MG tablet; Take 1 tablet (100 mg) by mouth 2 times daily  - erythromycin (ROMYCIN) 5 MG/GM ophthalmic ointment; Place 0.5 inches Into the left eye 3 times daily      No LOS data to display   Time spent by me doing chart review, history and exam, documentation and further activities per the note       FUTURE APPOINTMENTS:       - Follow-up visit in 1 week if symptoms not resolved, sooner symptoms worsen or new symptoms develop despite treatment plan    Fco Healy MD  [facility]  [facility] CARLIE Heaton is a 38 year old, presenting for the following health issues:  No chief complaint on file.      HPI       38-year-old female presents describing waking up this morning with a swollen mildly tender left lower eyelid  Denies fevers or other URI symptoms        Review of Systems         Objective           Vitals:  No vitals were obtained today due to virtual visit.    Physical Exam   GENERAL: Healthy, alert and no distress  EYES: Extraocular movements appear grossly intact, there is no conjunctival erythema or injection, there is swelling of the left lower lid that is notable  RESP: No audible wheeze, cough, or visible cyanosis.  No visible retractions or increased work of breathing.    SKIN: Visible skin clear. No significant rash, abnormal pigmentation or lesions.  NEURO: Cranial nerves grossly intact.  Mentation and speech appropriate for age.  PSYCH: Mentation appears normal, affect normal/bright, judgement and insight intact, normal speech and appearance well-groomed.                Video-Visit Details    Type of service:  Video Visit   Video Start Time:  0709  Video End Time:7:23 AM    Originating Location (pt. Location): Home    Distant Location (provider location):  On-site  Platform used for Video Visit: [employer]

## 2023-11-22 NOTE — TELEPHONE ENCOUNTER
"Patient is calling to report left lower eyelid swollen, red, and painful.    Redness started about 2 days ago and symptoms have progressively gotten worse.  No drainage.  No fever.    Disposition:  See PCP with 24 hours.  Care advice given.  Pt verbalized understanding.    Janna Liu, RN, BSN Nurse Triage Advisor 11/22/2023 6:04 AM     Reason for Disposition   Eyelid is red and painful (or tender to touch)    Additional Information   Negative: Unresponsive, passed out or very weak   Negative: Difficulty breathing or wheezing   Negative: [1] Difficulty swallowing or slurred speech AND [2] sudden onset   Negative: Sounds like a life-threatening emergency to the triager   Negative: [1] SEVERE eyelid swelling (i.e., shut or almost) AND [2] fever   Negative: [1] Eyelid (outer) is very red AND [2] fever   Negative: Patient sounds very sick or weak to the triager   Negative: [1] Pregnant 20 or more weeks AND [2] sudden weight gain (e.g., more than 3 lbs or 1.4 kg in one week)   Negative: [1] Postpartum (from 0 to 6 weeks after delivery) AND [2] sudden weight gain (e.g., more than 3 lbs or 1.4 kg in one week)   Negative: [1] SEVERE eyelid swelling (i.e., shut or almost) AND [2] involves both eyes (Exception: Itchy eyes, which  are probably an allergic reaction.)   Negative: [1] SEVERE eyelid swelling AND [2] Taking an ACE Inhibitor medicine (e.g., benazepril / LOTENSIN, captopril / CAPOTEN, enalapril / VASOTEC, lisinopril / ZESTRIL)   Negative: [1] SEVERE eyelid swelling on one side AND [2] red and painful (or tender to touch)   Negative: [1] SEVERE eyelid swelling on one side AND [2] sinus pain or pressure   Negative: Fever   Negative: [1] Painful rash AND [2] multiple small blisters grouped together (i.e., dermatomal distribution or \"band\" or \"stripe\")   Negative: [1] SEVERE eyelid swelling (i.e., shut or almost) AND [2] involves both eyes AND [3] itchy   Negative: MODERATE-SEVERE eyelid swelling on one side  (Exception: " Due to a mosquito bite.)   Negative: [1] MILD eyelid swelling (puffiness) AND [2] sinus pain or pressure    Protocols used: Eye - Swelling-A-AH

## 2023-11-23 ENCOUNTER — E-VISIT (OUTPATIENT)
Dept: FAMILY MEDICINE | Facility: OTHER | Age: 38
End: 2023-11-23
Payer: MEDICARE

## 2023-11-23 ENCOUNTER — NURSE TRIAGE (OUTPATIENT)
Dept: NURSING | Facility: CLINIC | Age: 38
End: 2023-11-23

## 2023-11-23 DIAGNOSIS — Z71.1 CONCERN ABOUT EYE DISEASE WITHOUT DIAGNOSIS: Primary | ICD-10-CM

## 2023-11-23 PROCEDURE — 99207 PR NON-BILLABLE SERV PER CHARTING: CPT | Performed by: PHYSICIAN ASSISTANT

## 2023-11-23 NOTE — TELEPHONE ENCOUNTER
"Patient calling about her virtual visit from yesterday. She states that the redness around her left eye seems to be increasing and her right eye is watery.  Patient will do an e-visit today and then  her antibiotics.     ABELINO LINDSEY RN    Reason for Disposition   Eyelid is red and painful (or tender to touch)    Additional Information   Negative: Chemical got in the eye   Negative: Piece of something got in the eye   Negative: Eye redness followed an eye injury   Negative: Yellow or green pus in the eyes   Negative: [1] Eyelid is swollen AND [2] no redness of white of eye (sclera)   Negative: Caused by pollen or other allergy OR main symptom is itchy eyes   Negative: Red, widespread rash also present   Negative: SEVERE eye pain   Negative: [1] Eyelids are very swollen (shut or almost) AND [2] fever   Negative: [1] Eyelid (outer) is very red (or tender to touch) AND [2] fever   Negative: [1] Foreign body sensation (\"feels like something is in there\") AND [2] irrigation didn't help   Negative: Vomiting   Negative: [1] Recent eye surgery AND [2] increasing eye pain   Negative: Patient sounds very sick or weak to the triager   Negative: MODERATE eye pain or discomfort (e.g., interferes with normal activities or awakens from sleep; more than mild)   Negative: New or worsening blurred vision   Negative: Looking at light causes MODERATE to SEVERE eye pain (i.e., photophobia)   Negative: Cloudy spot or sore seen on the cornea (clear part of the eye)   Negative: Eyelid is very swollen (shut or almost)    Protocols used: Eye - Red Without Pus-A-AH    "

## 2023-11-24 NOTE — TELEPHONE ENCOUNTER
Provider E-Visit time total (minutes): See recent visit for blepharitis and prescribed two antibiotics, hasn't had improvement, recommended F2F visit and/or ophthalmology.     Marcy Campos PA-C

## 2023-12-05 ENCOUNTER — TELEPHONE (OUTPATIENT)
Dept: FAMILY MEDICINE | Facility: OTHER | Age: 38
End: 2023-12-05

## 2023-12-05 ENCOUNTER — OFFICE VISIT (OUTPATIENT)
Dept: FAMILY MEDICINE | Facility: OTHER | Age: 38
End: 2023-12-05
Payer: MEDICARE

## 2023-12-05 VITALS
TEMPERATURE: 99 F | RESPIRATION RATE: 16 BRPM | HEART RATE: 71 BPM | DIASTOLIC BLOOD PRESSURE: 78 MMHG | BODY MASS INDEX: 46.21 KG/M2 | HEIGHT: 66 IN | WEIGHT: 287.5 LBS | SYSTOLIC BLOOD PRESSURE: 112 MMHG | OXYGEN SATURATION: 98 %

## 2023-12-05 DIAGNOSIS — H01.005 BLEPHARITIS OF LEFT LOWER EYELID, UNSPECIFIED TYPE: Primary | ICD-10-CM

## 2023-12-05 PROCEDURE — 99213 OFFICE O/P EST LOW 20 MIN: CPT | Performed by: PHYSICIAN ASSISTANT

## 2023-12-05 RX ORDER — ERYTHROMYCIN 5 MG/G
0.5 OINTMENT OPHTHALMIC 3 TIMES DAILY
Qty: 3.5 G | Refills: 0 | Status: SHIPPED | OUTPATIENT
Start: 2023-12-05 | End: 2024-01-03

## 2023-12-05 RX ORDER — DOXYCYCLINE 100 MG/1
100 TABLET ORAL 2 TIMES DAILY
Qty: 14 TABLET | Refills: 0 | Status: SHIPPED | OUTPATIENT
Start: 2023-12-05 | End: 2024-01-03

## 2023-12-05 ASSESSMENT — ENCOUNTER SYMPTOMS: EYE PAIN: 1

## 2023-12-05 ASSESSMENT — PAIN SCALES - GENERAL: PAINLEVEL: MILD PAIN (3)

## 2023-12-05 NOTE — PROGRESS NOTES
Assessment & Plan     Blepharitis of left lower eyelid, unspecified type  Per patient history, this seems similar to previous episode of blepharitis. Since patient had not previously tried prescribed antibiotics, recommend to start erythromycin ointment. Continue daily warm compresses, can alternate between warm and cool compresses as needed. Also recommend to cleanse lash line with gentle baby soap + water and a q-tip daily. Recommend to start oral doxycycline and see opthalmology for further evaluation if symptoms worsen or fail to improve. If patient notes any fever, pain with eye movements, or vision changes, patient should immediately go to the ER.   - doxycycline monohydrate (ADOXA) 100 MG tablet; Take 1 tablet (100 mg) by mouth 2 times daily  - erythromycin (ROMYCIN) 5 MG/GM ophthalmic ointment; Place 0.5 inches Into the left eye 3 times daily    Options for treatment and follow-up care were reviewed with the patient and/or guardian. Patient and/or guardian engaged in the decision making process and verbalized understanding of the options discussed and agreed with the final plan.    I, Marcy Campos PA-C, was present with the Physician Assistant student who participated in the service and in the documentation of the note.  I have verified the history and personally performed the physical exam and medical decision making.  I agree with the assessment and plan of care as documented in the note.     YONI Hollingsworth-S2  [facility]     CHIQUITA GarciaC  [facility] is a 38 year old, presenting for the following health issues:  Blepharitis  - has been using same washcloth for warm compresses, has not picked up abx previously prescribed because it was getting better back to normal but now is worse.  - no pain with EOM. Pain when you touch it. No discharge or goop from the lid or eyeball. Notes it feels gluey in the outside corners. Has been 2 weeks  "since it first started.   - tearing in left eye. Not crusted shut when wakes up. No changes in eyeball color.   - has a constant headache on the left side since this morning. Has not tried anything for this.   - no itching        12/5/2023     2:13 PM   Additional Questions   Roomed by VENITA   Accompanied by BARBY         12/5/2023     2:13 PM   Patient Reported Additional Medications   Patient reports taking the following new medications None       Eye Problem        Pt seen virtually on 11/22/23 for left eyelid swelling, pt prescribed eye drops and doxycycline. Symptoms resolved without pt needing to take the medication. Pt states that eyelid started to swell again yesterday. Left upper eyelid swollen and red, 3/10 tried. Warms wash clothes placed on area at home, no relief.    Review of Systems   Eyes:  Positive for pain.      Constitutional, HEENT, cardiovascular, pulmonary, gi and gu systems are negative, except as otherwise noted.      Objective    /78   Pulse 71   Temp 99  F (37.2  C) (Temporal)   Resp 16   Ht 1.681 m (5' 6.18\")   Wt 130.4 kg (287 lb 8 oz)   LMP 11/28/2023 (Approximate)   SpO2 98%   BMI 46.15 kg/m    Body mass index is 46.15 kg/m .  Physical Exam   GENERAL: healthy, alert and no distress  EYES: Eyes grossly normal to inspection, PERRL and conjunctivae and sclerae normal. Left eye shows no obvious crusting or masses, possible slight papule on upper mid lashline. Erythema and slight swelling noted on left upper eyelid. EOM intact with no pain.                  "

## 2023-12-05 NOTE — TELEPHONE ENCOUNTER
Patient calling and stating she was told she has a blocked tear duct and having swelling in her lower left eyelid. Swelling is now present in her upper eyelid. Patient explains it is 2/10 pain. She does have a headache now as well. Patient submitted an eVisit on 11/23 and YONI GILL recommended F2F visit. Patient agreeable to appointment F2F today. No further questions or concerns at this time.

## 2023-12-21 ENCOUNTER — TRANSFERRED RECORDS (OUTPATIENT)
Dept: HEALTH INFORMATION MANAGEMENT | Facility: CLINIC | Age: 38
End: 2023-12-21
Payer: MEDICARE

## 2023-12-22 ENCOUNTER — NURSE TRIAGE (OUTPATIENT)
Dept: NURSING | Facility: CLINIC | Age: 38
End: 2023-12-22
Payer: MEDICARE

## 2023-12-23 NOTE — TELEPHONE ENCOUNTER
Nurse Triage SBAR    Is this a 2nd Level Triage? NO    Situation: Anal discomfort    Background: Patient believes she has an anal fissure as she has rectal pain following a bowel movement. Also believes she is having her period. Reports she has had an anal fissure in the past and this feels similar. Discomfort started Monday. Patient has tried Vaseline for discomfort.     Assessment: Rectal pain rated at 5-6 on scale of 0-10. No bleeding that she knows of. Reports itching. Does not feel as though she has any hemorrhoids. Diarrhea. Afebrile. Has had this in the past but believes this is worse. Itching is the more concerning issue. Last BM this evening.    Protocol Recommended Disposition:   Home Care    Recommendation: Patient should be able to manage this at home with care advice. Reviewed disposition and care advice. Patient questions answered; will call back with new or worsening symptoms.     Sue Lira RN on 12/22/2023 at 11:15 PM      Reason for Disposition   Mild rectal itching    Additional Information   Negative: Foreign body in rectum   Negative: Diarrhea is main symptom   Negative: Constipation is main symptom (e.g., pain or discomfort caused by passage of hard BMs)   Negative: Blood in or on bowel movement is main symptom   Negative: Pregnant   Negative: Pinworms are suspected (rectal itching; (white thread-like worm about size of a staple, moves)   Negative: [1] Mpox suspected (e.g., direct skin contact such as sex, recent travel to [place] or [place]) AND [2] symptoms of Mpox (e.g., rectal rash or sores, fever, muscle aches, or swollen lymph nodes)   Negative: [1] At risk for Mpox (men-who-have-sex-with-men) AND [2] possible exposure (e.g., multiple sex partners in past 21 days) AND [3] symptoms of Mpox (e.g., rectal rash or sores, fever, muscle aches, or swollen lymph nodes)   Negative: Sexual assault or rape (sexual intercourse or activity occurs without freely given consent), known or  suspected   Negative: Injury to rectum   Negative: Large mass protruding out of rectum   Negative: Patient sounds very sick or weak to the triager   Negative: SEVERE rectal pain (e.g., excruciating, unable to have a bowel movement)   Negative: [1] Rectal pain or redness AND [2] fever   Negative: [1] Sudden onset rectal pain AND [2] constipated (straining, rectal pressure or fullness) AND [3] NOT better after SITZ bath, suppository or enema   Negative: MODERATE-SEVERE rectal pain (i.e., interferes with school, work, or sleep)   Negative: MODERATE-SEVERE rectal itching (i.e., interferes with school, work, or sleep)   Negative: Rash of rectal area (e.g., open sore, painful tiny water blisters, unexplained bumps)   Negative: Patient is worried they have a sexually transmitted infection (STI)   Negative: Rectal area looks infected (e.g., draining sore, spreading redness)   Negative: Last bowel movement (BM) > 4 days ago   Negative: [1] Home treatment > 3 days for rectal pain AND [2] not improved   Negative: [1] Home treatment for > 3 days for rectal itching AND [2] not improved   Negative: [1] Stool is mucus or pus AND [2] persists > 24 hours   Negative: [1] Recurrent episodes of unexplained rectal pain AND [2] NO rectal symptoms now   Negative: Painless lump in rectal area    Protocols used: Rectal Symptoms-A-

## 2024-01-03 ENCOUNTER — APPOINTMENT (OUTPATIENT)
Dept: GENERAL RADIOLOGY | Facility: CLINIC | Age: 39
End: 2024-01-03
Attending: EMERGENCY MEDICINE
Payer: MEDICARE

## 2024-01-03 ENCOUNTER — HOSPITAL ENCOUNTER (EMERGENCY)
Facility: CLINIC | Age: 39
Discharge: HOME | End: 2024-01-03
Attending: EMERGENCY MEDICINE | Admitting: EMERGENCY MEDICINE
Payer: MEDICARE

## 2024-01-03 VITALS
SYSTOLIC BLOOD PRESSURE: 137 MMHG | HEART RATE: 78 BPM | TEMPERATURE: 98.3 F | HEIGHT: 65 IN | BODY MASS INDEX: 48.72 KG/M2 | DIASTOLIC BLOOD PRESSURE: 93 MMHG | OXYGEN SATURATION: 100 % | WEIGHT: 292.4 LBS | RESPIRATION RATE: 20 BRPM

## 2024-01-03 DIAGNOSIS — R07.9 CHEST PAIN, UNSPECIFIED TYPE: ICD-10-CM

## 2024-01-03 DIAGNOSIS — R03.0 ELEVATED BLOOD PRESSURE READING: ICD-10-CM

## 2024-01-03 LAB
ANION GAP SERPL CALCULATED.3IONS-SCNC: 14 MMOL/L (ref 7–15)
BASOPHILS # BLD AUTO: 0 10E3/UL (ref 0–0.2)
BASOPHILS NFR BLD AUTO: 0 %
BUN SERPL-MCNC: 14.1 MG/DL (ref 6–20)
CALCIUM SERPL-MCNC: 9.1 MG/DL (ref 8.6–10)
CHLORIDE SERPL-SCNC: 101 MMOL/L (ref 98–107)
CREAT SERPL-MCNC: 0.93 MG/DL (ref 0.51–0.95)
D DIMER PPP FEU-MCNC: <0.27 UG/ML FEU (ref 0–0.5)
DEPRECATED HCO3 PLAS-SCNC: 23 MMOL/L (ref 22–29)
EGFRCR SERPLBLD CKD-EPI 2021: 80 ML/MIN/1.73M2
EOSINOPHIL # BLD AUTO: 0.2 10E3/UL (ref 0–0.7)
EOSINOPHIL NFR BLD AUTO: 2 %
ERYTHROCYTE [DISTWIDTH] IN BLOOD BY AUTOMATED COUNT: 14.1 % (ref 10–15)
GLUCOSE SERPL-MCNC: 122 MG/DL (ref 70–99)
HCT VFR BLD AUTO: 39.9 % (ref 35–47)
HGB BLD-MCNC: 12.9 G/DL (ref 11.7–15.7)
HOLD SPECIMEN: NORMAL
HOLD SPECIMEN: NORMAL
IMM GRANULOCYTES # BLD: 0 10E3/UL
IMM GRANULOCYTES NFR BLD: 0 %
LYMPHOCYTES # BLD AUTO: 2.5 10E3/UL (ref 0.8–5.3)
LYMPHOCYTES NFR BLD AUTO: 24 %
MCH RBC QN AUTO: 26.4 PG (ref 26.5–33)
MCHC RBC AUTO-ENTMCNC: 32.3 G/DL (ref 31.5–36.5)
MCV RBC AUTO: 82 FL (ref 78–100)
MONOCYTES # BLD AUTO: 0.5 10E3/UL (ref 0–1.3)
MONOCYTES NFR BLD AUTO: 5 %
NEUTROPHILS # BLD AUTO: 7.4 10E3/UL (ref 1.6–8.3)
NEUTROPHILS NFR BLD AUTO: 69 %
NRBC # BLD AUTO: 0 10E3/UL
NRBC BLD AUTO-RTO: 0 /100
PLATELET # BLD AUTO: 337 10E3/UL (ref 150–450)
POTASSIUM SERPL-SCNC: 4.1 MMOL/L (ref 3.4–5.3)
RBC # BLD AUTO: 4.88 10E6/UL (ref 3.8–5.2)
SODIUM SERPL-SCNC: 138 MMOL/L (ref 135–145)
TROPONIN T SERPL HS-MCNC: <6 NG/L
WBC # BLD AUTO: 10.7 10E3/UL (ref 4–11)

## 2024-01-03 PROCEDURE — 84484 ASSAY OF TROPONIN QUANT: CPT | Performed by: EMERGENCY MEDICINE

## 2024-01-03 PROCEDURE — 36415 COLL VENOUS BLD VENIPUNCTURE: CPT | Performed by: EMERGENCY MEDICINE

## 2024-01-03 PROCEDURE — 93005 ELECTROCARDIOGRAM TRACING: CPT | Performed by: EMERGENCY MEDICINE

## 2024-01-03 PROCEDURE — 71046 X-RAY EXAM CHEST 2 VIEWS: CPT

## 2024-01-03 PROCEDURE — 85025 COMPLETE CBC W/AUTO DIFF WBC: CPT | Performed by: EMERGENCY MEDICINE

## 2024-01-03 PROCEDURE — 85379 FIBRIN DEGRADATION QUANT: CPT | Performed by: EMERGENCY MEDICINE

## 2024-01-03 PROCEDURE — 80048 BASIC METABOLIC PNL TOTAL CA: CPT | Performed by: EMERGENCY MEDICINE

## 2024-01-03 PROCEDURE — 99284 EMERGENCY DEPT VISIT MOD MDM: CPT | Mod: 25 | Performed by: EMERGENCY MEDICINE

## 2024-01-03 PROCEDURE — 93010 ELECTROCARDIOGRAM REPORT: CPT | Performed by: EMERGENCY MEDICINE

## 2024-01-03 PROCEDURE — 99285 EMERGENCY DEPT VISIT HI MDM: CPT | Mod: 25 | Performed by: EMERGENCY MEDICINE

## 2024-01-03 RX ORDER — BUSPIRONE HYDROCHLORIDE 7.5 MG/1
7.5 TABLET ORAL 2 TIMES DAILY
COMMUNITY
Start: 2023-11-21

## 2024-01-03 ASSESSMENT — ACTIVITIES OF DAILY LIVING (ADL)
ADLS_ACUITY_SCORE: 35
ADLS_ACUITY_SCORE: 33

## 2024-01-04 ENCOUNTER — PATIENT OUTREACH (OUTPATIENT)
Dept: CARE COORDINATION | Facility: CLINIC | Age: 39
End: 2024-01-04
Payer: MEDICARE

## 2024-01-04 ENCOUNTER — TELEPHONE (OUTPATIENT)
Dept: FAMILY MEDICINE | Facility: OTHER | Age: 39
End: 2024-01-04
Payer: MEDICARE

## 2024-01-04 NOTE — ED TRIAGE NOTES
Pt reports mild chest pain on/off since yesterday. Pt reports a history of anxiety and panic attacks.      Triage Assessment (Adult)       Row Name 01/03/24 6706          Triage Assessment    Airway WDL WDL        Respiratory WDL    Respiratory WDL WDL        Cardiac WDL    Cardiac WDL chest pain        Chest Pain Assessment    Chest Pain Location midsternal

## 2024-01-04 NOTE — TELEPHONE ENCOUNTER
Forms/Letter Request    Type of form/letter:  Application for [employer]    Have you been seen for this request: N/A    Do we have the form/letter: Yes: Forms bin    Who is the form from? Patient    Where did/will the form come from? Patient or family brought in       When is form/letter needed by: As soon as possible, Actually need it before 1/10    How would you like the form/letter returned:     Patient Notified form requests are processed in 3-5 business days:Yes    Could we send this information to you in [employer] or would you prefer to receive a phone call?:   Patient would prefer a phone call when completed  Okay to leave a detailed message?: Yes at Home number on file [phone] (home)

## 2024-01-04 NOTE — DISCHARGE INSTRUCTIONS
At this point it does not appear that you are suffering from any scary diseases.    You can take ibuprofen or Tylenol to help with your symptoms.  If you develop new symptoms or worsening symptoms please return here.  Otherwise follow-up with primary care.    As we discussed your blood pressure was elevated.  It is important to see if this continues to be elevated in a more relaxed setting.

## 2024-01-04 NOTE — MEDICATION SCRIBE - ADMISSION MEDICATION HISTORY
Medication Scribe Admission Medication History    Admission medication history is complete. The information provided in this note is only as accurate as the sources available at the time of the update.    Information Source(s): Patient and CareEverywhere/SureScripts via in-person    Pertinent Information: n/a    Changes made to PTA medication list:  Added: buspirone  Deleted: xanax, doxycycline, erythromycin ophth  Changed: None    Medication Affordability:  Not including over the counter (OTC) medications, was there a time in the past 3 months when you did not take your medications as prescribed because of cost?: No    Allergies reviewed with patient and updates made in EHR: yes    Medication History Completed By: DAYRON RICHARDS 1/3/2024 8:03 PM    PTA Med List   Medication Sig Last Dose    busPIRone (BUSPAR) 7.5 MG tablet Take 7.5 mg by mouth 2 times daily 1/3/2024 at am    desogestrel-ethinyl estradiol (APRI) 0.15-30 MG-MCG tablet Take 1 tablet by mouth daily Take one tablet daily. Past Week at unkn    escitalopram (LEXAPRO) 20 MG tablet Take 40 mg by mouth daily 1/3/2024 at am    hydrOXYzine (ATARAX) 25 MG tablet Take 25 mg by mouth daily as needed for anxiety Past Week at     Semaglutide (RYBELSUS) 14 MG tablet Take 14 mg by mouth daily Past Month at unkn    valACYclovir (VALTREX) 1000 mg tablet Take 2 tablets (2,000 mg) by mouth 2 times daily For 1 day More than a month at unkn

## 2024-01-04 NOTE — TELEPHONE ENCOUNTER
Patient came in and was wondering on if you want to see her. She said she got lab work done 1/3 and has seen you in October of last year?

## 2024-01-04 NOTE — ED TRIAGE NOTES
Pt reports CP that's started yesterday afternoon.  Denies N/V.  Pink color, no diaphoresis.

## 2024-01-04 NOTE — ED PROVIDER NOTES
History     chief complaint  HPI  Patient is a 38-year-old female with a history of hyperlipidemia, cervical radiculopathy is presenting with a chief complaint of chest pain.  This started yesterday insidiously.  It is located on the right side of her chest.  It has not changed significantly since starting.  It is not exertional.  Occasionally slightly pleuritic.  No associated dyspnea, nausea, diaphoresis.  Pain does not radiate.    Review of Systems:  All organ systems below were reviewed and are negative unless indicated in the HPI.    Constitutional  Eyes  ENT  Respiratory  Cardiovascular  Gastrointestinal  Genitourinary  Musculoskeletal  Skin  Neuro    Allergies:  Allergies   Allergen Reactions    Macrobid [Nitrofurantoin] Headache and Nausea       Problem List:    Patient Active Problem List    Diagnosis Date Noted    Cervical radiculopathy 12/16/2022     Priority: Medium    Prolonged PTT 08/09/2018     Priority: Medium    Attention deficit disorder without hyperactivity 12/01/2011     Priority: Medium    Hyperlipidemia, unspecified 01/08/2010     Priority: Medium    Generalized anxiety disorder 10/24/2007     Priority: Medium    Morbid obesity (H) 01/01/2004     Priority: Medium    Problems with learning 12/31/2003     Priority: Medium        Past Medical History:    Past Medical History:   Diagnosis Date    Attention deficit disorder with hyperactivity(314.01)     Left carpal tunnel syndrome 6/27/2019    Other kyphoscoliosis and scoliosis     Other specified delay in development     Right carpal tunnel syndrome 3/14/2019    Viral warts, unspecified        Past Surgical History:    Past Surgical History:   Procedure Laterality Date    EXAM UNDER ANESTHESIA PELVIC N/A 9/9/2016    Procedure: EXAM UNDER ANESTHESIA PELVIC;  Surgeon: Rhoda Alcazar MD;  Location:  OR     TOOTH EXTRACTION W/FORCEP      wisdom    LAPAROSCOPIC APPENDECTOMY N/A 1/9/2023    Procedure: APPENDECTOMY, LAPAROSCOPIC;  Surgeon:  "Jhony Perez MD;  Location: [facility] OR    RELEASE CARPAL TUNNEL Right 3/22/2019    Procedure: RIGHT RELEASE CARPAL TUNNEL;  Surgeon: Anjum Wilburn DO;  Location: [facility] OR    RELEASE CARPAL TUNNEL Left 7/16/2019    Procedure: RELEASE, CARPAL TUNNEL-Left;  Surgeon: Anjum Wilburn DO;  Location: [facility] OR       Family History:    Family History   Problem Relation Age of Onset    Lipids Father         diet    Thyroid Disease Mother         unsure if hypo or hyper    Diabetes Paternal Grandfather         adult    Heart Disease Paternal Grandfather         bypass/open hear surgery    Lipids Maternal Aunt         medication    Lipids Maternal Aunt         diet    Alzheimer Disease Maternal Grandfather     Hypertension No family hx of     Coronary Artery Disease No family hx of     Hyperlipidemia No family hx of     Cancer - colorectal No family hx of     Ovarian Cancer No family hx of     Prostate Cancer No family hx of     Depression/Anxiety No family hx of     Cerebrovascular Disease No family hx of     Anesthesia Reaction No family hx of     Asthma No family hx of     Osteoporosis No family hx of     Chemical Addiction No family hx of     Obesity No family hx of        Medications:    busPIRone (BUSPAR) 7.5 MG tablet  desogestrel-ethinyl estradiol (APRI) 0.15-30 MG-MCG tablet  escitalopram (LEXAPRO) 20 MG tablet  hydrOXYzine (ATARAX) 25 MG tablet  Semaglutide (RYBELSUS) 14 MG tablet  valACYclovir (VALTREX) 1000 mg tablet          Physical Exam   BP: (!) 145/101  Pulse: 70  Temp: 98.3  F (36.8  C)  Resp: 20  Height: 165.1 cm (5' 5\")  Weight: 132.6 kg (292 lb 6.4 oz)  SpO2: 98 %    Gen: Vital signs reviewed  Eyes: Sclera white, pupils round  ENT: External ears and nares normal  Card: Regular rate and rhythm  Resp: No respiratory distress. Lungs clear to auscultation bilaterally  Abd: Soft, non-distended, non-tender  Extremities: Symmetric distal pulses.  Skin: No rashes  Neuro: Alert, speech normal. Face " is symmetric.  Strength and sensation intact throughout.  No dysmetria.  Visual fields grossly intact.  Hearing grossly intact.       ED Course        Procedures         EKG interpreted by myself. EKG shows sinus rhythm at a rate of 80 bpm, ID interval 176 ms, QTc 425 ms, QRS duration 80 ms without acute ST segment changes.           Results for orders placed or performed during the hospital encounter of 01/03/24 (from the past 24 hour(s))   [facility]    Narrative    The following orders were created for panel order [facility].  Procedure                               Abnormality         Status                     ---------                               -----------         ------                     Extra Green Top (Lithium...[[id number]]                      Final result               Extra Purple Top Tube[[id number]]                            Final result                 Please view results for these tests on the individual orders.   Extra Green Top (Lithium Heparin) Tube   Result Value Ref Range    Hold Specimen JIC    Extra Purple Top Tube   Result Value Ref Range    Hold Specimen JIC    CBC with Platelets & Differential    Narrative    The following orders were created for panel order CBC with Platelets & Differential.  Procedure                               Abnormality         Status                     ---------                               -----------         ------                     CBC with platelets and d...[[id number]]  Abnormal            Final result                 Please view results for these tests on the individual orders.   Basic metabolic panel   Result Value Ref Range    Sodium 138 135 - 145 mmol/L    Potassium 4.1 3.4 - 5.3 mmol/L    Chloride 101 98 - 107 mmol/L    Carbon Dioxide (CO2) 23 22 - 29 mmol/L    Anion Gap 14 7 - 15 mmol/L    Urea Nitrogen 14.1 6.0 - 20.0 mg/dL    Creatinine 0.93 0.51 - 0.95 mg/dL    GFR Estimate 80 >60 mL/min/1.73m2    Calcium 9.1 8.6 - 10.0 mg/dL    Glucose  122 (H) 70 - 99 mg/dL   Troponin T, High Sensitivity   Result Value Ref Range    Troponin T, High Sensitivity <6 <=14 ng/L   CBC with platelets and differential   Result Value Ref Range    WBC Count 10.7 4.0 - 11.0 10e3/uL    RBC Count 4.88 3.80 - 5.20 10e6/uL    Hemoglobin 12.9 11.7 - 15.7 g/dL    Hematocrit 39.9 35.0 - 47.0 %    MCV 82 78 - 100 fL    MCH 26.4 (L) 26.5 - 33.0 pg    MCHC 32.3 31.5 - 36.5 g/dL    RDW 14.1 10.0 - 15.0 %    Platelet Count 337 150 - 450 10e3/uL    % Neutrophils 69 %    % Lymphocytes 24 %    % Monocytes 5 %    % Eosinophils 2 %    % Basophils 0 %    % Immature Granulocytes 0 %    NRBCs per 100 WBC 0 <1 /100    Absolute Neutrophils 7.4 1.6 - 8.3 10e3/uL    Absolute Lymphocytes 2.5 0.8 - 5.3 10e3/uL    Absolute Monocytes 0.5 0.0 - 1.3 10e3/uL    Absolute Eosinophils 0.2 0.0 - 0.7 10e3/uL    Absolute Basophils 0.0 0.0 - 0.2 10e3/uL    Absolute Immature Granulocytes 0.0 <=0.4 10e3/uL    Absolute NRBCs 0.0 10e3/uL   D dimer quantitative   Result Value Ref Range    D-Dimer Quantitative <0.27 0.00 - 0.50 ug/mL FEU    Narrative    This D-dimer assay is intended for use in conjunction with a clinical pretest probability assessment model to exclude pulmonary embolism (PE) and deep venous thrombosis (DVT) in outpatients suspected of PE or DVT. The cut-off value is 0.50 ug/mL FEU.   XR Chest 2 Views    Narrative    EXAM: XR CHEST 2 VIEWS  LOCATION: [facility]  DATE: 1/3/2024    INDICATION: right sided chest pain  COMPARISON: None.      Impression    IMPRESSION: Negative chest.       Medications - No data to display      Consultations:  None    Social Determinants of Health:  Manages ADLs    Assessments & Plan (with Medical Decision Making)       I have reviewed the nursing notes.    I have reviewed the findings, diagnosis, plan and need for follow up with the patient.      Medical Decision Making  On arrival, patient slightly hypertensive.  Differential for  presentation includes chest wall pain, pleurisy, PE, myocarditis, ACS.  Lower suspicion for dissection.  Laboratory workup sent.  D-dimer is negative in the setting of low risk on Wells score, this sufficiently rules out PE.  High-sensitivity troponin negative despite several days of symptoms.  Low suspicion for ACS at this point.   Chest x-ray did not reveal acute changes thus low suspicion for pneumothorax or pneumonia.  With the pleuritic component of this, this could be more of a viral pleurisy.  Will trial NSAIDs.  Discharged home in stable condition with appropriate return precautions.  Discussed the elevated blood pressure and the need for outpatient follow-up.    Final diagnoses:   Chest pain, unspecified type   Elevated blood pressure reading         Javier Ramos M.D.   [facility] Emergency Department     Javier Ramos MD  01/04/24 0029

## 2024-01-05 NOTE — TELEPHONE ENCOUNTER
TC out of clinic.   I would recommend a ER/Hospital follow up with PCP. Ok to use provider spots for scheduling.       ANGIE Kaba CNP  Questions or concerns please feel free to send me a [employer] message or call me  Phone : [phone]

## 2024-01-09 ENCOUNTER — MYC MEDICAL ADVICE (OUTPATIENT)
Dept: FAMILY MEDICINE | Facility: OTHER | Age: 39
End: 2024-01-09
Payer: MEDICARE

## 2024-01-10 ENCOUNTER — TELEPHONE (OUTPATIENT)
Dept: FAMILY MEDICINE | Facility: OTHER | Age: 39
End: 2024-01-10
Payer: MEDICARE

## 2024-01-10 NOTE — TELEPHONE ENCOUNTER
Rybelus 14 MG  tablets     Prior Authorization Retail Medication Request    Medication/Dose: Rybelus 14 MG  tablets   Diagnosis and ICD code (if different than what is on RX):    New/renewal/insurance change PA/secondary ins. PA:  Previously Tried and Failed:    Rationale:      Insurance   Primary:   Insurance ID:      Secondary (if applicable):  Insurance ID:      Pharmacy Information (if different than what is on RX)  Name:    Phone:    Fax:

## 2024-01-16 ENCOUNTER — OFFICE VISIT (OUTPATIENT)
Dept: FAMILY MEDICINE | Facility: OTHER | Age: 39
End: 2024-01-16
Payer: MEDICARE

## 2024-01-16 VITALS
BODY MASS INDEX: 49.17 KG/M2 | SYSTOLIC BLOOD PRESSURE: 132 MMHG | WEIGHT: 293 LBS | TEMPERATURE: 97.5 F | RESPIRATION RATE: 16 BRPM | OXYGEN SATURATION: 97 % | DIASTOLIC BLOOD PRESSURE: 78 MMHG | HEART RATE: 78 BPM

## 2024-01-16 DIAGNOSIS — G47.19 EXCESSIVE DAYTIME SLEEPINESS: Primary | ICD-10-CM

## 2024-01-16 DIAGNOSIS — E66.01 MORBID OBESITY (H): ICD-10-CM

## 2024-01-16 DIAGNOSIS — F41.1 GENERALIZED ANXIETY DISORDER: ICD-10-CM

## 2024-01-16 PROCEDURE — 99213 OFFICE O/P EST LOW 20 MIN: CPT | Performed by: FAMILY MEDICINE

## 2024-01-16 ASSESSMENT — PAIN SCALES - GENERAL: PAINLEVEL: NO PAIN (0)

## 2024-01-16 NOTE — PROGRESS NOTES
Assessment & Plan     Excessive daytime sleepiness  Patient does snore, is obese, complains of excessive daytime sleepiness.  She is at risk for obstructive sleep apnea.  Will refer to sleep medicine.    - Adult Sleep Eval & Management  Referral; Future    Morbid obesity (H)  Phentermine has stopped working for the past and also seem to exacerbate her anxiety.  She had been on oral semaglutide but insurance is no longer covering this.  We are awaiting PA for this year to see if they will cover it.  In the meantime she is working on trying to exercise which is walking which she has not been doing with the below 0 temperatures lately.  She is also trying to eat healthier.    Generalized anxiety disorder  She is following with psychiatry and counseling    Alanna Curiel MD  [facility] Heaton is a 38 year old, presenting for the following health issues:  Hospital F/U      HPI       ED/UC Followup:    Facility: [facility]     Date of visit: 1/3/24  Reason for visit: Chest pain   Current Status: Feeling better but feels it was more anxiety     Patient is seen today in follow-up of emergency room visit a couple weeks ago.  She was seen for right-sided chest pain.  She was ruled out for pulmonary embolism.  Troponin was negative.  It was thought that she might of had a viral pleurisy.  She is sent home with NSAIDs.  At this time patient feels it was probably more anxiety.  She states she was having issues at work and would feel so upset that she would cry.  She eventually was fired from that job.  She states that since not working she has not had any of those symptoms again.  She does continue to follow with psychiatry and with counseling.  She is now actively looking for another job but has not found anything yet.    She has not been taking her oral semaglutide as insurance has not been covering it and another prior authorization has been sent.  She  is trying to work on improved exercise and healthy eating.  She states her significant other is also trying to help her with improved eating.    She tells me that she is tired all the time.  She states she is a really deep sleeper and will not wake up.  She states counselor tried calling her 3 times and she could not hear her phone.  Her significant other states that she is difficult to wake up.  She states after she wakes up she is then still feels really tired during the day.  She has been told that she snores.        Objective    /78   Pulse 78   Temp 97.5  F (36.4  C) (Temporal)   Resp 16   Wt 134 kg (295 lb 8 oz)   LMP 11/28/2023 (Approximate)   SpO2 97%   BMI 49.17 kg/m    Body mass index is 49.17 kg/m .  Physical Exam   Gen: no apparent distress  Neck: supple, no adenopathy, and thyroid normal size, non-tender, without nodularity.  Chest/CV: S1 and S2 normal, no murmurs, clicks, gallops or rubs. Regular rate and rhythm. Chest is clear; no wheezes or rales. No edema or JVD.   Psych: Mood neutral

## 2024-01-16 NOTE — TELEPHONE ENCOUNTER
PA Initiation    Medication: RYBELSUS 14 MG PO TABS  Insurance Company: [employer] Part D - Phone [phone] Fax [phone]  Pharmacy Filling the Rx: [facility] #[id number] - [place], [place] - [address] AT [facility] [address]  Filling Pharmacy Phone: [phone]  Filling Pharmacy Fax:    Start Date: 1/16/2024  Retail Pharmacy Prior Authorization Team   Phone: [phone]

## 2024-01-17 NOTE — TELEPHONE ENCOUNTER
PRIOR AUTHORIZATION DENIED    Medication: RYBELSUS 14 MG PO TABS  Insurance Company: [employer] Part D - Phone [phone] Fax [phone]  Denial Date: 1/16/2024  Denial Reason(s):     Appeal Information:     Patient Notified: Pharmacy will notify patient.

## 2024-01-18 ENCOUNTER — PATIENT OUTREACH (OUTPATIENT)
Dept: CARE COORDINATION | Facility: CLINIC | Age: 39
End: 2024-01-18
Payer: MEDICARE

## 2024-01-19 ENCOUNTER — TELEPHONE (OUTPATIENT)
Dept: FAMILY MEDICINE | Facility: OTHER | Age: 39
End: 2024-01-19
Payer: MEDICARE

## 2024-01-19 NOTE — TELEPHONE ENCOUNTER
INCOMING FORMS    Sender: [employer]    Type of Form, letter or note (What is requested?): order    How was the form received?: Fax    How should forms be returned?:  Fax : [phone]    Form placed in TC bin for review/signature if appropriate.

## 2024-01-19 NOTE — TELEPHONE ENCOUNTER
This was for not covering Rybelsus for weight loss, however Medicare Part D doesn't cover medications for weight loss, so will not sill out this form as do not feel it will be helpful..

## 2024-02-04 ENCOUNTER — NURSE TRIAGE (OUTPATIENT)
Dept: NURSING | Facility: CLINIC | Age: 39
End: 2024-02-04
Payer: MEDICARE

## 2024-02-05 NOTE — TELEPHONE ENCOUNTER
Nurse Triage SBAR    Is this a 2nd Level Triage? NO    Situation: Pregnancy Test    Background: :na    Assessment: Patient calling to inquire on home pregnancy test. She reports expected period was last week of January. She reports positive test with [facility]. She is inquiring on accuracy of test. Informed her that it is 99% accurate if after expected start of period.    Encouraged her to message PCP if interested in blood test for confirmation.    No additional concerns or questions.    Leah Roberson RN  02/04/24 7:56 PM  [facility] Nurse Advisor      Reason for Disposition   Health Information question, no triage required and triager able to answer question    Additional Information   Negative: [1] Caller is not with the adult (patient) AND [2] reporting urgent symptoms   Negative: Lab result questions   Negative: Medication questions   Negative: Caller can't be reached by phone   Negative: Caller has already spoken to PCP or another triager   Negative: RN needs further essential information from caller in order to complete triage   Negative: Requesting regular office appointment   Negative: [1] Caller requesting NON-URGENT health information AND [2] PCP's office is the best resource    Protocols used: Information Only Call - [facility]

## 2024-02-14 ENCOUNTER — OFFICE VISIT (OUTPATIENT)
Dept: FAMILY MEDICINE | Facility: OTHER | Age: 39
End: 2024-02-14
Payer: MEDICARE

## 2024-02-14 ENCOUNTER — NURSE TRIAGE (OUTPATIENT)
Dept: FAMILY MEDICINE | Facility: OTHER | Age: 39
End: 2024-02-14

## 2024-02-14 VITALS
SYSTOLIC BLOOD PRESSURE: 118 MMHG | DIASTOLIC BLOOD PRESSURE: 78 MMHG | HEIGHT: 65 IN | WEIGHT: 293 LBS | HEART RATE: 89 BPM | OXYGEN SATURATION: 96 % | RESPIRATION RATE: 16 BRPM | TEMPERATURE: 98 F | BODY MASS INDEX: 48.82 KG/M2

## 2024-02-14 DIAGNOSIS — G89.29 CHRONIC PAIN OF RIGHT KNEE: Primary | ICD-10-CM

## 2024-02-14 DIAGNOSIS — M25.561 CHRONIC PAIN OF RIGHT KNEE: Primary | ICD-10-CM

## 2024-02-14 PROCEDURE — 99213 OFFICE O/P EST LOW 20 MIN: CPT | Performed by: FAMILY MEDICINE

## 2024-02-14 ASSESSMENT — PAIN SCALES - GENERAL: PAINLEVEL: WORST PAIN (10)

## 2024-02-14 ASSESSMENT — ENCOUNTER SYMPTOMS: LEG PAIN: 1

## 2024-02-14 NOTE — TELEPHONE ENCOUNTER
S-(situation): pt calling with leg pain     B-(background): pt states the leg pain has lasted for a few weeks     A-(assessment): pt states she is unsure if the pain is from being overweight. Pt denies any injuries, states she is not pregnancy. Pain is in both legs. No swelling or redness, no chest pain or difficulty breathing.    R-(recommendations): be seen today     Rn scheduled with PCP     Catia Rosas RN      Reason for Disposition   SEVERE pain (e.g., excruciating, unable to do any normal activities)    Additional Information   Negative: Looks like a broken bone or dislocated joint (e.g., crooked or deformed)   Negative: Sounds like a life-threatening emergency to the triager   Negative: Followed a hip injury   Negative: Followed a knee injury   Negative: Followed an ankle or foot injury   Negative: Back pain radiating (shooting) into leg(s)   Negative: Foot pain is main symptom   Negative: Ankle pain is main symptom   Negative: Knee pain is main symptom   Negative: Leg swelling is main symptom   Negative: Chest pain   Negative: Difficulty breathing   Negative: Entire foot is cool or blue in comparison to other side   Negative: Unable to walk   Negative: Fever and red area (or area very tender to touch)   Negative: Swollen joint and fever   Negative: Thigh or calf pain in only one leg and present > 1 hour   Negative: Thigh, calf, or ankle swelling in only one leg   Negative: Thigh, calf, or ankle swelling in both legs, but one side is definitely more swollen   Negative: History of prior 'blood clot' in leg or lungs (i.e., deep vein thrombosis, pulmonary embolism)   Negative: History of inherited increased risk of blood clots (e.g., factor 5 Leiden, antithrombin 3, protein C or protein S deficiency, prothrombin mutation)   Negative: Major surgery in past month   Negative: Hip or leg fracture (broken bone) in past month (or had cast on leg or ankle in past month)   Negative: Illness requiring prolonged  bedrest in past month (e.g., immobilization, long hospital stay)   Negative: Long-distance travel in past month (e.g., car, bus, train, plane; with trip lasting 6 or more hours)   Negative: Cancer treatment in past six months (or has cancer now)   Negative: Patient sounds very sick or weak to the triager    Protocols used: Leg Pain-A-[place]

## 2024-02-14 NOTE — PROGRESS NOTES
"  Assessment & Plan     Chronic pain of right knee  Patient has chronic knee pain and had an MRI 9 months ago which revealed partial tearing.  Pain improved on its own and now is recurring.  I recommend physical therapy.  If it is not improving would then refer her back to sports medicine.    - Physical Therapy Referral; Future    Subjective   Florina is a 38 year old, presenting for the following health issues:  Leg Pain      2/14/2024    11:09 AM   Additional Questions   Roomed by Jenni     Leg Pain    History of Present Illness       Reason for visit:  Leg pain    She eats 2-3 servings of fruits and vegetables daily.She consumes 1 sweetened beverage(s) daily.She exercises with enough effort to increase her heart rate 30 to 60 minutes per day.  She exercises with enough effort to increase her heart rate 4 days per week.   She is taking medications regularly.       Pain History:  When did you first notice your pain? Ongoing   Have you seen anyone else for your pain? No  How has your pain affected your ability to work? 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 Musculoskeletal problem/pain  Onset/Duration: \"ongoing\"  Description  Location: leg - bilateral  Joint Swelling: No  Redness: No  Pain: YES  Warmth: No  Intensity:  severe  Progression of Symptoms:  same and constant  Accompanying signs and symptoms:   Fevers: No  Numbness/tingling/weakness: No  History  Trauma to the area: No  Recent illness:  No  Previous similar problem: YES  Previous evaluation:  YES- MRI  Precipitating or alleviating factors:  Aggravating factors include: sitting and walking  Therapies tried and outcome: nothing    She complains of having chronic right knee pain for a while.  In reviewing the chart she was evaluated for this last May.  She had an MRI that revealed partial tearing.  She was sent to physical therapy.  Patient never went to physical therapy " "as she stated her pain got better on its own.  She is unsure when the pain started again.  She denies giving out or locking.  She denies redness or swelling.  The pain tends to be mainly anterior knee pain.  She is not taking any medications for pain.          Objective    /78   Pulse 89   Temp 98  F (36.7  C) (Temporal)   Resp 16   Ht 1.651 m (5' 5\")   Wt 136.8 kg (301 lb 8 oz)   LMP 02/14/2024   SpO2 96%   BMI 50.17 kg/m    Body mass index is 50.17 kg/m .  Physical Exam   Gen: no apparent distress  Right knee: No erythema, no swelling.  No joint line tenderness.  She has some mild tenderness over her insertion of her patellar tendon.  Negative Belen's.            Signed Electronically by: Alanna Curiel MD    "

## 2024-02-26 ENCOUNTER — OFFICE VISIT (OUTPATIENT)
Dept: FAMILY MEDICINE | Facility: OTHER | Age: 39
End: 2024-02-26
Payer: MEDICARE

## 2024-02-26 VITALS
WEIGHT: 293 LBS | HEART RATE: 71 BPM | RESPIRATION RATE: 16 BRPM | DIASTOLIC BLOOD PRESSURE: 72 MMHG | HEIGHT: 65 IN | BODY MASS INDEX: 48.82 KG/M2 | SYSTOLIC BLOOD PRESSURE: 120 MMHG | OXYGEN SATURATION: 97 % | TEMPERATURE: 97.7 F

## 2024-02-26 DIAGNOSIS — E78.5 HYPERLIPIDEMIA, UNSPECIFIED HYPERLIPIDEMIA TYPE: ICD-10-CM

## 2024-02-26 DIAGNOSIS — J02.9 SORE THROAT: Primary | ICD-10-CM

## 2024-02-26 DIAGNOSIS — Z12.4 CERVICAL CANCER SCREENING: ICD-10-CM

## 2024-02-26 LAB
DEPRECATED S PYO AG THROAT QL EIA: NEGATIVE
GROUP A STREP BY PCR: NOT DETECTED

## 2024-02-26 PROCEDURE — 99213 OFFICE O/P EST LOW 20 MIN: CPT | Performed by: FAMILY MEDICINE

## 2024-02-26 PROCEDURE — 87651 STREP A DNA AMP PROBE: CPT | Performed by: FAMILY MEDICINE

## 2024-02-26 ASSESSMENT — PAIN SCALES - GENERAL: PAINLEVEL: MODERATE PAIN (5)

## 2024-02-26 ASSESSMENT — ENCOUNTER SYMPTOMS: SORE THROAT: 1

## 2024-02-26 NOTE — PATIENT INSTRUCTIONS
I strongly encourage you to get your pap test done soon.      You can do this at your wellness check.      Rapid strep test is negative.  Await confirmatory testing (can take up to 2 days).  I recommend supportive therapy - fluids, analgesics; teas possibly with honey, cayenne pepper, salt water gargles as desired.     Contact us or return if questions or concerns.    Have a nice day!    Dr. Singer

## 2024-02-26 NOTE — PROGRESS NOTES
Assessment & Plan       ICD-10-CM    1. Sore throat  J02.9 Streptococcus A Rapid Screen w/Reflex to PCR - Clinic Collect     Group A Streptococcus PCR Throat Swab      2. Cervical cancer screening  Z12.4       3. Hyperlipidemia, unspecified  E78.5            1.  Rapid strep was negative.  Will await confirmatory testing.  Recommended continued medications for pain control.  Let us know if not improving.  I did offer testing for flu or COVID, but she declined.  2.  Strongly encourage patient to get this done.  She declined doing it today.  Encouraged her to schedule a wellness check with her PCP.  3.  Patient also did not want to check this today.  Recommended that she follow-up on this with her PCP.      Portions of this note were completed using Dragon dictation software.  Although reviewed, there may be typographical and other inadvertent errors that remain.         Ordering of each unique test  24 minutes spent by me on the date of the encounter doing chart review, history and exam, documentation and further activities per the note        Patient Instructions   I strongly encourage you to get your pap test done soon.      You can do this at your wellness check.      Rapid strep test is negative.  Await confirmatory testing (can take up to 2 days).  I recommend supportive therapy - fluids, analgesics; teas possibly with honey, cayenne pepper, salt water gargles as desired.     Contact us or return if questions or concerns.    Have a nice day!    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, presenting for the following health issues:  Pharyngitis        2/26/2024     9:14 AM   Additional Questions   Roomed by VENITA   Accompanied by BARBY         2/26/2024     9:14 AM   Patient Reported Additional Medications   Patient reports taking the following new medications None     History of Present Illness       Reason for visit:  Sore Throat  Symptom onset:  1-3 days ago  Symptoms include:  Sore Throat  Symptom  "intensity:  Mild  Symptom progression:  Staying the same  Had these symptoms before:  Yes  What makes it better:  Cold foods    She eats 2-3 servings of fruits and vegetables daily.She consumes 1 sweetened beverage(s) daily.She exercises with enough effort to increase her heart rate 30 to 60 minutes per day.  She exercises with enough effort to increase her heart rate 4 days per week.   She is taking medications regularly.     Pt works in a , so is frequently exposed to a number of illnesses.      ST started Saturday morning (2 days ago).  Has been taking cough drops, advil, tylenol.  Helps a bit.  Cold foods, liquids help the most.  No cough.  No fever.  No sinus symptoms.  Some nasal congestion.  No sneezing or itchy/watery eyes.             Objective    /72   Pulse 71   Temp 97.7  F (36.5  C) (Temporal)   Resp 16   Ht 1.651 m (5' 5\")   Wt 136.5 kg (301 lb)   LMP 02/14/2024   SpO2 97%   BMI 50.09 kg/m    Body mass index is 50.09 kg/m .  Physical Exam   GENERAL: alert and no distress  EYES: Eyes grossly normal to inspection, PERRL and conjunctivae and sclerae normal  HENT: ear canals and TM's normal, nose and mouth without ulcers or lesions  NECK: no adenopathy, no asymmetry, masses, or scars  RESP: lungs clear to auscultation - no rales, rhonchi or wheezes  CV: regular rate and rhythm, normal S1 S2, no S3 or S4, no murmur, click or rub, no peripheral edema  ABDOMEN: soft, nontender, no hepatosplenomegaly, no masses and bowel sounds normal  MS: no gross musculoskeletal defects noted, no edema    Admission on 01/03/2024, Discharged on 01/03/2024   Component Date Value Ref Range Status    Hold Specimen 01/03/2024 [facility]   Final    Hold Specimen 01/03/2024 [facility]   Final    Sodium 01/03/2024 138  135 - 145 mmol/L Final    Reference intervals for this test were updated on 09/26/2023 to more accurately reflect our healthy population. There may be differences in the flagging of prior results with similar " values performed with this method. Interpretation of those prior results can be made in the context of the updated reference intervals.     Potassium 01/03/2024 4.1  3.4 - 5.3 mmol/L Final    Chloride 01/03/2024 101  98 - 107 mmol/L Final    Carbon Dioxide (CO2) 01/03/2024 23  22 - 29 mmol/L Final    Anion Gap 01/03/2024 14  7 - 15 mmol/L Final    Urea Nitrogen 01/03/2024 14.1  6.0 - 20.0 mg/dL Final    Creatinine 01/03/2024 0.93  0.51 - 0.95 mg/dL Final    GFR Estimate 01/03/2024 80  >60 mL/min/1.73m2 Final    Calcium 01/03/2024 9.1  8.6 - 10.0 mg/dL Final    Glucose 01/03/2024 122 (H)  70 - 99 mg/dL Final    Troponin T, High Sensitivity 01/03/2024 <6  <=14 ng/L Final    Either a High Sensitivity Troponin T baseline (0 hours) value = 100 ng/L, or an increase in High Sensitivity Troponin T = 7 ng/L at 2 hours compared to 0 hours (2-0 hours), suggests myocardial injury, and urgent clinical attention is required.    If the 2-0 hours increase is <7 ng/L, a High Sensitivity Troponin T result above gender-specific reference ranges warrants further evaluation.   Recommendations for further evaluation include correlation with clinical decision-making tool (e.g., HEART), a 3rd High Sensitivity Troponin T test 2 hours after the 2nd (a 20% change from baseline would represent concern), admission for observation, close PCC/cardiology follow-up, or urgent outpatient provocative testing.    WBC Count 01/03/2024 10.7  4.0 - 11.0 10e3/uL Final    RBC Count 01/03/2024 4.88  3.80 - 5.20 10e6/uL Final    Hemoglobin 01/03/2024 12.9  11.7 - 15.7 g/dL Final    Hematocrit 01/03/2024 39.9  35.0 - 47.0 % Final    MCV 01/03/2024 82  78 - 100 fL Final    MCH 01/03/2024 26.4 (L)  26.5 - 33.0 pg Final    MCHC 01/03/2024 32.3  31.5 - 36.5 g/dL Final    RDW 01/03/2024 14.1  10.0 - 15.0 % Final    Platelet Count 01/03/2024 337  150 - 450 10e3/uL Final    % Neutrophils 01/03/2024 69  % Final    % Lymphocytes 01/03/2024 24  % Final    % Monocytes  01/03/2024 5  % Final    % Eosinophils 01/03/2024 2  % Final    % Basophils 01/03/2024 0  % Final    % Immature Granulocytes 01/03/2024 0  % Final    NRBCs per 100 WBC 01/03/2024 0  <1 /100 Final    Absolute Neutrophils 01/03/2024 7.4  1.6 - 8.3 10e3/uL Final    Absolute Lymphocytes 01/03/2024 2.5  0.8 - 5.3 10e3/uL Final    Absolute Monocytes 01/03/2024 0.5  0.0 - 1.3 10e3/uL Final    Absolute Eosinophils 01/03/2024 0.2  0.0 - 0.7 10e3/uL Final    Absolute Basophils 01/03/2024 0.0  0.0 - 0.2 10e3/uL Final    Absolute Immature Granulocytes 01/03/2024 0.0  <=0.4 10e3/uL Final    Absolute NRBCs 01/03/2024 0.0  10e3/uL Final    D-Dimer Quantitative 01/03/2024 <0.27  0.00 - 0.50 ug/mL FEU Final     No results found for any visits on 02/26/24.  No results found for this or any previous visit (from the past 24 hour(s)).        Signed Electronically by: Vladislav Singer MD, MD

## 2024-03-06 ENCOUNTER — HOSPITAL ENCOUNTER (EMERGENCY)
Facility: CLINIC | Age: 39
Discharge: HOME | End: 2024-03-06
Attending: FAMILY MEDICINE | Admitting: FAMILY MEDICINE
Payer: MEDICARE

## 2024-03-06 VITALS
HEART RATE: 93 BPM | TEMPERATURE: 98.1 F | SYSTOLIC BLOOD PRESSURE: 132 MMHG | RESPIRATION RATE: 19 BRPM | OXYGEN SATURATION: 95 % | DIASTOLIC BLOOD PRESSURE: 80 MMHG

## 2024-03-06 DIAGNOSIS — K62.89 RECTAL PAIN: ICD-10-CM

## 2024-03-06 LAB
ALBUMIN UR-MCNC: 30 MG/DL
APPEARANCE UR: ABNORMAL
BACTERIA #/AREA URNS HPF: ABNORMAL /HPF
BILIRUB UR QL STRIP: NEGATIVE
COLOR UR AUTO: YELLOW
GLUCOSE UR STRIP-MCNC: NEGATIVE MG/DL
HGB UR QL STRIP: NEGATIVE
KETONES UR STRIP-MCNC: 5 MG/DL
LEUKOCYTE ESTERASE UR QL STRIP: NEGATIVE
MUCOUS THREADS #/AREA URNS LPF: PRESENT /LPF
NITRATE UR QL: NEGATIVE
PH UR STRIP: 5 [PH] (ref 5–7)
RBC URINE: 1 /HPF
SP GR UR STRIP: 1.03 (ref 1–1.03)
SQUAMOUS EPITHELIAL: 8 /HPF
UROBILINOGEN UR STRIP-MCNC: 2 MG/DL
WBC URINE: 1 /HPF

## 2024-03-06 PROCEDURE — 81003 URINALYSIS AUTO W/O SCOPE: CPT | Performed by: FAMILY MEDICINE

## 2024-03-06 PROCEDURE — 99283 EMERGENCY DEPT VISIT LOW MDM: CPT | Performed by: FAMILY MEDICINE

## 2024-03-06 ASSESSMENT — ACTIVITIES OF DAILY LIVING (ADL): ADLS_ACUITY_SCORE: 35

## 2024-03-06 ASSESSMENT — COLUMBIA-SUICIDE SEVERITY RATING SCALE - C-SSRS
2. HAVE YOU ACTUALLY HAD ANY THOUGHTS OF KILLING YOURSELF IN THE PAST MONTH?: NO
1. IN THE PAST MONTH, HAVE YOU WISHED YOU WERE DEAD OR WISHED YOU COULD GO TO SLEEP AND NOT WAKE UP?: NO
6. HAVE YOU EVER DONE ANYTHING, STARTED TO DO ANYTHING, OR PREPARED TO DO ANYTHING TO END YOUR LIFE?: NO

## 2024-03-07 ENCOUNTER — TRANSFERRED RECORDS (OUTPATIENT)
Dept: HEALTH INFORMATION MANAGEMENT | Facility: CLINIC | Age: 39
End: 2024-03-07
Payer: MEDICARE

## 2024-03-07 ENCOUNTER — PATIENT OUTREACH (OUTPATIENT)
Dept: CARE COORDINATION | Facility: CLINIC | Age: 39
End: 2024-03-07
Payer: MEDICARE

## 2024-03-07 NOTE — LETTER
Florina Marie  [address]     [place] [address]    Dear Florina Marie,      I am a team member within the [facility] Care Resource Center with [facility]. I recently contacted you to ensure you are doing well following a visit within our health system. I also wanted to take this chance to introduce Clinic Care Coordination should you have any interest in this program in the future.    Below is a description of Clinic Care Coordination and how this team can further assist you:       The Clinic Care Coordination team is made up of a Registered Nurse, , Financial Resource Worker, and a Community Health Worker who understand and can help navigate the health care system. The goal of clinic care coordination is to help you manage your health, improve access to care, and achieve optimal health outcomes. They work alongside your provider to assist you in determining your health and social needs, obtain health care and community resources, and provide you with necessary information and education. Clinic Care Coordination can work with you through any barriers and develop a care plan that helps coordinate and strengthen the relationship between you and your care team.    If you wish to connect with the Clinic Care Coordination Team, please let your [facility] Primary Care Provider or Clinic Care Team know and they can place a referral. The Clinic Care Coordination team will then reach out by phone to further support you.    We are focused on providing you with the highest-quality healthcare experience possible.    Sincerely,   Your care team with [facility]

## 2024-03-07 NOTE — ED TRIAGE NOTES
Pt comes in today along with her  c/o lower back pain, anal itching, rectal pain, concentrated urine and a headache. Pt has a hx of anal fissure and is c/o that it has never gone away.     Triage Assessment (Adult)       Row Name 03/06/24 6031          Triage Assessment    Airway WDL WDL        Respiratory WDL    Respiratory WDL WDL        Skin Circulation/Temperature WDL    Skin Circulation/Temperature WDL WDL        Cardiac WDL    Cardiac WDL WDL        Peripheral/Neurovascular WDL    Peripheral Neurovascular WDL WDL        Cognitive/Neuro/Behavioral WDL    Cognitive/Neuro/Behavioral WDL WDL

## 2024-03-07 NOTE — DISCHARGE INSTRUCTIONS
You do not have an anal fissure at this time.  However the anal pain and itching is likely due to constipation.  Avoid straining as this will likely result in a tear.  Hold off on adding any additional fiber.  Begin MiraLAX 2-4 times daily to help soften and empty your stool.  You can also take Colace in addition to the MiraLAX.  Sit in a warm sitz bath once or twice a day to help with the anal pain.  Once you are stooling more comfortably, restart high-fiber and plenty of water in your diet.  Your urinalysis was normal today.  Follow-up in the clinic in 5-7 days if not improving.  Return to the ED if symptoms worsen.

## 2024-03-07 NOTE — PROGRESS NOTES
Clinic Care Coordination Contact  Community Health Worker Initial Outreach    CHW Initial Information Gathering:  Referral Source: ED Follow-Up  CHW Additional Questions  Patient agreeable to assistance with scheduling?: No  Patient declined (specify): Patient declined to schedule ED follow up with PCP at this time  [facility] active?: Yes    Patient accepts CC: No, patient declined at this time. Patient will be sent Care Coordination introduction letter for future reference.       Magali Triplett  Community Health Worker  Connected Care Resource Center, [facility]    *Connected Care Resource Team does NOT follow patient ongoing. Referrals are identified based on internal discharge reports and the outreach is to ensure patient has an understanding of their discharge instructions.

## 2024-03-07 NOTE — ED PROVIDER NOTES
ED Provider Note     Florina Marie  [id number]  March 6, 2024      CC:     Chief Complaint   Patient presents with    Back Pain          History is obtained from the patient.  She is accompanied by her male  and service dog.    HPI: Florina Marie is a 38 year old female presenting with rectal pain and itching that has been worsening.  She states that she was here last August with rectal pain.  I had seen her at that time and thought that she may have early anal fissure or hemorrhoid.  She has not had any rectal bleeding, and cannot characterize her stools.  She did have a bowel movement today.  She has had more discomfort having bowel movements and tonight had some increased pain.  Patient was straining with her bowel movements.  She does not have any significant abdominal pain, nausea or vomiting.  She was concerned about her urine.        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     Right carpal tunnel syndrome 3/14/2019    Viral warts, unspecified        PSH:   Past Surgical History:   Procedure Laterality Date    EXAM UNDER ANESTHESIA PELVIC N/A 9/9/2016    Procedure: EXAM UNDER ANESTHESIA PELVIC;  Surgeon: Rhoda Alcazar MD;  Location: [facility] OR    HC TOOTH EXTRACTION W/FORCEP      wisdom    LAPAROSCOPIC APPENDECTOMY N/A 1/9/2023    Procedure: APPENDECTOMY, LAPAROSCOPIC;  Surgeon: Jhony Perez MD;  Location: [facility] OR    RELEASE CARPAL TUNNEL Right 3/22/2019    Procedure: RIGHT RELEASE CARPAL TUNNEL;  Surgeon: Anjum Wilburn DO;  Location: [facility] OR    RELEASE CARPAL TUNNEL Left 7/16/2019    Procedure: RELEASE, CARPAL TUNNEL-Left;  Surgeon: Anjum Wilburn DO;  Location: [facility] OR       MEDS: No current facility-administered  medications on file prior to encounter.  busPIRone (BUSPAR) 7.5 MG tablet, Take 7.5 mg by mouth 2 times daily  desogestrel-ethinyl estradiol (APRI) 0.15-30 MG-MCG tablet, Take 1 tablet by mouth daily Take one tablet daily.  escitalopram (LEXAPRO) 20 MG tablet, Take 40 mg by mouth daily  hydrOXYzine (ATARAX) 25 MG tablet, Take 25 mg by mouth daily as needed for anxiety  valACYclovir (VALTREX) 1000 mg tablet, Take 2 tablets (2,000 mg) by mouth 2 times daily For 1 day (Patient not taking: Reported on 2/14/2024)        Allergies: Macrobid [nitrofurantoin]    Triage and nursing notes were reviewed.    ROS: All other systems were reviewed and are negative    Physical Exam:  Vitals:    03/06/24 2259 03/06/24 2300   BP: 132/80 132/80   Pulse: 93    Resp: 19    Temp: 98.1  F (36.7  C)    TempSrc: Oral    SpO2: 96% 95%     GENERAL APPEARANCE: Oriented x 3  HEAD: atraumatic  HENT: Normal external exam  RECTUM: Patient has a tight anal sphincter.  No obvious anal fissure or external hemorrhoids.  Digital exam was deferred at the patient's request.  NEURO: mentation and speech normal; no focal deficits        Labs/Imaging Results:  Results for orders placed or performed during the hospital encounter of 03/06/24 (from the past 24 hour(s))   UA with Microscopic reflex to Culture    Specimen: Urine, Clean Catch   Result Value Ref Range    Color Urine Yellow Colorless, Straw, Light Yellow, Yellow    Appearance Urine Slightly Cloudy (A) Clear    Glucose Urine Negative Negative mg/dL    Bilirubin Urine Negative Negative    Ketones Urine 5 (A) Negative mg/dL    Specific Gravity Urine 1.031 1.003 - 1.035    Blood Urine Negative Negative    pH Urine 5.0 5.0 - 7.0    Protein Albumin Urine 30 (A) Negative mg/dL    Urobilinogen Urine 2.0 Normal, 2.0 mg/dL    Nitrite Urine Negative Negative    Leukocyte Esterase Urine Negative Negative    Bacteria Urine Few (A) None Seen /HPF    Mucus Urine Present (A) None Seen /LPF    RBC Urine 1 <=2 /HPF     WBC Urine 1 <=5 /HPF    Squamous Epithelials Urine 8 (H) <=1 /HPF    Narrative    Urine Culture not indicated           Impression:  Final diagnoses:   Rectal pain         ED Course & Medical Decision Making (Plan):  Florina Marie is a 38 year old female with history of constipation, and anal pain with possible early anal fissure versus hemorrhoids.  I had seen the patient back in August 2023 when she presented with similar problems.  She returns tonight with complaints of the same.  She is having some rectal pain, anal itching in the setting of straining with bowel movements.  Patient has not been monitoring the consistency or quality of her stools.  Her significant other states that she was straining with bowel movements tonight.  Has not been any blood on the toilet or tissue when she wipes.    Vital signs are stable with normal temp, blood pressure, heart rate, respiratory rate and oxygen saturation.  Patient was reluctant to have an exam but was cooperative.  Rectal exam reveals no obvious external hemorrhoid or anal fissure.  Digital exam was deferred due to her discomfort level.  I recommended that she resume stool softeners, and MiraLAX.  She is likely constipated, and I will have her start with a low residue diet until she has been able to pass stools.  She can then resume fiber and water in her diet.  Avoid straining.  Patient expressed understanding of written discharge instructions below.    Written after-visit summary and instructions were given at the time of discharge.        Discharge Instructions:  You do not have an anal fissure at this time.  However the anal pain and itching is likely due to constipation.  Avoid straining as this will likely result in a tear.  Hold off on adding any additional fiber.  Begin MiraLAX 2-4 times daily to help soften and empty your stool.  You can also take Colace in addition to the MiraLAX.  Sit in a warm sitz bath once or twice a day to help with the anal  pain.  Once you are stooling more comfortably, restart high-fiber and plenty of water in your diet.  Your urinalysis was normal today.  Follow-up in the clinic in 5-7 days if not improving.  Return to the ED if symptoms worsen.        Disclaimer: This note consists of words and symbols derived from keyboarding and dictation using voice recognition software.  As a result, there may be errors that have gone undetected.  Please consider this when interpreting information found in this note.       Jian Mullen MD  03/07/24 0057

## 2024-03-16 ENCOUNTER — HEALTH MAINTENANCE LETTER (OUTPATIENT)
Age: 39
End: 2024-03-16

## 2024-03-20 ENCOUNTER — TRANSFERRED RECORDS (OUTPATIENT)
Dept: HEALTH INFORMATION MANAGEMENT | Facility: CLINIC | Age: 39
End: 2024-03-20

## 2024-03-20 ENCOUNTER — ALLIED HEALTH/NURSE VISIT (OUTPATIENT)
Dept: FAMILY MEDICINE | Facility: OTHER | Age: 39
End: 2024-03-20
Payer: MEDICARE

## 2024-03-20 ENCOUNTER — TELEPHONE (OUTPATIENT)
Dept: FAMILY MEDICINE | Facility: OTHER | Age: 39
End: 2024-03-20

## 2024-03-20 ENCOUNTER — OFFICE VISIT (OUTPATIENT)
Dept: FAMILY MEDICINE | Facility: OTHER | Age: 39
End: 2024-03-20
Payer: MEDICARE

## 2024-03-20 ENCOUNTER — NURSE TRIAGE (OUTPATIENT)
Dept: FAMILY MEDICINE | Facility: OTHER | Age: 39
End: 2024-03-20

## 2024-03-20 VITALS
WEIGHT: 293 LBS | DIASTOLIC BLOOD PRESSURE: 78 MMHG | TEMPERATURE: 97.3 F | OXYGEN SATURATION: 97 % | BODY MASS INDEX: 47.09 KG/M2 | SYSTOLIC BLOOD PRESSURE: 128 MMHG | RESPIRATION RATE: 18 BRPM | HEIGHT: 66 IN | HEART RATE: 88 BPM

## 2024-03-20 DIAGNOSIS — S61.219A FINGER LACERATION: Primary | ICD-10-CM

## 2024-03-20 DIAGNOSIS — L03.012 PARONYCHIA OF FINGER OF LEFT HAND: Primary | ICD-10-CM

## 2024-03-20 PROCEDURE — 99207 PR NO CHARGE NURSE ONLY: CPT

## 2024-03-20 PROCEDURE — 99213 OFFICE O/P EST LOW 20 MIN: CPT | Performed by: STUDENT IN AN ORGANIZED HEALTH CARE EDUCATION/TRAINING PROGRAM

## 2024-03-20 RX ORDER — CEPHALEXIN 500 MG/1
500 CAPSULE ORAL 2 TIMES DAILY
Qty: 14 CAPSULE | Refills: 0 | Status: CANCELLED | OUTPATIENT
Start: 2024-03-20 | End: 2024-03-27

## 2024-03-20 ASSESSMENT — PAIN SCALES - GENERAL: PAINLEVEL: SEVERE PAIN (7)

## 2024-03-20 NOTE — PATIENT INSTRUCTIONS
-Warm soaks 2-3 times daily. Dry well then add on antibiotic ointment afterwards.  -Let me know how things are in about 1 week - I may consider oral antibiotics   -If thing worsen (more swollen, more red, red streaks up finger to hand, fever, etc...) please let me know ASAP

## 2024-03-20 NOTE — TELEPHONE ENCOUNTER
Reason for Disposition    Yellow pus under skin around fingernail (cuticle) or pus under fingernail    Protocols used: Finger Pain-A-OH

## 2024-03-20 NOTE — TELEPHONE ENCOUNTER
Pt walked in to clinic with concerns of infected nail     Appears to be a hang nail that was pulled off but pt reports she does not remember ever pulling skin, pt believes her nail is under the skin     Yellow pus and clear discharge coming from nail     See in office today       RN added pt on provider open slot schedule    Patient verbalized understanding and in agreement with plan of care.     Catia Rosas RN          Reason for Disposition   Looks infected (e.g., spreading redness, red streak, pus)    Additional Information   Negative: Followed an injury   Negative: Wound looks infected   Negative: Caused by an animal bite   Negative: Caused by frostbite   Negative: Hand or wrist pain is main symptom   Negative: Looks infected (spreading redness, red streak, pus) and severe pain with movement   Negative: Patient sounds very sick or weak to the triager    Protocols used: Finger Pain-A-OH

## 2024-03-20 NOTE — TELEPHONE ENCOUNTER
Reason for Call:  Appointment Request    Patient requesting this type of appt:  finger infection    Requested provider:  willing to see any provider    Reason patient unable to be scheduled: Not within requested timeframe    When does patient want to be seen/preferred time: Same day    Comments: patient has infected finger since last weekend and is not getting better. Would like to be seen today for it.    [facility] or would you prefer to receive a phone call?:   Patient would prefer a phone call   Okay to leave a detailed message?: Yes at Cell number on file:    Telephone Information:   Mobile [phone]   Mobile [phone]       Call taken on 3/20/2024 at 10:02 AM by Linda Morris

## 2024-03-20 NOTE — PROGRESS NOTES
"  Assessment & Plan     Paronychia of finger, left  Appears to have spontaneously drained prior to arrival, manual decompression and some warm soaks during clinic visit did not express any more purulent fluid.  Did discuss about warm soaks daily up to 15 minutes 3 times daily along with antibiotic ointment application daily.  I do want her to be diligent about this for up to a week, she should notify me with any worsening signs symptoms or concerns for worsening infection/cellulitis or pus pocket formation.  Nail care was also discussed.      Remi Heaton is a 38 year old, presenting for the following health issues:  Finger (Right hand middle finger infection /)        3/20/2024    12:31 PM   Additional Questions   Roomed by Jennifer   Accompanied by Self     History of Present Illness       Reason for visit:  Finger infection  Symptom onset:  3-7 days ago  Symptoms include:  Throbbing pain, reddness, pus  Symptom intensity:  Mild  Symptom progression:  Staying the same  Had these symptoms before:  Yes  Has tried/received treatment for these symptoms:  Yes  Previous treatment was successful:  Yes  Prior treatment description:  Cut it out    She eats 0-1 servings of fruits and vegetables daily.She consumes 1 sweetened beverage(s) daily.She exercises with enough effort to increase her heart rate 30 to 60 minutes per day.  She exercises with enough effort to increase her heart rate 4 days per week. She is missing 1 dose(s) of medications per week.           Review of Systems  Constitutional, HEENT, cardiovascular, pulmonary, gi and gu systems are negative, except as otherwise noted.      Objective    /78   Pulse 88   Temp 97.3  F (36.3  C) (Temporal)   Resp 18   Ht 1.665 m (5' 5.55\")   Wt 137.4 kg (303 lb)   LMP 03/14/2024   SpO2 97%   BMI 49.58 kg/m    Body mass index is 49.58 kg/m .  Physical Exam  Vitals and nursing note reviewed.   Constitutional:       General: She is not in acute distress.     " Appearance: Normal appearance. She is not ill-appearing, toxic-appearing or diaphoretic.   HENT:      Head: Normocephalic.      Right Ear: External ear normal.      Left Ear: External ear normal.      Nose: No rhinorrhea.   Eyes:      General:         Right eye: No discharge.         Left eye: No discharge.      Extraocular Movements: Extraocular movements intact.      Conjunctiva/sclera: Conjunctivae normal.      Pupils: Pupils are equal, round, and reactive to light.   Pulmonary:      Effort: Pulmonary effort is normal. No respiratory distress.   Musculoskeletal:         General: Normal range of motion.      Cervical back: Normal range of motion.   Skin:     Comments: Resolving paronychia of the left middle finger over the medial aspect of the nail   Neurological:      Mental Status: She is alert and oriented to person, place, and time.   Psychiatric:         Mood and Affect: Mood normal.         Behavior: Behavior normal.                  Signed Electronically by: DEBBIE MEZA MD

## 2024-03-22 ENCOUNTER — NURSE TRIAGE (OUTPATIENT)
Dept: FAMILY MEDICINE | Facility: OTHER | Age: 39
End: 2024-03-22
Payer: MEDICARE

## 2024-03-22 DIAGNOSIS — B37.31 YEAST INFECTION OF THE VAGINA: Primary | ICD-10-CM

## 2024-03-22 RX ORDER — FLUCONAZOLE 150 MG/1
150 TABLET ORAL ONCE
Qty: 1 TABLET | Refills: 0 | Status: SHIPPED | OUTPATIENT
Start: 2024-03-22 | End: 2024-03-22

## 2024-03-22 NOTE — TELEPHONE ENCOUNTER
"Nurse Triage SBAR    Is this a 2nd Level Triage? NO    Situation: Patient reports she has symptoms of a yeast infection. She reports it just started yesterday. She has no fevers. She has had yeast infections before and reports it feels like one. She has not tried any OTC medication.    Background: 1 day of vaginal itching.    Assessment: Patient has not tried any over the counter yeast infection medication. She reports she also has her menstrual cycle at this time. Did let patient know that her PCP is out of office.     Protocol Recommended Disposition:   Home Care    Recommendation: Patient asking for a prescribed yeast infection medication. Has not tried anything OTC. Due to it being the weekend, she did not want to do an e-visit.    Routed to provider    Does the patient meet one of the following criteria for ADS visit consideration? No    Reason for Disposition   Symptoms of a yeast infection (i.e., itchy, white discharge, not bad smelling) and feels like prior vaginal yeast infections    Additional Information   Negative: Followed a genital area injury (e.g., vagina, vulva)   Negative: Vaginal bleeding is main symptom   Negative: Vaginal discharge is main symptom   Negative: Pain or burning with passing urine (urination) is main symptom   Negative: Menstrual cramps is main symptom   Negative: Abdomen pain is main symptom   Negative: Pubic lice suspected   Negative: Itching or rash of female genital area (e.g., labia, vagina, vulva)   Negative: Pregnant and labor suspected   Negative: Tender lump (swelling or \"ball\") at vaginal opening   Negative: Symptoms of a yeast infection (i.e., itchy, white discharge, not bad smelling) and not improved > 3 days following Care Advice   Negative: Vaginal itching and not improved > 3 days following Care Advice   Negative: Vaginal odor (bad smell) not improved > 3 days following Care Advice   Negative: Patient is worried they have a sexually transmitted infection (STI)   " Negative: Patient wants to be seen   Negative: Feels like something inside is falling out of vagina (e.g., pressure, heaviness, fullness)   Negative: Vaginal dryness or itching and nearing menopause or after menopause   Negative: Pain with sexual intercourse (dyspareunia)  (Exception: Feels like prior yeast infection, minor abrasion, minor rash < 24 hour duration, mild itching.)   Negative: Pain in genital area is a chronic symptom (recurrent or ongoing AND present > 4 weeks)   Negative: All other vaginal symptoms  (Exception: Feels like prior yeast infection, minor abrasion, mild rash < 24 hour duration, mild itching.)   Negative: MODERATE-SEVERE itching (i.e., interferes with school, work, or sleep)   Negative: Rash (e.g., redness, tiny bumps, sore) of genital area and present > 24 hours   Negative: SEVERE pain and not improved 2 hours after pain medicine   Negative: Genital area looks infected (e.g., draining sore, spreading redness) and fever   Negative: Something is hanging out of the vagina and can't easily be pushed back inside   Negative: MILD-MODERATE pain and present > 24 hours  (Exception: Chronic pain.)   Negative: Genital area looks infected (e.g., draining sore, spreading redness)   Negative: Rash with painful tiny water blisters   Negative: Patient sounds very sick or weak to the triager   Negative: Sounds like a life-threatening emergency to the triager    Protocols used: Vaginal Symptoms-A-[place]

## 2024-03-22 NOTE — TELEPHONE ENCOUNTER
Typically this would require a visit. However, she did recently see Dr. Paredes and was on some antibiotic ointment for a skin infection. Antibiotics can cause yeast infection but typically it is an oral antibiotic. Will prescribe Diflucan this once but needs follow-up if not improving. Sent to [facility].    Savage Steward PA-C

## 2024-04-02 ENCOUNTER — OFFICE VISIT (OUTPATIENT)
Dept: SLEEP MEDICINE | Facility: CLINIC | Age: 39
End: 2024-04-02
Attending: FAMILY MEDICINE
Payer: MEDICARE

## 2024-04-02 VITALS
RESPIRATION RATE: 18 BRPM | OXYGEN SATURATION: 96 % | SYSTOLIC BLOOD PRESSURE: 126 MMHG | HEART RATE: 80 BPM | BODY MASS INDEX: 47.09 KG/M2 | DIASTOLIC BLOOD PRESSURE: 86 MMHG | WEIGHT: 293 LBS | HEIGHT: 66 IN

## 2024-04-02 DIAGNOSIS — F32.A ANXIETY AND DEPRESSION: ICD-10-CM

## 2024-04-02 DIAGNOSIS — E66.01 CLASS 3 SEVERE OBESITY WITHOUT SERIOUS COMORBIDITY WITH BODY MASS INDEX (BMI) OF 45.0 TO 49.9 IN ADULT, UNSPECIFIED OBESITY TYPE (H): ICD-10-CM

## 2024-04-02 DIAGNOSIS — F41.9 ANXIETY AND DEPRESSION: ICD-10-CM

## 2024-04-02 DIAGNOSIS — Z72.821 INADEQUATE SLEEP HYGIENE: ICD-10-CM

## 2024-04-02 DIAGNOSIS — E66.813 CLASS 3 SEVERE OBESITY WITHOUT SERIOUS COMORBIDITY WITH BODY MASS INDEX (BMI) OF 45.0 TO 49.9 IN ADULT, UNSPECIFIED OBESITY TYPE (H): ICD-10-CM

## 2024-04-02 DIAGNOSIS — G47.19 EXCESSIVE DAYTIME SLEEPINESS: ICD-10-CM

## 2024-04-02 DIAGNOSIS — R06.83 SNORING: Primary | ICD-10-CM

## 2024-04-02 PROCEDURE — 99205 OFFICE O/P NEW HI 60 MIN: CPT | Mod: GC | Performed by: INTERNAL MEDICINE

## 2024-04-02 ASSESSMENT — SLEEP AND FATIGUE QUESTIONNAIRES
HOW LIKELY ARE YOU TO NOD OFF OR FALL ASLEEP WHILE LYING DOWN TO REST IN THE AFTERNOON WHEN CIRCUMSTANCES PERMIT: HIGH CHANCE OF DOZING
HOW LIKELY ARE YOU TO NOD OFF OR FALL ASLEEP WHILE SITTING AND TALKING TO SOMEONE: WOULD NEVER DOZE
HOW LIKELY ARE YOU TO NOD OFF OR FALL ASLEEP WHILE SITTING AND READING: HIGH CHANCE OF DOZING
HOW LIKELY ARE YOU TO NOD OFF OR FALL ASLEEP WHILE WATCHING TV: HIGH CHANCE OF DOZING
HOW LIKELY ARE YOU TO NOD OFF OR FALL ASLEEP IN A CAR, WHILE STOPPED FOR A FEW MINUTES IN TRAFFIC: HIGH CHANCE OF DOZING
HOW LIKELY ARE YOU TO NOD OFF OR FALL ASLEEP WHILE SITTING INACTIVE IN A PUBLIC PLACE: HIGH CHANCE OF DOZING
HOW LIKELY ARE YOU TO NOD OFF OR FALL ASLEEP WHEN YOU ARE A PASSENGER IN A CAR FOR AN HOUR WITHOUT A BREAK: HIGH CHANCE OF DOZING
HOW LIKELY ARE YOU TO NOD OFF OR FALL ASLEEP WHILE SITTING QUIETLY AFTER LUNCH WITHOUT ALCOHOL: WOULD NEVER DOZE

## 2024-04-02 NOTE — PATIENT INSTRUCTIONS
"          MY TREATMENT INFORMATION FOR SLEEP APNEA-  Florina Marie    DOCTOR : Joshua Gan MD    Am I having a sleep study at a sleep center?  --->Due to normal delays, you will be contacted within 2-4 weeks to schedule    Am I having a home sleep study?  --->Watch the video for the device you are using:    -/drop off device-   https://www.[employer].com/watch?v=yGGFBdELGhk    -Disposable device sent out require phone/computer application-   https://[email or web address].com/watch?v=BCce_vbiwxE      Recommendations:   - We will do a 2-week duration actigraphy with corresponding sleep diaries   - In lab sleep study after the actigraphy  - Blood gas test <48 hours prior to the sleep study  - Recommend a lightbox (10,000 Lux) in the morning upon waking up for ~ 30 minutes. This can be purchased at [employer].com. You can also ask your psychiatrist for prescription for it  - Recommend ~ 7-8 hours of sleep nightly.  Frequently asked questions:  1. What is Obstructive Sleep Apnea (PRIMO)? PRIMO is the most common type of sleep apnea. Apnea means, \"without breath.\"  Apnea is most often caused by narrowing or collapse of the upper airway as muscles relax during sleep.   Almost everyone has occasional apneas. Most people with sleep apnea have had brief interruptions at night frequently for many years.  The severity of sleep apnea is related to how frequent and severe the events are.   2. What are the consequences of PRIMO? Symptoms include: feeling sleepy during the day, snoring loudly, gasping or stopping of breathing, trouble sleeping, and occasionally morning headaches or heartburn at night.  Sleepiness can be serious and even increase the risk of falling asleep while driving. Other health consequences may include development of high blood pressure and other cardiovascular disease in persons who are susceptible. Untreated PRIMO  can contribute to heart disease, stroke and diabetes.   3. What are the treatment options? In most " situations, sleep apnea is a lifelong disease that must be managed with daily therapy. Medications are not effective for sleep apnea and surgery is generally not considered until other therapies have been tried. Your treatment is your choice . Continuous Positive Airway (CPAP) works right away and is the therapy that is effective in nearly everyone. An oral device to hold your jaw forward is usually the next most reliable option. Other options include postioning devices (to keep you off your back), weight loss, and surgery including a tongue pacing device. There is more detail about some of these options below.  4. Are my sleep studies covered by insurance? Although we will request verification of coverage, we advise you also check in advance of the study to ensure there is coverage.    Important tips for those choosing CPAP and similar devices  REMEMBER-IF YOU RECEIVE A CALL FROM  [phone]-->IT IS TO SETUP A DEVICE  For new devices, sign up for device GABE to monitor your device for your followup visits  We encourage you to utilize the [employer] gabe or website ( https://[employer]/ ) to monitor your therapy progress and share the data with your healthcare team when you discuss your sleep apnea.                                                    Know your equipment:  CPAP is continuous positive airway pressure that prevents obstructive sleep apnea by keeping the throat from collapsing while you are sleeping. In most cases, the device is  smart  and can slowly self-adjusts if your throat collapses and keeps a record every day of how well you are treated-this information is available to you and your care team.  BPAP is bilevel positive airway pressure that keeps your throat open and also assists each breath with a pressure boost to maintain adequate breathing.  Special kinds of BPAP are used in patients who have inadequate breathing from lung or heart disease. In most cases, the device is  smart  and  can slowly self-adjusts to assist breathing. Like CPAP, the device keeps a record of how well you are treated.  Your mask is your connection to the device. You get to choose what feels most comfortable and the staff will help to make sure if fits. Here: are some examples of the different masks that are available: Magnetic mask aids may assist with use but there are safety issues that should be addressed when considering with magnets* ( see end of discussion).       Key points to remember on your journey with sleep apnea:  Sleep study.  PAP devices often need to be adjusted during a sleep study to show that they are effective and adjusted right.  Good tips to remember: Try wearing just the mask during a quiet time during the day so your body adapts to wearing it. A humidifier is recommended for comfort in most cases to prevent drying of your nose and throat. Allergy medication from your provider may help you if you are having nasal congestion.  Getting settled-in. It takes more than one night for most of us to get used to wearing a mask. Try wearing just the mask during a quiet time during the day so your body adapts to wearing it. A humidifier is recommended for comfort in most cases. Our team will work with you carefully on the first day and will be in contact within 4 days and again at 2 and 4 weeks for advice and remote device adjustments. Your therapy is evaluated by the device each day.   Use it every night. The more you are able to sleep naturally for 7-8 hours, the more likely you will have good sleep and to prevent health risks or symptoms from sleep apnea. Even if you use it 4 hours it helps. Occasionally all of us are unable to use a medical therapy, in sleep apnea, it is not dangerous to miss one night.   Communicate. Call our skilled team on the number provided on the first day if your visit for problems that make it difficult to wear the device. Over 2 out of 3 patients can learn to wear the device  long-term with help from our team. Remember to call our team or your sleep providers if you are unable to wear the device as we may have other solutions for those who cannot adapt to mask CPAP therapy. It is recommended that you sleep your sleep provider within the first 3 months and yearly after that if you are not having problems.   Use it for your health. We encourage use of CPAP masks during daytime quiet periods to allow your face and brain to adapt to the sensation of CPAP so that it will be a more natural sensation to awaken to at night or during naps. This can be very useful during the first few weeks or months of adapting to CPAP though it does not help medically to wear CPAP during wakefulness and  should not be used as a strategy just to meet guidelines.  Take care of your equipment. Make sure you clean your mask and tubing using directions every day and that your filter and mask are replaced as recommended or if they are not working.     *Masks with magnets:  Updated Contraindications  Masks with magnetic components are contraindicated for use by patients where they, or anyone in close physical contact while using the mask, have the following:   Active medical implants that interact with magnets (i.e., pacemakers, implantable cardioverter defibrillators (ICD), neurostimulators, cerebrospinal fluid (CSF) shunts, insulin/infusion pumps)   Metallic implants/objects containing ferromagnetic material (i.e., aneurysm clips/flow disruption devices, embolic coils, stents, valves, electrodes, implants to restore hearing or balance with implanted magnets, ocular implants, metallic splinters in the eye)  Updated Warning  Keep the mask magnets at a safe distance of at least 6 inches (150 mm) away from implants or medical devices that may be adversely affected by magnetic interference. This warning applies to you or anyone in close physical contact with your mask. The magnets are in the frame and lower headgear  clips, with a magnetic field strength of up to 400mT. When worn, they connect to secure the mask but may inadvertently detach while asleep.  Implants/medical devices, including those listed within contraindications, may be adversely affected if they change function under external magnetic fields or contain ferromagnetic materials that attract/repel to magnetic fields (some metallic implants, e.g., contact lenses with metal, dental implants, metallic cranial plates, screws, wm hole covers, and bone substitute devices). Consult your physician and  of your implant / other medical device for information on the potential adverse effects of magnetic fields.    BESIDES CPAP, WHAT OTHER THERAPIES ARE THERE?    Positioning Device  Positioning devices are generally used when sleep apnea is mild and only occurs on your back.This example shows a pillow that straps around the waist. It may be appropriate for those whose sleep study shows milder sleep apnea that occurs primarily when lying flat on one's back. Preliminary studies have shown benefit but effectiveness at home may need to be verified by a home sleep test. These devices are generally not covered by medical insurance.  Examples of devices that maintain sleeping on the back to prevent snoring and mild sleep apnea.    Belt type body positioner  http://[employer]/    Electronic reminder  http://nightshifttherapy.com/            Oral Appliance  What is oral appliance therapy?  An oral appliance device fits on your teeth at night like a retainer used after having braces. The device is made by a specialized dentist and requires several visits over 1-2 months before a manufactured device is made to fit your teeth and is adjusted to prevent your sleep apnea. Once an oral device is working properly, snoring should be improved. A home sleep test may be recommended at that time if to determine whether the sleep apnea is adequately treated.       Some things to  remember:  -Oral devices are often, but not always, covered by your medical insurance. Be sure to check with your insurance provider.   -If you are referred for oral therapy, you will be given a list of specialized dentists to consider or you may choose to visit the Web site of the American Academy of Dental Sleep Medicine  -Oral devices are less likely to work if you have severe sleep apnea or are extremely overweight.     More detailed information  An oral appliance is a small acrylic device that fits over the upper and lower teeth  (similar to a retainer or a mouth guard). This device slightly moves jaw forward, which moves the base of the tongue forward, opens the airway, improves breathing for effective treat snoring and obstructive sleep apnea in perhaps 7 out of 10 people .  The best working devices are custom-made by a dental device  after a mold is made of the teeth 1, 2, 3.  When is an oral appliance indicated?  Oral appliance therapy is recommended as a first-line treatment for patients with primary snoring, mild sleep apnea, and for patients with moderate sleep apnea who prefer appliance therapy to use of CPAP4, 5. Severity of sleep apnea is determined by sleep testing and is based on the number of respiratory events per hour of sleep.   How successful is oral appliance therapy?  The success rate of oral appliance therapy in patients with mild sleep apnea is 75-80% while in patients with moderate sleep apnea it is 50-70%. The chance of success in patients with severe sleep apnea is 40-50%. The research also shows that oral appliances have a beneficial effect on the cardiovascular health of PRIMO patients at the same magnitude as CPAP therapy7.  Oral appliances should be a second-line treatment in cases of severe sleep apnea, but if not completely successful then a combination therapy utilizing CPAP plus oral appliance therapy may be effective. Oral appliances tend to be effective in a broad  range of patients although studies show that the patients who have the highest success are females, younger patients, those with milder disease, and less severe obesity. 3, 6.   Finding a dentist that practices dental sleep medicine  Specific training is available through the American Academy of Dental Sleep Medicine for dentists interested in working in the field of sleep. To find a dentist who is educated in the field of sleep and the use of oral appliances, near you, visit the Web site of the American Academy of Dental Sleep Medicine.    References  1. Varun et al. Objectively measured vs self-reported compliance during oral appliance therapy for sleep-disordered breathing. Chest 2013; 144(5): 6684-6410.  2. Corie, et al. Objective measurement of compliance during oral appliance therapy for sleep-disordered breathing. Thorax 2013; 68(1): 91-96.  3. Williams et al. Mandibular advancement devices in 620 men and women with PRIMO and snoring: tolerability and predictors of treatment success. Chest 2004; 125: 7635-0075.  4. Tami, et al. Oral appliances for snoring and PRIMO: a review. Sleep 2006; 29: 244-262.  5. Nicolás et al. Oral appliance treatment for PRIMO: an update. J Clin Sleep Med 2014; 10(2): 215-227.  6. Nory et al. Predictors of OSAH treatment outcome. J Dent Res 2007; 86: 3799-0616.      Weight Loss:   Your There is no height or weight on file to calculate BMI.    Being overweight does not necessarily mean you will have health consequences.  Those who have BMI over 35 or over 27 with existing medical conditions carries greater risk.   Weight loss decreases severity of sleep apnea in most people with obesity. For those with mild obesity who have developed snoring with weight gain, even 15-30 pound weight loss can improve and occasionally milder eliminate sleep apnea.  Structured and life-long dietary and health habits are necessary to lose weight and keep healthier weight levels.      The Comprehensive Weight loss program offers all aspects of weight loss strategies including two Non-Surgical Weight Loss Programs: Medical Weight Management and our 24 Week Healthy Lifestyle Program:    Medical Weight Management: You will meet with a Medical Weight Management Provider, as well as a Registered Dietician. The program may include medication therapy, dietary education, recommended exercise and physical therapy programs, monthly support group meetings, and possible psychological counseling. Follow up visits with the provider or dietician are scheduled based on your progress and needs.    24 Week Healthy Lifestyle Program: This unique program is designed to give you the support of weekly appointments and activities thru a 24-week period. It may include all of the components of the basic program (above), with the addition of 11 individual Health  Visits, 24-week access to the [employer] website for over 700 online classes, and monthly support group meetings. This program has an out-of-pocket expense of $499 to cover the items that can not be billed to insurance (health coaches and [employer] access), and is non-refundable/non-transferable (you may be able to use a Health Savings Account; ask your HSA provider). There may be an optional meal replacement plan prescribed as well.   Surgical management achieves meaningful long-term weight loss and improvement in health risks in most patients with more severe obesity.      Sleep Apnea Surgery:    Surgery for obstructive sleep apnea is considered generally only when other therapies fail to work. Surgery may be discussed with you if you are having a difficult time tolerating CPAP and or when there is an abnormal structure that requires surgical correction.  Nose and throat surgeries often enlarge the airway to prevent collapse.  Most of these surgeries create pain for 1-2 weeks and up to half of the most common surgeries are not effective throughout life.   You should carefully discuss the benefits and drawbacks to surgery with your sleep provider and surgeon to determine if it is the best solution for you.   More information  Surgery for PRIMO is directed at areas that are responsible for narrowing or complete obstruction of the airway during sleep.  There are a wide range of procedures available to enlarge and/or stabilize the airway to prevent blockage of breathing in the three major areas where it can occur: the palate, tongue, and nasal regions.  Successful surgical treatment depends on the accurate identification of the factors responsible for obstructive sleep apnea in each person.  A personalized approach is required because there is no single treatment that works well for everyone.  Because of anatomic variation, consultation with an examination by a sleep surgeon is a critical first step in determining what surgical options are best for each patient.  In some cases, examination during sedation may be recommended in order to guide the selection of procedures.  Patients will be counseled about risks and benefits as well as the typical recovery course after surgery. Surgery is typically not a cure for a person s PRIMO.  However, surgery will often significantly improve one s PRIMO severity (termed  success rate ).  Even in the absence of a cure, surgery will decrease the cardiovascular risk associated with OSA7; improve overall quality of life8 (sleepiness, functionality, sleep quality, etc).      Palate Procedures:  Patients with PRIMO often have narrowing of their airway in the region of their tonsils and uvula.  The goals of palate procedures are to widen the airway in this region as well as to help the tissues resist collapse.  Modern palate procedure techniques focus on tissue conservation and soft tissue rearrangement, rather than tissue removal.  Often the uvula is preserved in this procedure. Residual sleep apnea is common in patient after pharyngoplasty with  an average reduction in sleep apnea events of 33%2.      Tongue Procedures:  ExamWhile patients are awake, the muscles that surround the throat are active and keep this region open for breathing. These muscles relax during sleep, allowing the tongue and other structures to collapse and block breathing.  There are several different tongue procedures available.  Selection of a tongue base procedure depends on characteristics seen on physical exam.  Generally, procedures are aimed at removing bulky tissues in this area or preventing the back of the tongue from falling back during sleep.  Success rates for tongue surgery range from 50-62%3.    Hypoglossal Nerve Stimulation:  Hypoglossal nerve stimulation has recently received approval from the [place] Food and Drug Administration for the treatment of obstructive sleep apnea.  This is based on research showing that the system was safe and effective in treating sleep apnea6.  Results showed that the median AHI score decreased 68%, from 29.3 to 9.0. This therapy uses an implant system that senses breathing patterns and delivers mild stimulation to airway muscles, which keeps the airway open during sleep.  The system consists of three fully implanted components: a small generator (similar in size to a pacemaker), a breathing sensor, and a stimulation lead.  Using a small handheld remote, a patient turns the therapy on before bed and off upon awakening.    Candidates for this device must be greater than 18 years of age, have moderate to severe obstructive sleep apnea with less than 25% central events  (AHI between 15-65), BMI less than 35, have tried CPAP/oral appliance for at least 8 weeks without success, and have appropriate upper airway anatomy (determined by a sleep endoscopy performed by Dr. Brandon Nuñez or Dr. Remigio Stephenson).    Nasal Procedures:  Nasal obstruction can interfere with nasal breathing during the day and night.  Studies have shown that relief of  nasal obstruction can improve the ability of some patients to tolerate positive airway pressure therapy for obstructive sleep apnea1.  Treatment options include medications such as nasal saline, topical corticosteroid and antihistamine sprays, and oral medications such as antihistamines or decongestants. Non-surgical treatments can include external nasal dilators for selected patients. If these are not successful by themselves, surgery can improve the nasal airway either alone or in combination with these other options.        Combination Procedures:  Combination of surgical procedures and other treatments may be recommended, particularly if patients have more than one area of narrowing or persistent positional disease.  The success rate of combination surgery ranges from 66-80%2,3.    References  Veto SINGER. The Role of the Nose in Snoring and Obstructive Sleep Apnoea: An Update.  Eur Arch Otorhinolaryngol. 2011; 268: 1365-73.   Jamal SM; Alin JA; Anuja JR; Pallanch JF; Benny MB; Michelle SG; Carlos SEGURAD. Surgical modifications of the upper airway for obstructive sleep apnea in adults: a systematic review and meta-analysis. SLEEP 2010;33(10):6703-1344. Dilma LAWTON. Hypopharyngeal surgery in obstructive sleep apnea: an evidence-based medicine review.  Arch Otolaryngol Head Neck Surg. 2006 Feb;132(2):206-13.  Benjie YH1, Yayo Y, See VENITA. The efficacy of anatomically based multilevel surgery for obstructive sleep apnea. Otolaryngol Head Neck Surg. 2003 Oct;129(4):327-35.  Dilma LAWTON, Goldberg A. Hypopharyngeal Surgery in Obstructive Sleep Apnea: An Evidence-Based Medicine Review. Arch Otolaryngol Head Neck Surg. 2006 Feb;132(2):206-13.  Onesimo PJ et al. Upper-Airway Stimulation for Obstructive Sleep Apnea.  N Engl J Med. 2014 Jan 9;370(2):139-49.  Alee Y et al. Increased Incidence of Cardiovascular Disease in Middle-aged Men with Obstructive Sleep Apnea. Am J Respir Crit Care Med; 2002 166: 159-165  Pearl SHIN et  al. Studying Life Effects and Effectiveness of Palatopharyngoplasty (SLEEP) study: Subjective Outcomes of Isolated Uvulopalatopharyngoplasty. Otolaryngol Head Neck Surg. 2011; 144: 623-631.        WHAT IF I ONLY HAVE SNORING?    Mandibular advancement devices, lateral sleep positioning, long-term weight loss and treatment of nasal allergies have been shown to improve snoring.  Exercising tongue muscles with a game (https://m[employer]go.[employer]/us/gabe/[employer]-reduce-snoring/ul4982310250) or stimulating the tongue during the day with a device (https://doi.org/10.3390/wbd06069057) have improved snoring in some individuals.  https://www.[employer]/  https://www.sleepfoundation.org/best-anti-snoring-mouthpieces-and-mouthguards    Remember to Drive Safe... Drive Alive     Sleep health profoundly affects your health, mood, and your safety.  Thirty three percent of the population (one in three of us) is not getting enough sleep and many have a sleep disorder. Not getting enough sleep or having an untreated / undertreated sleep condition may make us sleepy without even knowing it. In fact, our driving could be dramatically impaired due to our sleep health. As your provider, here are some things I would like you to know about driving:     Here are some warning signs for impairment and dangerous drowsy driving:              -Having been awake more than 16 hours               -Looking tired               -Eyelid drooping              -Head nodding (it could be too late at this point)              -Driving for more than 30 minutes     Some things you could do to make the driving safer if you are experiencing some drowsiness:              -Stop driving and rest              -Call for transportation              -Make sure your sleep disorder is adequately treated     Some things that have been shown NOT to work when experiencing drowsiness while driving:              -Turning on the radio              -Opening windows               -Eating any  distracting  /  entertaining  foods (e.g., sunflower seeds, candy, or any other)              -Talking on the phone      Your decision may not only impact your life, but also the life of others. Please, remember to drive safe for yourself and all of us.

## 2024-04-02 NOTE — PROGRESS NOTES
Outpatient Sleep Medicine Consultation:      Name: Florina Marie MRN# [id number]   Age: 38 year old YOB: 1985     Date of Consultation: April 2, 2024  Consultation is requested by: Alanna Curiel MD  [address] [place] [id number] Alanna Curiel  Primary care provider: Alanna Curiel       Reason for Sleep Consult:     Florina Marie is sent by Alanna Curiel for a sleep consultation regarding excessive daytime sleepiness.    Patient s Reason for visit  Florina Marie main reason for visit: concern of bhaskar  Patient states problem(s) started: years  Florina Marie's goals for this visit: sleep study           Assessment and Plan:   Florina Marie is a 38-year-old woman with history of anxiety, depression who is referred for evaluation for excessive daytime sleepiness, in setting of snoring concerning for sleep-related disordered breathing.    Summary Sleep Diagnoses:  Snoring: With STOP-BANG score of 4, along with elevated BMI concerning for untreated sleep-related disordered breathing(BHASKAR and possible coexistent obesity hypoventilation syndrome)  Excessive daytime sleepiness likely as a result of possible untreated sleep related breathing disorder, possible insufficient sleeptime , depression  Inadequate sleep hygiene with nightly use of electronics in bed before sleep  Possible circadian rhythm disorder    Comorbid Diagnoses:  Obesity: BMI 49.66  Depression  Anxiety      Summary Recommendations:  - 2 weeks of actigraphy accompanied by sleep diaries to assess the sleep-wake schedule and evaluate for excessive daytime sleepiness  - In-lab polysomnography with TCM to evaluate for sleep-related breathing disorder, split night protocol if AHI >30/h  -Obtain ABG on R/A within 48 h prior to sleep study for evaluation for hypoventilation   - Advised to establish routine exercise regimen and to eat well-balanced diet   -Patient also advised to limit use of  "electronics at least an hour before bedtime  -  Will get ALLEN to communicate with the patient's psychiatrist   -Bright lightbox 10,000 lux for at least 30 minutes upon awakening in the morning. Patient instructed to purchase a light box online, or can request her psychiatrist for a prescription since she reports she has diagnosis of seasonal depression  - Patient was strongly advised to avoid driving while drowsy or sleepy.  Patient was counseled on the importance of driving while alert, to pull over if drowsy, or nap before getting into the vehicle if sleepy.     Orders Placed This Encounter   Procedures    Comprehensive Sleep Study    ABG-Blood Gas Arterial ([facility] / [facility] / [facility] / [facility])       Summary Counseling:    Sleep Testing Reviewed  Obstructive Sleep Apnea Reviewed  Complications of Untreated Sleep Apnea Reviewed    Medical Decision-making:   Educational materials provided in instructions     CC: Alanna Curiel MD     The above note was dictated using voice recognition software. Although reviewed after completion, some word and grammatical error may remain . Please contact the author for any clarifications.    Patient was staffed with Dr.Pusalavidyasagar Joshua Gan MD  Sleep Medicine Fellow    Attending: As the attending physician I was present with  Dr. Joshua Gan, during this clinic visit. I personally reviewed the araujo aspects of the history, chart review and discussed the plan with the patient and I agree with the above documentation.     \" Total time spent was 60 minutes for this appointment on this date of service which include time spent before, during and after the visit for chart review, patient care, counseling and coordination of care including documentation.\"      Elke Castano MD  [facility]  [address], [address], [place] [address]            History of Present Illness:     Florina Marie is a 38 year old woman " with history of generalized anxiety disorder, hyperlipidemia, elevated BMI who is seen at the sleep clinic today for evaluation for sleep-related disordered breathing due to history of excessive daytime sleepiness and snoring.   Patient also reports increased sleep needs, usually requiring more than 8 hours of sleep to feel rested.    Past Sleep Evaluations: None    SLEEP-WAKE SCHEDULE:     Work/School Days: Patient goes to work: Yes   Usually gets into bed at 10  Takes patient about 30 to fall asleep  Has trouble falling asleep none nights per week  Wakes up in the middle of the night not often times.  Wakes up due to Use the bathroom;Anxiety  She has trouble falling back asleep none times a week.   It usually takes   to get back to sleep  Patient is usually up at 6am  Uses alarm: Yes    Weekends/Non-work Days/All Other Days:  Usually gets into bed at 10   Takes patient about same to fall asleep  Patient is usually up at 12  Uses alarm: No    Sleep Need  Patient gets  7.5 hours sleep on average   Patient thinks she needs about 12 hours sleep    Florina Marie prefers to sleep in this position(s): Back;Side   Patient states they do the following activities in bed: Watch TV;Use phone, computer, or tablet    Naps  Patient takes a purposeful nap 7 times a week and naps are usually 1 to 2 hours in duration  She feels better after a nap: Yes  She dozes off unintentionally 7 days per week  Patient has had a driving accident or near-miss due to sleepiness/drowsiness: No      SLEEP DISRUPTIONS:    Breathing/Snoring  Patient snores:Yes  Other people complain about her snoring: Yes  Patient has been told she stops breathing in her sleep:No  She has issues with the following: Morning mouth dryness;Stuffy nose when you wake up;Getting up to urinate more than once    Movement:  Patient gets pain, discomfort, with an urge to move:  No  It happens when she is resting:  No  It happens more at night:  No  Patient has been told she  kicks her legs at night:  No     Behaviors in Sleep:  Florina Marie has experienced the following behaviors while sleeping: Kicking or punching  She has experienced sudden muscle weakness during the day: No      Is there anything else you would like your sleep provider to know:        CAFFEINE AND OTHER SUBSTANCES:    Patient consumes caffeinated beverages per day:  2  Last caffeine use is usually: evening  List of any prescribed or over the counter stimulants that patient takes: no  List of any prescribed or over the counter sleep medication patient takes: see list  List of previous sleep medications that patient has tried:    Patient drinks alcohol to help them sleep: No  Patient drinks alcohol near bedtime: No    Family History:  Patient has a family member been diagnosed with a sleep disorder: No        SCALES:    EPWORTH SLEEPINESS SCALE         4/2/2024     8:20 AM    Sunset Sleepiness Scale ( DINORA Morales  [id number]<br>ESS - [place]/English - Final version - 21 Nov 07 - [facility] [place].)   Sitting and reading High chance of dozing   Watching TV High chance of dozing   Sitting, inactive in a public place (e.g. a theatre or a meeting) High chance of dozing   As a passenger in a car for an hour without a break High chance of dozing   Lying down to rest in the afternoon when circumstances permit High chance of dozing   Sitting and talking to someone Would never doze   Sitting quietly after a lunch without alcohol Would never doze   In a car, while stopped for a few minutes in traffic High chance of dozing   Sunset Score (MC) 18   Sunset Score (Sleep) 18         INSOMNIA SEVERITY INDEX (ARNALDO)          4/2/2024     8:06 AM   Insomnia Severity Index (ARNALDO)   Difficulty falling asleep 1   Difficulty staying asleep 0   Problems waking up too early 4   How SATISFIED/DISSATISFIED are you with your CURRENT sleep pattern? 4   How NOTICEABLE to others do you think your sleep problem is in terms of impairing the  "quality of your life? 3   How WORRIED/DISTRESSED are you about your current sleep problem? 4   To what extent do you consider your sleep problem to INTERFERE with your daily functioning (e.g. daytime fatigue, mood, ability to function at work/daily chores, concentration, memory, mood, etc.) CURRENTLY? 4   ARNALDO Total Score 20       Guidelines for Scoring/Interpretation:  Total score categories:  0-7 = No clinically significant insomnia   8-14 = Subthreshold insomnia   15-21 = Clinical insomnia (moderate severity)  22-28 = Clinical insomnia (severe)  Used via courtesy of www.enosiXealth.va.gov with permission from Nikko Armstrong PhD., [facility]      STOP BANG         4/2/2024     8:24 AM   STOP BANG Questionnaire (  2008, the American Society of Anesthesiologists, Inc. [employer]   Neck Cir (cm) Clinic: 40 cm   B/P Clinic: 126/86   BMI Clinic: 49.66   STOP-BANG score: 4     GAD7        10/6/2023    10:04 AM   ZEENAT-7    1. Feeling nervous, anxious, or on edge 1   2. Not being able to stop or control worrying 1   3. Worrying too much about different things 1   4. Trouble relaxing 0   5. Being so restless that it is hard to sit still 0   6. Becoming easily annoyed or irritable 0   7. Feeling afraid, as if something awful might happen 1   ZEENAT-7 Total Score 4   If you checked any problems, how difficult have they made it for you to do your work, take care of things at home, or get along with other people? Somewhat difficult         CAGE-AID         No data to display                CAGE-AID reprinted with permission from the [place] Medical Journal, MAYTE Gavin. and TRACI Bonner, \"Conjoint screening questionnaires for alcohol and drug abuse\" [place] Medical Journal 94: 135-140, 1995.      PATIENT HEALTH QUESTIONNAIRE-9 (PHQ - 9)        10/6/2023    10:03 AM   PHQ-9 ([employer])   1.  Little interest or pleasure in doing things 0   2.  Feeling down, depressed, or hopeless 0   3.  Trouble falling " or staying asleep, or sleeping too much 0   4.  Feeling tired or having little energy 0   5.  Poor appetite or overeating 0   6.  Feeling bad about yourself - or that you are a failure or have let yourself or your family down 0   7.  Trouble concentrating on things, such as reading the newspaper or watching television 0   8.  Moving or speaking so slowly that other people could have noticed. Or the opposite - being so fidgety or restless that you have been moving around a lot more than usual 0   9.  Thoughts that you would be better off dead, or of hurting yourself in some way 0   PHQ-9 Total Score 0   6.  Feeling bad about yourself 0   7.  Trouble concentrating 0   8.  Moving slowly or restless 0   9.  Suicidal or self-harm thoughts 0   1.  Little interest or pleasure in doing things Not at all   2.  Feeling down, depressed, or hopeless Not at all   3.  Trouble falling or staying asleep, or sleeping too much Not at all   4.  Feeling tired or having little energy Not at all   5.  Poor appetite or overeating Not at all   6.  Feeling bad about yourself Not at all   7.  Trouble concentrating Not at all   8.  Moving slowly or restless Not at all   9.  Suicidal or self-harm thoughts Not at all   PHQ-9 via MyChart TOTAL SCORE-----> 0   Difficulty at work, home, or with people Not difficult at all       Developed by Daphne Jerez, Merari Hernandez, Prince Ornelas and colleagues, with an educational redd from [employer] No permission required to reproduce, translate, display or distribute.        Allergies:    Allergies   Allergen Reactions    Macrobid [Nitrofurantoin] Headache and Nausea       Medications:    Current Outpatient Medications   Medication Sig Dispense Refill    busPIRone (BUSPAR) 7.5 MG tablet Take 7.5 mg by mouth 2 times daily      desogestrel-ethinyl estradiol (APRI) 0.15-30 MG-MCG tablet Take 1 tablet by mouth daily Take one tablet daily. 84 tablet 3    escitalopram (LEXAPRO) 20 MG tablet Take  40 mg by mouth daily  5    hydrOXYzine (ATARAX) 25 MG tablet Take 25 mg by mouth daily as needed for anxiety      valACYclovir (VALTREX) 1000 mg tablet Take 2 tablets (2,000 mg) by mouth 2 times daily For 1 day (Patient not taking: Reported on 2/14/2024) 4 tablet 3       Problem List:  Patient Active Problem List    Diagnosis Date Noted    Cervical radiculopathy 12/16/2022     Priority: Medium    Prolonged PTT 08/09/2018     Priority: Medium    Attention deficit disorder without hyperactivity 12/01/2011     Priority: Medium    Hyperlipidemia, unspecified 01/08/2010     Priority: Medium    Generalized anxiety disorder 10/24/2007     Priority: Medium    Morbid obesity (H) 01/01/2004     Priority: Medium    Problems with learning 12/31/2003     Priority: Medium        Past Medical/Surgical History:  Past Medical History:   Diagnosis Date    Attention deficit disorder with hyperactivity(314.01)     Left carpal tunnel syndrome 6/27/2019    Other kyphoscoliosis and scoliosis     Other specified delay in development     Microcephaly    Right carpal tunnel syndrome 3/14/2019    Viral warts, unspecified     plantar warts     Past Surgical History:   Procedure Laterality Date    EXAM UNDER ANESTHESIA PELVIC N/A 9/9/2016    Procedure: EXAM UNDER ANESTHESIA PELVIC;  Surgeon: Rhoda Alcazar MD;  Location: [facility] OR    HC TOOTH EXTRACTION W/FORCEP      wisdom    LAPAROSCOPIC APPENDECTOMY N/A 1/9/2023    Procedure: APPENDECTOMY, LAPAROSCOPIC;  Surgeon: Jhony Perez MD;  Location: [facility] OR    RELEASE CARPAL TUNNEL Right 3/22/2019    Procedure: RIGHT RELEASE CARPAL TUNNEL;  Surgeon: Anjum Wilburn DO;  Location: [facility] OR    RELEASE CARPAL TUNNEL Left 7/16/2019    Procedure: RELEASE, CARPAL TUNNEL-Left;  Surgeon: Anjum Wilburn DO;  Location: [facility] OR       Social History:  Social History     Socioeconomic History    Marital status: Single     Spouse name: Not on file    Number of children: 0    Years of education:  Not on file    Highest education level: Not on file   Occupational History    Occupation: student     Comment: [employer]   Tobacco Use    Smoking status: Never     Passive exposure: Never    Smokeless tobacco: Never    Tobacco comments:     no smokers in the household   Vaping Use    Vaping Use: Never used   Substance and Sexual Activity    Alcohol use: No    Drug use: No    Sexual activity: Never     Birth control/protection: Pill   Other Topics Concern     Service Not Asked    Blood Transfusions Not Asked    Caffeine Concern Yes     Comment: 1 can QD    Occupational Exposure Not Asked    Hobby Hazards Not Asked    Sleep Concern Not Asked    Stress Concern Not Asked    Weight Concern Not Asked    Special Diet Not Asked    Back Care Not Asked    Exercise Yes     Comment: 4 days a week 1/2 hour    Bike Helmet No    Seat Belt Yes    Self-Exams Not Asked    Parent/sibling w/ CABG, MI or angioplasty before 65F 55M? No   Social History Narrative    Not on file     Social Determinants of Health     Financial Resource Strain: Low Risk  (1/16/2024)    Financial Resource Strain     Within the past 12 months, have you or your family members you live with been unable to get utilities (heat, electricity) when it was really needed?: No   Food Insecurity: Low Risk  (1/16/2024)    Food Insecurity     Within the past 12 months, did you worry that your food would run out before you got money to buy more?: No     Within the past 12 months, did the food you bought just not last and you didn t have money to get more?: No   Transportation Needs: Low Risk  (1/16/2024)    Transportation Needs     Within the past 12 months, has lack of transportation kept you from medical appointments, getting your medicines, non-medical meetings or appointments, work, or from getting things that you need?: No   Physical Activity: Not on file   Stress: Not on file   Social Connections: Not on file   Interpersonal Safety: Low Risk   "(10/6/2023)    Interpersonal Safety     Do you feel physically and emotionally safe where you currently live?: Yes     Within the past 12 months, have you been hit, slapped, kicked or otherwise physically hurt by someone?: No     Within the past 12 months, have you been humiliated or emotionally abused in other ways by your partner or ex-partner?: No   Housing Stability: Low Risk  (1/16/2024)    Housing Stability     Do you have housing? : Yes     Are you worried about losing your housing?: No       Family History:  Family History   Problem Relation Age of Onset    Lipids Father         diet    Thyroid Disease Mother         unsure if hypo or hyper    Diabetes Paternal Grandfather         adult    Heart Disease Paternal Grandfather         bypass/open hear surgery    Lipids Maternal Aunt         medication    Lipids Maternal Aunt         diet    Alzheimer Disease Maternal Grandfather     Hypertension No family hx of     Coronary Artery Disease No family hx of     Hyperlipidemia No family hx of     Cancer - colorectal No family hx of     Ovarian Cancer No family hx of     Prostate Cancer No family hx of     Depression/Anxiety No family hx of     Cerebrovascular Disease No family hx of     Anesthesia Reaction No family hx of     Asthma No family hx of     Osteoporosis No family hx of     Chemical Addiction No family hx of     Obesity No family hx of        Review of Systems:  A brief review of systems reviewed by me is negative with the exeption of what has been mentioned in the history of present illness.       Physical Examination:  Vitals: /86   Pulse 80   Resp 18   Ht 1.665 m (5' 5.55\")   Wt 137.7 kg (303 lb 8 oz)   LMP 03/14/2024   SpO2 96%   BMI 49.66 kg/m    BMI= Body mass index is 49.66 kg/m .    Neck Cir (cm): 40 cm    General: Pleasant. Cooperative. In no apparent distress.  Pulmonary: Able to speak in full sentences easily with normal respiratory effort. No cough or wheeze.   Neurologic: Alert, " "oriented x3.  Psychiatric: Mood euthymic. Affect congruent with full range and intensity.  Distracted.    Mallampati Class: III.  Tonsillar Stage: 1  hidden by pillars.         Data: All pertinent previous laboratory data reviewed     Recent Labs   Lab Test 01/03/24 2005 04/22/23  2058    136   POTASSIUM 4.1 4.0   CHLORIDE 101 99   CO2 23 27   ANIONGAP 14 10   * 114*   BUN 14.1 16.0   CR 0.93 0.91   SY 9.1 9.5       Recent Labs   Lab Test 01/03/24 2005   WBC 10.7   RBC 4.88   HGB 12.9   HCT 39.9   MCV 82   MCH 26.4*   MCHC 32.3   RDW 14.1          Recent Labs   Lab Test 01/09/23  1118   PROTTOTAL 7.4   ALBUMIN 3.3*   BILITOTAL 0.4   ALKPHOS 79   AST 19   ALT 19       TSH (mU/L)   Date Value   12/31/2021 1.42   04/15/2021 1.62   09/18/2020 1.62       No results found for: \"UAMP\", \"UBARB\", \"BENZODIAZEUR\", \"UCANN\", \"UCOC\", \"OPIT\", \"UPCP\"    Ferritin   Date/Time Value Ref Range Status   12/31/2021 08:00 AM 11 (L) 12 - 150 ng/mL Final       pH Arterial (no units)   Date Value   07/18/2014 6   05/05/2012 5.0       Echocardiology: No results found for this or any previous visit (from the past 4320 hour(s)).    Chest x-ray:   XR Chest 2 Views 01/03/2024    Narrative  EXAM: XR CHEST 2 VIEWS  LOCATION[facility]  DATE: 1/3/2024    INDICATION: right sided chest pain  COMPARISON: None.    Impression  IMPRESSION: Negative chest.      Chest CT: No results found for this or any previous visit from the past 365 days.      Joshua Gan MD 4/2/2024   "

## 2024-04-03 ENCOUNTER — TELEPHONE (OUTPATIENT)
Dept: SLEEP MEDICINE | Facility: CLINIC | Age: 39
End: 2024-04-03
Payer: MEDICARE

## 2024-04-03 NOTE — TELEPHONE ENCOUNTER
Reason for Call:  Appointment Request    Patient requesting this type of appt:  PSG Split w/Actigraphy. Plz forward to clinic for scheduling. Thank you    Requested provider:     Reason patient unable to be scheduled:     When does patient want to be seen/preferred time:     Comments:   [facility] or would you prefer to receive a phone call?:       Call taken on 4/3/2024 at 11:33 AM by Winifred Mccord

## 2024-04-07 ENCOUNTER — NURSE TRIAGE (OUTPATIENT)
Dept: NURSING | Facility: CLINIC | Age: 39
End: 2024-04-07
Payer: MEDICARE

## 2024-04-08 NOTE — TELEPHONE ENCOUNTER
"Permission given to speak to Viktor Pearson, by patient.   Patient has been seen for sore in her rectal area. She has had it \"for awhile--it comes and goes. Has been seen in ER numerous times for this.\" Current pain =\"10\". She is now soaking in bathtub. \"It helps a little bit\". Her stool has not been hard. She uses Miralax as needed. Soft green stools. She says that It feels like it is inside the anus, but she is not sure. She is wondering if she might have an anal fissure. Last seen for this about a month ago. No drainage or bleeding noted. Rectal itching: uses Vaseline for this. No fever noted. She has not taken anything OTC for the pain yet tonight.     Triaged to a disposition of See PCP within 24 hrs. Home care given per guideline. She states she will call back later to schedule a clinic appointment for tomorrow.     Tianna Kirkland RN Triage Nurse Advisor 9:41 PM 4/7/2024   Reason for Disposition   MODERATE-SEVERE rectal pain (i.e., interferes with school, work, or sleep)    Additional Information   Negative: Foreign body in rectum   Negative: Diarrhea is main symptom   Negative: Constipation is main symptom (e.g., pain or discomfort caused by passage of hard BMs)   Negative: Blood in or on bowel movement is main symptom   Negative: Pregnant   Negative: Pinworms are suspected (rectal itching; (white thread-like worm about size of a staple, moves)   Negative: [1] Mpox suspected (e.g., direct skin contact such as sex, recent travel to [place] or [place]) AND [2] symptoms of Mpox (e.g., rectal rash or sores, fever, muscle aches, or swollen lymph nodes)   Negative: [1] At risk for Mpox (men-who-have-sex-with-men) AND [2] possible exposure (e.g., multiple sex partners in past 21 days) AND [3] symptoms of Mpox (e.g., rectal rash or sores, fever, muscle aches, or swollen lymph nodes)   Negative: Sexual assault or rape (sexual intercourse or activity occurs without freely given consent), known or suspected   " Negative: Injury to rectum   Negative: Large mass protruding out of rectum   Negative: Patient sounds very sick or weak to the triager   Negative: SEVERE rectal pain (e.g., excruciating, unable to have a bowel movement)   Negative: [1] Rectal pain or redness AND [2] fever   Negative: [1] Sudden onset rectal pain AND [2] constipated (straining, rectal pressure or fullness) AND [3] NOT better after SITZ bath, suppository or enema    Protocols used: Rectal Symptoms-A-AH

## 2024-04-09 ENCOUNTER — E-VISIT (OUTPATIENT)
Dept: FAMILY MEDICINE | Facility: OTHER | Age: 39
End: 2024-04-09
Payer: MEDICARE

## 2024-04-09 DIAGNOSIS — J01.90 SUBACUTE SINUSITIS, UNSPECIFIED LOCATION: Primary | ICD-10-CM

## 2024-04-09 PROCEDURE — 99421 OL DIG E/M SVC 5-10 MIN: CPT | Performed by: FAMILY MEDICINE

## 2024-04-11 ENCOUNTER — NURSE TRIAGE (OUTPATIENT)
Dept: NURSING | Facility: CLINIC | Age: 39
End: 2024-04-11
Payer: MEDICARE

## 2024-04-11 NOTE — TELEPHONE ENCOUNTER
Patient had an e visit on Tuesday 4/9/2024 and diagnosed with sinusitis.  Patient is having coughing attacks that she is gagging and is having troubles breathing and is having urinary incontinence.   Patient was prescribed Amoxicillin-clavulanate.  Patient has a dry cough.  Patient has not tested herself for covid.  Patient is taking Thera flu cough and cold.  Patient is actually vomiting after coughing so hard.   FNA also advised about urgent care.  Patient feels that she may have bronchitis or pneumonia.        Reason for Disposition   SEVERE coughing spells (e.g., whooping sound after coughing, vomiting after coughing)    Additional Information   Negative: SEVERE difficulty breathing (e.g., struggling for each breath, speaks in single words)   Negative: Bluish (or gray) lips or face now   Negative: [1] Rapid onset of cough AND [2] has hives   Negative: Coughing started suddenly after medicine, an allergic food or bee sting   Negative: [1] Difficulty breathing AND [2] exposure to flames, smoke, or fumes   Negative: [1] Stridor AND [2] difficulty breathing   Negative: Sounds like a life-threatening emergency to the triager   Negative: [1] MODERATE difficulty breathing (e.g., speaks in phrases, SOB even at rest, pulse 100-120) AND [2] still present when not coughing   Negative: Chest pain  (Exception: MILD central chest pain, present only when coughing.)   Negative: Patient sounds very sick or weak to the triager   Negative: [1] MILD difficulty breathing (e.g., minimal/no SOB at rest, SOB with walking, pulse <100) AND [2] still present when not coughing   Negative: [1] Coughed up blood AND [2] > 1 tablespoon (15 ml)   (Exception: Blood-tinged sputum.)   Negative: Fever > 103 F (39.4 C)   Negative: [1] Fever > 101 F (38.3 C) AND [2] age > 60 years   Negative: [1] Fever > 100.0 F (37.8 C) AND [2] bedridden (e.g., CVA, chronic illness, recovering from surgery)   Negative: [1] Fever > 100.0 F (37.8 C) AND [2] diabetes  mellitus or weak immune system (e.g., HIV positive, cancer chemo, splenectomy, organ transplant, chronic steroids)   Negative: Wheezing is present   Negative: [1] Ankle swelling AND [2] swelling is increasing    Protocols used: Cough - Acute Non-Productive-A-AH

## 2024-05-04 ENCOUNTER — NURSE TRIAGE (OUTPATIENT)
Dept: NURSING | Facility: CLINIC | Age: 39
End: 2024-05-04
Payer: MEDICARE

## 2024-05-04 ENCOUNTER — HOSPITAL ENCOUNTER (EMERGENCY)
Facility: CLINIC | Age: 39
Discharge: HOME | End: 2024-05-04
Attending: PHYSICIAN ASSISTANT | Admitting: PHYSICIAN ASSISTANT
Payer: MEDICARE

## 2024-05-04 VITALS
OXYGEN SATURATION: 99 % | DIASTOLIC BLOOD PRESSURE: 91 MMHG | TEMPERATURE: 98.1 F | SYSTOLIC BLOOD PRESSURE: 130 MMHG | HEIGHT: 66 IN | BODY MASS INDEX: 47.09 KG/M2 | HEART RATE: 72 BPM | WEIGHT: 293 LBS | RESPIRATION RATE: 20 BRPM

## 2024-05-04 DIAGNOSIS — L23.7 ALLERGIC CONTACT DERMATITIS DUE TO PLANTS, EXCEPT FOOD: ICD-10-CM

## 2024-05-04 PROCEDURE — 99283 EMERGENCY DEPT VISIT LOW MDM: CPT | Performed by: PHYSICIAN ASSISTANT

## 2024-05-04 RX ORDER — PREDNISONE 10 MG/1
TABLET ORAL
Qty: 32 TABLET | Refills: 0 | Status: SHIPPED | OUTPATIENT
Start: 2024-05-04 | End: 2024-05-14

## 2024-05-04 ASSESSMENT — ACTIVITIES OF DAILY LIVING (ADL): ADLS_ACUITY_SCORE: 33

## 2024-05-04 NOTE — DISCHARGE INSTRUCTIONS
I think you have a poison ivy rash.  The steroids prescribed, prednisone, will help the itching and make the rash go away.  Please take the full course as prescribed.  You can continue with calamine lotion to the affected areas.  Try not to scratch.  If you do have any worsening symptoms please return to the emergency department.    Thank you for choosing [facility]'s Emergency Department. It was a pleasure taking care of you today. If you have any questions, please call [phone].    Jenni Goel PA-C

## 2024-05-04 NOTE — ED TRIAGE NOTES
Reports working outside with trees on Tuesday and noted a rash starting Thursday to bilateral hands, abdomen and possible groin.

## 2024-05-04 NOTE — ED PROVIDER NOTES
History     Chief Complaint   Patient presents with    Rash       HPI  Florina Marie is a 38 year old female who presents to the emergency department for concerns of a rash.  4 days ago she was out picking up weeds outside and within 2 days developed a rash on her hands, face, left groin fold, abdominal wall, and right posterior hip area.  She notes the rash is very itchy and if she scratches it too much it breaks open.  She has been putting calamine lotion on it without much relief.  No associated fevers, chills, breathing difficulties.        Allergies:  Allergies   Allergen Reactions    Macrobid [Nitrofurantoin] Headache and Nausea       Problem List:    Patient Active Problem List    Diagnosis Date Noted    Cervical radiculopathy 12/16/2022     Priority: Medium    Prolonged PTT 08/09/2018     Priority: Medium    Attention deficit disorder without hyperactivity 12/01/2011     Priority: Medium    Hyperlipidemia, unspecified 01/08/2010     Priority: Medium    Generalized anxiety disorder 10/24/2007     Priority: Medium    Morbid obesity (H) 01/01/2004     Priority: Medium    Problems with learning 12/31/2003     Priority: Medium        Past Medical History:    Past Medical History:   Diagnosis Date    Attention deficit disorder with hyperactivity(314.01)     Left carpal tunnel syndrome 6/27/2019    Other kyphoscoliosis and scoliosis     Other specified delay in development     Right carpal tunnel syndrome 3/14/2019    Viral warts, unspecified        Past Surgical History:    Past Surgical History:   Procedure Laterality Date    EXAM UNDER ANESTHESIA PELVIC N/A 9/9/2016    Procedure: EXAM UNDER ANESTHESIA PELVIC;  Surgeon: Rhoda Alcazar MD;  Location: [facility]    HC TOOTH EXTRACTION W/FORCEP      wisdom    LAPAROSCOPIC APPENDECTOMY N/A 1/9/2023    Procedure: APPENDECTOMY, LAPAROSCOPIC;  Surgeon: Jhony Perez MD;  Location: [facility]    RELEASE CARPAL TUNNEL Right 3/22/2019    Procedure: RIGHT RELEASE CARPAL  "TUNNEL;  Surgeon: Anjum Wilburn DO;  [facility]    RELEASE CARPAL TUNNEL Left 7/16/2019    Procedure: RELEASE, CARPAL TUNNEL-Left;  Surgeon: Anjum Wilburn DO;  Location: [facility]       Family History:    Family History   Problem Relation Age of Onset    Lipids Father         diet    Thyroid Disease Mother         unsure if hypo or hyper    Diabetes Paternal Grandfather         adult    Heart Disease Paternal Grandfather         bypass/open hear surgery    Lipids Maternal Aunt         medication    Lipids Maternal Aunt         diet    Alzheimer Disease Maternal Grandfather     Hypertension No family hx of     Coronary Artery Disease No family hx of     Hyperlipidemia No family hx of     Cancer - colorectal No family hx of     Ovarian Cancer No family hx of     Prostate Cancer No family hx of     Depression/Anxiety No family hx of     Cerebrovascular Disease No family hx of     Anesthesia Reaction No family hx of     Asthma No family hx of     Osteoporosis No family hx of     Chemical Addiction No family hx of     Obesity No family hx of        Social History:  Marital Status:  Single [1]  Social History     Tobacco Use    Smoking status: Never     Passive exposure: Never    Smokeless tobacco: Never    Tobacco comments:     no smokers in the household   Vaping Use    Vaping status: Never Used   Substance Use Topics    Alcohol use: No    Drug use: No        Medications:    predniSONE (DELTASONE) 10 MG tablet  busPIRone (BUSPAR) 7.5 MG tablet  desogestrel-ethinyl estradiol (APRI) 0.15-30 MG-MCG tablet  escitalopram (LEXAPRO) 20 MG tablet  hydrOXYzine (ATARAX) 25 MG tablet  valACYclovir (VALTREX) 1000 mg tablet          Review of Systems   All other systems reviewed and are negative.        Physical Exam   BP: (!) 130/91  Pulse: 72  Temp: 98.1  F (36.7  C)  Resp: 20  Height: 167.6 cm (5' 6\")  Weight: 137 kg (302 lb)  SpO2: 99 %      Physical Exam  Vitals and nursing note reviewed. "   Constitutional:       General: She is not in acute distress.     Appearance: Normal appearance. She is obese. She is not ill-appearing, toxic-appearing or diaphoretic.   HENT:      Head: Normocephalic and atraumatic.      Nose: Nose normal.      Mouth/Throat:      Mouth: Mucous membranes are moist.   Eyes:      Extraocular Movements: Extraocular movements intact.      Conjunctiva/sclera: Conjunctivae normal.      Pupils: Pupils are equal, round, and reactive to light.      Comments: Left periorbital edema without significant induration, warmth, or tenderness.     Cardiovascular:      Rate and Rhythm: Normal rate and regular rhythm.      Heart sounds: Normal heart sounds.   Pulmonary:      Effort: Pulmonary effort is normal. No respiratory distress.      Breath sounds: Normal breath sounds.   Musculoskeletal:      Cervical back: Neck supple.   Skin:     General: Skin is warm and dry.      Comments: Rough appearing erythematous vesicular rash to right mandibular region, left superior eyelid, right upper abdominal wall, right low back, left groin fold, bilateral hands on palms and in webspaces.   Neurological:      General: No focal deficit present.      Mental Status: She is alert and oriented to person, place, and time. Mental status is at baseline.             ED Course        Procedures      No results found for this or any previous visit (from the past 24 hour(s)).    Medications - No data to display      Assessments & Plan (with Medical Decision Making)  Florina Marie is a 38 year old female who presents to the ED complaining of a rash that developed 2 days ago after working outside pulling weeds.  Reports rash is very itchy but no associated fevers, dyspnea, nausea or vomiting.  She was mildly hypertensive on arrival but otherwise had normal vital signs.  Exam demonstrated diffuse erythematous vesicular rough appearing rash on face, torso, bilateral hands, and left groin fold.  Rash is consistent with a  contact dermatitis, question if it could be related to poison ivy or poison oak she was exposed to when pulling weeds.  Given the extent of the spread including face and hands I think oral prednisone would be appropriate therapy.  She will be prescribed to treat for for treatment, side effects discussed.  She can continue with over-the-counter anti-itch remedies such as calamine lotion as needed.  Return precautions provided.  All questions answered and patient discharged home in suitable condition.     I have reviewed the nursing notes.    I have reviewed the findings, diagnosis, plan and need for follow up with the patient.    Discharge Medication List as of 5/4/2024  3:23 PM        START taking these medications    Details   predniSONE (DELTASONE) 10 MG tablet Take 4 tablets daily for 5 days,  take 2 tablets daily for 3 days, take 1 tablet daily for 3 days, take half a tablet for 3 days., Disp-32 tablet, R-0, E-Prescribe             Final diagnoses:   Allergic contact dermatitis due to plants, except food     Note: Chart documentation done in part with Dragon Voice Recognition software. Although reviewed after completion, some word and grammatical errors may remain.     5/4/2024   [facility] EMERGENCY DEPT       Jenni Goel PA-C  05/04/24 1815

## 2024-05-04 NOTE — TELEPHONE ENCOUNTER
Nurse Triage SBAR    Is this a 2nd Level Triage? No - patient will go to local /ED    Situation.Background: Patient is calling with concern of possible poison ivy which patient has had for two days.   Patient is using calamine lotion and icing the left hand.     Assessment:   Rash is in between fingers of left hand, now immediately under right breast, right hand, also is starting into vaginal area and left upper eyelid which is swollen  Patient states the rash is forming into blisters on bilateral hands  Patient states left hand is swollen, right hand is worsening today    Recommendation: Per disposition, See PCP within 24 hours. Recommended , provided location of local  which does have limited hours on the weekend. Patient stated she will likely go to ED in her area. Reviewed Care Advice with patient and verbalizes understanding. Declines additional questions. Advised patient to call back with any new or worsening symptoms. Patient verbalized understanding and agrees with plan.     Protocol Recommended Disposition: Urgent care center    Juli Thomas RN on 5/4/2024 at 12:00 PM  [facility] Nurse Advisors  Reason for Disposition   MODERATE to SEVERE itching (e.g., interferes with work, school, sleep, or other activities)    Additional Information   Negative: Doesn't match the SYMPTOMS of poison ivy, oak, or sumac   Negative: [1] Difficulty breathing or severe coughing AND [2] followed exposure to burning weeds   Negative: Patient sounds very sick or weak to the triager   Negative: [1] Severe poison ivy, oak, or sumac reaction in the past AND [2] face or genitals involved   Negative: Rash involves more than 20% of the body   Negative: Large blisters or oozing sores   Negative: [1] Fever AND [2] area is very tender to touch   Negative: [1] Fever AND [2] spreading red area or streak from rash   Negative: [1] Skin around the rash has become red AND [2] larger than 2 inches (5 cm)    Protocols used: Poison  Ivy - Oak - Isatu-LACEY

## 2024-05-06 ENCOUNTER — PATIENT OUTREACH (OUTPATIENT)
Dept: CARE COORDINATION | Facility: CLINIC | Age: 39
End: 2024-05-06
Payer: MEDICARE

## 2024-05-06 NOTE — PROGRESS NOTES
[facility] Care Resource [place]  Community Health Worker Initial Outreach    CHW Initial Information Gathering:  Referral Source: ED Follow-Up  CHW Additional Questions  MyChart active?: Yes    Patient accepts CC: No, patient's mother/guardian declined. Patient will be sent Care Coordination introduction letter for future reference.       Magali Triplett  Community Health Worker  [facility] Care Resource [facility]    *Connected Care Resource Team does NOT follow patient ongoing. Referrals are identified based on internal discharge reports and the outreach is to ensure patient has an understanding of their discharge instructions.

## 2024-05-13 ENCOUNTER — TRANSFERRED RECORDS (OUTPATIENT)
Dept: HEALTH INFORMATION MANAGEMENT | Facility: CLINIC | Age: 39
End: 2024-05-13
Payer: MEDICARE

## 2024-05-19 ENCOUNTER — HOSPITAL ENCOUNTER (EMERGENCY)
Facility: CLINIC | Age: 39
Discharge: HOME | End: 2024-05-19
Attending: EMERGENCY MEDICINE | Admitting: EMERGENCY MEDICINE
Payer: MEDICARE

## 2024-05-19 VITALS
TEMPERATURE: 98 F | BODY MASS INDEX: 48.82 KG/M2 | RESPIRATION RATE: 20 BRPM | HEIGHT: 65 IN | DIASTOLIC BLOOD PRESSURE: 98 MMHG | WEIGHT: 293 LBS | SYSTOLIC BLOOD PRESSURE: 136 MMHG | HEART RATE: 80 BPM | OXYGEN SATURATION: 100 %

## 2024-05-19 DIAGNOSIS — L23.7 POISON IVY DERMATITIS: ICD-10-CM

## 2024-05-19 DIAGNOSIS — B37.2 CUTANEOUS CANDIDIASIS: ICD-10-CM

## 2024-05-19 PROCEDURE — 99283 EMERGENCY DEPT VISIT LOW MDM: CPT | Performed by: EMERGENCY MEDICINE

## 2024-05-19 PROCEDURE — 99284 EMERGENCY DEPT VISIT MOD MDM: CPT | Performed by: EMERGENCY MEDICINE

## 2024-05-19 PROCEDURE — 250N000012 HC RX MED GY IP 250 OP 636 PS 637: Performed by: EMERGENCY MEDICINE

## 2024-05-19 RX ORDER — PREDNISONE 20 MG/1
60 TABLET ORAL ONCE
Status: COMPLETED | OUTPATIENT
Start: 2024-05-19 | End: 2024-05-19

## 2024-05-19 RX ORDER — OMEPRAZOLE 40 MG/1
40 CAPSULE, DELAYED RELEASE ORAL DAILY
Qty: 30 CAPSULE | Refills: 0 | Status: SHIPPED | OUTPATIENT
Start: 2024-05-19 | End: 2024-08-08

## 2024-05-19 RX ORDER — PREDNISONE 20 MG/1
TABLET ORAL
Qty: 32.5 TABLET | Refills: 0 | Status: SHIPPED | OUTPATIENT
Start: 2024-05-19 | End: 2024-08-08

## 2024-05-19 RX ORDER — CLOTRIMAZOLE 1 %
CREAM (GRAM) TOPICAL 2 TIMES DAILY
Qty: 85 G | Refills: 0 | Status: SHIPPED | OUTPATIENT
Start: 2024-05-19 | End: 2024-05-19

## 2024-05-19 RX ORDER — CLOTRIMAZOLE 1 %
CREAM (GRAM) TOPICAL 2 TIMES DAILY
Qty: 85 G | Refills: 0 | Status: SHIPPED | OUTPATIENT
Start: 2024-05-19

## 2024-05-19 RX ORDER — OMEPRAZOLE 40 MG/1
40 CAPSULE, DELAYED RELEASE ORAL DAILY
Qty: 30 CAPSULE | Refills: 0 | Status: SHIPPED | OUTPATIENT
Start: 2024-05-19 | End: 2024-05-19

## 2024-05-19 RX ORDER — PREDNISONE 20 MG/1
TABLET ORAL
Qty: 32.5 TABLET | Refills: 0 | Status: SHIPPED | OUTPATIENT
Start: 2024-05-19 | End: 2024-05-19

## 2024-05-19 RX ADMIN — PREDNISONE 60 MG: 20 TABLET ORAL at 19:37

## 2024-05-19 ASSESSMENT — COLUMBIA-SUICIDE SEVERITY RATING SCALE - C-SSRS
6. HAVE YOU EVER DONE ANYTHING, STARTED TO DO ANYTHING, OR PREPARED TO DO ANYTHING TO END YOUR LIFE?: NO
2. HAVE YOU ACTUALLY HAD ANY THOUGHTS OF KILLING YOURSELF IN THE PAST MONTH?: NO
1. IN THE PAST MONTH, HAVE YOU WISHED YOU WERE DEAD OR WISHED YOU COULD GO TO SLEEP AND NOT WAKE UP?: NO

## 2024-05-19 ASSESSMENT — ACTIVITIES OF DAILY LIVING (ADL)
ADLS_ACUITY_SCORE: 33
ADLS_ACUITY_SCORE: 33

## 2024-05-19 NOTE — ED TRIAGE NOTES
Comes in with a rash all over, she recently had a poison ivy rash.      Triage Assessment (Adult)       Row Name 05/19/24 4093          Triage Assessment    Airway WDL WDL        Respiratory WDL    Respiratory WDL WDL        Skin Circulation/Temperature WDL    Skin Circulation/Temperature WDL WDL        Cardiac WDL    Cardiac WDL WDL        Cognitive/Neuro/Behavioral WDL    Cognitive/Neuro/Behavioral WDL WDL

## 2024-05-20 ENCOUNTER — PATIENT OUTREACH (OUTPATIENT)
Dept: CARE COORDINATION | Facility: CLINIC | Age: 39
End: 2024-05-20
Payer: MEDICARE

## 2024-05-20 NOTE — PROGRESS NOTES
[facility]  Community Health Worker Initial Outreach    CHW spoke with Parent/GuardianAnna.    CHW Initial Information Gathering:  Referral Source: ED Follow-Up  CHW Additional Questions  If ED/Hospital discharge, follow-up appointment scheduled as recommended?: No  Patient agreeable to assistance with scheduling?: No  Patient declined (specify): Pt's Parent/Guardian declined to schedule ED follow-up with PCP at this time  MyChart active?: Yes    Patient accepts CC: No, pt's Parent/Guardian declined at this time. Patient will be sent Care Coordination introduction letter for future reference.       Magali Triplett  Community Health Worker  [facility] Care Resource [facility]    *Connected Care Resource Team does NOT follow patient ongoing. Referrals are identified based on internal discharge reports and the outreach is to ensure patient has an understanding of their discharge instructions.

## 2024-05-20 NOTE — DISCHARGE INSTRUCTIONS
I have prescribed a 20-day taper for your poison ivy.  For the rash underneath the breasts apply the cream I prescribed.  To protect your stomach during this prednisone time I prescribed omeprazole.  Follow-up with your primary care doctor in 1 week to reassess your rash.

## 2024-05-20 NOTE — LETTER
Florina Marie  [address]     [place] [address]    Dear Florina MEAD Rashadhi or Parent/Guardian,      I am a team member within the [facility] Care Resource Center with [facility]. I recently contacted you to ensure Florina is doing well following a visit within our health system. I also wanted to take this chance to introduce Clinic Care Coordination should you have any interest in this program in the future.    Below is a description of Clinic Care Coordination and how this team can further assist you:       The Clinic Care Coordination team is made up of a Registered Nurse, , Financial Resource Worker, and a Community Health Worker who understand and can help navigate the health care system. The goal of clinic care coordination is to help you manage your health, improve access to care, and achieve optimal health outcomes. They work alongside your provider to assist you in determining your health and social needs, obtain health care and community resources, and provide you with necessary information and education. Clinic Care Coordination can work with you through any barriers and develop a care plan that helps coordinate and strengthen the relationship between you and your care team.    If you wish to connect with the Clinic Care Coordination Team, please let your [facility] Primary Care Provider or Clinic Care Team know and they can place a referral. The Clinic Care Coordination team will then reach out by phone to further support you.    We are focused on providing you with the highest-quality healthcare experience possible.    Sincerely,   Your care team with [facility]

## 2024-05-20 NOTE — ED PROVIDER NOTES
History     chief complaint  HPI  Patient is a 38-year-old female with a history of hyperlipidemia presenting with a chief complaint of a worsening rash.  Patient was diagnosed with poison ivy dermatitis and underwent 2 weeks of prednisone.  As the prednisone started tapering off she developed significantly worsening pruritic rash that was the same as the first rash.  She then developed a bilateral below breast rash.  No fevers, chills, or other systemic symptoms.    Review of Systems:  All organ systems below were reviewed and are negative unless indicated in the HPI.    Constitutional  Eyes  ENT  Respiratory  Cardiovascular  Gastrointestinal  Genitourinary  Musculoskeletal  Skin  Neuro    Allergies:  Allergies   Allergen Reactions    Macrobid [Nitrofurantoin] Headache and Nausea       Problem List:    Patient Active Problem List    Diagnosis Date Noted    Cervical radiculopathy 12/16/2022     Priority: Medium    Prolonged PTT 08/09/2018     Priority: Medium    Attention deficit disorder without hyperactivity 12/01/2011     Priority: Medium    Hyperlipidemia, unspecified 01/08/2010     Priority: Medium    Generalized anxiety disorder 10/24/2007     Priority: Medium    Morbid obesity (H) 01/01/2004     Priority: Medium    Problems with learning 12/31/2003     Priority: Medium        Past Medical History:    Past Medical History:   Diagnosis Date    Attention deficit disorder with hyperactivity(314.01)     Left carpal tunnel syndrome 6/27/2019    Other kyphoscoliosis and scoliosis     Other specified delay in development     Right carpal tunnel syndrome 3/14/2019    Viral warts, unspecified        Past Surgical History:    Past Surgical History:   Procedure Laterality Date    EXAM UNDER ANESTHESIA PELVIC N/A 9/9/2016    Procedure: EXAM UNDER ANESTHESIA PELVIC;  Surgeon: Rhoda Alcazar MD;  Location:  OR     TOOTH EXTRACTION W/FORCEP      wisdom    LAPAROSCOPIC APPENDECTOMY N/A 1/9/2023    Procedure:  "APPENDECTOMY, LAPAROSCOPIC;  Surgeon: Jhony Perez MD;  [facility]    RELEASE CARPAL TUNNEL Right 3/22/2019    Procedure: RIGHT RELEASE CARPAL TUNNEL;  Surgeon: Anjum Wilburn DO;  [facility]    RELEASE CARPAL TUNNEL Left 7/16/2019    Procedure: RELEASE, CARPAL TUNNEL-Left;  Surgeon: Anjum Wilburn DO;  Location: [facility] OR       Family History:    Family History   Problem Relation Age of Onset    Lipids Father         diet    Thyroid Disease Mother         unsure if hypo or hyper    Diabetes Paternal Grandfather         adult    Heart Disease Paternal Grandfather         bypass/open hear surgery    Lipids Maternal Aunt         medication    Lipids Maternal Aunt         diet    Alzheimer Disease Maternal Grandfather     Hypertension No family hx of     Coronary Artery Disease No family hx of     Hyperlipidemia No family hx of     Cancer - colorectal No family hx of     Ovarian Cancer No family hx of     Prostate Cancer No family hx of     Depression/Anxiety No family hx of     Cerebrovascular Disease No family hx of     Anesthesia Reaction No family hx of     Asthma No family hx of     Osteoporosis No family hx of     Chemical Addiction No family hx of     Obesity No family hx of        Medications:    clotrimazole (LOTRIMIN) 1 % external cream  omeprazole (PRILOSEC) 40 MG DR capsule  predniSONE (DELTASONE) 20 MG tablet  busPIRone (BUSPAR) 7.5 MG tablet  desogestrel-ethinyl estradiol (APRI) 0.15-30 MG-MCG tablet  escitalopram (LEXAPRO) 20 MG tablet  hydrOXYzine (ATARAX) 25 MG tablet  valACYclovir (VALTREX) 1000 mg tablet          Physical Exam   BP: (!) 136/98  Pulse: 80  Temp: 98  F (36.7  C)  Resp: 20  Height: 165.1 cm (5' 5\")  Weight: 139.2 kg (306 lb 12.8 oz)  SpO2: 100 %    Gen: Vital signs reviewed  Eyes: Sclera white, pupils round  ENT: External ears and nares normal.  No mucosal changes.  Card: Regular rate and rhythm  Resp: No respiratory distress. Lungs clear to auscultation " bilaterally  Extremities: Symmetric distal pulses.  Skin: Diffuse erythematous vesicular rash.  Also has bilateral below breast beefy red dermatitis that is a separate type of rash.  No tenderness to palpation.  Neuro: Alert, speech normal.     ED Course        Procedures           No results found for this or any previous visit (from the past 24 hour(s)).    Medications   predniSONE (DELTASONE) tablet 60 mg (60 mg Oral $Given 5/19/24 1937)         Consultations:  None    Social Determinants of Health:  Manages ADLs    Independent Review of Notes:  Reviewed initial ED visit from 5/4/2024  Assessments & Plan (with Medical Decision Making)       I have reviewed the nursing notes.    I have reviewed the findings, diagnosis, plan and need for follow up with the patient.      Medical Decision Making  On arrival, patient well-appearing.  Presentation most consistent with rebound poison ivy dermatitis as well as a secondary cutaneous candidiasis below each breast.  Prescribed cream for the candidiasis.  Did a 21-day taper for the poison ivy dermatitis.  Given the length of this, I did prescribe her omeprazole to take alongside this.  Discussed appropriate return precautions and the effect this could have on her underlying mental health issues.  Discharged home in stable condition.    Final diagnoses:   Poison ivy dermatitis   Cutaneous candidiasis         Javier Ramos M.D.   [facility] Emergency Department     Javier Ramos MD  05/19/24 2239

## 2024-06-12 NOTE — TELEPHONE ENCOUNTER
Rodrigue Adam is a 89 year old female presenting with back pain     Denies Latex allergy or sensitivity.     There were no vitals taken for this visit.    No changes to patient's medical history or social history since their last visit.    ALLERGIES:  No Known Allergies    Rodrigue notes that she currently is not a smoker.   Will await forms

## 2024-07-03 ENCOUNTER — HOSPITAL ENCOUNTER (EMERGENCY)
Facility: CLINIC | Age: 39
Discharge: HOME | End: 2024-07-03
Attending: PHYSICIAN ASSISTANT | Admitting: PHYSICIAN ASSISTANT
Payer: MEDICARE

## 2024-07-03 ENCOUNTER — APPOINTMENT (OUTPATIENT)
Dept: GENERAL RADIOLOGY | Facility: CLINIC | Age: 39
End: 2024-07-03
Attending: STUDENT IN AN ORGANIZED HEALTH CARE EDUCATION/TRAINING PROGRAM
Payer: MEDICARE

## 2024-07-03 ENCOUNTER — NURSE TRIAGE (OUTPATIENT)
Dept: FAMILY MEDICINE | Facility: OTHER | Age: 39
End: 2024-07-03
Payer: MEDICARE

## 2024-07-03 VITALS
RESPIRATION RATE: 16 BRPM | TEMPERATURE: 99.4 F | SYSTOLIC BLOOD PRESSURE: 143 MMHG | HEART RATE: 81 BPM | DIASTOLIC BLOOD PRESSURE: 89 MMHG | OXYGEN SATURATION: 99 %

## 2024-07-03 DIAGNOSIS — M72.2 PLANTAR FASCIITIS: ICD-10-CM

## 2024-07-03 DIAGNOSIS — M77.32 CALCANEAL SPUR OF LEFT FOOT: ICD-10-CM

## 2024-07-03 DIAGNOSIS — M79.672 LEFT FOOT PAIN: ICD-10-CM

## 2024-07-03 PROCEDURE — 73610 X-RAY EXAM OF ANKLE: CPT | Mod: LT

## 2024-07-03 PROCEDURE — 99283 EMERGENCY DEPT VISIT LOW MDM: CPT | Performed by: PHYSICIAN ASSISTANT

## 2024-07-03 ASSESSMENT — ACTIVITIES OF DAILY LIVING (ADL): ADLS_ACUITY_SCORE: 35

## 2024-07-03 NOTE — TELEPHONE ENCOUNTER
Patient calling regarding nausea and a dry cough that started yesterday or day before. Denies any other symptoms. Patient has not vomited.     RN advised patient of home care options and when to be seen.RN reviewed red flag symptoms with patient and when to see emergency care. They agreed and understood.     TOBIN Ramirez, RN     Reason for Disposition   Cough with no complications   Unexplained nausea    Additional Information   Negative: Shock suspected (e.g., cold/pale/clammy skin, too weak to stand, low BP, rapid pulse)   Negative: Sounds like a life-threatening emergency to the triager   Negative: Nausea or vomiting and pregnancy < 20 weeks   Negative: Menstrual Period - Missed or Late (i.e., pregnancy suspected)   Negative: Heat exhaustion suspected (i.e., dehydration from heat exposure)   Negative: Anxiety or stress suspected (i.e., nausea with anxiety attacks or stressful situations)   Negative: Traumatic Brain Injury (TBI) suspected   Negative: Vomiting occurs   Negative: Other symptom is present, see that guideline.  (e.g., chest pain, headache, dizziness, abdominal pain, colds, sore throat, etc.).   Negative: Unable to walk, or can only walk with assistance (e.g., requires support)   Negative: Difficulty breathing   Negative: Insulin-dependent diabetes (Type I) and glucose > 400 mg/dL (22 mmol/L)   Negative: Drinking very little and dehydration suspected (e.g., no urine > 12 hours, very dry mouth, very lightheaded)   Negative: Patient sounds very sick or weak to the triager   Negative: Fever > 104 F  (40 C)   Negative: Fever > 101 F  (38.3 C) and over 60 years of age   Negative: Fever > 100.0 F  (37.8 C) and bedridden (e.g., CVA, chronic illness, recovering from surgery)   Negative: Fever > 100.0 F  (37.8 C) and diabetes mellitus or weak immune system (e.g., HIV positive, cancer chemo, splenectomy, chronic steroids)   Negative: Taking any of the following medications: digoxin (Lanoxin), lithium,  theophylline, phenytoin (Dilantin)   Negative: Yellowish color of the skin or white of the eye (i.e., jaundice)   Negative: Fever present > 3 days (72 hours)   Negative: Patient wants to be seen   Negative: Receiving cancer chemotherapy medication   Negative: Taking prescription medication that could cause nausea (e.g., narcotics/opiates, antibiotics, OCPs, many others)   Negative: Bluish (or gray) lips or face   Negative: SEVERE difficulty breathing (e.g., struggling for each breath, speaks in single words)   Negative: Rapid onset of cough and has hives   Negative: Coughing started suddenly after medicine, an allergic food or bee sting   Negative: Difficulty breathing after exposure to flames, smoke, or fumes   Negative: Sounds like a life-threatening emergency to the triager   Negative: Previous asthma attacks and this feels like asthma attack   Negative: Dry cough (non-productive; no sputum or minimal clear sputum) and within 14 days of COVID-19 Exposure   Negative: MODERATE difficulty breathing (e.g., speaks in phrases, SOB even at rest, pulse 100-120) and still present when not coughing   Negative: Chest pain present when not coughing   Negative: Passed out (i.e., fainted, collapsed and was not responding)   Negative: Patient sounds very sick or weak to the triager   Negative: MILD difficulty breathing (e.g., minimal/no SOB at rest, SOB with walking, pulse <100) and still present when not coughing   Negative: Coughed up > 1 tablespoon (15 ml) blood (Exception: Blood-tinged sputum.)   Negative: Fever > 103 F (39.4 C)   Negative: Fever > 101 F (38.3 C) and over 60 years of age   Negative: Fever > 100.0 F (37.8 C) and has diabetes mellitus or a weak immune system (e.g., HIV positive, cancer chemotherapy, organ transplant, splenectomy, chronic steroids)   Negative: Fever > 100.0 F (37.8 C) and bedridden (e.g., CVA, chronic illness, recovering from surgery)   Negative: Increasing ankle swelling   Negative: Wheezing  is present   Negative: SEVERE coughing spells (e.g., whooping sound after coughing, vomiting after coughing)   Negative: Coughing up lydia-colored (reddish-brown) or blood-tinged sputum   Negative: Fever present > 3 days (72 hours)   Negative: Fever returns after gone for over 24 hours and symptoms worse or not improved   Negative: Using nasal washes and pain medicine > 24 hours and sinus pain persists   Negative: Known COPD or other severe lung disease (i.e., bronchiectasis, cystic fibrosis, lung surgery) and worsening symptoms (i.e., increased sputum purulence or amount, increased breathing difficulty)   Negative: Continuous (nonstop) coughing interferes with work or school and no improvement using cough treatment per Care Advice   Negative: Patient wants to be seen   Negative: Cough has been present for > 3 weeks   Negative: Allergy symptoms are also present (e.g., itchy eyes, clear nasal discharge, postnasal drip)   Negative: Nasal discharge present > 10 days   Negative: Exposure to TB (Tuberculosis)   Negative: Taking an ACE Inhibitor medicine (e.g., benazepril/LOTENSIN, captopril/CAPOTEN, enalapril/VASOTEC, lisinopril/ZESTRIL)   Negative: Nausea lasts > 1 week   Negative: Substance use (drug use) or unhealthy alcohol use, known or suspected   Negative: Nausea is a chronic symptom (recurrent or ongoing AND present > 4 weeks)    Protocols used: Nausea-A-OH, Cough-A-OH

## 2024-07-03 NOTE — ED TRIAGE NOTES
Patient with L foot pain, states she was in a pool yesterday and just stepped wrong. Has been taking Tylenol and Ibuprofen.      Triage Assessment (Adult)       Row Name 07/03/24 6058          Triage Assessment    Airway WDL WDL        Respiratory WDL    Respiratory WDL WDL        Skin Circulation/Temperature WDL    Skin Circulation/Temperature WDL WDL

## 2024-07-04 ENCOUNTER — NURSE TRIAGE (OUTPATIENT)
Dept: NURSING | Facility: CLINIC | Age: 39
End: 2024-07-04
Payer: MEDICARE

## 2024-07-04 NOTE — ED PROVIDER NOTES
History     Chief Complaint   Patient presents with    Foot Pain     HPI  Florina Marie is a 38 year old female who presents for evaluation of left foot pain starting yesterday.  She first noticed pain when she was walking in a pool.  She did not feel a pop or snap.  She was not doing any quick movements.  Has had off-and-on pain of her left foot for years.  Even saw podiatry in the past and had orthotics.  She states that she lost the orthotics.  She generally wears sandals now.  She denies any real ankle or lower leg pain otherwise.  Denies any calf pain.  No chest pain or dyspnea.  Denies any lacerations or bleeding.  No skin irritation.  No numbness or tingling.  Current pain is rated 1 on a scale of 10 at rest, but with walking, her pain will increase up to 6 on a scale of 10.  Has not taken anything for the pain.        Allergies:  Allergies   Allergen Reactions    Macrobid [Nitrofurantoin] Headache and Nausea       Problem List:    Patient Active Problem List    Diagnosis Date Noted    Cervical radiculopathy 12/16/2022     Priority: Medium    Prolonged PTT 08/09/2018     Priority: Medium    Attention deficit disorder without hyperactivity 12/01/2011     Priority: Medium    Hyperlipidemia, unspecified 01/08/2010     Priority: Medium    Generalized anxiety disorder 10/24/2007     Priority: Medium    Morbid obesity (H) 01/01/2004     Priority: Medium    Problems with learning 12/31/2003     Priority: Medium        Past Medical History:    Past Medical History:   Diagnosis Date    Attention deficit disorder with hyperactivity(314.01)     Left carpal tunnel syndrome 6/27/2019    Other kyphoscoliosis and scoliosis     Other specified delay in development     Right carpal tunnel syndrome 3/14/2019    Viral warts, unspecified        Past Surgical History:    Past Surgical History:   Procedure Laterality Date    EXAM UNDER ANESTHESIA PELVIC N/A 9/9/2016    Procedure: EXAM UNDER ANESTHESIA PELVIC;  Surgeon: Ottoniel  Rhoda SPARKS MD;  Location: [facility] OR    HC TOOTH EXTRACTION W/FORCEP      wisdom    LAPAROSCOPIC APPENDECTOMY N/A 1/9/2023    Procedure: APPENDECTOMY, LAPAROSCOPIC;  Surgeon: Jhony Perez MD;  Location: [facility] OR    RELEASE CARPAL TUNNEL Right 3/22/2019    Procedure: RIGHT RELEASE CARPAL TUNNEL;  Surgeon: Anjum Wilburn DO;  Location: [facility]    RELEASE CARPAL TUNNEL Left 7/16/2019    Procedure: RELEASE, CARPAL TUNNEL-Left;  Surgeon: Anjum Wilburn DO;  Location: [facility] OR       Family History:    Family History   Problem Relation Age of Onset    Lipids Father         diet    Thyroid Disease Mother         unsure if hypo or hyper    Diabetes Paternal Grandfather         adult    Heart Disease Paternal Grandfather         bypass/open hear surgery    Lipids Maternal Aunt         medication    Lipids Maternal Aunt         diet    Alzheimer Disease Maternal Grandfather     Hypertension No family hx of     Coronary Artery Disease No family hx of     Hyperlipidemia No family hx of     Cancer - colorectal No family hx of     Ovarian Cancer No family hx of     Prostate Cancer No family hx of     Depression/Anxiety No family hx of     Cerebrovascular Disease No family hx of     Anesthesia Reaction No family hx of     Asthma No family hx of     Osteoporosis No family hx of     Chemical Addiction No family hx of     Obesity No family hx of        Social History:  Marital Status:  Single [1]  Social History     Tobacco Use    Smoking status: Never     Passive exposure: Never    Smokeless tobacco: Never    Tobacco comments:     no smokers in the household   Vaping Use    Vaping status: Never Used   Substance Use Topics    Alcohol use: No    Drug use: No        Medications:    busPIRone (BUSPAR) 7.5 MG tablet  clotrimazole (LOTRIMIN) 1 % external cream  desogestrel-ethinyl estradiol (APRI) 0.15-30 MG-MCG tablet  escitalopram (LEXAPRO) 20 MG tablet  hydrOXYzine (ATARAX) 25 MG tablet  omeprazole (PRILOSEC) 40 MG   capsule  predniSONE (DELTASONE) 20 MG tablet  valACYclovir (VALTREX) 1000 mg tablet          Review of Systems   All other systems reviewed and are negative.      Physical Exam   BP: (!) 143/89  Pulse: 81  Temp: 99.4  F (37.4  C)  Resp: 16  SpO2: 99 %      Physical Exam  Vitals and nursing note reviewed.   Constitutional:       General: She is not in acute distress.     Appearance: She is not diaphoretic.   HENT:      Head: Normocephalic and atraumatic.      Right Ear: External ear normal.      Left Ear: External ear normal.      Nose: Nose normal.      Mouth/Throat:      Pharynx: No oropharyngeal exudate.   Eyes:      General: No scleral icterus.        Right eye: No discharge.         Left eye: No discharge.      Conjunctiva/sclera: Conjunctivae normal.      Pupils: Pupils are equal, round, and reactive to light.   Neck:      Thyroid: No thyromegaly.   Cardiovascular:      Rate and Rhythm: Normal rate and regular rhythm.      Heart sounds: Normal heart sounds. No murmur heard.  Pulmonary:      Effort: Pulmonary effort is normal. No respiratory distress.      Breath sounds: Normal breath sounds. No wheezing or rales.   Chest:      Chest wall: No tenderness.   Abdominal:      General: Bowel sounds are normal. There is no distension.      Palpations: Abdomen is soft. There is no mass.      Tenderness: There is no abdominal tenderness. There is no guarding or rebound.   Musculoskeletal:         General: No deformity. Normal range of motion.      Cervical back: Normal range of motion and neck supple.      Comments: Left lower leg is normal to inspection.  No acute edema.  No calf tenderness.  No palpable cord.  Distal pulses are 2+.  Sensation intact to light touch.  Ankle joint is nontender to palpation throughout.  Normal range of motion.  Negative drawer sign.  Foot with tenderness of the inferior/anterior aspect of the calcaneus.  This reproduces her symptoms.  With forced dorsiflexion, she has increased symptoms.   She does not have any tenderness over the posterior/superior aspect of the heel.  No Achilles tenderness.  Negative Lopez's test.  No tenderness of the metatarsal heads.  No other abnormalities.   Lymphadenopathy:      Cervical: No cervical adenopathy.   Skin:     General: Skin is warm and dry.      Capillary Refill: Capillary refill takes less than 2 seconds.      Findings: No erythema or rash.   Neurological:      Mental Status: She is alert and oriented to person, place, and time.      Cranial Nerves: No cranial nerve deficit.   Psychiatric:         Behavior: Behavior normal.         Thought Content: Thought content normal.         ED Course        Procedures              Critical Care time:  none               Results for orders placed or performed during the hospital encounter of 07/03/24 (from the past 24 hour(s))   XR Ankle Left G/E 3 Views    Narrative    EXAM: XR ANKLE LEFT G/E 3 VIEWS  LOCATION: [facility]  DATE: 7/3/2024    INDICATION: Left ankle pain.  COMPARISON: None.      Impression    IMPRESSION: Moderate circumferential soft tissue swelling in the lower calf and ankle. Normal joint alignment. No fracture or erosion. Moderate-sized plantar calcaneal enthesophyte.       Medications - No data to display    Assessments & Plan (with Medical Decision Making)     Left foot pain  Plantar fasciitis  Calcaneal spur of left foot     38 year old female presents for evaluation of left heel pain starting yesterday.  No acute injury.  Has had heel pain off and on for years.  Has seen podiatry and previously had inserts made for her shoes, but she lost the inserts.  Exam with stable vital signs.  She has tenderness of the inferior heel.  No Achilles tenderness.  Negative Lopez's test.  Pain increased with dorsiflexion of the foot.  Remainder the exam is otherwise reassuring as noted above.  X-ray of the ankle ordered by nursing shows no abnormality of the ankle.  On the lateral  view, there is a moderate-sized plantar calcaneal spur.  I performed independent review of the x-ray and agree with the findings.  I printed a copy of the x-ray and explained it to the patient.    This does not appear to be an acute injury.  She has struggled with this type issue off and on for years.  No fracture noted on x-ray.  Rather, she has chronic finding of the calcaneal spur.  Currently, I think she has an exacerbation of her plantar fasciitis.  We discussed conservative management to include ice massage, towel stretching, and wearing shoes only with high arch support.  Okay to use ibuprofen as needed for pain and swelling.  Podiatry referral placed for follow-up.  Indications for ED return reviewed.  She was in agreement.     I have reviewed the nursing notes.    I have reviewed the findings, diagnosis, plan and need for follow up with the patient.           Medical Decision Making  The patient's presentation was of moderate complexity (an acute complicated injury).    The patient's evaluation involved:  ordering and/or review of 1 test(s) in this encounter (see separate area of note for details)    The patient's management necessitated moderate risk (a decision regarding minor procedure (Ace bandage support of the foot and ankle along with crutch dispensing) with risk factors of none).        Discharge Medication List as of 7/3/2024  7:31 PM          Final diagnoses:   Left foot pain   Plantar fasciitis   Calcaneal spur of left foot     Disclaimer: This note consists of symbols derived from keyboarding, dictation and/or voice recognition software. As a result, there may be errors in the script that have gone undetected. Please consider this when interpreting information found in this chart.      7/3/2024   [facility] EMERGENCY DEPT       Jesse Valentin PA-C  07/03/24 2042     - - -

## 2024-07-04 NOTE — DISCHARGE INSTRUCTIONS
"It was a pleasure working with you today!  I hope your condition improves rapidly!     Please start the exercises and perform them 3-4 times per day.  The most important exercise right away is the \"towel stretch\".  Please put water in an old pot bottle and freeze it.  Place it underneath your heel and midfoot and roll it back-and-forth for 15 minutes every couple hours.  It is okay to use ibuprofen 600 mg every 6 hours as needed for inflammation and pain.  Use the Ace bandage for support of your arch.  Use only well supporting tennis shoes to provide cushion and support for your foot as well.  Use the crutches until you are able to bear weight without pain.  The podiatry department should be contacting you in the next couple days to line up a recheck appointment.  "

## 2024-07-05 NOTE — TELEPHONE ENCOUNTER
"Florina is calling about a couple concerns.    #1 - Black specks in stool the past 2 days  #2 - Feels like food is stuck in lower throat past 4 days    For the past few days, since ~Mon, 7/1/24, when eating, feels like food is getting stuck in lower throat.    - feels nauseated \"like wanting to vomit to get it out\"  - lasts a couple hours  - able to swallow liquids, saliva  - no new medications, supplements  - denies eating berries or dark colored foods  - denies hx of reflux    Re: Swallowing  Advised to see PCP within 24 hours    Re: Stool  Advised to see PCP within 2-3 days    Scheduling offered. Pt will monitor symptoms and call back or call clinic if needed.    Radha Poe RN  [facility]      Reason for Disposition   [1] Swallowing difficulty AND [2] cause unknown  (Exception: Difficulty swallowing is a chronic symptom.)   [1] Abnormal color is unexplained AND [2] persists > 24 hours    Additional Information   Negative: [1] Severe difficulty swallowing (e.g., drooling or spitting) AND [2] started suddenly after taking a medicine or allergic food   Negative: Wheezing, stridor, hoarseness, or difficulty breathing   Negative: [1] Swollen tongue AND [2] sudden onset   Negative: Sounds like a life-threatening emergency to the triager   Negative: SEVERE difficulty swallowing (e.g., drooling or spitting, can't swallow water)   Negative: [1] Symptoms of blocked esophagus (e.g., can't swallow normal secretions, drooling) AND [2] present now   Negative: Symptoms of food or bone stuck in throat or esophagus (e.g., pain in throat or chest, FB sensation, blood-tinged saliva)   Negative: SEVERE symptoms of pill stuck in throat or esophagus (e.g., severe pain, bleeding, or inability to swallow liquids)   Negative: [1] Drinking very little AND [2] dehydration suspected (e.g., no urine > 12 hours, very dry mouth, very lightheaded)   Negative: [1] Refuses to drink anything AND [2] for > 12 hours   " Negative: Patient sounds very sick or weak to the triager   Negative: Fever > 100.4 F (38.0 C)   Negative: [1] Coughing spells AND [2] occur during eating/feedings or within 2 hours   Negative: [1] Symptoms of pill stuck in throat or esophagus (e.g., pain in throat or chest, FB sensation) AND [2] no relief after using Care Advice   Negative: Weak immune system (e.g., HIV positive, cancer chemo, splenectomy, organ transplant, chronic steroids)   Negative: Patient sounds very sick or weak to the triager   Negative: [1] Stool is light gray or whitish AND [2] unexplained   Negative: [1] Stool is mucus or pus AND [2] persists > 24 hours    Protocols used: Swallowing Difficulty-A-AH, Stools - Unusual Color-A-AH

## 2024-07-11 ENCOUNTER — PATIENT OUTREACH (OUTPATIENT)
Dept: CARE COORDINATION | Facility: CLINIC | Age: 39
End: 2024-07-11
Payer: MEDICARE

## 2024-08-08 ENCOUNTER — TELEPHONE (OUTPATIENT)
Dept: FAMILY MEDICINE | Facility: OTHER | Age: 39
End: 2024-08-08

## 2024-08-08 ENCOUNTER — OFFICE VISIT (OUTPATIENT)
Dept: FAMILY MEDICINE | Facility: OTHER | Age: 39
End: 2024-08-08
Payer: MEDICARE

## 2024-08-08 VITALS
SYSTOLIC BLOOD PRESSURE: 124 MMHG | BODY MASS INDEX: 45.99 KG/M2 | TEMPERATURE: 98.3 F | OXYGEN SATURATION: 96 % | HEART RATE: 76 BPM | RESPIRATION RATE: 16 BRPM | DIASTOLIC BLOOD PRESSURE: 78 MMHG | WEIGHT: 293 LBS | HEIGHT: 67 IN

## 2024-08-08 DIAGNOSIS — Z12.4 CERVICAL CANCER SCREENING: ICD-10-CM

## 2024-08-08 DIAGNOSIS — Z00.00 ENCOUNTER FOR MEDICARE ANNUAL WELLNESS EXAM: Primary | ICD-10-CM

## 2024-08-08 DIAGNOSIS — R73.09 ELEVATED GLUCOSE: ICD-10-CM

## 2024-08-08 DIAGNOSIS — Z30.41 ORAL CONTRACEPTIVE PILL SURVEILLANCE: ICD-10-CM

## 2024-08-08 DIAGNOSIS — E66.01 MORBID OBESITY (H): ICD-10-CM

## 2024-08-08 DIAGNOSIS — E78.5 HYPERLIPIDEMIA, UNSPECIFIED HYPERLIPIDEMIA TYPE: ICD-10-CM

## 2024-08-08 LAB
CHOLEST SERPL-MCNC: 197 MG/DL
FASTING STATUS PATIENT QL REPORTED: YES
FASTING STATUS PATIENT QL REPORTED: YES
GLUCOSE SERPL-MCNC: 93 MG/DL (ref 70–99)
HBA1C MFR BLD: 6.2 % (ref 0–5.6)
HDLC SERPL-MCNC: 36 MG/DL
LDLC SERPL CALC-MCNC: 102 MG/DL
NONHDLC SERPL-MCNC: 161 MG/DL
TRIGL SERPL-MCNC: 293 MG/DL

## 2024-08-08 PROCEDURE — 82947 ASSAY GLUCOSE BLOOD QUANT: CPT | Performed by: FAMILY MEDICINE

## 2024-08-08 PROCEDURE — 80061 LIPID PANEL: CPT | Performed by: FAMILY MEDICINE

## 2024-08-08 PROCEDURE — G0439 PPPS, SUBSEQ VISIT: HCPCS | Performed by: FAMILY MEDICINE

## 2024-08-08 PROCEDURE — 36415 COLL VENOUS BLD VENIPUNCTURE: CPT | Performed by: FAMILY MEDICINE

## 2024-08-08 PROCEDURE — 83036 HEMOGLOBIN GLYCOSYLATED A1C: CPT | Performed by: FAMILY MEDICINE

## 2024-08-08 PROCEDURE — 99214 OFFICE O/P EST MOD 30 MIN: CPT | Mod: 25 | Performed by: FAMILY MEDICINE

## 2024-08-08 RX ORDER — DESOGESTREL AND ETHINYL ESTRADIOL 0.15-0.03
1 KIT ORAL DAILY
Qty: 84 TABLET | Refills: 3 | Status: SHIPPED | OUTPATIENT
Start: 2024-08-08

## 2024-08-08 SDOH — HEALTH STABILITY: PHYSICAL HEALTH: ON AVERAGE, HOW MANY DAYS PER WEEK DO YOU ENGAGE IN MODERATE TO STRENUOUS EXERCISE (LIKE A BRISK WALK)?: 4 DAYS

## 2024-08-08 SDOH — HEALTH STABILITY: PHYSICAL HEALTH: ON AVERAGE, HOW MANY MINUTES DO YOU ENGAGE IN EXERCISE AT THIS LEVEL?: 30 MIN

## 2024-08-08 ASSESSMENT — ANXIETY QUESTIONNAIRES
2. NOT BEING ABLE TO STOP OR CONTROL WORRYING: SEVERAL DAYS
1. FEELING NERVOUS, ANXIOUS, OR ON EDGE: SEVERAL DAYS
IF YOU CHECKED OFF ANY PROBLEMS ON THIS QUESTIONNAIRE, HOW DIFFICULT HAVE THESE PROBLEMS MADE IT FOR YOU TO DO YOUR WORK, TAKE CARE OF THINGS AT HOME, OR GET ALONG WITH OTHER PEOPLE: NOT DIFFICULT AT ALL
6. BECOMING EASILY ANNOYED OR IRRITABLE: NOT AT ALL
4. TROUBLE RELAXING: NOT AT ALL
5. BEING SO RESTLESS THAT IT IS HARD TO SIT STILL: SEVERAL DAYS
7. FEELING AFRAID AS IF SOMETHING AWFUL MIGHT HAPPEN: NOT AT ALL
GAD7 TOTAL SCORE: 4
GAD7 TOTAL SCORE: 4
8. IF YOU CHECKED OFF ANY PROBLEMS, HOW DIFFICULT HAVE THESE MADE IT FOR YOU TO DO YOUR WORK, TAKE CARE OF THINGS AT HOME, OR GET ALONG WITH OTHER PEOPLE?: NOT DIFFICULT AT ALL
3. WORRYING TOO MUCH ABOUT DIFFERENT THINGS: SEVERAL DAYS
GAD7 TOTAL SCORE: 4
7. FEELING AFRAID AS IF SOMETHING AWFUL MIGHT HAPPEN: NOT AT ALL

## 2024-08-08 ASSESSMENT — PAIN SCALES - GENERAL: PAINLEVEL: NO PAIN (0)

## 2024-08-08 ASSESSMENT — SOCIAL DETERMINANTS OF HEALTH (SDOH): HOW OFTEN DO YOU GET TOGETHER WITH FRIENDS OR RELATIVES?: ONCE A WEEK

## 2024-08-08 NOTE — PATIENT INSTRUCTIONS
Patient Education   Preventive Care Advice   This is general advice given by our system to help you stay healthy. However, your care team may have specific advice just for you. Please talk to your care team about your preventive care needs.  Nutrition  Eat 5 or more servings of fruits and vegetables each day.  Try wheat bread, brown rice and whole grain pasta (instead of white bread, rice, and pasta).  Get enough calcium and vitamin D. Check the label on foods and aim for 100% of the RDA (recommended daily allowance).  Lifestyle  Exercise at least 150 minutes each week  (30 minutes a day, 5 days a week).  Do muscle strengthening activities 2 days a week. These help control your weight and prevent disease.  No smoking.  Wear sunscreen to prevent skin cancer.  Have a dental exam and cleaning every 6 months.  Yearly exams  See your health care team every year to talk about:  Any changes in your health.  Any medicines your care team has prescribed.  Preventive care, family planning, and ways to prevent chronic diseases.  Shots (vaccines)   HPV shots (up to age 26), if you've never had them before.  Hepatitis B shots (up to age 59), if you've never had them before.  COVID-19 shot: Get this shot when it's due.  Flu shot: Get a flu shot every year.  Tetanus shot: Get a tetanus shot every 10 years.  Pneumococcal, hepatitis A, and RSV shots: Ask your care team if you need these based on your risk.  Shingles shot (for age 50 and up)  General health tests  Diabetes screening:  Starting at age 35, Get screened for diabetes at least every 3 years.  If you are younger than age 35, ask your care team if you should be screened for diabetes.  Cholesterol test: At age 39, start having a cholesterol test every 5 years, or more often if advised.  Bone density scan (DEXA): At age 50, ask your care team if you should have this scan for osteoporosis (brittle bones).  Hepatitis C: Get tested at least once in your life.  STIs (sexually  transmitted infections)  Before age 24: Ask your care team if you should be screened for STIs.  After age 24: Get screened for STIs if you're at risk. You are at risk for STIs (including HIV) if:  You are sexually active with more than one person.  You don't use condoms every time.  You or a partner was diagnosed with a sexually transmitted infection.  If you are at risk for HIV, ask about PrEP medicine to prevent HIV.  Get tested for HIV at least once in your life, whether you are at risk for HIV or not.  Cancer screening tests  Cervical cancer screening: If you have a cervix, begin getting regular cervical cancer screening tests starting at age 21.  Breast cancer scan (mammogram): If you've ever had breasts, begin having regular mammograms starting at age 40. This is a scan to check for breast cancer.  Colon cancer screening: It is important to start screening for colon cancer at age 45.  Have a colonoscopy test every 10 years (or more often if you're at risk) Or, ask your provider about stool tests like a FIT test every year or Cologuard test every 3 years.  To learn more about your testing options, visit:   .  For help making a decision, visit:   https://bit.ly/dy26370.  Prostate cancer screening test: If you have a prostate, ask your care team if a prostate cancer screening test (PSA) at age 55 is right for you.  Lung cancer screening: If you are a current or former smoker ages 50 to 80, ask your care team if ongoing lung cancer screenings are right for you.  For informational purposes only. Not to replace the advice of your health care provider. Copyright   2023 [facility]. All rights reserved. Clinically reviewed by the [employer] Transitions Program. [employer] 395906 - REV 01/24.  Preventing Falls: Care Instructions  Injuries and health problems such as trouble walking or poor eyesight can increase your risk of falling. So can some medicines. But there are things you can do to help  "prevent falls. You can exercise to get stronger. You can also arrange your home to make it safer.    Talk to your doctor about the medicines you take. Ask if any of them increase the risk of falls and whether they can be changed or stopped.   Try to exercise regularly. It can help improve your strength and balance. This can help lower your risk of falling.     Practice fall safety and prevention.    Wear low-heeled shoes that fit well and give your feet good support. Talk to your doctor if you have foot problems that make this hard.  Carry a cellphone or wear a medical alert device that you can use to call for help.  Use stepladders instead of chairs to reach high objects. Don't climb if you're at risk for falls. Ask for help, if needed.  Wear the correct eyeglasses, if you need them.    Make your home safer.    Remove rugs, cords, clutter, and furniture from walkways.  Keep your house well lit. Use night-lights in hallways and bathrooms.  Install and use sturdy handrails on stairways.  Wear nonskid footwear, even inside. Don't walk barefoot or in socks without shoes.    Be safe outside.    Use handrails, curb cuts, and ramps whenever possible.  Keep your hands free by using a shoulder bag or backpack.  Try to walk in well-lit areas. Watch out for uneven ground, changes in pavement, and debris.  Be careful in the winter. Walk on the grass or gravel when sidewalks are slippery. Use de-icer on steps and walkways. Add non-slip devices to shoes.    Put grab bars and nonskid mats in your shower or tub and near the toilet. Try to use a shower chair or bath bench when bathing.   Get into a tub or shower by putting in your weaker leg first. Get out with your strong side first. Have a phone or medical alert device in the bathroom with you.   Where can you learn more?  Go to https://www.organgir.am.net/patiented  Enter G117 in the search box to learn more about \"Preventing Falls: Care Instructions.\"  Current as of: July 17, " 2023               Content Version: 14.0    4996-2759 [employer].   Care instructions adapted under license by your healthcare professional. If you have questions about a medical condition or this instruction, always ask your healthcare professional. [employer] disclaims any warranty or liability for your use of this information.

## 2024-08-08 NOTE — TELEPHONE ENCOUNTER
Test Results    Contacts       Contact Date/Time Type Contact Phone/Fax    08/08/2024 04:49 PM CDT Phone (Incoming) Florina Marie (Self) [phone] (M)            Who ordered the test:  PCP    Type of test: Lab    Date of test:  8/8/24    Where was the test performed:  [facility]    What are your questions/concerns?: patient has some questions about her high numbers, would like to talk with someone.     Could we send this information to you in [facility] or would you prefer to receive a phone call?:   Patient would prefer a phone call   Okay to leave a detailed message?: Yes at Home number on file [phone] (home)

## 2024-08-08 NOTE — PROGRESS NOTES
"Preventive Care Visit  [facility]  Alanna Curiel MD, Family Medicine  Aug 8, 2024      Assessment & Plan     Encounter for Medicare annual wellness exam    Cervical cancer screening  She declines having Pap done in office.  She request having Pap done under anesthesia.  Will refer to gynecology.    - Ob/Gyn  Referral; Future    Oral contraceptive pill surveillance  She states she has been taking the pill regularly.  She has no concerns.  Refills given.    - desogestrel-ethinyl estradiol (APRI) 0.15-30 MG-MCG tablet; Take 1 tablet by mouth daily Take one tablet daily.    Morbid obesity (H)  She is concerned about her weight.  She states she has been biking 20 to 30 minutes about 4-5 times a week.  She has tried phentermine in the past with varying results and the last time she took this it was not helpful.  She tried oral semaglutide without much improvement.  She is interested in giving herself injections if that is helpful.  We did discuss that Medicare does not cover this but she states she also has Medicaid and I am unclear if they cover the medication or not.  Will try Zepbound.  She will follow-up in 3 months.  This is not helpful could consider Topamax.    - tirzepatide-Weight Management (ZEPBOUND) 2.5 MG/0.5ML prefilled pen; Inject 0.5 mLs (2.5 mg) subcutaneously every 7 days  - tirzepatide-Weight Management (ZEPBOUND) 5 MG/0.5ML prefilled pen; Inject 0.5 mLs (5 mg) subcutaneously every 7 days    Elevated glucose  She has had sporadic elevated glucoses in the past.  Will check glucose and A1c today.    - Glucose  - Hemoglobin A1c    Hyperlipidemia, unspecified hyperlipidemia type  - Lipid panel reflex to direct LDL Non-fasting      BMI  Estimated body mass index is 50.03 kg/m  as calculated from the following:    Height as of this encounter: 1.69 m (5' 6.54\").    Weight as of this encounter: 142.9 kg (315 lb).   Weight management plan: Discussed weight management " program but she declines traveling.  Will see if Zepbound is covered.    Counseling  Appropriate preventive services were addressed with this patient via screening, questionnaire, or discussion as appropriate for fall prevention, nutrition, physical activity, Tobacco-use cessation, weight loss and cognition.  Checklist reviewing preventive services available has been given to the patient.  Reviewed patient's diet, addressing concerns and/or questions.     Remi Heaton is a 39 year old, presenting for the following:  Medicare Visit        8/8/2024     8:15 AM   Additional Questions   Roomed by bhavani         Health Care Directive  Patient has a Health Care Directive on file  Advance care planning document is on file and is current.    HPI        8/8/2024   General Health   How would you rate your overall physical health? (!) FAIR   Feel stress (tense, anxious, or unable to sleep) Not at all            8/8/2024   Nutrition   Diet: Regular (no restrictions)            8/8/2024   Exercise   Days per week of moderate/strenous exercise 4 days   Average minutes spent exercising at this level 30 min            8/8/2024   Social Factors   Frequency of gathering with friends or relatives Once a week   Worry food won't last until get money to buy more No   Food not last or not have enough money for food? No   Do you have housing? (Housing is defined as stable permanent housing and does not include staying ouside in a car, in a tent, in an abandoned building, in an overnight shelter, or couch-surfing.) Yes   Are you worried about losing your housing? No   Lack of transportation? No   Unable to get utilities (heat,electricity)? No            8/8/2024   Fall Risk   Fallen 2 or more times in the past year? No   Trouble with walking or balance? Yes   Reason Gait Speed Test Not Completed Patient declines             8/8/2024   Activities of Daily Living- Home Safety   Needs help with the following daily activites None of the  above   Safety concerns in the home None of the above            8/8/2024   Dental   Dentist two times every year? Yes            8/8/2024   Hearing Screening   Hearing concerns? None of the above            8/8/2024   Driving Risk Screening   Patient/family members have concerns about driving No            8/8/2024   General Alertness/Fatigue Screening   Have you been more tired than usual lately? No            8/8/2024   Urinary Incontinence Screening   Bothered by leaking urine in past 6 months No            8/8/2024   TB Screening   Were you born outside of the [place]? No          Today's PHQ-9 Score:       8/8/2024     8:04 AM   PHQ-9 SCORE   PHQ-9 Total Score MyChart 1 (Minimal depression)   PHQ-9 Total Score 1         8/8/2024   Substance Use   Alcohol more than 3/day or more than 7/wk No   Do you have a current opioid prescription? No   How severe/bad is pain from 1 to 10? 0/10 (No Pain)   Do you use any other substances recreationally? No        Social History     Tobacco Use    Smoking status: Never     Passive exposure: Never    Smokeless tobacco: Never    Tobacco comments:     no smokers in the household   Vaping Use    Vaping status: Never Used   Substance Use Topics    Alcohol use: No    Drug use: No           12/31/2021   LAST FHS-7 RESULTS   1st degree relative breast or ovarian cancer Yes   Any relative bilateral breast cancer Unknown   Any male have breast cancer No   Any ONE woman have BOTH breast AND ovarian cancer No   Any woman with breast cancer before 50yrs Unknown   2 or more relatives with breast AND/OR ovarian cancer No   2 or more relatives with breast AND/OR bowel cancer No           Mammogram Screening - Patient under 40 years of age: Routine Mammogram Screening not recommended.       History of abnormal Pap smear: No - age 30- 64 PAP with HPV every 5 years recommended        Latest Ref Rng & Units 9/9/2016     9:16 AM 9/8/2016    12:00 AM 7/24/2013    12:00 AM   PAP / HPV   PAP  "(Historical)   NIL  NIL    HPV 16 DNA NEG Negative      HPV 18 DNA NEG Negative      Other HR HPV NEG Negative              8/8/2024   Contraception/Family Planning   Questions about contraception or family planning No            Reviewed and updated as needed this visit by Provider   Tobacco  Allergies  Meds  Problems  Med Hx  Surg Hx  Fam Hx              Current providers sharing in care for this patient include:  Patient Care Team:  Alanna Curiel MD as PCP - General  Alanna Curiel MD as Assigned PCP  Karen Garcia MD as Assigned Musculoskeletal Provider  Elke Castano MD as Assigned Sleep Provider    The following health maintenance items are reviewed in Epic and correct as of today:  Health Maintenance   Topic Date Due    HPV TEST  09/08/2021    PAP  09/08/2021    LIPID  12/31/2022    COVID-19 Vaccine (2 - 2023-24 season) 09/01/2023    INFLUENZA VACCINE (1) 09/01/2024    ANNUAL REVIEW OF HM ORDERS  02/14/2025    MEDICARE ANNUAL WELLNESS VISIT  08/08/2025    GLUCOSE  01/03/2027    ADVANCE CARE PLANNING  01/13/2028    DTAP/TDAP/TD IMMUNIZATION (4 - Td or Tdap) 11/20/2028    HEPATITIS C SCREENING  Completed    HIV SCREENING  Completed    PHQ-2 (once per calendar year)  Completed    HPV IMMUNIZATION  Completed    HEPATITIS B IMMUNIZATION  Completed    Pneumococcal Vaccine: Pediatrics (0 to 5 Years) and At-Risk Patients (6 to 64 Years)  Aged Out    IPV IMMUNIZATION  Aged Out    MENINGITIS IMMUNIZATION  Aged Out    RSV MONOCLONAL ANTIBODY  Aged Out          Objective    Exam  /78 (BP Location: Left arm, Patient Position: Sitting, Cuff Size: Adult Large)   Pulse 76   Temp 98.3  F (36.8  C) (Temporal)   Resp 16   Ht 1.69 m (5' 6.54\")   Wt 142.9 kg (315 lb)   LMP 07/01/2024   SpO2 96%   BMI 50.03 kg/m     Estimated body mass index is 50.03 kg/m  as calculated from the following:    Height as of this encounter: 1.69 m (5' 6.54\").    Weight as of this " encounter: 142.9 kg (315 lb).    Physical Exam  Constitutional:       General: She is not in acute distress.     Appearance: She is well-developed.   HENT:      Right Ear: Tympanic membrane and external ear normal.      Left Ear: Tympanic membrane and external ear normal.      Nose: Nose normal.   Eyes:      General:         Right eye: No discharge.         Left eye: No discharge.      Conjunctiva/sclera: Conjunctivae normal.      Pupils: Pupils are equal, round, and reactive to light.   Neck:      Thyroid: No thyroid mass.   Cardiovascular:      Rate and Rhythm: Normal rate and regular rhythm.      Heart sounds: Normal heart sounds, S1 normal and S2 normal. No murmur heard.  Pulmonary:      Effort: Pulmonary effort is normal. No respiratory distress.      Breath sounds: Normal breath sounds. No wheezing or rales.   Abdominal:      General: Bowel sounds are normal.      Palpations: Abdomen is soft. There is no mass.      Tenderness: There is no abdominal tenderness.   Musculoskeletal:         General: Normal range of motion.      Cervical back: Neck supple.   Lymphadenopathy:      Cervical: No cervical adenopathy.   Skin:     General: Skin is warm and dry.      Findings: No rash.   Neurological:      Mental Status: She is alert and oriented to person, place, and time.   Psychiatric:         Mood and Affect: Mood normal.         Behavior: Behavior normal.         Thought Content: Thought content normal.         Judgment: Judgment normal.               8/8/2024   Mini Cog   Mini-Cog Not Completed (choose reason) Patient declines          Not complete due to known dementia or mental handicap           Signed Electronically by: Alanna Curiel MD    Answers submitted by the patient for this visit:  Patient Health Questionnaire (Submitted on 8/8/2024)  If you checked off any problems, how difficult have these problems made it for you to do your work, take care of things at home, or get along with other people?:  Not difficult at all  PHQ9 TOTAL SCORE: 1  ZEENAT-7 (Submitted on 8/8/2024)  ZEENAT 7 TOTAL SCORE: 4

## 2024-08-09 NOTE — TELEPHONE ENCOUNTER
Called patient and relayed providers message. She expressed understanding and will implement the interventions mentioned.   She has not called her insurance on the Zepbound yet.    No further questions or concerns at this time.  Carie Almonte RN on 8/9/2024 at 11:41 AM

## 2024-08-09 NOTE — TELEPHONE ENCOUNTER
Central Prior Authorization Team   Phone: [phone]    PA Initiation    Medication: tirzepatide-Weight Management (ZEPBOUND) 2.5 MG/0.5ML prefilled pen  Insurance Company: Jaycee Limon - Phone [phone] Fax [phone]  Pharmacy Filling the Rx: [id number] [place], [place] - [facility] [address]  Filling Pharmacy Phone: [phone]  Filling Pharmacy Fax:    Start Date: 8/9/2024

## 2024-08-09 NOTE — TELEPHONE ENCOUNTER
Please see what questions she has. She does have a learning disability.  I copied my result notes below.  Increasing exercise and weight loss are her goals of treatment.  She is trying to see if her insurance covers Zepbound for weight loss.       Your total cholesterol and your LDL (which is your bad cholesterol) are both coming down which is good.     Your triglycerides have gone up and your HDL (good cholesterol) has gone down.  Both of these can be helped by increasing your exercise.  So I would recommend that you keep on biking and try to do it for longer periods of time more days a week.   Written by Alanna Curiel MD on 8/9/2024  7:56 AM CDT  Seen by patient Florina Marie on 8/9/2024  9:43 AM      You are prediabetic.  Weight loss would be helpful.   Written by Alanna Curiel MD on 8/8/2024 10:09 AM CDT  Seen by patient Florina Marie on 8/9/2024  9:43 AM

## 2024-08-13 NOTE — TELEPHONE ENCOUNTER
PRIOR AUTHORIZATION DENIED    Medication: tirzepatide-Weight Management (ZEPBOUND) 2.5 MG/0.5ML prefilled pen-PA DENIED     Denial Date: 8/9/2024    Denial Rational:             Appeal Information:

## 2024-08-14 ENCOUNTER — TRANSFERRED RECORDS (OUTPATIENT)
Dept: HEALTH INFORMATION MANAGEMENT | Facility: CLINIC | Age: 39
End: 2024-08-14
Payer: MEDICARE

## 2024-09-16 ENCOUNTER — TELEPHONE (OUTPATIENT)
Dept: SLEEP MEDICINE | Facility: CLINIC | Age: 39
End: 2024-09-16
Payer: MEDICARE

## 2024-09-16 DIAGNOSIS — G47.33 OSA (OBSTRUCTIVE SLEEP APNEA): Primary | ICD-10-CM

## 2024-09-16 NOTE — Clinical Note
Hi, patient has been scheduled for in-lab sleep study on October 11, 2024.  Please refer to the communication from Margi about insurance issue with Medicare guidelines. I updated the PSG order.  Please let me know if any questions.  Thanks, Yodit    [address] February 8, 2021 RE: Monserrat Delgadillo To Whom It May Concern, This is to certify that Monserrat Delgadillo may may return to work on 2/10/2021. Please feel free to contact of Dr Violet Holt at [phone] if you have any questions or concerns. Thank you for your assistance in this matter. Sincerely, Bob Ragland RN

## 2024-09-16 NOTE — TELEPHONE ENCOUNTER
Received following email please review. If any questions please reach out to financial securing listed below.    Central PA Order Diagnosis Update Request     Patient Name:                        Florina Marie  :                                       1985  MRN:                                       [id number]     Dr. Castano ,     The diagnosis code on the order for the PSG SPLIT W/TCM is not meeting medical necessity per Medicare guidelines. If you were to choose another diagnosis code and update the existing order, we will review it again.     For your reference, here is the link to the payer's policy:  https://www.cms.gov/medicare-coverage-database/view/article.aspx?[email or web address]     Please note: medical policies are not authorizations and are set by the payer. Medicare does not allow authorizations or appeals to these policies pre-service.     Please let us know how you wish to proceed as soon as possible. We may contact the patient if we do not receive a response.     Thank you,     Xochilt Garcia Prior Authorization      Beth Barthel   Financial [facility]  Financial Securing Center  [address] [address] [address] [address]    [email or web address] [email or web address]   Office: [phone]    Fax: [phone]   Employed by [facility]

## 2024-09-20 NOTE — PATIENT INSTRUCTIONS
If you have any questions regarding your visit, Please contact your care team.    [employer] [facility]: [phone]      [facility] CLINIC HOURS TELEPHONE NUMBER   Deysi De Paz DO.    LEODAN Pink-Surgery Scheduler  Kimberly - Surgery Scheduler    JASVIR Hernandez RN Kylie, RN     Monday, Thursday  [place]  7am-3pm    Tuesday, Wednesday  [place]  7am-3pm    Friday  [place]  1pm-3:30pm    Typical Surgery Days: Thursday or Friday   [facility]  [address] [place] [address] [phone] Phone  [phone] Fax    [facility]  [address] [place] [address] [phone] Phone    Imaging [phone] Phone    [facility] Labor [facility] Delivery:  [phone] Phone     **Surgeries** Our Surgery Schedulers will contact you to schedule. If you do not receive a call within 3 business days, please call [phone].    Urgent Care locations:  [facility] Saturday and    9 am - 5 pm    Monday-Friday   12 pm - 8 pm  Saturday and    9 am - 5 pm   [phone]       If you need a medication refill, please contact your pharmacy. Please allow 3 business days for your refill to be completed.  As always, Thank you for trusting us with your healthcare needs!

## 2024-09-23 ENCOUNTER — OFFICE VISIT (OUTPATIENT)
Dept: OBGYN | Facility: CLINIC | Age: 39
End: 2024-09-23
Attending: FAMILY MEDICINE
Payer: MEDICARE

## 2024-09-23 ENCOUNTER — TELEPHONE (OUTPATIENT)
Dept: OBGYN | Facility: CLINIC | Age: 39
End: 2024-09-23

## 2024-09-23 VITALS
SYSTOLIC BLOOD PRESSURE: 110 MMHG | HEART RATE: 84 BPM | WEIGHT: 293 LBS | BODY MASS INDEX: 49.68 KG/M2 | DIASTOLIC BLOOD PRESSURE: 78 MMHG

## 2024-09-23 DIAGNOSIS — Z12.4 CERVICAL CANCER SCREENING: ICD-10-CM

## 2024-09-23 DIAGNOSIS — Z91.89 AT HIGH RISK FOR PAIN FROM PROCEDURE: Primary | ICD-10-CM

## 2024-09-23 DIAGNOSIS — F41.9 ANXIETY DUE TO INVASIVE PROCEDURE: ICD-10-CM

## 2024-09-23 PROCEDURE — 99203 OFFICE O/P NEW LOW 30 MIN: CPT | Mod: 57 | Performed by: OBSTETRICS & GYNECOLOGY

## 2024-09-23 RX ORDER — QUETIAPINE FUMARATE 50 MG/1
TABLET, FILM COATED ORAL
COMMUNITY
Start: 2024-09-20

## 2024-09-23 NOTE — PROGRESS NOTES
SUBJECTIVE:       HPI: Florina Marie is a 39 year old  who presents today for consultation regarding pap smear under sedation, referral from Alanna Curiel     Last EUA was in 2016 with Dr. Alcazar.  Exam done for developmental delay, unable to tolerate exam in office.  Has attempted to have done in the office previously but cannot tolerate pain.  Mom and dad are medical guardians     Gyn Hx: Patient's last menstrual period was 2024 (within weeks).     Last pap was  16 nil neg hpv  Not sexually active  Family history of gyn-related malignancies: none         reports that she has never smoked. She has never been exposed to tobacco smoke. She has never used smokeless tobacco.      Today's PHQ-2 Score:       2024    12:51 PM   PHQ-2 (  [employer])   Q1: Little interest or pleasure in doing things 0   Q2: Feeling down, depressed or hopeless 1   PHQ-2 Score 1   Q1: Little interest or pleasure in doing things Not at all   Q2: Feeling down, depressed or hopeless Several days   PHQ-2 Score 1     Today's PHQ-9 Score:       2024     8:04 AM   PHQ-9 SCORE   PHQ-9 Total Score MyChart 1 (Minimal depression)   PHQ-9 Total Score 1     Today's ZEENAT-7 Score:       2024     8:05 AM   ZEENAT-7 SCORE   Total Score 4 (minimal anxiety)   Total Score 4       Problem list and histories reviewed & adjusted, as indicated.  Additional history: as documented.    Patient Active Problem List   Diagnosis    Problems with learning    Morbid obesity (H)    Generalized anxiety disorder    Hyperlipidemia, unspecified    Attention deficit disorder without hyperactivity    Prolonged PTT    Cervical radiculopathy     Past Surgical History:   Procedure Laterality Date    EXAM UNDER ANESTHESIA PELVIC N/A 2016    Procedure: EXAM UNDER ANESTHESIA PELVIC;  Surgeon: Rhoda Alcazar MD;  Location:  OR     TOOTH EXTRACTION W/FORCEP      wisdom    LAPAROSCOPIC APPENDECTOMY N/A 2023    Procedure: APPENDECTOMY,  LAPAROSCOPIC;  Surgeon: Jhony Perez MD;  Location: [facility] OR    RELEASE CARPAL TUNNEL Right 3/22/2019    Procedure: RIGHT RELEASE CARPAL TUNNEL;  Surgeon: Anjum Wilburn DO;  Location: [facility] OR    RELEASE CARPAL TUNNEL Left 7/16/2019    Procedure: RELEASE, CARPAL TUNNEL-Left;  Surgeon: Anjum Wilburn DO;  Location: [facility] OR      Social History     Tobacco Use    Smoking status: Never     Passive exposure: Never    Smokeless tobacco: Never    Tobacco comments:     no smokers in the household   Substance Use Topics    Alcohol use: No      Problem (# of Occurrences) Relation (Name,Age of Onset)    Alzheimer Disease (1) Maternal Grandfather    Diabetes (1) Paternal Grandfather: adult    Heart Disease (1) Paternal Grandfather: bypass/open hear surgery    Lipids (3) Father: diet, Maternal Aunt: medication, Maternal Aunt: diet    Thyroid Disease (1) Mother: unsure if hypo or hyper           Negative family history of: Hypertension, Coronary Artery Disease, Hyperlipidemia, Cancer - colorectal, Ovarian Cancer, Prostate Cancer, Depression/Anxiety, Cerebrovascular Disease, Anesthesia Reaction, Asthma, Osteoporosis, Chemical Addiction, Obesity              Current Outpatient Medications   Medication Sig Dispense Refill    busPIRone (BUSPAR) 7.5 MG tablet Take 7.5 mg by mouth 2 times daily      escitalopram (LEXAPRO) 20 MG tablet Take 40 mg by mouth daily  5    hydrOXYzine (ATARAX) 25 MG tablet Take 25 mg by mouth daily as needed for anxiety      QUEtiapine (SEROQUEL) 50 MG tablet       clotrimazole (LOTRIMIN) 1 % external cream Apply topically 2 times daily (Patient not taking: Reported on 8/8/2024) 85 g 0    desogestrel-ethinyl estradiol (APRI) 0.15-30 MG-MCG tablet Take 1 tablet by mouth daily Take one tablet daily. (Patient not taking: Reported on 9/23/2024) 84 tablet 3    tirzepatide-Weight Management (ZEPBOUND) 2.5 MG/0.5ML prefilled pen Inject 0.5 mLs (2.5 mg) subcutaneously every 7 days (Patient not  taking: Reported on 2024) 2 mL 0    tirzepatide-Weight Management (ZEPBOUND) 5 MG/0.5ML prefilled pen Inject 0.5 mLs (5 mg) subcutaneously every 7 days (Patient not taking: Reported on 2024) 2 mL 1    valACYclovir (VALTREX) 1000 mg tablet Take 2 tablets (2,000 mg) by mouth 2 times daily For 1 day (Patient not taking: Reported on 2024) 4 tablet 3     No current facility-administered medications for this visit.     Allergies   Allergen Reactions    Macrobid [Nitrofurantoin] Headache and Nausea       ROS:  10 Point review of systems negative other noted above in HPI    OBJECTIVE:     /78   Pulse 84   Wt 141.9 kg (312 lb 12.8 oz)   LMP 2024 (Within Weeks)   BMI 49.68 kg/m    Body mass index is 49.68 kg/m .      Gen: Alert, oriented, appropriately interactive, NAD  Resp: no audible wheeze, cough, or visible cyanosis.  No visible retractions or increased work of breathing.  Able to speak fully in complete sentences.  Neuro: Cranial nerves grossly intact, mentation intact and speech normal  Psych: mentation appears normal, affect normal/bright, judgement and insight intact, normal speech and appearance well-groomed        In-Clinic Test Results:  No results found for this or any previous visit (from the past 24 hour(s)).    ASSESSMENT/PLAN:                                                      Florina Marie is a 39 year old  who presents today for consultation regarding request for pap smear under sedation, referral from Alanna Curiel       ICD-10-CM    1. At high risk for pain from procedure  Z91.89       2. Cervical cancer screening  Z12.4 Ob/Gyn  Referral     Case Request: EXAM UNDER ANESTHESIA, PELVIS  Pap smear under sedation     Case Request: EXAM UNDER ANESTHESIA, PELVIS  Pap smear under sedation      3. Anxiety due to invasive procedure  F41.9           Option for pelvic exam and pap smears reviewed, including awake in office, oral anxiolytics in office with  vaginal lidocaine vs EUA. Patient requesting EUA, declines to have any pelvic exams in the office.  CBE with Primary care provider.  Screening mammograms at 39yo.  Declines Flu and covid vaccines.      I personally reviewed her last operative report, last Primary care provider note, prior pap history    20 minutes spent on the date of the encounter doing chart review, history and exam, documentation and further activities as noted above    Deysi De Paz DO  [facility] [place]

## 2024-09-23 NOTE — TELEPHONE ENCOUNTER
Associated Diagnoses    Cervical cancer screening [Z12.4]        Source Order Set    Order Set Name Order ID    [id number]     Case Request: Case Info    Panel 1    Providers    Provider Role Service   Deysi De Paz DO Primary Obstetrics     Procedures    Procedure Laterality Anesthesia Region   EXAM UNDER ANESTHESIA, PELVIS  Pap smear under sedation [23918 (CPT )] N/A MAC Vagina                  Requested date:   Location:  OR   Patient class: Same Day Surgery      Pre-op diagnoses: Cervical cancer screening     Scheduling Instructions    Additional Instructions for the Case  Surgical Assistant: Surgical Assist: Not needed  Multi Surgeon Case No  CSC okay No  H&P:  Pre-op options: PCP  Post-op:  NA  Sterilization consent:  Not applicable to procedure being performed.  Vendor: No  Surgical time needed: Average  ERAS patient: YES  If scheduling for D&E does Laminaria placement: No   SURGERY SCHEDULING AND PRECERTIFICATION    Medical Record Number: [id number]  Florina Marie  YOB: 1985   Phone: [phone] (home)   Primary Provider: Alanna Curiel    Reason for Admit:  ICD-10 CODE:  Z12.4    Surgeon: Deysi De Paz DO  Surgical Procedure: EXAM UNDER ANESTHESIA, PELVIS  Pap smear under sedation     Date of Surgery 12/13 Time of Surgery 9:55am  Surgery [facility] performed at:  [facility]  Status: Outpatient  Type of Anesthesia Anticipated: MAC    Sterilization consent:  Not applicable to procedure being performed.    Pre-Op: On 12/03 with Dr Curiel at [place]  COVID testing:  Per Provider's discretion Covid testing is not indicated.     Post-Op:  N/A    Pre-certification routed to Financial Counselors:  Auto routes via Case Request    Surgery packet mailed to patient's home address: Yes  Patient instructed NPO 12 hours prior to surgery, arrive according to the time the nurse gives patient when called prior to surgery, must have a .  Patient understood and  agrees to the plan.      Requestor:  Lottie Edward     Location:  [facility] [phone]

## 2024-10-02 ENCOUNTER — E-VISIT (OUTPATIENT)
Dept: URGENT CARE | Facility: CLINIC | Age: 39
End: 2024-10-02
Payer: MEDICARE

## 2024-10-02 ENCOUNTER — NURSE TRIAGE (OUTPATIENT)
Dept: FAMILY MEDICINE | Facility: OTHER | Age: 39
End: 2024-10-02
Payer: MEDICARE

## 2024-10-02 DIAGNOSIS — J06.9 ACUTE UPPER RESPIRATORY INFECTION, UNSPECIFIED: Primary | ICD-10-CM

## 2024-10-02 PROCEDURE — 99207 PR NON-BILLABLE SERV PER CHARTING: CPT | Performed by: INTERNAL MEDICINE

## 2024-10-02 NOTE — TELEPHONE ENCOUNTER
Patient has had dry eyes for 1 week  She states her eyes are red and raw.  And sinus congestion.  No fever.  Patient has not tried any allergy medications.    Per protocol patient advised to do an evisit.    Ashlie Masterson RN on 10/2/2024 at 2:59 PM    Reason for Disposition   Eye allergy is a chronic symptom (recurrent or ongoing AND present > 4 weeks)    Additional Information   Negative: Chemical gets into the eye from fingers, contaminated object, spray, or splash   Negative: Eye pain   Negative: Runny nose, nose itching, and sneezing also present   Negative: Reaction to antibiotic eye drops   Negative: Blurred vision and new or worsening   Negative: Sacs of clear fluid (blisters) on whites of eyes or inner lids   Negative: Eyelids are very swollen (shut or almost)   Negative: Taking allergy medicine > 2 days and eyes still very itchy or still has pus on eyelids.   Negative: Contact lenses makes itching or redness worse   Negative: Patient wants to be seen    Protocols used: Eye - Allergy-A-OH

## 2024-10-02 NOTE — PATIENT INSTRUCTIONS
Dear Florina,    After reviewing your responses, I would like you to come in for a swab to make sure we treat you correctly. This swab is to evaluate you for possible COVID and Flu, and should be scheduled for today or tomorrow. Please use the Schedule Now button in [facility] to schedule your swab. Otherwise, click this link to schedule a lab only appointment.    Lab appointments are not available at most locations on the weekends. If no Lab Only appointment is available, you should be seen in any of our convenient Urgent Care Centers for an in person visit, which can be found on our website here.    You will receive instructions with your results in [facility] once they are available.     If your symptoms worsen, you develop difficulty breathing, difficulty with drinking enough to stay hydrated, or fevers for more than 5 days, please contact your primary care provider for an appointment or visit an Urgent Care Center to be seen.      Thanks again for choosing us as your health care partner.   Valorie Watts MD

## 2024-10-06 ENCOUNTER — NURSE TRIAGE (OUTPATIENT)
Dept: NURSING | Facility: CLINIC | Age: 39
End: 2024-10-06
Payer: MEDICARE

## 2024-10-06 NOTE — TELEPHONE ENCOUNTER
"Nurse Triage SBAR    Is this a 2nd Level Triage? NO    Situation: Florina is feeling increased Anxiety.    Background: She believes this is related to how she is feeling about having a Sleep Study on Fri, 10/11/24.    - Concerned about her BP  - Feels like heart is racing  - Nauseated - stomach feels \"in knots\"  - Loose stools - usually associated with stress & anxiety    BP = 119/84; Repeated - 147/89 & 144/90  HR = 76    Currently taking Seroquel, Lexapro & Buspar prescribed by Psychiatrist  Also has Hydroxyzine but has not taken it \"for a long time\"    Sees Therapist weekly & Psychiatry quarterly.    Assessment: as noted above    Protocol Recommended Disposition:   See PCP Within 2 Weeks  Care Advice reviewed    Recommended trying Hydroxyzine for Anxiety as well.  Contact PCP &/or Psychiatrist re: advice for dealing with Anxiety prior to Sleep Study.    Does the patient meet one of the following criteria for ADS visit consideration? 16+ years old, with an [facility] PCP     TIP  Providers, please consider if this condition is appropriate for management at one of our Acute and Diagnostic Services sites.     If patient is a good candidate, please use dotphrase <dot>triageresponse and select Refer to ADS to document.    Radha Poe RN  [facility]      Reason for Disposition   [1] Systolic BP  >= 130 OR Diastolic >= 80 AND [2] not taking BP medications   [1] Symptoms of anxiety or panic attack AND [2] is a chronic symptom (recurrent or ongoing AND present > 4 weeks)   Problems with anxiety or stress    Additional Information   Negative: Difficult to awaken or acting confused (e.g., disoriented, slurred speech)   Negative: SEVERE difficulty breathing (e.g., struggling for each breath, speaks in single words)   Negative: [1] Weakness of the face, arm or leg on one side of the body AND [2] new-onset   Negative: [1] Numbness (i.e., loss of sensation) of the face, arm or leg on one side of the body " AND [2] new-onset   Negative: [1] Chest pain lasts > 5 minutes AND [2] history of heart disease (i.e., heart attack, bypass surgery, angina, angioplasty, CHF)   Negative: [1] Chest pain AND [2] took nitrogylcerin AND [3] pain was not relieved   Negative: Sounds like a life-threatening emergency to the triager   Negative: [1] Systolic BP  >= 160 OR Diastolic >= 100 AND [2] cardiac (e.g., breathing difficulty, chest pain) or neurologic symptoms (e.g., new-onset blurred or double vision, unsteady gait)   Negative: [1] Pregnant 20 or more weeks (or postpartum < 6 weeks) AND [2] new hand or face swelling   Negative: [1] Pregnant 20 or more weeks (or postpartum < 6 weeks) AND [2] Systolic BP >= 160 OR Diastolic >= 110   Negative: [1] Systolic BP  >= 200 OR Diastolic >= 120 AND [2] having NO cardiac or neurologic symptoms   Negative: [1] Pregnant 20 or more weeks (or postpartum < 6 weeks) AND [2] Systolic BP  >= 140 OR Diastolic >= 90   Negative: [1] Systolic BP  >= 180 OR Diastolic >= 110 AND [2] missed most recent dose of blood pressure medication   Negative: Systolic BP  >= 180 OR Diastolic >= 110   Negative: Ran out of BP medications   Negative: Systolic BP  >= 160 OR Diastolic >= 100   Negative: [1] Taking BP medications AND [2] feels is having side effects (e.g., impotence, cough, dizzy upon standing)   Negative: [1] Systolic BP  >= 130 OR Diastolic >= 80 AND [2] pregnant   Negative: [1] Systolic BP  >= 130 OR Diastolic >= 80 AND [2] taking BP medications   Negative: Passed out (i.e., lost consciousness, collapsed and was not responding)   Negative: Shock suspected (e.g., cold/pale/clammy skin, too weak to stand, low BP, rapid pulse)   Negative: Difficult to awaken or acting confused (e.g., disoriented, slurred speech)   Negative: Visible sweat on face or sweat dripping down face   Negative: Unable to walk, or can only walk with assistance (e.g., requires support)   Negative: [1] Received SHOCK from implantable  "cardiac defibrillator AND [2] persisting symptoms (i.e., palpitations, lightheadedness)   Negative: [1] Dizziness, lightheadedness, or weakness AND [2] heart beating very rapidly (e.g., > 140 / minute)   Negative: [1] Dizziness, lightheadedness, or weakness AND [2] heart beating very slowly (e.g., < 50 / minute)   Negative: Sounds like a life-threatening emergency to the triager   Negative: Difficulty breathing   Negative: Dizziness, lightheadedness, or weakness   Negative: [1] Heart beating very rapidly (e.g., > 140 / minute) AND [2] present now  (Exception: During exercise.)   Negative: Heart beating very slowly (e.g., < 50 / minute)  (Exception: Athlete and heart rate normal for caller.)   Negative: New or worsened shortness of breath with activity (dyspnea on exertion)   Negative: Patient sounds very sick or weak to the triager   Negative: [1] Heart beating very rapidly (e.g., > 140 / minute) AND [2] not present now  (Exception: During exercise.)   Negative: [1] Skipped or extra beat(s) AND [2] increases with exercise or exertion   Negative: [1] Skipped or extra beat(s) AND [2] occurs 4 or more times per minute   Negative: New or worsened ankle swelling   Negative: History of heart disease (i.e., heart attack, bypass surgery, angina, angioplasty, CHF)  (Exception: Brief heartbeat symptoms that went away and now feels well.)   Negative: Age > 60 years  (Exception: Brief heartbeat symptoms that went away and now feels well.)   Negative: Taking water pill (i.e., diuretic) or heart medication (e.g., digoxin)   Negative: Wearing a \"Holter monitor\" or \"cardiac event monitor\"   Negative: [1] Received SHOCK from implantable cardiac defibrillator AND [2] now feels well   Negative: Heart rhythm alert (e.g., \"you have irregular heartbeat\") from personal wearable device (e.g., Apple Watch)   Negative: History of hyperthyroidism or taking thyroid medication   Negative: Substance use (drug use) or misuse, known or suspected   " Negative: SEVERE difficulty breathing (e.g., struggling for each breath, speaks in single words)   Negative: Bluish (or gray) lips or face now   Negative: Difficult to awaken or acting confused (e.g., disoriented, slurred speech)   Negative: Violent behavior, or threatening to physically hurt or kill someone   Negative: Sounds like a life-threatening emergency to the triager   Negative: [1] Difficulty breathing AND [2] persists > 10 minutes AND [3] not relieved by reassurance provided by triager   Negative: [1] Lightheadedness or dizziness AND [2] persists > 10 minutes AND [3] not relieved by reassurance provided by triager   Negative: [1] SEVERE anxiety (e.g., extremely anxious with intense emotional symptoms such as feeling of unreality, urge to flee, unable to calm down; unable to cope or function) AND [2] not better after 10 minutes of reassurance and Care Advice   Negative: [1] Panic attack symptoms (e.g., sudden onset of intense fear and symptoms such as dizziness, feeling of impending doom or fear of dying, hyperventilation, numbness or tingling, sweating, trembling) AND [2] has not been evaluated for this by doctor (or NP/PA)   Negative: [1] Panic attack symptoms (diagnosed in the past) AND [2] not better with usual treatment, reassurance, or Care Advice   Negative: [1] Alcohol or drug use, known or suspected AND [2] feeling very shaky (i.e., visible tremors of hands)   Negative: Patient sounds very sick or weak to the triager   Negative: Patient sounds very upset or troubled to the triager   Negative: MODERATE anxiety (e.g., persistent or frequent anxiety symptoms; interferes with sleep, school, or work)   Negative: [1] Anxiety symptoms AND [2] has not been evaluated for this by doctor (or NP/PA)   Negative: [1] Started on anti-anxiety medication AND [2] no relief   Negative: Panic attacks are increasing in frequency   Negative: [1] Significant weight loss (or gain) AND [2] not dieting   Negative: Taking  thyroid medications   Negative: Unhealthy caffeine use, known or suspected (e.g., > 2 cups of coffee/tea or > 4 cans of soda / day)   Negative: Substance use (drug use) or misuse, known or suspected   Negative: Taking herbal remedies   Negative: Recent traumatic event (e.g., death of a loved one, job loss, victim/witness of crime)   Negative: Requesting to talk to a counselor (e.g., mental health worker, psychiatrist)   Negative: [1] ADHD AND [2] taking stimulant medication   Negative: [1] Palpitations AND [2] no improvement after using Care Advice    Protocols used: Blood Pressure - High-A-AH, Anxiety and Panic Attack-A-AH, Heart Rate and Heartbeat [id number]

## 2024-10-07 ENCOUNTER — TELEPHONE (OUTPATIENT)
Dept: FAMILY MEDICINE | Facility: OTHER | Age: 39
End: 2024-10-07
Payer: MEDICARE

## 2024-10-07 NOTE — TELEPHONE ENCOUNTER
General Call    Contacts       Contact Date/Time Type Contact Phone/Fax    10/07/2024 09:48 AM CDT Phone (Outgoing) Florina Marie (Self) [phone] (M)          Reason for Call:  PT with sleep study ordered and Actigraphy noted on order. Please contact pt to schedule.     What are your questions or concerns:  NA    Date of last appointment with provider: NA    Could we send this information to you in [place] or would you prefer to receive a phone call?:   Patient would prefer a phone call   Okay to leave a detailed message?: Yes at Cell number on file:    Telephone Information:   Mobile [phone]   Mobile [phone]

## 2024-10-10 ENCOUNTER — OFFICE VISIT (OUTPATIENT)
Dept: PULMONOLOGY | Facility: CLINIC | Age: 39
End: 2024-10-10
Payer: MEDICARE

## 2024-10-10 DIAGNOSIS — E66.813 CLASS 3 SEVERE OBESITY WITHOUT SERIOUS COMORBIDITY WITH BODY MASS INDEX (BMI) OF 45.0 TO 49.9 IN ADULT, UNSPECIFIED OBESITY TYPE (H): ICD-10-CM

## 2024-10-10 DIAGNOSIS — E66.01 MORBID OBESITY (H): Primary | ICD-10-CM

## 2024-10-10 DIAGNOSIS — E66.01 CLASS 3 SEVERE OBESITY WITHOUT SERIOUS COMORBIDITY WITH BODY MASS INDEX (BMI) OF 45.0 TO 49.9 IN ADULT, UNSPECIFIED OBESITY TYPE (H): ICD-10-CM

## 2024-10-10 LAB
ALLEN'S TEST: YES
BASE EXCESS BLDA CALC-SCNC: 1.4 MMOL/L (ref -3–3)
COHGB MFR BLD: 97.1 % (ref 96–97)
HCO3 BLD-SCNC: 26 MMOL/L (ref 21–28)
O2/TOTAL GAS SETTING VFR VENT: 21 %
PCO2 BLD: 41 MM HG (ref 35–45)
PH BLD: 7.41 [PH] (ref 7.35–7.45)
PO2 BLD: 84 MM HG (ref 80–105)
SAO2 % BLDA: 96 % (ref 92–100)

## 2024-10-10 PROCEDURE — 36600 WITHDRAWAL OF ARTERIAL BLOOD: CPT | Performed by: INTERNAL MEDICINE

## 2024-10-10 PROCEDURE — 82805 BLOOD GASES W/O2 SATURATION: CPT | Performed by: PATHOLOGY

## 2024-10-10 NOTE — PROGRESS NOTES
ABG was drawn from right wrist on room air after positve adam's test. No complications noted. Patient left the PFT lab in no distress.

## 2024-10-11 ENCOUNTER — DOCUMENTATION ONLY (OUTPATIENT)
Dept: SLEEP MEDICINE | Facility: CLINIC | Age: 39
End: 2024-10-11

## 2024-10-11 ENCOUNTER — THERAPY VISIT (OUTPATIENT)
Dept: SLEEP MEDICINE | Facility: CLINIC | Age: 39
End: 2024-10-11
Payer: MEDICARE

## 2024-10-11 DIAGNOSIS — G47.19 EXCESSIVE DAYTIME SLEEPINESS: ICD-10-CM

## 2024-10-11 DIAGNOSIS — G47.33 OSA (OBSTRUCTIVE SLEEP APNEA): Primary | ICD-10-CM

## 2024-10-11 DIAGNOSIS — R06.83 SNORING: ICD-10-CM

## 2024-10-11 LAB — FIO2-PRE: 21 %

## 2024-10-11 PROCEDURE — 95811 POLYSOM 6/>YRS CPAP 4/> PARM: CPT | Performed by: INTERNAL MEDICINE

## 2024-10-12 NOTE — PROGRESS NOTES
Completed a split night PSG per provider order.    Preliminary AHI >30.  A final therapeutic PAP pressure was not achieved.    Supine REM was seen on therapeutic pressure.    Patient reports feeling refreshed in AM.

## 2024-10-18 NOTE — PROCEDURES
"   SLEEP STUDY INTERPRETATION  SPLIT NIGHT STUDY      Patient: MELODY CASTELLON  YOB: 1985  Study Date: 10/11/2024  MRN: [id number]  Referring Provider: -  Ordering Provider: MD Elke Castano    Indications for Polysomnography: The patient is a 39-year-old Female who is 5' 6\" and weighs 312.0 lbs. Her BMI is 50.1, Hortense sleepiness scale 18 and neck circumference is 47.5 cm. Relevant medical history includes obesity, anxiety, and depression. A diagnostic polysomnogram was performed to evaluate for sleep apnea/hypoventilation/hypoxemia. After 217.5 minutes of sleep time the patient exhibited sufficient respiratory events qualifying her for a CPAP trial which was then initiated.    Polysomnogram Data: A full night polysomnogram recorded the standard physiologic parameters including EEG, EOG, EMG, ECG, nasal and oral airflow. Respiratory parameters of chest and abdominal movements were recorded with respiratory inductance plethysmography. Oxygen saturation was recorded by pulse oximetry.  Hypopnea scoring rule used: 1B 4%     Diagnostic PSG  Sleep Architecture: Sleep architecture was remarkable for severe sleep fragmentation (arousals were mostly due to respiratory events, spontaneous and some arousals were PLM related) with reduced sleep efficiency. REM sleep was not observed.  The total recording time of the polysomnogram was 287.0 minutes. The total sleep time was 217.5 minutes. Sleep latency was normal at 20.5 minutes without the use of a sleep aid. REM sleep was not seen. Arousal index was increased at 93.8 arousals per hour. Sleep efficiency was decreased at 75.8%. Wake after sleep onset was 49.0 minutes. The patient spent 26.9% of total sleep time in Stage N1, 51.5% in Stage N2, 21.6% in Stage N3, and 0.0% in REM.     Respiration: Severe obstructive sleep apnea was present with sleep associated hypoxemia and without sleep associated hypoventilation.  Since REM sleep was not " observed during the baseline, it is plausible that the overall severity of the sleep-related breathing disorder may have been underestimated.  Events ? The polysomnogram revealed a presence of 1 obstructive, 4 central, and 3 mixed apneas resulting in an apnea index of 2.2 events per hour. There were 126 obstructive hypopneas and - central hypopneas resulting in an obstructive hypopnea index of 34.8 and central hypopnea index of - events per hour. The combined apnea/hypopnea index was 37.0 events per hour (central apnea/hypopnea index was 1.1 events per hour).  The REM AHI could not be assessed since REM sleep was not seen. The supine AHI was 24.6 events per hour. The RERA index was 4.4 events per hour. The RDI was 41.4 events per hour.  Snoring - was reported as mild and intermittent.  Respiratory rate and pattern - was notable for normal respiratory rate and pattern.  Sustained Sleep Associated Hypoventilation - Transcutaneous carbon dioxide monitoring was used, however significant hypoventilation was not present with a maximum change from 42 to 51 mmHg and 0 minutes at or greater than 55 mmHg.  Sleep Associated Hypoxemia - (Greater than 5 minutes O2 sat at or below 88%) was present. Baseline oxygen saturation was 92.7%. Lowest oxygen saturation was 81.0%. Time spent less than or equal to 88% was 5.8 minutes. Time spent less than or equal to 89% was 13.2 minutes.   Pre- sleep study arterial blood gas analysis on room air (10/10/24) showed a pH: 7.41; pCO2: 41 and pO2: 84 and O2 sat: 97.1%      Treatment PSG  Sleep Architecture: There was some reduction in the frequency of the arousals with CPAP therapy compared to baseline. All sleep stages were seen and there was rebound increase in the REM sleep.  At 03:39:05 AM the patient was placed on PAP treatment and was titrated at pressures ranging from CPAP 5 cmH2O up to CPAP 14 cmH2O. The total recording time of the treatment portion of the study was 255.6 minutes. The  total sleep time was 200.0 minutes. During the treatment portion of the study the sleep latency was 5.5 minutes. REM latency was 131.5 minutes. Arousal index was high but decreased at 44.1 arousals per hour compared to the baseline part of the study. Sleep efficiency was decreased at 78.2%. Wake after sleep onset was 50.0 minutes. The patient spent 19.0% of total sleep time in Stage N1, 35.0% in Stage N2, 14.5% in Stage N3, and 31.5% in REM. Time in REM supine was 63.0 minutes.     Respiration: CPAP titration was good (residual AHI < 10 events per hour or 50% decrease if baseline AHI > 15 events per hour and including REM?supine sleep at final pressure).  CPAP titration was initiated with pressure settings starting at 5 cm water up and increased to a final pressure of 14 cm water. CPAP at 9 cm water was effective during NREM sleep in supine position. With CPAP at the final pressure setting of 14 cm water, REM sleep in supine position was observed with few disordered breathing events (obstructive events were mostly controlled and few central events were seen) and the oxygen saturations remained above 90% and the TCM pCO2 was 49mm Hg.  The final pressure was CPAP 14 cmH2O with an AHI of 6.5 events per hour. Time in REM supine on final pressure was 18.5 minutes.   This titration was considered Good (residual AHI < 10 events per hour or 50% decrease if baseline AHI > 15 events per hour and including REM?supine sleep at final pressure).    Movement Activity: Frequent periodic limb movements (PLMs) were observed during the baseline and there was significant reduction in the frequency of the PLMs with CPAP therapy. '  Periodic Limb Movements  During the diagnostic portion of the study, there were 130 PLMs recorded. The PLM index was 35.9 movements per hour. The PLM Arousal Index was 21.8 per hour.  During the treatment portion of the study, there were 8 PLMs recorded. The PLM index was 2.4 movements per hour. The PLM  Arousal Index was 0.6 per hour.  REM EMG Activity -Occasional transient bursts of muscle activity were seen in limb EMG during phasic REM sleep in some epochs that did not meet the ASSM scoring criteria for RBD.  Nocturnal Behavior - Abnormal sleep related behaviors were not noted during/arising out of NREM / REM sleep.   Bruxism - Not apparent.    Cardiac Summary: Normal sinus rhythm was noted.  During the diagnostic portion of the study, the average pulse rate was 79.3 bpm. The minimum pulse rate was 64.0 bpm while the maximum pulse rate was 96.0 bpm.  During the treatment portion of the study, the average pulse rate was 73.4 bpm. The minimum pulse rate was 62.0 bpm while the maximum pulse rate was 95.0 bpm.   Arrhythmias were not noted.    Assessment:   Diagnostic PSG:  Sleep architecture was remarkable for severe sleep fragmentation (arousals were mostly due to respiratory events, spontaneous and some arousals were PLM related) with reduced sleep efficiency.  REM sleep was not observed.  Severe obstructive sleep apnea was present with sleep associated hypoxemia and without sleep associated hypoventilation.  Since REM sleep was not observed during the baseline, it is plausible that the overall severity of the sleep-related breathing disorder may have been underestimated.  Treatment PSG:  There was some reduction in the frequency of the arousals with CPAP therapy compared to the baseline. All sleep stages were seen and there was rebound increase in the REM sleep.  CPAP titration was good.  Frequent periodic limb movements (PLMs) were observed during the baseline and there was significant reduction in the frequency of the PLMs with CPAP therapy.   Normal sinus rhythm was noted.    Recommendations:  Treatment of PRIMO with Auto titrating CPAP therapy with a range of 9 to 15 cmH2O. Recommend clinical follow up with sleep management team, including review of compliance measures.  Advice regarding the risks of drowsy  driving.  Suggest optimizing sleep schedule and avoiding sleep deprivation.  Weight management (if BMI > 30).  Pharmacologic therapy should be used for management of restless legs syndrome only if present and clinically indicated and not based on the presence of periodic limb movements alone.    Diagnostic Codes:   Obstructive Sleep Apnea G47.33  Sleep Hypoxemia/Hypoventilation G47.36   Periodic Limb Movement Disorder G47.61  Repetitive Intrusions Into Sleep F51.8      10/11/2024 [facility] Sleep Study (312.0 lbs) - AHI 37.0, RDI 41.4, Supine AHI 24.6, REM AHI -, Low O2% 81.0%, Time Spent ?88% 5.8, Time Spent ?89% 13.2. Treatment was titrated to a pressure of CPAP 14 with an AHI 6.5. Time spent in REM supine at this pressure was 18.5 minutes.     _____________________________________   Electronically Signed By: (Elke Castano MD), 10/18/24

## 2024-10-21 LAB — SLPCOMP: NORMAL

## 2024-10-30 ENCOUNTER — DOCUMENTATION ONLY (OUTPATIENT)
Dept: SLEEP MEDICINE | Facility: CLINIC | Age: 39
End: 2024-10-30

## 2024-10-30 ENCOUNTER — APPOINTMENT (OUTPATIENT)
Dept: SLEEP MEDICINE | Facility: CLINIC | Age: 39
End: 2024-10-30
Payer: MEDICARE

## 2024-10-30 DIAGNOSIS — G47.33 OSA (OBSTRUCTIVE SLEEP APNEA): Primary | ICD-10-CM

## 2024-10-30 NOTE — PROGRESS NOTES
Patient was offered choice of vendor and chose [facility].  Patient Florina Marie was set up at [facility]  on October 30, 2024. Patient received a Resmed Airsense 11. Pressures were set at  9-15 cmH2O. Patient s ramp is 7 cmH2O for auto and FLEX/EPR is EPR, 2. Patient received a [employer] mask name: Vitera full face mask size small, heated tubing and heated humidifier. Patient needs compliant usage and sleep follow up for Medicare compliance. Patient has a follow up on 2/14/2025 with ANGIE Baez, CNP.    Yanira Quevedo

## 2024-11-04 ENCOUNTER — MYC MEDICAL ADVICE (OUTPATIENT)
Dept: FAMILY MEDICINE | Facility: OTHER | Age: 39
End: 2024-11-04

## 2024-11-04 ENCOUNTER — DOCUMENTATION ONLY (OUTPATIENT)
Dept: SLEEP MEDICINE | Facility: CLINIC | Age: 39
End: 2024-11-04
Payer: MEDICARE

## 2024-11-04 DIAGNOSIS — E66.01 MORBID OBESITY (H): Primary | ICD-10-CM

## 2024-11-04 NOTE — PROGRESS NOTES
3 day Sleep therapy management telephone visit    Diagnostic AHI:   PS.0         LEFT VOICE MESSAGE FOR PATIENT TO RETURN CALL      Order settings:  CPAP MIN CPAP MAX   9 cm H2O 15 cm H2O         Device settings:  CPAP MIN CPAP MAX EPR RESMED SOFT RESPONSE SETTING   9.0 cm  H20 15.0 cm  H20 TWO OFF         Patient has the following upcoming sleep appts:  Future Sleep Appointments         Provider Department    2025 2:30 PM (Arrive by 2:15 PM) Arjun Hopper, ANGIE [facility]            Replacement device: No  STM ordered by provider: Yes     Total time spent on accessing and  interpreting remote patient PAP therapy data  10 minutes    Total time spent counseling, coaching  and reviewing PAP therapy data with patient  0 minutes

## 2024-11-05 ENCOUNTER — TELEPHONE (OUTPATIENT)
Dept: FAMILY MEDICINE | Facility: OTHER | Age: 39
End: 2024-11-05
Payer: MEDICARE

## 2024-11-05 NOTE — TELEPHONE ENCOUNTER
Semaglutide-Weight Management (WEGOVY) 0.25 MG/0.5ML pen     Prior Authorization Retail Medication Request    Medication/Dose: Semaglutide-Weight Management (WEGOVY) 0.25 MG/0.5ML pen  Diagnosis and ICD code (if different than what is on RX):    New/renewal/insurance change PA/secondary ins. PA:  Previously Tried and Failed:    Rationale:      Insurance   Primary:   Insurance ID:      Secondary (if applicable):  Insurance ID:      Pharmacy Information (if different than what is on RX)  Name:    Phone:    Fax:    Clinic Information  Preferred routing pool for dept communication:

## 2024-11-07 NOTE — TELEPHONE ENCOUNTER
PA Initiation    Medication: WEGOVY 0.25 MG/0.5ML SC SOAJ  Insurance Company: [employer] - Phone [phone] Fax [phone]  Pharmacy Filling the Rx:    Filling Pharmacy Phone:    Filling Pharmacy Fax:    Start Date: 11/7/2024

## 2024-11-08 NOTE — TELEPHONE ENCOUNTER
PRIOR AUTHORIZATION DENIED    Medication: WEGOVY 0.25 MG/0.5ML SC SOAJ  Insurance Company: [employer] - Phone [phone] Fax [phone]  Denial Date: 11/8/2024  Denial Reason(s): PLAN EXCLUSION - THIS APPLIES TO ALL DRUGS PRESCRIBED FOR WEIGHT LOSS!!!    Hello,    This prior authorization has been denied as a drug/diagnosis exclusion.    Drug/Diagnosis exclusions cannot be appealed. This medication/diagnosis will not be covered by the prescription plan for any reason. There are no loopholes to gaining approval.    The patient is able to use iMedicare, Adea or another discount card to help with the cost of the medication.    Please follow up with the patient and close the encounter.     Thank you!    Retail Pharmacy Prior Authorization Team   Phone: [phone]    Appeal Information: N/A  Patient Notified: NO

## 2024-11-14 ENCOUNTER — DOCUMENTATION ONLY (OUTPATIENT)
Dept: SLEEP MEDICINE | Facility: CLINIC | Age: 39
End: 2024-11-14
Payer: MEDICARE

## 2024-11-14 NOTE — PROGRESS NOTES
14 day Sleep therapy management telephone visit    Diagnostic AHI:   PS.0         LEFT VOICE MESSAGE FOR PATIENT TO RETURN CALL      Objective measures: 14 day rolling measures   COMPLIANCE LEAK AHI AVERAGE USE IN MINUTES   78 % 38.96 2.74 378   GOAL >70% GOAL < 24 LPM GOAL <5 GOAL >240          Device settings:  CPAP MIN CPAP MAX EPR RESMED SOFT RESPONSE SETTING   9.0 cm  H20 15.0 cm  H20 TWO OFF         Patient has the following upcoming sleep appts:  Future Sleep Appointments         Provider Department    2025 2:30 PM (Arrive by 2:15 PM) Arjun Hopper, ANGIE [facility]            Replacement device: No  STM ordered by provider: Yes     Total time spent on accessing and  interpreting remote patient PAP therapy data  10 minutes    Total time spent counseling, coaching  and reviewing PAP therapy data with patient  0 minutes

## 2024-11-25 ENCOUNTER — NURSE TRIAGE (OUTPATIENT)
Dept: FAMILY MEDICINE | Facility: OTHER | Age: 39
End: 2024-11-25
Payer: MEDICARE

## 2024-11-25 ENCOUNTER — TRANSFERRED RECORDS (OUTPATIENT)
Dept: HEALTH INFORMATION MANAGEMENT | Facility: CLINIC | Age: 39
End: 2024-11-25
Payer: MEDICARE

## 2024-11-25 NOTE — TELEPHONE ENCOUNTER
"Nurse Triage SBAR    Is this a 2nd Level Triage? NO    Situation: Patient reports she has been waking up with a racing heart rate despite using her sleep apnea machine.     Background: Has sleep apnea, anxiety    Assessment: Patient reports for several months she has been waking up most mornings and her heart feels like it is racing. She has not checked her pulse, but feels it is very high. No recent stressors. No SOB, chest pain, sweating, headache or dizziness. She reports this has been ongoing for several months. She said these episodes last \" a long time.\" Discussed red flag symptoms and when to present to the ED.    Protocol Recommended Disposition:   See in Office Within 3 Days    Recommendation: Patient has an appointment 12/3/24, she is wondering if she can wait until that appt or if she needs to be seen sooner?    Routed to provider    Does the patient meet one of the following criteria for ADS visit consideration? No    Nelly Pavon RN on 11/25/2024 at 5:58 PM      Reason for Disposition   Palpitations and no improvement after following Care Advice    Additional Information   Negative: Passed out (e.g., fainted, lost consciousness, blacked out and was not responding)   Negative: Shock suspected (e.g., cold/pale/clammy skin, too weak to stand, low BP, rapid pulse)   Negative: Difficult to awaken or acting confused (e.g., disoriented, slurred speech)   Negative: Visible sweat on face or sweat dripping down face   Negative: Unable to walk, or can only walk with assistance (e.g., requires support)   Negative: Received SHOCK from implantable cardiac defibrillator and has persisting symptoms (i.e., palpitations, lightheadedness)   Negative: Feeling weak or lightheaded (e.g., woozy, feeling like they might faint) AND heart beating very rapidly (e.g., > 140 / minute)   Negative: Feeling weak or lightheaded (e.g., woozy, feeling like they might faint) AND heart beating very slowly (e.g., < 50 / minute)   " Negative: Sounds like a life-threatening emergency to the triager   Negative: Chest pain   Negative: Difficulty breathing   Negative: Feeling weak or lightheaded (e.g., woozy, feeling like they might faint)   Negative: Heart beating very rapidly (e.g., > 140 / minute) and present now  (Exception: During exercise.)   Negative: Heart beating very slowly (e.g., < 50 / minute)  (Exception: Athlete and heart rate normal for caller.)   Negative: New or worsened shortness of breath with activity (dyspnea on exertion)   Negative: Patient sounds very sick or weak to the triager   Negative: Wearing a cardiac monitor (e.g., cardiac event, Holter)   Negative: Received SHOCK from implantable cardiac defibrillator (and now feels well)   Negative: Heart beating very rapidly (e.g., > 140 / minute) and not present now  (Exception: During exercise.)   Negative: Skipped or extra beat(s) and increases with exercise or exertion   Negative: Skipped or extra beat(s) and occurs 4 or more times per minute   Negative: History of heart disease (i.e., heart attack, bypass surgery, angina, angioplasty)  (Exception: Brief heartbeat symptoms that went away and now feels well.)   Negative: Age > 60 years  (Exception: Brief heartbeat symptoms that went away and now feels well.)   Negative: Taking water pill (i.e., diuretic) or heart medication (e.g., digoxin)   Negative: Patient wants to be seen   Negative: New-onset or worsening ankle swelling   Negative: Heart rhythm alert (e.g., 'you have irregular heartbeat') from personal wearable device (e.g., Apple Watch)   Negative: History of hyperthyroidism or taking thyroid medication   Negative: Substance use (drug use) or misuse, known or suspected   Negative: ADHD and taking stimulant medicine    Protocols used: Heart Rate and Heartbeat [id number]

## 2024-11-26 NOTE — TELEPHONE ENCOUNTER
Spoke with patient, advised okay to wait for appointment next week per provider. RN reviewed red flag symptoms with patient and when to see emergency care. She agreed and understood.     Rhoda Arreaga RN, BSN

## 2024-12-03 ENCOUNTER — OFFICE VISIT (OUTPATIENT)
Dept: FAMILY MEDICINE | Facility: OTHER | Age: 39
End: 2024-12-03
Payer: MEDICARE

## 2024-12-03 VITALS
DIASTOLIC BLOOD PRESSURE: 74 MMHG | OXYGEN SATURATION: 96 % | SYSTOLIC BLOOD PRESSURE: 110 MMHG | HEART RATE: 77 BPM | WEIGHT: 293 LBS | TEMPERATURE: 97.9 F | HEIGHT: 66 IN | BODY MASS INDEX: 47.09 KG/M2 | RESPIRATION RATE: 16 BRPM

## 2024-12-03 DIAGNOSIS — Z01.818 PREOP GENERAL PHYSICAL EXAM: Primary | ICD-10-CM

## 2024-12-03 DIAGNOSIS — Z12.4 CERVICAL CANCER SCREENING: ICD-10-CM

## 2024-12-03 DIAGNOSIS — G47.33 OSA (OBSTRUCTIVE SLEEP APNEA): ICD-10-CM

## 2024-12-03 PROCEDURE — 99214 OFFICE O/P EST MOD 30 MIN: CPT | Performed by: FAMILY MEDICINE

## 2024-12-03 NOTE — PROGRESS NOTES
Preoperative Evaluation  [address]  [facility] [address]  Phone: [phone]  Fax: [phone]  Primary Provider: Alanna Curiel MD  Pre-op Performing Provider: Alanna Curiel MD  Dec 3, 2024             12/3/2024   Surgical Information   What procedure is being done? 12 13 2024    Facility or Hospital where procedure/surgery will be performed: [facility]    Who is doing the procedure / surgery? [facility]    Date of surgery / procedure: 12 13 24    Time of surgery / procedure: 10 am    Where do you plan to recover after surgery? at home alone        Patient-reported     Fax number for surgical facility: Note does not need to be faxed, will be available electronically in Epic.    Assessment & Plan     The proposed surgical procedure is considered LOW risk.    Preop general physical exam    Cervical cancer screening      Possible Sleep Apnea: recent diagnosis of obstructive sleep apnea, following with sleep specialist        Risks and Recommendations  The patient has the following additional risks and recommendations for perioperative complications:   - Morbid obesity (BMI >40)    Antiplatelet or Anticoagulation Medication Instructions   - Patient is on no antiplatelet or anticoagulation medications.    Additional Medication Instructions  Take all scheduled medications on the day of surgery    Recommendation  Approval given to proceed with proposed procedure, without further diagnostic evaluation.    Remi Heaton is a 39 year old, presenting for the following:  Pre-Op Exam          12/3/2024     1:28 PM   Additional Questions   Roomed by bhavani LANDAVERDE related to upcoming procedure: patient with developmental delay and unable to tolerate PAP/pelvic exam in clinic and would prefer to have it done under anesthesia        12/3/2024   Pre-Op Questionnaire   Have you ever had a heart attack or stroke? No    Have you ever had surgery  on your heart or blood vessels, such as a stent placement, a coronary artery bypass, or surgery on an artery in your head, neck, heart, or legs? No    Do you have chest pain with activity? No    Do you have a history of heart failure? No    Do you currently have a cold, bronchitis or symptoms of other infection? No    Do you have a cough, shortness of breath, or wheezing? No    Do you or anyone in your family have previous history of blood clots? No    Do you or does anyone in your family have a serious bleeding problem such as prolonged bleeding following surgeries or cuts? No    Have you ever had problems with anemia or been told to take iron pills? No    Have you had any abnormal blood loss such as black, tarry or bloody stools, or abnormal vaginal bleeding? No    Have you ever had a blood transfusion? No    Are you willing to have a blood transfusion if it is medically needed before, during, or after your surgery? Yes    Have you or any of your relatives ever had problems with anesthesia? No    Do you have sleep apnea, excessive snoring or daytime drowsiness? (!) YES    Do you have a CPAP machine? Yes    Do you have any artifical heart valves or other implanted medical devices like a pacemaker, defibrillator, or continuous glucose monitor? No    Do you have artificial joints? No    Are you allergic to latex? No        Patient-reported     Health Care Directive  Patient has a Health Care Directive on file      Preoperative Review of    reviewed - no record of controlled substances prescribed.      Patient Active Problem List    Diagnosis Date Noted    Cervical radiculopathy 12/16/2022     Priority: Medium    Prolonged PTT 08/09/2018     Priority: Medium    Attention deficit disorder without hyperactivity 12/01/2011     Priority: Medium    Hyperlipidemia, unspecified 01/08/2010     Priority: Medium    Generalized anxiety disorder 10/24/2007     Priority: Medium    Morbid obesity (H) 01/01/2004     Priority:  Medium    Problems with learning 12/31/2003     Priority: Medium      Past Medical History:   Diagnosis Date    Attention deficit disorder with hyperactivity(314.01)     Left carpal tunnel syndrome 6/27/2019    Other kyphoscoliosis and scoliosis     Other specified delay in development     Microcephaly    Right carpal tunnel syndrome 3/14/2019    Viral warts, unspecified     plantar warts     Past Surgical History:   Procedure Laterality Date    EXAM UNDER ANESTHESIA PELVIC N/A 9/9/2016    Procedure: EXAM UNDER ANESTHESIA PELVIC;  Surgeon: Rhoda Alcazar MD;  Location: [facility] OR    HC TOOTH EXTRACTION W/FORCEP      wisdom    LAPAROSCOPIC APPENDECTOMY N/A 1/9/2023    Procedure: APPENDECTOMY, LAPAROSCOPIC;  Surgeon: Jhony Perez MD;  Location: [facility] OR    RELEASE CARPAL TUNNEL Right 3/22/2019    Procedure: RIGHT RELEASE CARPAL TUNNEL;  Surgeon: Anjum Wilburn DO;  Location: [facility] OR    RELEASE CARPAL TUNNEL Left 7/16/2019    Procedure: RELEASE, CARPAL TUNNEL-Left;  Surgeon: Anjum Wilburn DO;  Location: [facility] OR     Current Outpatient Medications   Medication Sig Dispense Refill    busPIRone (BUSPAR) 7.5 MG tablet Take 7.5 mg by mouth 2 times daily      desogestrel-ethinyl estradiol (APRI) 0.15-30 MG-MCG tablet Take 1 tablet by mouth daily Take one tablet daily. 84 tablet 3    escitalopram (LEXAPRO) 20 MG tablet Take 40 mg by mouth daily  5    hydrOXYzine (ATARAX) 25 MG tablet Take 25 mg by mouth daily as needed for anxiety      QUEtiapine (SEROQUEL) 50 MG tablet       clotrimazole (LOTRIMIN) 1 % external cream Apply topically 2 times daily (Patient not taking: Reported on 12/3/2024) 85 g 0    valACYclovir (VALTREX) 1000 mg tablet Take 2 tablets (2,000 mg) by mouth 2 times daily For 1 day (Patient not taking: Reported on 12/3/2024) 4 tablet 3       Allergies   Allergen Reactions    Macrobid [Nitrofurantoin] Headache and Nausea        Social History     Tobacco Use    Smoking status: Never      "Passive exposure: Never    Smokeless tobacco: Never    Tobacco comments:     no smokers in the household   Substance Use Topics    Alcohol use: No       History   Drug Use No             Review of Systems  CONSTITUTIONAL: NEGATIVE for fever, chills, change in weight  INTEGUMENTARY/SKIN: NEGATIVE for worrisome rashes, moles or lesions  EYES: NEGATIVE for vision changes or irritation  ENT/MOUTH: NEGATIVE for ear, mouth and throat problems  RESP: NEGATIVE for significant cough or SOB  CV: NEGATIVE for chest pain, palpitations or peripheral edema  GI: NEGATIVE for nausea, abdominal pain, heartburn, or change in bowel habits  : NEGATIVE for frequency, dysuria, or hematuria  MUSCULOSKELETAL: NEGATIVE for significant arthralgias or myalgia  NEURO: NEGATIVE for weakness, dizziness or paresthesias  ENDOCRINE: NEGATIVE for temperature intolerance, skin/hair changes  HEME: NEGATIVE for bleeding problems  PSYCHIATRIC: NEGATIVE for changes in mood or affect    Objective    /74 (BP Location: Right arm, Patient Position: Sitting, Cuff Size: Adult Large)   Pulse 77   Temp 97.9  F (36.6  C) (Temporal)   Resp 16   Ht 1.676 m (5' 6\")   Wt 140.6 kg (310 lb)   LMP 12/02/2024   SpO2 96%   BMI 50.04 kg/m     Estimated body mass index is 50.04 kg/m  as calculated from the following:    Height as of this encounter: 1.676 m (5' 6\").    Weight as of this encounter: 140.6 kg (310 lb).  Physical Exam  GENERAL: alert and no distress  EYES: Eyes grossly normal to inspection, PERRL and conjunctivae and sclerae normal  HENT: ear canals and TM's normal, nose and mouth without ulcers or lesions  NECK: no adenopathy, no asymmetry, masses, or scars  RESP: lungs clear to auscultation - no rales, rhonchi or wheezes  CV: regular rate and rhythm, normal S1 S2, no S3 or S4, no murmur, click or rub, no peripheral edema  ABDOMEN: soft, nontender, no hepatosplenomegaly, no masses and bowel sounds normal  MS: no gross musculoskeletal defects " noted, no edema  SKIN: no suspicious lesions or rashes  NEURO: Normal strength and tone, mentation intact and speech normal  PSYCH: mentation appears normal, affect normal/bright    Recent Labs   Lab Test 08/08/24  0849 01/03/24 2005   HGB  --  12.9   PLT  --  337   NA  --  138   POTASSIUM  --  4.1   CR  --  0.93   A1C 6.2*  --         Diagnostics  No labs were ordered during this visit.   No EKG required for low risk surgery (cataract, skin procedure, breast biopsy, etc).    Revised Cardiac Risk Index (RCRI)  The patient has the following serious cardiovascular risks for perioperative complications:   - No serious cardiac risks = 0 points     RCRI Interpretation: 0 points: Class I (very low risk - 0.4% complication rate)         Signed Electronically by: Alanna Curiel MD  A copy of this evaluation report is provided to the requesting physician.

## 2024-12-13 ENCOUNTER — HOSPITAL ENCOUNTER (OUTPATIENT)
Facility: CLINIC | Age: 39
Discharge: HOME | End: 2024-12-13
Attending: OBSTETRICS & GYNECOLOGY | Admitting: OBSTETRICS & GYNECOLOGY
Payer: MEDICARE

## 2024-12-13 VITALS
TEMPERATURE: 96.8 F | DIASTOLIC BLOOD PRESSURE: 73 MMHG | SYSTOLIC BLOOD PRESSURE: 119 MMHG | RESPIRATION RATE: 18 BRPM | HEART RATE: 83 BPM | OXYGEN SATURATION: 96 %

## 2024-12-13 LAB — HCG UR QL: NEGATIVE

## 2024-12-13 PROCEDURE — 88305 TISSUE EXAM BY PATHOLOGIST: CPT | Mod: TC | Performed by: OBSTETRICS & GYNECOLOGY

## 2024-12-13 PROCEDURE — 87624 HPV HI-RISK TYP POOLED RSLT: CPT | Performed by: OBSTETRICS & GYNECOLOGY

## 2024-12-13 PROCEDURE — 370N000017 HC ANESTHESIA TECHNICAL FEE, PER MIN: Performed by: OBSTETRICS & GYNECOLOGY

## 2024-12-13 PROCEDURE — 81025 URINE PREGNANCY TEST: CPT | Performed by: OBSTETRICS & GYNECOLOGY

## 2024-12-13 PROCEDURE — 999N000141 HC STATISTIC PRE-PROCEDURE NURSING ASSESSMENT: Performed by: OBSTETRICS & GYNECOLOGY

## 2024-12-13 PROCEDURE — 56605 BIOPSY OF VULVA/PERINEUM: CPT | Performed by: OBSTETRICS & GYNECOLOGY

## 2024-12-13 PROCEDURE — 360N000075 HC SURGERY LEVEL 2, PER MIN: Performed by: OBSTETRICS & GYNECOLOGY

## 2024-12-13 PROCEDURE — 250N000009 HC RX 250: Performed by: OBSTETRICS & GYNECOLOGY

## 2024-12-13 PROCEDURE — G0145 SCR C/V CYTO,THINLAYER,RESCR: HCPCS | Performed by: OBSTETRICS & GYNECOLOGY

## 2024-12-13 PROCEDURE — 710N000012 HC RECOVERY PHASE 2, PER MINUTE: Performed by: OBSTETRICS & GYNECOLOGY

## 2024-12-13 RX ORDER — OXYCODONE HYDROCHLORIDE 5 MG/1
5 TABLET ORAL
Status: DISCONTINUED | OUTPATIENT
Start: 2024-12-13 | End: 2024-12-13 | Stop reason: HOSPADM

## 2024-12-13 RX ORDER — ACETAMINOPHEN 325 MG/1
975 TABLET ORAL ONCE
Status: DISCONTINUED | OUTPATIENT
Start: 2024-12-13 | End: 2024-12-13 | Stop reason: HOSPADM

## 2024-12-13 RX ORDER — ONDANSETRON 4 MG/1
4 TABLET, ORALLY DISINTEGRATING ORAL EVERY 30 MIN PRN
Status: DISCONTINUED | OUTPATIENT
Start: 2024-12-13 | End: 2024-12-13 | Stop reason: HOSPADM

## 2024-12-13 RX ORDER — FENTANYL CITRATE 50 UG/ML
25 INJECTION, SOLUTION INTRAMUSCULAR; INTRAVENOUS
Status: DISCONTINUED | OUTPATIENT
Start: 2024-12-13 | End: 2024-12-13 | Stop reason: HOSPADM

## 2024-12-13 RX ORDER — IBUPROFEN 800 MG/1
800 TABLET, FILM COATED ORAL ONCE
Status: DISCONTINUED | OUTPATIENT
Start: 2024-12-13 | End: 2024-12-13 | Stop reason: HOSPADM

## 2024-12-13 RX ORDER — BUPIVACAINE HYDROCHLORIDE AND EPINEPHRINE 2.5; 5 MG/ML; UG/ML
INJECTION, SOLUTION INFILTRATION; PERINEURAL PRN
Status: DISCONTINUED | OUTPATIENT
Start: 2024-12-13 | End: 2024-12-13 | Stop reason: HOSPADM

## 2024-12-13 RX ORDER — NALOXONE HYDROCHLORIDE 0.4 MG/ML
0.1 INJECTION, SOLUTION INTRAMUSCULAR; INTRAVENOUS; SUBCUTANEOUS
Status: DISCONTINUED | OUTPATIENT
Start: 2024-12-13 | End: 2024-12-13 | Stop reason: HOSPADM

## 2024-12-13 RX ORDER — OXYCODONE HYDROCHLORIDE 5 MG/1
10 TABLET ORAL
Status: DISCONTINUED | OUTPATIENT
Start: 2024-12-13 | End: 2024-12-13 | Stop reason: HOSPADM

## 2024-12-13 RX ORDER — HYDROXYZINE HYDROCHLORIDE 25 MG/1
25 TABLET, FILM COATED ORAL
Status: DISCONTINUED | OUTPATIENT
Start: 2024-12-13 | End: 2024-12-13 | Stop reason: HOSPADM

## 2024-12-13 RX ORDER — DEXAMETHASONE SODIUM PHOSPHATE 10 MG/ML
4 INJECTION, SOLUTION INTRAMUSCULAR; INTRAVENOUS
Status: DISCONTINUED | OUTPATIENT
Start: 2024-12-13 | End: 2024-12-13 | Stop reason: HOSPADM

## 2024-12-13 RX ORDER — ONDANSETRON 2 MG/ML
4 INJECTION INTRAMUSCULAR; INTRAVENOUS EVERY 30 MIN PRN
Status: DISCONTINUED | OUTPATIENT
Start: 2024-12-13 | End: 2024-12-13 | Stop reason: HOSPADM

## 2024-12-13 RX ORDER — ACETAMINOPHEN 325 MG/1
975 TABLET ORAL ONCE
Status: COMPLETED | OUTPATIENT
Start: 2024-12-13 | End: 2024-12-13

## 2024-12-13 ASSESSMENT — ACTIVITIES OF DAILY LIVING (ADL)
ADLS_ACUITY_SCORE: 41
ADLS_ACUITY_SCORE: 41

## 2024-12-13 NOTE — DISCHARGE INSTRUCTIONS
Nothing in the vagina for 2 weeks.  May resume normal activity in 1 day  Resume normal diet as tolerated  Do not drive for 24 hours after surgery  Expect cramping and light spotting for 1-2 weeks. May take Motrin and/or Tylenol as needed.    [facility]   Adult Discharge Orders & Instructions     For 24 hours after surgery    Get plenty of rest.  A responsible adult must stay with you for at least 24 hours after you leave the hospital.   Do not drive or use heavy equipment.  If you have weakness or tingling, don't drive or use heavy equipment until this feeling goes away.  Do not drink alcohol.  Avoid strenuous or risky activities.  Ask for help when climbing stairs.   You may feel lightheaded.  If so, sit for a few minutes before standing.  Have someone help you get up.   You may have a slight fever. Call the doctor if your fever is over 100 F (37.7 C) (taken under the tongue) or lasts longer than 24 hours.  You may have a dry mouth, a sore throat, muscle aches or trouble sleeping.  These should go away after 24 hours.  Do not make important or legal decisions.  We don t expect you to have any problems from the surgery or treatment you had today. Just in case, here s what to do if you have pain, upset stomach (nausea), bleeding or infection:  Pain:  Take medicines your doctor has prescribed or over-the-counter medicine they have suggested. Resting and using ice packs can help, too. For surgery on an arm or leg, raise it on a pillow to ease swelling. Call your doctor if these methods don t work.  Copyright Kiet Faith, Licensed under [employer]  Upset stomach (nausea):  Take anti-nausea medicine approved by your doctor. Drink clear liquids like apple juice, ginger ale, broth or 7-Up. Be sure to drink enough fluids. Rest can help, too. Move to normal foods when you re ready.   Bleeding:  In the first 24 hours, you may see a little blood on your dressing, about the size of a  quarter. You don t need to worry about this much blood, but if the blood spot keeps getting bigger:  Put pressure on the wound if you can, AND  Call your doctor.  Copyright Vessel, Licensed under [employer]  Fever/Infection: Please call your doctor if you have any of these signs:  Redness  Swelling  Wound feels warm  Pain gets worse  Bad-smelling fluid leaks from wound  Fever or chills  Call your doctor for any of the followin.  It has been over 8 to 10 hours since surgery and you are still not able to urinate (pass water).    2.  Headache for over 24 hours.    3.  Numbness, tingling or weakness in your legs the day after surgery (if you had spinal anesthesia).    For patient questions :  Any specialty not related to the above groups: [phone]

## 2024-12-13 NOTE — OP NOTE
HOSPITAL OPERATIVE NOTE  DATE/TIME OF SURGERY: 2024  PATIENT NAME: Florina Marie  MRN: [id number]  PATIENT : 1985      Preoperative Diagnosis:    At high risk for pain with procedure  Developmental delay  Cervical cancer screening    Postoperative Diagnosis:   At high risk for pain with procedure  Developmental delay  Cervical cancer screening  Vulvar thickening and whitening with mild loss of architecture.     Surgeon: Deysi De Paz DO    Procedure:  Exam under anesthesia, pap smear with cotesting, vulvar biopsy    Anesthesia: MAC    EBL:  5mL    Speciman:  Pap with cotesting    Findings: Exam under anesthesia revealed an small, mobile anteverted uterus. No adnexal masses palpated.  Exam limited by body habitus. Moderate loss of vulvar architecture with vulvar thickening and whitening without discrete lesions or ulcerations.     Complications:  None    Indication: Florina Marie is a 39 year old  who presents today for scheduled EUA, pap for reasons above. Details of the procedure were discussed with the patient.  Risks include, but are not limited to, bleeding, infection, and injury to surrounding organs such as the bowel, urinary system, nerves, and blood vessels.  Injury may result in repair at the time of the surgery or in a separate procedure.  All questions answered, and accepting these risks, the patient elects to proceed with the procedure.    Procedure: Patient was taken to the operating room where she was placed under MAC without complication.  She was prepped and draped in the normal sterile fashion.  Exam under anesthesia revealed findings as above.  A small speculum was placed in the vagina.  Pap smear obtained. Decision was made to perform a vulvar biopsy given appearance of vulva, concern for ability to do biopsy in the office in the future. The patient's dilia/medical POA was called over the phone in the OR, recommendations given and informed consent received. The area  of the vulva was then prepared with iodine and 0.25%marcaine with epi was injected at the posterior fourchette. A punch biopsy was then obtained using a 4mm punch. Bleeding was controlled with pressure and 4-0 vicryl with excellent  hemostasis.   All instruments were removed from the vagina and the procedure was ended.    Patient tolerated the procedure well and was taken to PACU in stable condition.  All instrument sponge counts were correct x2    Deysi De Paz DO

## 2024-12-14 ENCOUNTER — NURSE TRIAGE (OUTPATIENT)
Dept: NURSING | Facility: CLINIC | Age: 39
End: 2024-12-14
Payer: MEDICARE

## 2024-12-14 NOTE — TELEPHONE ENCOUNTER
"Nurse Triage SBAR    Is this a 2nd Level Triage? YES, LICENSED PRACTITIONER REVIEW IS REQUIRED    Situation: Post op vomiting    Background: Pt reports she had general anesthesia yesterday for pelvic exam. Pt reports she vomited \"once 12:30 am and just now\". Pt reports she had a bowel movement this afternoon. Pt reports \"ate some pizza yesterday and today, every time vomited it's been coming up\". Pt denies blood in vomit or other acute symptoms.     Assessment:  Mild post op vomiting    Protocol Recommended Disposition:   Go to ED Now (Or PCP Triage)    Recommendation: Second level triage        Provider consult indicated.     Reason for page: Second level triage     Page sent to Dr. Hermes Waldron by RN at 4:20 pm.         Provider, Dr. Hermes Waldron, returning page to Nurse Advisors at 4:36 pm    Provider recommended plan of care: Ok to monitor at home, increase fluids. If continue to vomit or develop abdominal pain be seen tonight. Call back if any questions or concerns.     Provider Recommendation Follow Up:   Reached patient/caregiver. Informed of provider's recommendations. Patient verbalized understanding and agrees with the plan.                     Does the patient meet one of the following criteria for ADS visit consideration? 16+ years old, with an [facility] PCP     TIP  Providers, please consider if this condition is appropriate for management at one of our Acute and Diagnostic Services sites.     If patient is a good candidate, please use dotphrase <dot>triageresponse and select Refer to ADS to document.    Reason for Disposition   [1] Vomiting AND [2] persists > 4 hours    Additional Information   Negative: Sounds like a life-threatening emergency to the triager   Negative: [1] Widespread rash AND [2] bright red, sunburn-like   Negative: [1] SEVERE headache AND [2] after spinal (epidural) anesthesia    Protocols used: Post-Op Symptoms and [id number]    "

## 2024-12-16 ENCOUNTER — TELEPHONE (OUTPATIENT)
Dept: FAMILY MEDICINE | Facility: OTHER | Age: 39
End: 2024-12-16
Payer: MEDICARE

## 2024-12-16 LAB
HPV HR 12 DNA CVX QL NAA+PROBE: NEGATIVE
HPV16 DNA CVX QL NAA+PROBE: NEGATIVE
HPV18 DNA CVX QL NAA+PROBE: NEGATIVE
HUMAN PAPILLOMA VIRUS FINAL DIAGNOSIS: NORMAL
PATH REPORT.COMMENTS IMP SPEC: NORMAL
PATH REPORT.COMMENTS IMP SPEC: NORMAL
PATH REPORT.FINAL DX SPEC: NORMAL
PATH REPORT.GROSS SPEC: NORMAL
PATH REPORT.MICROSCOPIC SPEC OTHER STN: NORMAL
PATH REPORT.RELEVANT HX SPEC: NORMAL
PHOTO IMAGE: NORMAL

## 2024-12-16 NOTE — TELEPHONE ENCOUNTER
Deysi De Paz, DO  [facility] Triage9 minutes ago (12:33 PM)     KK  See phone/result note.     See result note for further communication/documentation.  Attempted to reach pt today to relay the result note from Dr. De Paz to her.  We will close this encounter and continue to try pt in the result note.    Mary Sorenson RN- OB/GYN group

## 2024-12-16 NOTE — TELEPHONE ENCOUNTER
Patient asking about pathology results. Please review as I could not explain results to patient without the provider's interpretation. Thank you.    Nelly Pavon RN on 12/16/2024 at 11:45 AM

## 2024-12-19 LAB
BKR LAB AP GYN ADEQUACY: NORMAL
BKR LAB AP GYN INTERPRETATION: NORMAL
PATH REPORT.COMMENTS IMP SPEC: NORMAL
PATH REPORT.COMMENTS IMP SPEC: NORMAL

## 2025-01-20 ENCOUNTER — TELEPHONE (OUTPATIENT)
Dept: FAMILY MEDICINE | Facility: OTHER | Age: 40
End: 2025-01-20

## 2025-01-20 ENCOUNTER — VIRTUAL VISIT (OUTPATIENT)
Dept: FAMILY MEDICINE | Facility: OTHER | Age: 40
End: 2025-01-20
Payer: MEDICARE

## 2025-01-20 DIAGNOSIS — J40 BRONCHITIS: Primary | ICD-10-CM

## 2025-01-20 DIAGNOSIS — R06.02 SOB (SHORTNESS OF BREATH): ICD-10-CM

## 2025-01-20 PROCEDURE — 98005 SYNCH AUDIO-VIDEO EST LOW 20: CPT | Performed by: PHYSICIAN ASSISTANT

## 2025-01-20 RX ORDER — ALBUTEROL SULFATE 0.83 MG/ML
2.5 SOLUTION RESPIRATORY (INHALATION) EVERY 6 HOURS PRN
Qty: 90 ML | Refills: 0 | Status: SHIPPED | OUTPATIENT
Start: 2025-01-20

## 2025-01-20 RX ORDER — PREDNISONE 20 MG/1
20 TABLET ORAL DAILY
Qty: 10 TABLET | Refills: 0 | Status: SHIPPED | OUTPATIENT
Start: 2025-01-20

## 2025-01-20 RX ORDER — BENZONATATE 100 MG/1
100 CAPSULE ORAL 3 TIMES DAILY PRN
Qty: 30 CAPSULE | Refills: 0 | Status: SHIPPED | OUTPATIENT
Start: 2025-01-20

## 2025-01-20 ASSESSMENT — ENCOUNTER SYMPTOMS: COUGH: 1

## 2025-01-20 NOTE — TELEPHONE ENCOUNTER
Please call patient to coordinate which medical supply store she would like the DME for neb from January 20, 2025 sent to.     Thanks,  Fco Jaime PA-C on 1/20/2025 at 2:14 PM

## 2025-01-20 NOTE — PROGRESS NOTES
Florina is a 39 year old who is being evaluated via a billable video visit.    How would you like to obtain your AVS? MyChart  If the video visit is dropped, the invitation should be resent by: Text to cell phone: [phone]  Will anyone else be joining your video visit? No    Assessment & Plan     Bronchitis  SOB (shortness of breath)  Patient had been seen at [facility] ED and diagnosed with viral illness. She has been following their treatment plan as directed, but developed a cough over the weekend. She says that the cough is throughout the day and night. It is also causing SOB and interfering with sleep. Has noticed sharp pains along the chest with coughing. As this is a virtual visit I am unable to perform any physical examination and all decisions were made based off of the history provided. Discussed with the patient that I suspect she has post viral inflammation. Recommended that she complete a prednisone burst to treat this inflammation. She asked about therapies for SOB. We discussed inhaler vs nebulizer. Patient would prefer the nebulizer. DME order placed. Patient will be contacted by staff to coordinate which medical supply store she would like to get this from. Reference material attached to AVS.   - predniSONE (DELTASONE) 20 MG tablet; Take 1 tablet (20 mg) by mouth daily.  - benzonatate (TESSALON) 100 MG capsule; Take 1 capsule (100 mg) by mouth 3 times daily as needed for cough.  - albuterol (PROVENTIL) (2.5 MG/3ML) 0.083% neb solution; Take 1 vial (2.5 mg) by nebulization every 6 hours as needed for shortness of breath, wheezing or cough.    Body mass index (BMI) 50.0-59.9, adult (H)  Likely contributing to the patient's shortness of breath and suspected deconditioned airways. Patient will continue to work with PCP on healthy lifestyle and weight loss goals.       Subjective   Florina is a 39 year old, presenting for the following health issues:  Cough      1/20/2025     1:48 PM   Additional  Questions   Roomed by Missy MEAD     Video Start Time: 1:58 PM    Cough    History of Present Illness       Reason for visit:  Cough      Has had cough since last Friday, productive of green mucus, denies other Sx        Review of Systems  Constitutional, HEENT, cardiovascular, pulmonary, gi and gu systems are negative, except as otherwise noted.      Objective           Vitals:  No vitals were obtained today due to virtual visit.    Physical Exam   GENERAL: alert, no distress, and obese  EYES: Eyes grossly normal to inspection.  No discharge or erythema, or obvious scleral/conjunctival abnormalities.  RESP: No audible wheeze, cough, or visible cyanosis.    SKIN: Visible skin clear. No significant rash, abnormal pigmentation or lesions.  NEURO: Cranial nerves grossly intact.  Mentation and speech appropriate for age.  PSYCH: Appropriate affect, tone, and pace of words          Video-Visit Details    Type of service:  Video Visit   Video End Time:Joined the call at 1/20/2025, 1:58:28 pm.  Left the call at 1/20/2025, 2:08:55 pm.  You were on the call for 10 minutes 26 seconds .  Originating Location (pt. Location): Home    Distant Location (provider location):  Off-site  Platform used for Video Visit: Jesenia  Signed Electronically by: Fco Jaime PA-C

## 2025-02-27 ENCOUNTER — PATIENT OUTREACH (OUTPATIENT)
Dept: CARE COORDINATION | Facility: CLINIC | Age: 40
End: 2025-02-27
Payer: MEDICARE

## 2025-02-27 NOTE — PROGRESS NOTES
[facility] Medicare ACO Reach Population - Proactive Outreach    Background: Patient outreach conducted proactively to support health maintenance initiatives and identify any opportunities to integrated Care Coordination assistance in patient-centered goals.      Patient agreeable to scheduling Hospital/ED follow up? No     If No, CC team member encouraged patient to contact [facility] at [phone] to schedule an appointment in the future, or schedule [facility].    Patient accepts CC: Patient declined.    Juanita Dillard RN  [facility]

## 2025-03-05 ENCOUNTER — TRANSFERRED RECORDS (OUTPATIENT)
Dept: HEALTH INFORMATION MANAGEMENT | Facility: CLINIC | Age: 40
End: 2025-03-05
Payer: MEDICARE

## 2025-04-10 ENCOUNTER — PATIENT OUTREACH (OUTPATIENT)
Dept: CARE COORDINATION | Facility: CLINIC | Age: 40
End: 2025-04-10
Payer: MEDICARE

## 2025-04-10 NOTE — PROGRESS NOTES
[facility]  Medicare ACO Reach Population - Proactive Outreach  Unable to Reach    Background: Patient outreach conducted proactively to support health maintenance initiatives within [facility]'s Medicare ACO Reach Population.     Outreach attempted x 1.  Left message on patient's voicemail briefly explaining this is a proactive outreach from [facility].. Patient encouraged to call the [facility] scheduling team at [phone] with any scheduling needs or questions, or they can conveniently schedule via [employer].    Plan:  Care Coordinator will try to reach patient again in 1-2 business days.    Juanita Dillard RN  [facility]

## 2025-04-13 NOTE — PROGRESS NOTES
[facility]  Medicare ACO Reach Population - Proactive Outreach  Unable to Reach    Background: Patient outreach conducted proactively to support health maintenance initiatives within [facility]'s Medicare ACO Reach Population.     Outreach attempted x 2.  Left message on patient's voicemail briefly explaining this is a proactive outreach from [facility].. Patient encouraged to call the [facility] scheduling team at [phone] with any scheduling needs or questions, or they can conveniently schedule via [employer].    Plan:  Care Coordinator will do no further outreaches at this time.    Juanita Dillard, JASVIR  [facility]

## 2025-04-28 ENCOUNTER — OFFICE VISIT (OUTPATIENT)
Dept: FAMILY MEDICINE | Facility: OTHER | Age: 40
End: 2025-04-28
Payer: MEDICARE

## 2025-04-28 VITALS
RESPIRATION RATE: 22 BRPM | SYSTOLIC BLOOD PRESSURE: 122 MMHG | OXYGEN SATURATION: 97 % | HEIGHT: 66 IN | TEMPERATURE: 97.2 F | WEIGHT: 293 LBS | BODY MASS INDEX: 47.09 KG/M2 | DIASTOLIC BLOOD PRESSURE: 80 MMHG | HEART RATE: 74 BPM

## 2025-04-28 DIAGNOSIS — K64.4 EXTERNAL HEMORRHOIDS: ICD-10-CM

## 2025-04-28 DIAGNOSIS — L73.9 FOLLICULITIS: Primary | ICD-10-CM

## 2025-04-28 DIAGNOSIS — K60.2 ANAL FISSURE: ICD-10-CM

## 2025-04-28 PROCEDURE — 99213 OFFICE O/P EST LOW 20 MIN: CPT | Performed by: PHYSICIAN ASSISTANT

## 2025-04-28 PROCEDURE — 3074F SYST BP LT 130 MM HG: CPT | Performed by: PHYSICIAN ASSISTANT

## 2025-04-28 PROCEDURE — 3079F DIAST BP 80-89 MM HG: CPT | Performed by: PHYSICIAN ASSISTANT

## 2025-04-28 RX ORDER — HYDROCORTISONE 25 MG/G
CREAM TOPICAL 2 TIMES DAILY PRN
Qty: 30 G | Refills: 1 | Status: SHIPPED | OUTPATIENT
Start: 2025-04-28

## 2025-04-28 RX ORDER — CEPHALEXIN 500 MG/1
500 CAPSULE ORAL 3 TIMES DAILY
Qty: 30 CAPSULE | Refills: 0 | Status: SHIPPED | OUTPATIENT
Start: 2025-04-28 | End: 2025-05-08

## 2025-04-28 NOTE — PROGRESS NOTES
Assessment & Plan     Folliculitis  Very mild to the left index finger  - cephALEXin (KEFLEX) 500 MG capsule; Take 1 capsule (500 mg) by mouth 3 times daily for 10 days.    External hemorrhoids  Anal fissure  Treated as noted and follow-up as needed.  - hydrocortisone, Perianal, (HYDROCORTISONE) 2.5 % cream; Place rectally 2 times daily as needed for hemorrhoids.    Remi Heaton is a 39 year old, presenting for the following health issues:  Derm Problem (Rash;fingers) and Anal fissure        4/28/2025     7:56 AM   Additional Questions   Roomed by Maria Alejandra STRANGE   Accompanied by self     History of Present Illness       Reason for visit:  Anal fissure  Symptom onset:  1-2 weeks ago  Symptoms include:  Itching  Symptom intensity:  Mild  Symptom progression:  Staying the same  Had these symptoms before:  Yes  Has tried/received treatment for these symptoms:  Yes  Previous treatment was successful:  Yes  Prior treatment description:  Sits bath  What makes it worse:  No  What makes it better:  Bathing She is missing 1 dose(s) of medications per week.  She is not taking prescribed medications regularly due to remembering to take.      Rash  Onset/Duration: just started a few days ago  Description  Location: both hands but left hand is worse - she does have a minal further up her arm not sure if that is really part of the rash or just a scratch  Character: blotchy, raised, painful, red - painful when taking a bath  Itching: mild  Intensity:  mild  Progression of Symptoms:  same  Accompanying signs and symptoms:   Fever: No  Body aches or joint pain: No  Sore throat symptoms: No  Recent cold symptoms: No  History:           Previous episodes of similar rash: None  New exposures:  None  Recent travel: No  Exposure to similar rash: No - 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  Precipitating or alleviating factors: none  Therapies tried and outcome:  "Bacitracin      Review of Systems  Constitutional, HEENT, cardiovascular, pulmonary, GI, , musculoskeletal, neuro, skin, endocrine and psych systems are negative, except as otherwise noted.      Objective    /80   Pulse 74   Temp 97.2  F (36.2  C) (Temporal)   Resp 22   Ht 1.683 m (5' 6.25\")   Wt (!) 137.7 kg (303 lb 8 oz)   SpO2 97%   BMI 48.62 kg/m    Body mass index is 48.62 kg/m .  Physical Exam   GENERAL: alert and no distress  RESP: lungs clear to auscultation - no rales, rhonchi or wheezes  CV: regular rate and rhythm, normal S1 S2, no S3 or S4, no murmur, click or rub, no peripheral edema  RECTAL (female): collapsed external hemorrhoid at the 6 o'clock position and anal fissure at 12 o'clock  MS: no gross musculoskeletal defects noted, no edema  SKIN: no suspicious lesions or rashes with exception of some mild folliculitis noted to the posterior index finger of the left hand.          Signed Electronically by: Lazaro Vides PA-C    "

## 2025-04-29 ENCOUNTER — OFFICE VISIT (OUTPATIENT)
Dept: PODIATRY | Facility: CLINIC | Age: 40
End: 2025-04-29
Payer: MEDICARE

## 2025-04-29 ENCOUNTER — ANCILLARY PROCEDURE (OUTPATIENT)
Dept: GENERAL RADIOLOGY | Facility: CLINIC | Age: 40
End: 2025-04-29
Attending: PODIATRIST
Payer: MEDICARE

## 2025-04-29 VITALS — WEIGHT: 293 LBS | BODY MASS INDEX: 47.09 KG/M2 | HEIGHT: 66 IN

## 2025-04-29 DIAGNOSIS — Q66.70 CAVUS DEFORMITY OF FOOT: ICD-10-CM

## 2025-04-29 DIAGNOSIS — M72.2 PLANTAR FASCIITIS: ICD-10-CM

## 2025-04-29 DIAGNOSIS — M72.2 PLANTAR FASCIITIS: Primary | ICD-10-CM

## 2025-04-29 PROCEDURE — 73630 X-RAY EXAM OF FOOT: CPT | Mod: TC | Performed by: RADIOLOGY

## 2025-04-29 PROCEDURE — 99203 OFFICE O/P NEW LOW 30 MIN: CPT | Performed by: PODIATRIST

## 2025-04-29 PROCEDURE — 1125F AMNT PAIN NOTED PAIN PRSNT: CPT | Performed by: PODIATRIST

## 2025-04-29 ASSESSMENT — PAIN SCALES - GENERAL: PAINLEVEL_OUTOF10: MODERATE PAIN (6)

## 2025-04-29 NOTE — PATIENT INSTRUCTIONS
Reliable shoe stores: To maximize your experience and provide the best possible fit.  Be sure to show them your foot concerns and tell them Dr. Rincon sent you.      Stores listed in bold have only athletic shoes, and stores that are not bold are mostly casual or variety of shoes    [employer]  [address] [place] [address]  [phone]    [employer] [facility] [place]  [address] [place] [address]  [phone]     [employer] [place]  [address] [place] [address]  [phone]    [employer]  [address] [facility] [address]  [phone]    [employer]  [address] [place], [place] [address] [phone]    [employer]  [address] [place] [address]  [phone]                         [employer] Locations:     [address] [place] [address]   [phone]     [address] [place] [address] [address] [place] [address]   [phone]     [address] [place] [address]   [phone]     [address] [facility] [place] [address]   [phone]     [address] [facility] [place] [address]   [phone]     [address] [place] [address]   [phone]     [address] [facility] [address]   [place] [address]   [phone]     [address] [facility]   [place] [address]   [phone]             How to find reasonable shoes          The correct width    Correct Fitting    Correct Length      Foot Distortion    Posture Distortion                          Torsional Rigidity      Grasp behind the heel and underneath the foot and twist      Bad    Excessive torsion/twist in midfoot     Less torsion/twist in midfoot is better                   Heel Counter Rigidity      Grasp just above   midsole and squeeze      Bad    Soft heel counter      Good    Rigid Heel Counter      Flexion Rigidity      Grasp shoe and bend from forefoot to rearfoot                PLANTAR FASCIITIS  The  plantar fascia  is a  thick fibrous layer of tissue that covers the bones on the bottom of your foot. It supports the foot bones in an arched position.  Plantar fasciitis  is a painful inflammation of the plantar fascia due to overuse. This can develop gradually or suddenly. It usually affects one foot at a time but can affect both feet. Heel pain can be sharp and feel like a knife sticking in the bottom of your foot. Pain may occur after exercising, long distance jogging, stair climbing, long periods of standing, or after getting up from a seated position.  Risk factors include arthritis, diabetes, obesity or recent weight gain, flat-foot, high arch, wearing high heels or loose shoes or shoes with poor arch support.  Sudden changes in activity or shoe gear may contribute to symptoms.  Foot pain from this condition is usually worse in the morning and improves with walking. By the end of the day there may be a dull aching. Treatment requires improved support of feet, short-term rest and controlling inflammation. It may take up to nine months before all symptoms go away with the measures described below.  A steroid injection into the foot, or surgery may be needed if this is becomes long standing or severe.  HOME CARE  If you are overweight, lose weight to promote healing.  Choose supportive shoes (stiff through the shank) with good arch support and shock absorbency. Replace athletic shoes when they become worn out. Don t walk or run barefoot.  Shoe inserts are an important part of treatment. You can buy off-the-shelf shoe inserts inexpensively such as [employer].  The best ones are custom molded to your foot with a prescription.  Night splints keep the plantar fascia gently stretched while you sleep and will eliminate morning pain. Wear it ALL NIGHT EVERY NIGHT, or any time you sit for a long time.  Reduce by 10% or more the activities that stress the feet: jogging, prolonged standing or walking, high impact sports, etc.  Stretch your  feet. Gently flex your ankle by leaning into a wall or counter or drop your heel from a step.  Stretch two minutes of every hour you are awake.  Icing or massage may help heel pain. Apply an ice pack or frozen water or Coke bottle to the heel for 10-20 minutes as a preventive or after an acute flare of symptoms. You may repeat this as needed.   Follow up with your Doctor in 3 weeks as instructed.

## 2025-04-29 NOTE — NURSING NOTE
Dispensed 2 Dorsal (Anterior) Night Splint, Size S/M, with [facility] agreement signed by patient. Tonya Shore CMA, April 29, 2025

## 2025-04-29 NOTE — PROGRESS NOTES
HPI:  Florina Marie is a 39 year old female who is seen in consultation at the request of self.    Pt presents for eval of:   (Onset, Location, L/R, Character, Treatments, Injury if yes)    XR Left and Right 4/29/2025, 9/22/2020    Requesting new orthotics for arch pain.      LOV 9/22/2020 for Left and Right plantar fasciitis and Cavus deformity of foot. Orthotics fabricated.  Continues to have plantar Left and Right arch pain. No longer wearing orthotics, due to breakdown of materials. Moved and cannot locate her night splints.   Constant dull ache pain 6/10  Would like to discuss surgery so that she could wear sandals.  She is also requesting 2 new night splints as her night splints that she has had years ago have broken and she no longer has possession of them.  They were very helpful regarding her plantar heel and arch pain and pain upon arising after rest that she would like to address.    Works at a  on her feet, 4-5 hour work day.  20 hours a week.     ROS: 10 point ROS neg other than the symptoms noted above in the HPI.    Patient Active Problem List   Diagnosis    Problems with learning    Morbid obesity (H)    Generalized anxiety disorder    Hyperlipidemia, unspecified    Attention deficit disorder without hyperactivity    Prolonged PTT    Cervical radiculopathy    PRIMO (obstructive sleep apnea)       PAST MEDICAL HISTORY:   Past Medical History:   Diagnosis Date    Attention deficit disorder with hyperactivity(314.01)     Left carpal tunnel syndrome 6/27/2019    Other kyphoscoliosis and scoliosis     Other specified delay in development     Microcephaly    Right carpal tunnel syndrome 3/14/2019    Viral warts, unspecified     plantar warts     PAST SURGICAL HISTORY:   Past Surgical History:   Procedure Laterality Date    BIOPSY VULVA N/A 12/13/2024    Procedure: Biopsy vulva;  Surgeon: Deysi De Paz DO;  Location: [facility]    EXAM UNDER ANESTHESIA PELVIC N/A 9/9/2016    Procedure: EXAM UNDER  ANESTHESIA PELVIC;  Surgeon: Rhoda Alcazar MD;  Location: [facility]    EXAM UNDER ANESTHESIA PELVIC N/A 12/13/2024    Procedure: EXAM UNDER ANESTHESIA, PELVIS;  Surgeon: Deysi De Paz DO;  Location[facility]    HC TOOTH EXTRACTION W/FORCEP      wisdom    LAPAROSCOPIC APPENDECTOMY N/A 1/9/2023    Procedure: APPENDECTOMY, LAPAROSCOPIC;  Surgeon: Jhony Perez MD;  Location[facility]    PAPANICOLAOU SMEAR N/A 12/13/2024    Procedure: PAPANICOLAOU SMEAR under sedation;  Surgeon: Deysi De Paz DO;  Location[facility]    RELEASE CARPAL TUNNEL Right 3/22/2019    Procedure: RIGHT RELEASE CARPAL TUNNEL;  Surgeon: Anjum Wilburn DO;  [facility]    RELEASE CARPAL TUNNEL Left 7/16/2019    Procedure: RELEASE, CARPAL TUNNEL-Left;  Surgeon: Anjum Wilburn DO;  Location: [facility]     MEDICATIONS:   Current Outpatient Medications:     albuterol (PROVENTIL) (2.5 MG/3ML) 0.083% neb solution, Take 1 vial (2.5 mg) by nebulization every 6 hours as needed for shortness of breath, wheezing or cough., Disp: 90 mL, Rfl: 0    busPIRone (BUSPAR) 7.5 MG tablet, Take 7.5 mg by mouth 2 times daily, Disp: , Rfl:     cephALEXin (KEFLEX) 500 MG capsule, Take 1 capsule (500 mg) by mouth 3 times daily for 10 days., Disp: 30 capsule, Rfl: 0    clotrimazole (LOTRIMIN) 1 % external cream, Apply topically 2 times daily, Disp: 85 g, Rfl: 0    desogestrel-ethinyl estradiol (APRI) 0.15-30 MG-MCG tablet, Take 1 tablet by mouth daily Take one tablet daily., Disp: 84 tablet, Rfl: 3    escitalopram (LEXAPRO) 20 MG tablet, Take 40 mg by mouth daily, Disp: , Rfl: 5    hydrocortisone, Perianal, (HYDROCORTISONE) 2.5 % cream, Place rectally 2 times daily as needed for hemorrhoids., Disp: 30 g, Rfl: 1    hydrOXYzine (ATARAX) 25 MG tablet, Take 25 mg by mouth daily as needed for anxiety, Disp: , Rfl:     QUEtiapine (SEROQUEL) 50 MG tablet, , Disp: , Rfl:     valACYclovir (VALTREX) 1000 mg tablet, Take 2 tablets (2,000 mg) by mouth 2 times  daily For 1 day (Patient not taking: Reported on 4/29/2025), Disp: 4 tablet, Rfl: 3  ALLERGIES:    Allergies   Allergen Reactions    Macrobid [Nitrofurantoin] Headache and Nausea     SOCIAL HISTORY:   Social History     Socioeconomic History    Marital status: Single     Spouse name: Not on file    Number of children: 0    Years of education: Not on file    Highest education level: Not on file   Occupational History    Occupation: student     Comment: [employer]   Tobacco Use    Smoking status: Never     Passive exposure: Never    Smokeless tobacco: Never    Tobacco comments:     no smokers in the household   Vaping Use    Vaping status: Never Used   Substance and Sexual Activity    Alcohol use: No    Drug use: No    Sexual activity: Never     Birth control/protection: Pill   Other Topics Concern     Service Not Asked    Blood Transfusions Not Asked    Caffeine Concern Yes     Comment: 1 can QD    Occupational Exposure Not Asked    Hobby Hazards Not Asked    Sleep Concern Not Asked    Stress Concern Not Asked    Weight Concern Not Asked    Special Diet Not Asked    Back Care Not Asked    Exercise Yes     Comment: 4 days a week 1/2 hour    Bike Helmet No    Seat Belt Yes    Self-Exams Not Asked    Parent/sibling w/ CABG, MI or angioplasty before 65F 55M? No   Social History Narrative    Not on file     Social Drivers of Health     Financial Resource Strain: Low Risk  (8/8/2024)    Financial Resource Strain     Within the past 12 months, have you or your family members you live with been unable to get utilities (heat, electricity) when it was really needed?: No   Food Insecurity: Low Risk  (8/8/2024)    Food Insecurity     Within the past 12 months, did you worry that your food would run out before you got money to buy more?: No     Within the past 12 months, did the food you bought just not last and you didn t have money to get more?: No   Transportation Needs: Low Risk  (8/8/2024)     Transportation Needs     Within the past 12 months, has lack of transportation kept you from medical appointments, getting your medicines, non-medical meetings or appointments, work, or from getting things that you need?: No   Physical Activity: Insufficiently Active (8/8/2024)    Exercise Vital Sign     Days of Exercise per Week: 4 days     Minutes of Exercise per Session: 30 min   Stress: No Stress Concern Present (8/8/2024)    [place] [employer] - Occupational Stress Questionnaire     Feeling of Stress : Not at all   Social Connections: Unknown (8/8/2024)    Social Connection and Isolation Panel [NHANES]     Frequency of Communication with Friends and Family: Not on file     Frequency of Social Gatherings with Friends and Family: Once a week     Attends Mandaeism Services: Not on file     Active Member of Clubs or Organizations: Not on file     Attends Club or Organization Meetings: Not on file     Marital Status: Not on file   Interpersonal Safety: Not At Risk (4/6/2025)    Received from [facility]    Intimate Partner Violence     Are you in a relationship where you are physically hurt, threatened and/or made to feel afraid?: No   Housing Stability: Low Risk  (8/8/2024)    Housing Stability     Do you have housing? : Yes     Are you worried about losing your housing?: No     FAMILY HISTORY:   Family History   Problem Relation Age of Onset    Lipids Father         diet    Thyroid Disease Mother         unsure if hypo or hyper    Diabetes Paternal Grandfather         adult    Heart Disease Paternal Grandfather         bypass/open hear surgery    Lipids Maternal Aunt         medication    Lipids Maternal Aunt         diet    Alzheimer Disease Maternal Grandfather     Hypertension No family hx of     Coronary Artery Disease No family hx of     Hyperlipidemia No family hx of     Cancer - colorectal No family hx of     Ovarian Cancer No family hx of     Prostate Cancer No  "family hx of     Depression/Anxiety No family hx of     Cerebrovascular Disease No family hx of     Anesthesia Reaction No family hx of     Asthma No family hx of     Osteoporosis No family hx of     Chemical Addiction No family hx of     Obesity No family hx of        EXAM:Vitals: Ht 1.683 m (5' 6.25\")   Wt (!) 137.7 kg (303 lb 8 oz)   BMI 48.62 kg/m    BMI= Body mass index is 48.62 kg/m .    General appearance: Patient is alert and fully cooperative with history & exam.  No sign of distress is noted during the visit.     Psychiatric: Affect is pleasant & appropriate.  Patient appears motivated to improve health.     Respiratory: Breathing is regular & unlabored while sitting.     HEENT: Hearing is intact to spoken word.  Speech is clear.  No gross evidence of visual impairment that would impact ambulation.     Vascular: DP & PT 2/4 & regular bilaterally.  No significant edema, rubor or varicosities noted.  CFT and skin temperature is normal to both lower extremities.       Neurologic: Lower extremity sensation is intact to light touch.  No evidence of weakness in the lower extremities.  No evidence of neuropathy and negative tinel sign.     Dermatologic: Skin is intact to both lower extremities without significant lesions, rash or abrasion.  Normal texture turgor and tone. No paronychia or evidence of soft tissue infection is noted.    Musculoskeletal: Patient is ambulatory without assistive device or brace.  Discomfort is noted with firm palpation along the medial band of the plantar fascia bilateral foot most notably at the origination upon the calcaneus not through the arch.  No pain with compression of the calcaneus medial to lateral or with palpation of the achilles, peroneal or posterior tibial tendons.  Slightly more than 0  of ankle joint dorsiflexion without crepitus or pain throughout the ankle, subtalar or midtarsal joints.  No pain or limitations throughout manual muscle strength testing plus 5/5 to " all four quadrants bilateral.  No palpable edema noted.  Generalized high arch foot type is noted however it is fairly flexible upon weightbearing and does collapse upon weightbearing.    Radiographs:  Weight bearing. Osteophyte noted about the plantar calcaneus consistent with plantar fasciitis. Elevated calcaneal inclination angle consistent with pes cavus.     ASSESSMENT:       ICD-10-CM    1. Plantar fasciitis  M72.2 XR Foot Bilateral G/E 3 Views     Orthotics, Mastectomy and Custom Compression Orders Type: Orthotic; Orthotic Type Requested: Foot Orthotics; Foot Orthotics Laterality: Bilateral     Ankle/Foot Bracing Supplies Order Plantar Fasciitis Splint (S/M); Left, Right      2. Cavus deformity of foot  Q66.70 XR Foot Bilateral G/E 3 Views     Orthotics, Mastectomy and Custom Compression Orders Type: Orthotic; Orthotic Type Requested: Foot Orthotics; Foot Orthotics Laterality: Bilateral        PLAN:  [facility] and discussed etiology and treatment options.      Treatments:  4/29/2025    Obtained and interpreted radiographs.   Discontinue barefoot walking or unsupported walking in shoes without shank.  Dispensed written instructions to obtain appropriate shoe gear and/or OTC inserts.  Dispensed two anterior night splint to use all night every night.    Prescription for custom molded orthotics 4/29/2025  Follow up in 4-5 weeks    I am not aware of any surgery that can be done that would allow her to wear sandals as a supportive shoe.  She will still be required to wear supportive shoes when she is on her feet  All questions were answered.  Follow-up as needed.      Andres Rincon DPM

## 2025-04-29 NOTE — LETTER
4/29/2025      Florina Marie  [address]  [facility] [address]      Dear Colleague,    Thank you for referring your patient, Florina Marie, to the [facility]. Please see a copy of my visit note below.    HPI:  Florina Marie is a 39 year old female who is seen in consultation at the request of self.    Pt presents for eval of:   (Onset, Location, L/R, Character, Treatments, Injury if yes)    XR Left and Right 4/29/2025, 9/22/2020    Requesting new orthotics for arch pain.      LOV 9/22/2020 for Left and Right plantar fasciitis and Cavus deformity of foot. Orthotics fabricated.  Continues to have plantar Left and Right arch pain. No longer wearing orthotics, due to breakdown of materials. Moved and cannot locate her night splints.   Constant dull ache pain 6/10  Would like to discuss surgery so that she could wear sandals.  She is also requesting 2 new night splints as her night splints that she has had years ago have broken and she no longer has possession of them.  They were very helpful regarding her plantar heel and arch pain and pain upon arising after rest that she would like to address.    Works at a  on her feet, 4-5 hour work day.  20 hours a week.     ROS: 10 point ROS neg other than the symptoms noted above in the HPI.    Patient Active Problem List   Diagnosis     Problems with learning     Morbid obesity (H)     Generalized anxiety disorder     Hyperlipidemia, unspecified     Attention deficit disorder without hyperactivity     Prolonged PTT     Cervical radiculopathy     PRIMO (obstructive sleep apnea)       PAST MEDICAL HISTORY:   Past Medical History:   Diagnosis Date     Attention deficit disorder with hyperactivity(314.01)      Left carpal tunnel syndrome 6/27/2019     Other kyphoscoliosis and scoliosis      Other specified delay in development     Microcephaly     Right carpal tunnel syndrome 3/14/2019     Viral warts, unspecified     plantar warts      PAST SURGICAL HISTORY:   Past Surgical History:   Procedure Laterality Date     BIOPSY VULVA N/A 12/13/2024    Procedure: Biopsy vulva;  Surgeon: Deysi De Paz DO;  [facility]     EXAM UNDER ANESTHESIA PELVIC N/A 9/9/2016    Procedure: EXAM UNDER ANESTHESIA PELVIC;  Surgeon: Rhoda Alcazar MD;  Location[facility]     EXAM UNDER ANESTHESIA PELVIC N/A 12/13/2024    Procedure: EXAM UNDER ANESTHESIA, PELVIS;  Surgeon: Deysi De Paz DO;  Location[facility]     HC TOOTH EXTRACTION W/FORCEP      wisdom     LAPAROSCOPIC APPENDECTOMY N/A 1/9/2023    Procedure: APPENDECTOMY, LAPAROSCOPIC;  Surgeon: Jhony Perez MD;  Location: [facility]     PAPANICOLAOU SMEAR N/A 12/13/2024    Procedure: PAPANICOLAOU SMEAR under sedation;  Surgeon: Deysi De Paz DO;  [facility]     RELEASE CARPAL TUNNEL Right 3/22/2019    Procedure: RIGHT RELEASE CARPAL TUNNEL;  Surgeon: Anjum Wilburn DO;  [facility]     RELEASE CARPAL TUNNEL Left 7/16/2019    Procedure: RELEASE, CARPAL TUNNEL-Left;  Surgeon: Anjum Wilburn DO;  Location[facility]     MEDICATIONS:   Current Outpatient Medications:      albuterol (PROVENTIL) (2.5 MG/3ML) 0.083% neb solution, Take 1 vial (2.5 mg) by nebulization every 6 hours as needed for shortness of breath, wheezing or cough., Disp: 90 mL, Rfl: 0     busPIRone (BUSPAR) 7.5 MG tablet, Take 7.5 mg by mouth 2 times daily, Disp: , Rfl:      cephALEXin (KEFLEX) 500 MG capsule, Take 1 capsule (500 mg) by mouth 3 times daily for 10 days., Disp: 30 capsule, Rfl: 0     clotrimazole (LOTRIMIN) 1 % external cream, Apply topically 2 times daily, Disp: 85 g, Rfl: 0     desogestrel-ethinyl estradiol (APRI) 0.15-30 MG-MCG tablet, Take 1 tablet by mouth daily Take one tablet daily., Disp: 84 tablet, Rfl: 3     escitalopram (LEXAPRO) 20 MG tablet, Take 40 mg by mouth daily, Disp: , Rfl: 5     hydrocortisone, Perianal, (HYDROCORTISONE) 2.5 % cream, Place rectally 2 times daily as needed for  hemorrhoids., Disp: 30 g, Rfl: 1     hydrOXYzine (ATARAX) 25 MG tablet, Take 25 mg by mouth daily as needed for anxiety, Disp: , Rfl:      QUEtiapine (SEROQUEL) 50 MG tablet, , Disp: , Rfl:      valACYclovir (VALTREX) 1000 mg tablet, Take 2 tablets (2,000 mg) by mouth 2 times daily For 1 day (Patient not taking: Reported on 4/29/2025), Disp: 4 tablet, Rfl: 3  ALLERGIES:    Allergies   Allergen Reactions     Macrobid [Nitrofurantoin] Headache and Nausea     SOCIAL HISTORY:   Social History     Socioeconomic History     Marital status: Single     Spouse name: Not on file     Number of children: 0     Years of education: Not on file     Highest education level: Not on file   Occupational History     Occupation: student     Comment: [employer]   Tobacco Use     Smoking status: Never     Passive exposure: Never     Smokeless tobacco: Never     Tobacco comments:     no smokers in the household   Vaping Use     Vaping status: Never Used   Substance and Sexual Activity     Alcohol use: No     Drug use: No     Sexual activity: Never     Birth control/protection: Pill   Other Topics Concern      Service Not Asked     Blood Transfusions Not Asked     Caffeine Concern Yes     Comment: 1 can QD     Occupational Exposure Not Asked     Hobby Hazards Not Asked     Sleep Concern Not Asked     Stress Concern Not Asked     Weight Concern Not Asked     Special Diet Not Asked     Back Care Not Asked     Exercise Yes     Comment: 4 days a week 1/2 hour     Bike Helmet No     Seat Belt Yes     Self-Exams Not Asked     Parent/sibling w/ CABG, MI or angioplasty before 65F 55M? No   Social History Narrative     Not on file     Social Drivers of Health     Financial Resource Strain: Low Risk  (8/8/2024)    Financial Resource Strain      Within the past 12 months, have you or your family members you live with been unable to get utilities (heat, electricity) when it was really needed?: No   Food Insecurity: Low Risk   (8/8/2024)    Food Insecurity      Within the past 12 months, did you worry that your food would run out before you got money to buy more?: No      Within the past 12 months, did the food you bought just not last and you didn t have money to get more?: No   Transportation Needs: Low Risk  (8/8/2024)    Transportation Needs      Within the past 12 months, has lack of transportation kept you from medical appointments, getting your medicines, non-medical meetings or appointments, work, or from getting things that you need?: No   Physical Activity: Insufficiently Active (8/8/2024)    Exercise Vital Sign      Days of Exercise per Week: 4 days      Minutes of Exercise per Session: 30 min   Stress: No Stress Concern Present (8/8/2024)    [employer] - Occupational Stress Questionnaire      Feeling of Stress : Not at all   Social Connections: Unknown (8/8/2024)    Social Connection and Isolation Panel [NHANES]      Frequency of Communication with Friends and Family: Not on file      Frequency of Social Gatherings with Friends and Family: Once a week      Attends Restorationist Services: Not on file      Active Member of Clubs or Organizations: Not on file      Attends Club or Organization Meetings: Not on file      Marital Status: Not on file   Interpersonal Safety: Not At Risk (4/6/2025)    Received from [facility] and [facility]    Intimate Partner Violence      Are you in a relationship where you are physically hurt, threatened and/or made to feel afraid?: No   Housing Stability: Low Risk  (8/8/2024)    Housing Stability      Do you have housing? : Yes      Are you worried about losing your housing?: No     FAMILY HISTORY:   Family History   Problem Relation Age of Onset     Lipids Father         diet     Thyroid Disease Mother         unsure if hypo or hyper     Diabetes Paternal Grandfather         adult     Heart Disease Paternal Grandfather         bypass/open hear surgery     Lipids  "Maternal Aunt         medication     Lipids Maternal Aunt         diet     Alzheimer Disease Maternal Grandfather      Hypertension No family hx of      Coronary Artery Disease No family hx of      Hyperlipidemia No family hx of      Cancer - colorectal No family hx of      Ovarian Cancer No family hx of      Prostate Cancer No family hx of      Depression/Anxiety No family hx of      Cerebrovascular Disease No family hx of      Anesthesia Reaction No family hx of      Asthma No family hx of      Osteoporosis No family hx of      Chemical Addiction No family hx of      Obesity No family hx of        EXAM:Vitals: Ht 1.683 m (5' 6.25\")   Wt (!) 137.7 kg (303 lb 8 oz)   BMI 48.62 kg/m    BMI= Body mass index is 48.62 kg/m .    General appearance: Patient is alert and fully cooperative with history & exam.  No sign of distress is noted during the visit.     Psychiatric: Affect is pleasant & appropriate.  Patient appears motivated to improve health.     Respiratory: Breathing is regular & unlabored while sitting.     HEENT: Hearing is intact to spoken word.  Speech is clear.  No gross evidence of visual impairment that would impact ambulation.     Vascular: DP & PT 2/4 & regular bilaterally.  No significant edema, rubor or varicosities noted.  CFT and skin temperature is normal to both lower extremities.       Neurologic: Lower extremity sensation is intact to light touch.  No evidence of weakness in the lower extremities.  No evidence of neuropathy and negative tinel sign.     Dermatologic: Skin is intact to both lower extremities without significant lesions, rash or abrasion.  Normal texture turgor and tone. No paronychia or evidence of soft tissue infection is noted.    Musculoskeletal: Patient is ambulatory without assistive device or brace.  Discomfort is noted with firm palpation along the medial band of the plantar fascia bilateral foot most notably at the origination upon the calcaneus not through the arch.  No " pain with compression of the calcaneus medial to lateral or with palpation of the achilles, peroneal or posterior tibial tendons.  Slightly more than 0  of ankle joint dorsiflexion without crepitus or pain throughout the ankle, subtalar or midtarsal joints.  No pain or limitations throughout manual muscle strength testing plus 5/5 to all four quadrants bilateral.  No palpable edema noted.  Generalized high arch foot type is noted however it is fairly flexible upon weightbearing and does collapse upon weightbearing.    Radiographs:  Weight bearing. Osteophyte noted about the plantar calcaneus consistent with plantar fasciitis. Elevated calcaneal inclination angle consistent with pes cavus.     ASSESSMENT:       ICD-10-CM    1. Plantar fasciitis  M72.2 XR Foot Bilateral G/E 3 Views     Orthotics, Mastectomy and Custom Compression Orders Type: Orthotic; Orthotic Type Requested: Foot Orthotics; Foot Orthotics Laterality: Bilateral     Ankle/Foot Bracing Supplies Order Plantar Fasciitis Splint (S/M); Left, Right      2. Cavus deformity of foot  Q66.70 XR Foot Bilateral G/E 3 Views     Orthotics, Mastectomy and Custom Compression Orders Type: Orthotic; Orthotic Type Requested: Foot Orthotics; Foot Orthotics Laterality: Bilateral        PLAN:  Reviewed patient's chart in [facility] and discussed etiology and treatment options.      Treatments:  4/29/2025    Obtained and interpreted radiographs.   Discontinue barefoot walking or unsupported walking in shoes without shank.  Dispensed written instructions to obtain appropriate shoe gear and/or OTC inserts.  Dispensed two anterior night splint to use all night every night.    Prescription for custom molded orthotics 4/29/2025  Follow up in 4-5 weeks    I am not aware of any surgery that can be done that would allow her to wear sandals as a supportive shoe.  She will still be required to wear supportive shoes when she is on her feet  All questions were answered.  Follow-up as  needed.      Andres Rincon DPM    Again, thank you for allowing me to participate in the care of your patient.        Sincerely,        Andres Rincon DPM    Electronically signed

## 2025-05-26 ENCOUNTER — HOSPITAL ENCOUNTER (EMERGENCY)
Facility: CLINIC | Age: 40
Discharge: HOME | End: 2025-05-26
Attending: PHYSICIAN ASSISTANT | Admitting: PHYSICIAN ASSISTANT
Payer: MEDICARE

## 2025-05-26 VITALS
BODY MASS INDEX: 48.82 KG/M2 | SYSTOLIC BLOOD PRESSURE: 153 MMHG | WEIGHT: 293 LBS | OXYGEN SATURATION: 98 % | HEIGHT: 65 IN | TEMPERATURE: 97.6 F | HEART RATE: 88 BPM | DIASTOLIC BLOOD PRESSURE: 100 MMHG | RESPIRATION RATE: 18 BRPM

## 2025-05-26 DIAGNOSIS — J06.9 VIRAL URI: ICD-10-CM

## 2025-05-26 DIAGNOSIS — H92.01 OTALGIA, RIGHT: ICD-10-CM

## 2025-05-26 DIAGNOSIS — H69.91 EUSTACHIAN TUBE DYSFUNCTION, RIGHT: ICD-10-CM

## 2025-05-26 PROCEDURE — 99283 EMERGENCY DEPT VISIT LOW MDM: CPT | Performed by: PHYSICIAN ASSISTANT

## 2025-05-26 PROCEDURE — 250N000013 HC RX MED GY IP 250 OP 250 PS 637: Performed by: PHYSICIAN ASSISTANT

## 2025-05-26 PROCEDURE — 250N000009 HC RX 250: Performed by: PHYSICIAN ASSISTANT

## 2025-05-26 RX ORDER — PSEUDOEPHEDRINE HCL 30 MG/1
30 TABLET, FILM COATED ORAL ONCE
Status: COMPLETED | OUTPATIENT
Start: 2025-05-26 | End: 2025-05-26

## 2025-05-26 RX ORDER — CETIRIZINE HYDROCHLORIDE 10 MG/1
10 TABLET ORAL ONCE
Status: COMPLETED | OUTPATIENT
Start: 2025-05-26 | End: 2025-05-26

## 2025-05-26 RX ORDER — DEXAMETHASONE SODIUM PHOSPHATE 10 MG/ML
10 INJECTION, SOLUTION INTRAMUSCULAR; INTRAVENOUS ONCE
Status: COMPLETED | OUTPATIENT
Start: 2025-05-26 | End: 2025-05-26

## 2025-05-26 RX ADMIN — PSEUDOEPHEDRINE HCL 30 MG: 30 TABLET, FILM COATED ORAL at 18:17

## 2025-05-26 RX ADMIN — DEXAMETHASONE SODIUM PHOSPHATE 10 MG: 10 INJECTION, SOLUTION INTRAMUSCULAR; INTRAVENOUS at 18:17

## 2025-05-26 RX ADMIN — CETIRIZINE HYDROCHLORIDE 10 MG: 10 TABLET, FILM COATED ORAL at 18:17

## 2025-05-26 ASSESSMENT — COLUMBIA-SUICIDE SEVERITY RATING SCALE - C-SSRS
6. HAVE YOU EVER DONE ANYTHING, STARTED TO DO ANYTHING, OR PREPARED TO DO ANYTHING TO END YOUR LIFE?: NO
1. IN THE PAST MONTH, HAVE YOU WISHED YOU WERE DEAD OR WISHED YOU COULD GO TO SLEEP AND NOT WAKE UP?: NO
2. HAVE YOU ACTUALLY HAD ANY THOUGHTS OF KILLING YOURSELF IN THE PAST MONTH?: NO

## 2025-05-26 ASSESSMENT — ACTIVITIES OF DAILY LIVING (ADL): ADLS_ACUITY_SCORE: 41

## 2025-05-26 NOTE — ED TRIAGE NOTES
Pt presents with concerns of right ear pain.  Pt states that she has a cold at this time.  Pt states that she tries to pop her ear like when you are on a plane and it hurts to try.  Pt states that ear symptoms started Saturday and worsened.  No medications today for pain.       Triage Assessment (Adult)       Row Name 05/26/25 0797          Triage Assessment    Airway WDL WDL        Respiratory WDL    Respiratory WDL WDL        Skin Circulation/Temperature WDL    Skin Circulation/Temperature WDL WDL        Cardiac WDL    Cardiac WDL WDL        Peripheral/Neurovascular WDL    Peripheral Neurovascular WDL WDL        Cognitive/Neuro/Behavioral WDL    Cognitive/Neuro/Behavioral WDL WDL

## 2025-05-26 NOTE — DISCHARGE INSTRUCTIONS
It was a pleasure working with you today!  I hope your condition improves rapidly!     You are battling a virus that is causing your cold.  You are given the first dose of Zyrtec and pseudoephedrine here to help decongest your sinus and eustachian tube area.  I am hopeful that the Decadron you are given will help with the inflammation as well.  You can take Zyrtec 10 mg once daily moving forward for any nasal congestion or ear discomfort.  You can also use pseudoephedrine over-the-counter.  You will need to talk to your pharmacist about this medication, as it is behind the counter.  You can also use Flonase nasal spray 2 sprays each nostril once daily to help decongest.  Make sure that you are drinking 10+ glasses of water daily.  Try laying on your left side and put a heating pad on your right ear for 20 minutes every 1-2 hours over the next couple days to help with any discomfort.  You can also take ibuprofen 600 mg every 6 hours as needed for pain.

## 2025-05-27 NOTE — ED PROVIDER NOTES
History     Chief Complaint   Patient presents with    Otalgia     HPI  Florina Marie is a 39 year old female who presents for evaluation of otalgia on the right side starting this afternoon.  She is not able to hear out of her right ear suddenly and she feels like she needs to pop her eardrums but has not been able to do so with yawning and doing things that would typically help that.  She denies any fevers or chills.  No otorrhea.  No injury to the area.  Denies any jaw or tooth pain.  Reports URI symptoms for the past 4-5 days with rhinorrhea, congestion, and a mild nonproductive cough.  Denies any sore throat.        Allergies:  Allergies   Allergen Reactions    Macrobid [Nitrofurantoin] Headache and Nausea       Problem List:    Patient Active Problem List    Diagnosis Date Noted    PRIMO (obstructive sleep apnea) 12/03/2024     Priority: Medium    Cervical radiculopathy 12/16/2022     Priority: Medium    Prolonged PTT 08/09/2018     Priority: Medium    Attention deficit disorder without hyperactivity 12/01/2011     Priority: Medium    Hyperlipidemia, unspecified 01/08/2010     Priority: Medium    Generalized anxiety disorder 10/24/2007     Priority: Medium    Morbid obesity (H) 01/01/2004     Priority: Medium    Problems with learning 12/31/2003     Priority: Medium        Past Medical History:    Past Medical History:   Diagnosis Date    Attention deficit disorder with hyperactivity(314.01)     Left carpal tunnel syndrome 6/27/2019    Other kyphoscoliosis and scoliosis     Other specified delay in development     Right carpal tunnel syndrome 3/14/2019    Viral warts, unspecified        Past Surgical History:    Past Surgical History:   Procedure Laterality Date    BIOPSY VULVA N/A 12/13/2024    Procedure: Biopsy vulva;  Surgeon: Deysi De Paz DO;  Location:  OR    EXAM UNDER ANESTHESIA PELVIC N/A 9/9/2016    Procedure: EXAM UNDER ANESTHESIA PELVIC;  Surgeon: Rhoda Alcazar MD;  Location:  OR     EXAM UNDER ANESTHESIA PELVIC N/A 12/13/2024    Procedure: EXAM UNDER ANESTHESIA, PELVIS;  Surgeon: Deysi De Paz DO;  Location: [facility] OR    HC TOOTH EXTRACTION W/FORCEP      wisdom    LAPAROSCOPIC APPENDECTOMY N/A 1/9/2023    Procedure: APPENDECTOMY, LAPAROSCOPIC;  Surgeon: Jhony Perez MD;  [facility]: [facility]    PAPANICOLAOU SMEAR N/A 12/13/2024    Procedure: PAPANICOLAOU SMEAR under sedation;  Surgeon: Deysi De Paz DO;  [facility]: [facility]    RELEASE CARPAL TUNNEL Right 3/22/2019    Procedure: RIGHT RELEASE CARPAL TUNNEL;  Surgeon: Anjum Wilburn DO;  Location: [facility] OR    RELEASE CARPAL TUNNEL Left 7/16/2019    Procedure: RELEASE, CARPAL TUNNEL-Left;  Surgeon: Anjum Wilburn DO;  Location: [facility] OR       Family History:    Family History   Problem Relation Age of Onset    Lipids Father         diet    Thyroid Disease Mother         unsure if hypo or hyper    Diabetes Paternal Grandfather         adult    Heart Disease Paternal Grandfather         bypass/open hear surgery    Lipids Maternal Aunt         medication    Lipids Maternal Aunt         diet    Alzheimer Disease Maternal Grandfather     Hypertension No family hx of     Coronary Artery Disease No family hx of     Hyperlipidemia No family hx of     Cancer - colorectal No family hx of     Ovarian Cancer No family hx of     Prostate Cancer No family hx of     Depression/Anxiety No family hx of     Cerebrovascular Disease No family hx of     Anesthesia Reaction No family hx of     Asthma No family hx of     Osteoporosis No family hx of     Chemical Addiction No family hx of     Obesity No family hx of        Social History:  Marital Status:  Single [1]  Social History     Tobacco Use    Smoking status: Never     Passive exposure: Never    Smokeless tobacco: Never    Tobacco comments:     no smokers in the household   Vaping Use    Vaping status: Never Used   Substance Use Topics    Alcohol use: No    Drug use: No        Medications:   "  albuterol (PROVENTIL) (2.5 MG/3ML) 0.083% neb solution  busPIRone (BUSPAR) 7.5 MG tablet  clotrimazole (LOTRIMIN) 1 % external cream  desogestrel-ethinyl estradiol (APRI) 0.15-30 MG-MCG tablet  escitalopram (LEXAPRO) 20 MG tablet  hydrocortisone, Perianal, (HYDROCORTISONE) 2.5 % cream  hydrOXYzine (ATARAX) 25 MG tablet  QUEtiapine (SEROQUEL) 50 MG tablet  valACYclovir (VALTREX) 1000 mg tablet          Review of Systems   All other systems reviewed and are negative.      Physical Exam   BP: (!) 153/100  Pulse: 88  Temp: 97.6  F (36.4  C)  Resp: 18  Height: 165.1 cm (5' 5\")  Weight: (!) 138.8 kg (306 lb)  SpO2: 98 %      Physical Exam  Vitals and nursing note reviewed.   Constitutional:       General: She is not in acute distress.     Appearance: She is not diaphoretic.   HENT:      Head: Normocephalic and atraumatic.      Right Ear: External ear normal. There is no impacted cerumen.      Left Ear: Tympanic membrane, ear canal and external ear normal. There is no impacted cerumen.      Ears:      Comments: Right clear serous effusion.  No purulence.  No erythema.  No perforation.  Retracted TM.  Consistent with eustachian tube dysfunction.  Canal is otherwise normal.  She does not have any pain with range of motion of the ear to suggest otitis externa.  No TMJ tenderness.  Normal range of motion of the jaw.  No trismus or malocclusion.  No significant dental discomfort to palpation.     Nose: Nose normal.      Mouth/Throat:      Pharynx: No oropharyngeal exudate.   Eyes:      General: No scleral icterus.        Right eye: No discharge.         Left eye: No discharge.      Conjunctiva/sclera: Conjunctivae normal.      Pupils: Pupils are equal, round, and reactive to light.   Neck:      Thyroid: No thyromegaly.   Cardiovascular:      Rate and Rhythm: Normal rate and regular rhythm.      Heart sounds: Normal heart sounds. No murmur heard.  Pulmonary:      Effort: Pulmonary effort is normal. No respiratory distress.     " " Breath sounds: Normal breath sounds. No wheezing or rales.   Chest:      Chest wall: No tenderness.   Abdominal:      General: Bowel sounds are normal. There is no distension.      Palpations: Abdomen is soft. There is no mass.      Tenderness: There is no abdominal tenderness. There is no guarding or rebound.   Musculoskeletal:         General: No tenderness or deformity. Normal range of motion.      Cervical back: Normal range of motion and neck supple. No rigidity or tenderness.   Lymphadenopathy:      Cervical: No cervical adenopathy.   Skin:     General: Skin is warm and dry.      Capillary Refill: Capillary refill takes less than 2 seconds.      Findings: No erythema or rash.   Neurological:      Mental Status: She is alert and oriented to person, place, and time.      Cranial Nerves: No cranial nerve deficit.   Psychiatric:         Behavior: Behavior normal.         Thought Content: Thought content normal.         ED Course        Procedures              Critical Care time:  none     None         No results found for this or any previous visit (from the past 24 hours).    Medications   pseudoePHEDrine (SUDAFED) tablet 30 mg (30 mg Oral $Given 5/26/25 1817)   dexAMETHasone (PF) (DECADRON) injectable solution used ORALLY 10 mg (10 mg Oral $Given 5/26/25 1817)   cetirizine (zyrTEC) tablet 10 mg (10 mg Oral $Given 5/26/25 1817)       Assessments & Plan (with Medical Decision Making)     Viral URI  Eustachian tube dysfunction, right  Otalgia, right     39 year old female presents for evaluation of right-sided ear pain.  URI symptoms for the past 4 days with rhinorrhea, congestion, and a mild cough.  Ear pain started today.  It feels plugged.  She cannot hear well out of that ear.  Denies any otorrhea.  BP (!) 153/100   Pulse 88   Temp 97.6  F (36.4  C) (Oral)   Resp 18   Ht 1.651 m (5' 5\")   Wt (!) 138.8 kg (306 lb)   SpO2 98%   BMI 50.92 kg/m     Generally healthy-appearing in no acute distress.  No " tenderness of the external ear.  No external auditory canal abnormalities.  No TMJ tenderness.  Teeth without tenderness.  No sign of dental abscess.  There is a right sided clear serous effusion without erythema or perforation.  Noted retraction.  Remainder of the ENT exam is otherwise negative.  Discussed antibiotics are not indicated at this time.  The effusion is clear.  Goal of anti-inflammatory treatment reviewed with her.  I did give her a 10 mg Decadron dose here in the emergency department.  Pharmacies are closed given that it is memorial day.  Therefore, we gave her Zyrtec 10 mg and Sudafed 30 mg here in the emergency department.  Also recommended picking up OTC Flonase nasal spray.  Push clear fluids.  Try heating pad on the right ear with laying on her left side for 20 minutes every hour.  Ibuprofen 600 mg every 6 hours as needed for pain.  Indications for return reviewed with her.  Patient was in agreement with this plan and suitable for discharge.     I have reviewed the nursing notes.    I have reviewed the findings, diagnosis, plan and need for follow up with the patient.           Medical Decision Making  The patient's presentation was of moderate complexity (an acute illness with systemic symptoms).    The patient's evaluation involved:  history and exam without other MDM data elements    The patient's management necessitated moderate risk (prescription drug management including medications given in the ED).        Discharge Medication List as of 5/26/2025  6:19 PM          Final diagnoses:   Viral URI   Eustachian tube dysfunction, right   Otalgia, right     Disclaimer: This note consists of symbols derived from keyboarding, dictation and/or voice recognition software. As a result, there may be errors in the script that have gone undetected. Please consider this when interpreting information found in this chart.      5/26/2025   [facility] EMERGENCY DEPT       Jesse Valentin  YAHAIRA Holcomb  05/26/25 [id number]

## 2025-05-28 ENCOUNTER — PATIENT OUTREACH (OUTPATIENT)
Dept: CARE COORDINATION | Facility: CLINIC | Age: 40
End: 2025-05-28
Payer: MEDICARE

## 2025-05-28 NOTE — LETTER
Florina Marie  [address]   [place] [address]    Dear Florina Marie,      I am a team member within the [facility] with [facility]. I recently tried to reach you to ensure you were doing well following a recent visit within our health system. I also wanted to take this chance to introduce Clinic Care Coordination.     Below is a description of Clinic Care Coordination and how this team can further assist you:       The Clinic Care Coordination team is made up of a Registered Nurse, , Financial Resource Worker, and a Community Health Worker who understand and can help navigate the health care system. The goal of clinic care coordination is to help you manage your health, improve access to care, and achieve optimal health outcomes. They work alongside your provider to assist you in determining your health and social needs, obtain health care and community resources, and provide you with necessary information and education. Clinic Care Coordination can work with you through any barriers and develop a care plan that helps coordinate and strengthen the relationship between you and your care team.    If you wish to connect with the Clinic Care Coordination Team, please let your [facility] Primary Care Provider or Clinic Care Team know and they can place a referral. The Clinic Care Coordination team will then reach out by phone to further support you.    We are focused on providing you with the highest-quality healthcare experience possible.    Sincerely,   Your care team with [facility]'s [address] [place] [address] ([phone]).

## 2025-05-28 NOTE — PROGRESS NOTES
Clinic Care Coordination Contact  Community Health Worker Initial Outreach    CHW Initial Information Gathering:  Referral Source: ED Follow-Up  Current living arrangement:: Not Assessed  No PCP office visit in Past Year: No  CHW Additional Questions  If ED/Hospital discharge, follow-up appointment scheduled as recommended?: N/A  Medication changes made following ED/Hospital discharge?: No  MyChart active?: Yes  Patient sent Social Drivers of Health questionnaire?: No    Patient accepts CC: No, Patient expressed no additional support is needed at this time. Patient will be sent Care Coordination introduction letter for future reference.     Neli Briceno  Community Health Worker    Connected Care Resources   [facility]     *Connected Care Resources Team does NOT   follow patient ongoing. Referrals are identified   based on internal discharge report and the outreach is to ensure   Patient has understanding of their discharge instructions.

## 2025-05-29 ENCOUNTER — OFFICE VISIT (OUTPATIENT)
Dept: FAMILY MEDICINE | Facility: OTHER | Age: 40
End: 2025-05-29
Payer: MEDICARE

## 2025-05-29 VITALS
HEART RATE: 82 BPM | TEMPERATURE: 97.7 F | HEIGHT: 66 IN | BODY MASS INDEX: 47.09 KG/M2 | SYSTOLIC BLOOD PRESSURE: 128 MMHG | WEIGHT: 293 LBS | OXYGEN SATURATION: 97 % | DIASTOLIC BLOOD PRESSURE: 80 MMHG | RESPIRATION RATE: 18 BRPM

## 2025-05-29 DIAGNOSIS — H69.91 DYSFUNCTION OF RIGHT EUSTACHIAN TUBE: Primary | ICD-10-CM

## 2025-05-29 DIAGNOSIS — J06.9 VIRAL URI: ICD-10-CM

## 2025-05-29 RX ORDER — METHYLPREDNISOLONE 4 MG/1
TABLET ORAL
Qty: 21 TABLET | Refills: 0 | Status: SHIPPED | OUTPATIENT
Start: 2025-05-29

## 2025-05-29 ASSESSMENT — PAIN SCALES - GENERAL: PAINLEVEL_OUTOF10: MODERATE PAIN (6)

## 2025-05-29 NOTE — PROGRESS NOTES
Assessment & Plan     Dysfunction of right eustachian tube  Viral URI  Assuming viral insult causing inflammation would have her trial medications and no below and follow-up with her primary care provider.  Did warn her that use of this medication may indeed cause worsening of prediabetes.  Follow-up with primary care provider in the near future is encouraged.  - methylPREDNISolone (MEDROL DOSEPAK) 4 MG tablet therapy pack; Follow Package Directions        MED REC REQUIRED   Post Medication Reconciliation Status: discharge medications reconciled and changed, per note/orders    Remi Heaton is a 39 year old, presenting for the following health issues:  Hospital F/U (ER follow up)      5/29/2025     1:19 PM   Additional Questions   Roomed by Maria Alejandra STRANGE   Accompanied by self     History of Present Illness       Reason for visit:  Hearing loss    She eats 2-3 servings of fruits and vegetables daily.She consumes 1 sweetened beverage(s) daily.She exercises with enough effort to increase her heart rate 30 to 60 minutes per day.  She exercises with enough effort to increase her heart rate 4 days per week. She is missing 1 dose(s) of medications per week.  She is not taking prescribed medications regularly due to remembering to take.      ED/UC Followup:    Facility:  [facility]  Date of visit: 05/26/2025  Reason for visit: Otalgia  Current Status: still having pain    Said she had fluid behind ear drum but has been taking Sudafed , round 3 of that and nothing is getting any better. Gave a liquid steroid while there but has not helped and gave her Zyrtec as well but nothing is helping. Still having hearing loss still on the right side, they reported to her no wax causing this. She has been coughing up green phlegm and thinks maybe she has a cold going on too, since the cold started that is when the hearing loss started. Would rate pain in ear 5 or 6/10.      Review of Systems  Constitutional, HEENT,  "cardiovascular, pulmonary, GI, , musculoskeletal, neuro, skin, endocrine and psych systems are negative, except as otherwise noted.      Objective    /80   Pulse 82   Temp 97.7  F (36.5  C) (Temporal)   Resp 18   Ht 1.676 m (5' 6\")   Wt (!) 137 kg (302 lb)   LMP  (LMP Unknown)   SpO2 97%   BMI 48.74 kg/m    Body mass index is 48.74 kg/m .  Physical Exam   GENERAL: alert and no distress  HEENT:    Head normocephalic, hair normal, skin normal.    Eyes: CAMERON/EOM  Ears:  Bilateral EAC's clear, TM's gray with bony landmarks and light reflex normal, negative effusion.  Nose: Bilateral nares patent, tissue pink no discharge noted.  Throat:  oral pharynx with good hydration, negative for tonsillar hypertrophy, negative pitting, ulceration or exudates.  RESP: lungs clear to auscultation - no rales, rhonchi or wheezes  CV: regular rate and rhythm, normal S1 S2, no S3 or S4, no murmur, click or rub, no peripheral edema  ABDOMEN: soft, nontender, no hepatosplenomegaly, no masses and bowel sounds normal  MS: no gross musculoskeletal defects noted, no edema          Signed Electronically by: Lazaro Vides PA-C    "

## 2025-06-02 ENCOUNTER — TRANSFERRED RECORDS (OUTPATIENT)
Dept: HEALTH INFORMATION MANAGEMENT | Facility: CLINIC | Age: 40
End: 2025-06-02
Payer: MEDICARE

## 2025-06-12 ENCOUNTER — VIRTUAL VISIT (OUTPATIENT)
Dept: URGENT CARE | Facility: CLINIC | Age: 40
End: 2025-06-12
Payer: MEDICARE

## 2025-06-12 ENCOUNTER — NURSE TRIAGE (OUTPATIENT)
Dept: FAMILY MEDICINE | Facility: OTHER | Age: 40
End: 2025-06-12

## 2025-06-12 DIAGNOSIS — N39.0 ACUTE UTI: Primary | ICD-10-CM

## 2025-06-12 RX ORDER — SULFAMETHOXAZOLE AND TRIMETHOPRIM 800; 160 MG/1; MG/1
1 TABLET ORAL 2 TIMES DAILY
Qty: 10 TABLET | Refills: 0 | Status: SHIPPED | OUTPATIENT
Start: 2025-06-12 | End: 2025-06-17

## 2025-06-12 NOTE — PROGRESS NOTES
Florina is a 39 year old who is being evaluated via a billable telephone visit.      Originating Location (pt. Location): Home    Distant Location (provider location):  Off-site  Telephone visit completed due to the patient did not consent to a video visit.    Assessment & Plan     Acute UTI    - sulfamethoxazole-trimethoprim (BACTRIM DS) 800-160 MG tablet; Take 1 tablet by mouth 2 times daily for 5 days.            Follow-up  Return in about 5 days (around 6/17/2025), or if symptoms worsen or fail to improve.    Subjective   Florina is a 39 year old, presenting for the following health issues:  No chief complaint on file.    HPI      Genitourinary - Female  Onset/Duration: yesterday  Description:   Painful urination (Dysuria): YES           Frequency: YES  Blood in urine (Hematuria): No  Delay in urine (Hesitency): No  Intensity: moderate  Progression of Symptoms:  same  Accompanying Signs & Symptoms:  Fever/chills: No  Flank pain: No  Nausea and vomiting: No  Vaginal symptoms: none  Abdominal/Pelvic Pain: No  History:   History of frequent UTI s: No  History of kidney stones: No  Sexually Active: No  Possibility of pregnancy: No  Precipitating or alleviating factors: None  Therapies tried and outcome: Increase fluid intake        Review of Systems  Constitutional, HEENT, cardiovascular, pulmonary, gi and gu systems are negative, except as otherwise noted.      Objective           Vitals:  No vitals were obtained today due to virtual visit.    Physical Exam   General: Alert and no distress //Respiratory: No audible wheeze, cough, or shortness of breath // Psychiatric:  Appropriate affect, tone, and pace of words            Phone call duration: 11 minutes  Signed Electronically by: [facility]

## 2025-06-12 NOTE — TELEPHONE ENCOUNTER
Patient calling requesting orders for possibly UTI. She notes only symptom is burning with urination that started today.     RN advised patient to complete an evisit or offered to look for a virtual appointment. RN was able to schedule patient a virtual appointment today at 9:30pm.     RN reviewed red flag symptoms with patient and when to see emergency care. They agreed and understood.     Future Appointments 6/12/2025 - 12/9/2025        Date Visit Type Length Department Provider     6/12/2025  9:30 PM TELEPHONE VISIT SHORT 30 min  [facility] [place]  VIRTUAL CARE PROVIDER                   TOBIN Ramirez, RN            Reason for Disposition   All other females with painful urination, or patient wants to be seen    Additional Information   Negative: Shock suspected (e.g., cold/pale/clammy skin, too weak to stand, low BP, rapid pulse)   Negative: Sounds like a life-threatening emergency to the triager   Negative: Followed a genital area injury (e.g., vagina, vulva)   Negative: Taking antibiotic for urinary tract infection (UTI)   Negative: Pregnant   Negative: Unable to urinate (or only a few drops) and bladder feels very full   Negative: Vomiting   Negative: Patient sounds very sick or weak to the triager   Negative: Severe pain with urination   Negative: Fever > 100.4 F (38.0 C)   Negative: Side (flank) or lower back pain present   Negative: Unusual vaginal discharge   Negative: > 2 UTIs in last year   Negative: Patient is worried they have a sexually transmitted infection (STI)   Negative: Age > 50 years   Negative: Possibility of pregnancy   Negative: Painful urination AND EITHER frequency or urgency    Protocols used: Urination Pain - Female-A-[place]

## 2025-06-12 NOTE — PATIENT INSTRUCTIONS
Prescription for Bactrim was sent to your pharmacy in [place].  Take this as directed, and increase your water consumption.  If by the end of treatment you are not noticing any improvement, come in to be seen.

## 2025-07-03 ENCOUNTER — TRANSFERRED RECORDS (OUTPATIENT)
Dept: HEALTH INFORMATION MANAGEMENT | Facility: CLINIC | Age: 40
End: 2025-07-03
Payer: MEDICARE

## 2025-07-16 ENCOUNTER — OFFICE VISIT (OUTPATIENT)
Dept: FAMILY MEDICINE | Facility: OTHER | Age: 40
End: 2025-07-16
Payer: MEDICARE

## 2025-07-16 VITALS
TEMPERATURE: 97.7 F | HEIGHT: 66 IN | OXYGEN SATURATION: 95 % | HEART RATE: 76 BPM | BODY MASS INDEX: 47.09 KG/M2 | SYSTOLIC BLOOD PRESSURE: 116 MMHG | DIASTOLIC BLOOD PRESSURE: 70 MMHG | RESPIRATION RATE: 20 BRPM | WEIGHT: 293 LBS

## 2025-07-16 DIAGNOSIS — R07.0 THROAT PAIN: Primary | ICD-10-CM

## 2025-07-16 DIAGNOSIS — K59.00 CONSTIPATION, UNSPECIFIED CONSTIPATION TYPE: ICD-10-CM

## 2025-07-16 DIAGNOSIS — N39.44 NOCTURNAL ENURESIS: ICD-10-CM

## 2025-07-16 DIAGNOSIS — N39.3 FEMALE STRESS INCONTINENCE: ICD-10-CM

## 2025-07-16 LAB
DEPRECATED S PYO AG THROAT QL EIA: NEGATIVE
S PYO DNA THROAT QL NAA+PROBE: NOT DETECTED

## 2025-07-16 PROCEDURE — 3074F SYST BP LT 130 MM HG: CPT | Performed by: PHYSICIAN ASSISTANT

## 2025-07-16 PROCEDURE — 1125F AMNT PAIN NOTED PAIN PRSNT: CPT | Performed by: PHYSICIAN ASSISTANT

## 2025-07-16 PROCEDURE — 3078F DIAST BP <80 MM HG: CPT | Performed by: PHYSICIAN ASSISTANT

## 2025-07-16 PROCEDURE — 87651 STREP A DNA AMP PROBE: CPT | Performed by: PHYSICIAN ASSISTANT

## 2025-07-16 PROCEDURE — 99214 OFFICE O/P EST MOD 30 MIN: CPT | Performed by: PHYSICIAN ASSISTANT

## 2025-07-16 ASSESSMENT — PAIN SCALES - GENERAL: PAINLEVEL_OUTOF10: MODERATE PAIN (5)

## 2025-07-16 ASSESSMENT — ENCOUNTER SYMPTOMS: SORE THROAT: 1

## 2025-07-16 NOTE — PROGRESS NOTES
Assessment & Plan     Throat pain  Suspect acute viral infection, Second strep test is pending but rapid is negative.  Recommended home COVID testing, if positive could consider treatment with Paxlovid if no drug interactions.  Recommended tylenol/ibuprofen, salt water gargles, cool liquids.    - Streptococcus A Rapid Screen w/Reflex to PCR - Clinic Collect  - Group A Streptococcus PCR Throat Swab    Nocturnal enuresis  Constipation, unspecified constipation type  Female stress incontinence  Discussed if she has on and off constipation issues this can actually trigger more urinary symptoms. Did recommend considering pelvic floor therapy especially with stress incontinence.  Recommended starting twice daily Miralax 1 capful per dose for a couple of days and then using it once daily until having better bowel movements and less urine symptoms.  Follow-up if not improving or has any change in symptoms like pain, fevers, chills, abdominal pain,e tc.       Options for treatment and follow-up care were reviewed with the patient and/or guardian. Patient and/or guardian engaged in the decision making process and verbalized understanding of the options discussed and agreed with the final plan.      Remi Heaton is a 39 year old, presenting for the following health issues:  Pharyngitis        7/16/2025     8:46 AM   Additional Questions   Roomed by LEODAN Lafleur   Accompanied by Self     Pharyngitis     History of Present Illness       Reason for visit:  Sore throat She is missing 1 dose(s) of medications per week.  She is not taking prescribed medications regularly due to remembering to take.        Acute Illness  Acute illness concerns: sore throat  Onset/Duration: yesterday  Symptoms:  Fever: No  Chills/Sweats: No  Headache (location?): No  Sinus Pressure: No  Conjunctivitis:  YES- right eye kind of watery, otherwise no pain, redness, swelling, other discharge.   Ear Pain: YES: right. She has seen ENT for this and  "feels it is getting better.   Rhinorrhea: No  Congestion: No  Sore Throat: YES- starting getting the sore throat yesterday.   Cough: YES - a little bit  Wheeze: No  Decreased Appetite: YES- maybe a little bit.   Nausea: No  Vomiting: No  Diarrhea: No  Dysuria/Freq.: No  Dysuria or Hematuria: No  Fatigue/Achiness: No  Sick/Strep Exposure: Not specifically but she does work at a  and is around kids  Therapies tried and outcome: None    - She notes she has been having some issues with urine accidents at night, she notes she is a heavy sleeper, she does use a CPAP machine for PRIMO.  No dysuria, hematuria,   - During the day not having accidents unless she sneezes/coughs.   - She has had on and off issues with constipation. Right now has been having bowel movements daily still. No diarrhea.           Review of Systems  Constitutional, HEENT, cardiovascular, pulmonary, gi and gu systems are negative, except as otherwise noted.      Objective    /70   Pulse 76   Temp 97.7  F (36.5  C) (Temporal)   Resp 20   Ht 1.68 m (5' 6.14\")   Wt (!) 139 kg (306 lb 8 oz)   LMP  (LMP Unknown)   SpO2 95%   BMI 49.26 kg/m    Body mass index is 49.26 kg/m .  Physical Exam   GENERAL: alert and no distress  EYES: Eyes grossly normal to inspection, PERRL and conjunctivae and sclerae normal  HENT: normal cephalic/atraumatic, ear canals and TM's normal, nose and mouth without ulcers or lesions, oropharynx clear, oral mucous membranes moist, and tonsils are prominent but no erythema or exudates.   NECK: no adenopathy, no asymmetry, masses, or scars  RESP: lungs clear to auscultation - no rales, rhonchi or wheezes  CV: regular rate and rhythm, normal S1 S2, no S3 or S4, no murmur, click or rub, no peripheral edema  MS: no gross musculoskeletal defects noted, no edema  SKIN: no suspicious lesions or rashes    Results for orders placed or performed in visit on 07/16/25   Streptococcus A Rapid Screen w/Reflex to PCR - Clinic " Collect     Status: Normal    Specimen: Throat; Swab   Result Value Ref Range    Group A Strep antigen Negative Negative           Signed Electronically by: Marcy Campos PA-C

## 2025-08-16 ENCOUNTER — HEALTH MAINTENANCE LETTER (OUTPATIENT)
Age: 40
End: 2025-08-16

## 2025-08-26 ENCOUNTER — OFFICE VISIT (OUTPATIENT)
Dept: FAMILY MEDICINE | Facility: OTHER | Age: 40
End: 2025-08-26
Payer: MEDICARE

## 2025-08-26 VITALS
HEART RATE: 88 BPM | HEIGHT: 66 IN | WEIGHT: 293 LBS | OXYGEN SATURATION: 94 % | TEMPERATURE: 97.6 F | BODY MASS INDEX: 47.09 KG/M2 | RESPIRATION RATE: 20 BRPM | SYSTOLIC BLOOD PRESSURE: 110 MMHG | DIASTOLIC BLOOD PRESSURE: 72 MMHG

## 2025-08-26 DIAGNOSIS — Z12.31 VISIT FOR SCREENING MAMMOGRAM: ICD-10-CM

## 2025-08-26 DIAGNOSIS — R73.03 PREDIABETES: ICD-10-CM

## 2025-08-26 DIAGNOSIS — Z00.00 ENCOUNTER FOR MEDICARE ANNUAL WELLNESS EXAM: Primary | ICD-10-CM

## 2025-08-26 DIAGNOSIS — E78.5 HYPERLIPIDEMIA, UNSPECIFIED HYPERLIPIDEMIA TYPE: ICD-10-CM

## 2025-08-26 DIAGNOSIS — Z30.41 ORAL CONTRACEPTIVE PILL SURVEILLANCE: ICD-10-CM

## 2025-08-26 DIAGNOSIS — E66.01 MORBID OBESITY (H): ICD-10-CM

## 2025-08-26 DIAGNOSIS — R73.01 IMPAIRED FASTING GLUCOSE: ICD-10-CM

## 2025-08-26 LAB
CHOLEST SERPL-MCNC: 202 MG/DL
EST. AVERAGE GLUCOSE BLD GHB EST-MCNC: 123 MG/DL
FASTING STATUS PATIENT QL REPORTED: YES
HBA1C MFR BLD: 5.9 % (ref 0–5.6)
HDLC SERPL-MCNC: 31 MG/DL
LDLC SERPL CALC-MCNC: 107 MG/DL
NONHDLC SERPL-MCNC: 171 MG/DL
TRIGL SERPL-MCNC: 319 MG/DL
TSH SERPL DL<=0.005 MIU/L-ACNC: 1.46 UIU/ML (ref 0.3–4.2)

## 2025-08-26 RX ORDER — DESOGESTREL AND ETHINYL ESTRADIOL 0.15-0.03
1 KIT ORAL DAILY
Qty: 84 TABLET | Refills: 3 | Status: SHIPPED | OUTPATIENT
Start: 2025-08-26

## 2025-08-26 SDOH — HEALTH STABILITY: PHYSICAL HEALTH: ON AVERAGE, HOW MANY DAYS PER WEEK DO YOU ENGAGE IN MODERATE TO STRENUOUS EXERCISE (LIKE A BRISK WALK)?: 3 DAYS

## (undated) DEVICE — SU VICRYL 3-0 RB-1 27" UND J215H

## (undated) DEVICE — GLOVE ESTEEM BLUE W/NEU-THERA 8.0  2D73PB80

## (undated) DEVICE — DRSG GAUZE 4X4" TRAY

## (undated) DEVICE — DRSG TELFA 3X8" 1238

## (undated) DEVICE — SU PDS II 1 CT-1 MONOFIL Z347H

## (undated) DEVICE — GLOVE ESTEEM BLUE W/NEU-THERA 7.5  2D73PB75

## (undated) DEVICE — GLOVE PROTEXIS W/NEU-THERA 7.0  2D73TE70

## (undated) DEVICE — ESU SUCTION/IRRIGATION SYSTEM PISTOL GRIP

## (undated) DEVICE — DRAPE STERI TOWEL SM 1000

## (undated) DEVICE — ENDO TROCAR FIRST ENTRY KII FIOS Z-THRD 12X100MM CTF73

## (undated) DEVICE — BASIN SET MINOR DISP

## (undated) DEVICE — GLOVE PROTEXIS W/NEU-THERA 7.5  2D73TE75

## (undated) DEVICE — SU ETHILON 4-0 PS-2 18" 1667G

## (undated) DEVICE — ENDO TROCAR FIRST ENTRY KII FIOS Z-THRD 05X100MM CTF03

## (undated) DEVICE — PUNCH SKIN 4MM P450

## (undated) DEVICE — SYR 10ML FINGER CONTROL W/O NDL 309695

## (undated) DEVICE — SOL NACL 0.9% IRRIG 1000ML BOTTLE 07138-09

## (undated) DEVICE — ENDO POUCH UNIV RETRIEVAL SYSTEM INZII 10MM CD001

## (undated) DEVICE — SOL NACL 0.9% INJ 1000ML BAG 07983-09

## (undated) DEVICE — PACK HAND WRIST FOREARM CUSTOM

## (undated) DEVICE — ESU CORD MONOPOLAR HIGH FREQUENCY 26006M-D/10

## (undated) DEVICE — ESU GROUND PAD UNIVERSAL W/O CORD

## (undated) DEVICE — STOCKING SLEEVE COMPRESSION CALF LG

## (undated) DEVICE — PACK GENERAL LAPAOSCOPY

## (undated) DEVICE — PREP POVIDONE IODINE SWABSTICKS TRIPLE PACK MDS093902A

## (undated) DEVICE — ENDO TROCAR SLEEVE KII Z-THREADED 05X100MM CTS02

## (undated) DEVICE — SUCTION MANIFOLD NEPTUNE 2 SYS 4 PORT 0702-020-000

## (undated) DEVICE — ADH SKIN CLOSURE PREMIERPRO EXOFIN 1.0ML 3470

## (undated) DEVICE — STPL ENDO RELOAD ECHELON 45X2.0MM GREY ECR45M

## (undated) DEVICE — NEEDLE ECLIPSE 23X1 1/2 RB TW 303304

## (undated) DEVICE — ESU HOOK TIP 5MM CONMED

## (undated) DEVICE — SU VICRYL 3-0 SH 27" UND J416H

## (undated) RX ORDER — BUPIVACAINE HYDROCHLORIDE AND EPINEPHRINE 2.5; 5 MG/ML; UG/ML
INJECTION, SOLUTION EPIDURAL; INFILTRATION; INTRACAUDAL; PERINEURAL
Status: DISPENSED
Start: 2024-12-13

## (undated) RX ORDER — ONDANSETRON 2 MG/ML
INJECTION INTRAMUSCULAR; INTRAVENOUS
Status: DISPENSED
Start: 2023-01-09

## (undated) RX ORDER — PROPOFOL 10 MG/ML
INJECTION, EMULSION INTRAVENOUS
Status: DISPENSED
Start: 2023-01-09

## (undated) RX ORDER — FENTANYL CITRATE 50 UG/ML
INJECTION, SOLUTION INTRAMUSCULAR; INTRAVENOUS
Status: DISPENSED
Start: 2019-07-16

## (undated) RX ORDER — BUPIVACAINE HYDROCHLORIDE 5 MG/ML
INJECTION, SOLUTION EPIDURAL; INTRACAUDAL
Status: DISPENSED
Start: 2019-03-22

## (undated) RX ORDER — LIDOCAINE HYDROCHLORIDE 10 MG/ML
INJECTION, SOLUTION EPIDURAL; INFILTRATION; INTRACAUDAL; PERINEURAL
Status: DISPENSED
Start: 2019-03-22

## (undated) RX ORDER — BUPIVACAINE HYDROCHLORIDE 5 MG/ML
INJECTION, SOLUTION EPIDURAL; INTRACAUDAL
Status: DISPENSED
Start: 2019-07-16

## (undated) RX ORDER — KETOROLAC TROMETHAMINE 30 MG/ML
INJECTION, SOLUTION INTRAMUSCULAR; INTRAVENOUS
Status: DISPENSED
Start: 2023-01-09

## (undated) RX ORDER — KETOROLAC TROMETHAMINE 30 MG/ML
INJECTION, SOLUTION INTRAMUSCULAR; INTRAVENOUS
Status: DISPENSED
Start: 2024-12-13

## (undated) RX ORDER — BUPIVACAINE HYDROCHLORIDE AND EPINEPHRINE 2.5; 5 MG/ML; UG/ML
INJECTION, SOLUTION EPIDURAL; INFILTRATION; INTRACAUDAL; PERINEURAL
Status: DISPENSED
Start: 2023-01-09

## (undated) RX ORDER — ONDANSETRON 2 MG/ML
INJECTION INTRAMUSCULAR; INTRAVENOUS
Status: DISPENSED
Start: 2024-12-13

## (undated) RX ORDER — FENTANYL CITRATE 50 UG/ML
INJECTION, SOLUTION INTRAMUSCULAR; INTRAVENOUS
Status: DISPENSED
Start: 2023-01-09

## (undated) RX ORDER — PROPOFOL 10 MG/ML
INJECTION, EMULSION INTRAVENOUS
Status: DISPENSED
Start: 2019-07-16

## (undated) RX ORDER — FENTANYL CITRATE 50 UG/ML
INJECTION, SOLUTION INTRAMUSCULAR; INTRAVENOUS
Status: DISPENSED
Start: 2024-12-13

## (undated) RX ORDER — DEXAMETHASONE SODIUM PHOSPHATE 10 MG/ML
INJECTION, SOLUTION INTRAMUSCULAR; INTRAVENOUS
Status: DISPENSED
Start: 2023-01-09

## (undated) RX ORDER — ONDANSETRON 2 MG/ML
INJECTION INTRAMUSCULAR; INTRAVENOUS
Status: DISPENSED
Start: 2019-07-16

## (undated) RX ORDER — LIDOCAINE HYDROCHLORIDE 10 MG/ML
INJECTION, SOLUTION EPIDURAL; INFILTRATION; INTRACAUDAL; PERINEURAL
Status: DISPENSED
Start: 2024-12-13